# Patient Record
Sex: MALE | Race: WHITE | Employment: FULL TIME | URBAN - METROPOLITAN AREA
[De-identification: names, ages, dates, MRNs, and addresses within clinical notes are randomized per-mention and may not be internally consistent; named-entity substitution may affect disease eponyms.]

---
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
## 2020-01-07 ENCOUNTER — PATIENT OUTREACH (OUTPATIENT)
Dept: GASTROENTEROLOGY | Facility: CLINIC | Age: 20
End: 2020-01-07

## 2020-01-07 ENCOUNTER — TELEPHONE (OUTPATIENT)
Dept: GASTROENTEROLOGY | Facility: CLINIC | Age: 20
End: 2020-01-07

## 2020-01-07 DIAGNOSIS — K50.90 CROHN'S DISEASE (H): ICD-10-CM

## 2020-01-07 DIAGNOSIS — K50.90 CROHN'S DISEASE (H): Primary | ICD-10-CM

## 2020-01-07 LAB
FERRITIN SERPL-MCNC: 167 NG/ML (ref 26–388)
IRON SATN MFR SERPL: 19 % (ref 15–46)
IRON SERPL-MCNC: 62 UG/DL (ref 35–180)
TIBC SERPL-MCNC: 320 UG/DL (ref 240–430)
VIT B12 SERPL-MCNC: 643 PG/ML (ref 193–986)

## 2020-01-07 PROCEDURE — 83540 ASSAY OF IRON: CPT | Performed by: INTERNAL MEDICINE

## 2020-01-07 PROCEDURE — 82607 VITAMIN B-12: CPT | Performed by: INTERNAL MEDICINE

## 2020-01-07 PROCEDURE — 83550 IRON BINDING TEST: CPT | Performed by: INTERNAL MEDICINE

## 2020-01-07 PROCEDURE — 82728 ASSAY OF FERRITIN: CPT | Performed by: INTERNAL MEDICINE

## 2020-01-07 PROCEDURE — 36415 COLL VENOUS BLD VENIPUNCTURE: CPT | Performed by: INTERNAL MEDICINE

## 2020-01-07 NOTE — TELEPHONE ENCOUNTER
Prior Authorization Not Needed per Insurance    Medication: HUMIRA WEEKLY - NEW INSURANCE NO PA NEEDED  Insurance Company: [employer] - Phone [phone] Fax [phone]  Expected CoPay:      Pharmacy Filling the Rx: [facility] - [address] [place] [address]  Pharmacy Notified:    Patient Notified:

## 2020-01-07 NOTE — TELEPHONE ENCOUNTER
PA Initiation    Medication: HUMIRA WEEKLY - [facility] INSURANCE   Insurance Company: [employer] - Phone [phone] Fax [phone]  Pharmacy Filling the Rx: [facility] [place] - [address] [place] [address]  Filling Pharmacy Phone:    Filling Pharmacy Fax:    Start Date: 1/7/2020

## 2020-01-07 NOTE — PROGRESS NOTES
Mother called and left a message that patient was sleeping a lot and did not want to go snow boarding with his friends as he was tired.  Mother states similar when he was low in iron. Orders placed and my chart message to pt. Also will check on humira as patient insurance has changed. Discussed with pharmacy liaison and needs to fill through [facility]   Pt has 2 pens so not urgent.    Mother aware if be pharmacy.

## 2020-02-25 ENCOUNTER — PATIENT OUTREACH (OUTPATIENT)
Dept: GASTROENTEROLOGY | Facility: CLINIC | Age: 20
End: 2020-02-25

## 2020-02-25 DIAGNOSIS — K50.90 CROHN'S DISEASE (H): Primary | ICD-10-CM

## 2020-02-25 RX ORDER — HYOSCYAMINE SULFATE 0.125 MG
0.12 TABLET ORAL EVERY 4 HOURS PRN
Qty: 40 TABLET | Refills: 3 | Status: SHIPPED | OUTPATIENT
Start: 2020-02-25 | End: 2020-04-13

## 2020-02-25 NOTE — PROGRESS NOTES
Mom called asking for a refill of levsin. Pt about once or twice a week has cramping. Refilled the levsin.

## 2020-04-13 ENCOUNTER — VIRTUAL VISIT (OUTPATIENT)
Dept: GASTROENTEROLOGY | Facility: CLINIC | Age: 20
End: 2020-04-13
Payer: COMMERCIAL

## 2020-04-13 DIAGNOSIS — K50.018 CROHN'S DISEASE OF SMALL INTESTINE WITH OTHER COMPLICATION (H): Primary | ICD-10-CM

## 2020-04-13 RX ORDER — HYOSCYAMINE SULFATE 0.125 MG
0.12 TABLET ORAL EVERY 4 HOURS PRN
Qty: 40 TABLET | Refills: 3 | Status: SHIPPED | OUTPATIENT
Start: 2020-04-13 | End: 2020-04-16

## 2020-04-13 RX ORDER — ADALIMUMAB 40MG/0.4ML
40 KIT SUBCUTANEOUS
Qty: 4 EACH | Refills: 5 | Status: SHIPPED | OUTPATIENT
Start: 2020-04-13 | End: 2020-08-13

## 2020-04-13 RX ORDER — MERCAPTOPURINE 50 MG/1
100 TABLET ORAL DAILY
Qty: 60 TABLET | Refills: 4 | Status: SHIPPED | OUTPATIENT
Start: 2020-04-13 | End: 2020-05-29

## 2020-04-13 ASSESSMENT — PAIN SCALES - GENERAL: PAINLEVEL: NO PAIN (0)

## 2020-04-13 NOTE — PROGRESS NOTES
"Ventura Quiroz is a 19 year old male who is being evaluated via a billable video visit.      The patient has been notified of following:     \"This video visit will be conducted via a call between you and your physician/provider. We have found that certain health care needs can be provided without the need for an in-person physical exam.  This service lets us provide the care you need with a video conversation.  If a prescription is necessary we can send it directly to your pharmacy.  If lab work is needed we can place an order for that and you can then stop by our lab to have the test done at a later time.    Video visits are billed at different rates depending on your insurance coverage.  Please reach out to your insurance provider with any questions.    If during the course of the call the physician/provider feels a video visit is not appropriate, you will not be charged for this service.\"    Patient has given verbal consent for Video visit? Yes    How would you like to obtain your AVS? LeilaChaseley    Patient would like the video invitation sent by: Text to cell phone: [phone]      Video Start Time: 2:58 PM      Video-Visit Details    Type of service:  Video Visit    Video End Time (time video stopped): 3:18    Originating Location (pt. Location): Home    Distant Location (provider location):  [facility]     Mode of Communication:  Video Conference via [employer]el[employer]      Tobin Wright MD      "

## 2020-04-13 NOTE — PROGRESS NOTES
IBD CLINIC VISIT    CC/REFERRING MD:  Referred Self  REASON FOR CONSULTATION: Crohn's disease    ASSESSMENT/PLAN:  19 year old male with Crohn's disease of the ileum initially complicated by phlegmon and abscess.    1. Crohn's disease: Ileal Crohn's, previously complicated by phlegmon and abscess.  He had an improvement initially with adalimumab every other week and 6-mercaptopurine.  However he began develop increased inflammatory markers of recurrent symptoms.  Adalimumab trough concentration was 5.  We dose escalated adalimumab to every week in the fall 2019 and he has had a substantial clinical improvement.  He is technically in clinical remission at this time, but does have ongoing pain that I do believe is related to his ileal disease.  Ultimately I believe we need to reevaluate his Crohn's inflammation on weekly adalimumab.    I suspect his pain may be related to his Crohn's ileitis.  Potentially some mild stricturing disease or ongoing inflammation with some luminal narrowing.  Ultimately our goal will be to treat to deep remission.  He will need to be reevaluated with MRE and colonoscopy on weekly adalimumab and 100 mg of mercaptopurine.    We discussed the importance of remaining on the patient's current immunosuppressive therapy.  Discussed that the risks of coronavirus on these medications were small.  Discussed that should medications be interrupted, there could be a flare of the underlying inflammatory bowel disease which could require prednisone or hospitalization, both of which would likely increase the risk of coronavirus beyond that of the current medications.  Also discussed that should we enter a medications there is no guarantee that they would be effective again.  Finally we discussed the importance of hand hygiene, social isolation, and following all routine recommendations from the CDC.    -- Continue combination therapy with adalimumab weekly and mercaptopourine 50mg daily  -- Labs in May:   repeat adalimumab concentration and thiopurine metabolites   -- Colonoscopy / MRE in Spring July 2020 (tentative based on coronavirus)    2.  Acne: Follow with Derm, improved - almost resolved.    IBD HISTORY  Age at diagnosis: 18  Extent of disease: ileal  Disease phenotype: fistulizing  Nieves-anal disease: No  Current CD medications:   - Adalimumab (started 5/8/19) 40mg QOW; increase to EW 9/10/19  - mercaptopurine 100mg daily  Prior IBD surgeries: None  Prior IBD Medications:   - Ciprofloxacin  - Metronidazole    DRUG MONITORING  TPMT enzyme activity:     6-TGN/6-MMPN levels:    Biologic concentration:  8/23/19: Adalimumab: 5, no antibodies, every other week, 50mg mercaptopurine     DISEASE ASSESSMENT  Labs  Recent Labs   Lab Test 12/13/19  1341 09/10/19  0841   CRP 7.9 4.0   SED 9 5     Fecal calprotectin: --  Endoscopy: ileitis with stenosis at IC valve  Enterography: Inflamed ileum, inflammatory mass, likely ileo-ileal distula. > 25cm of ileum involved.   C diff: negative (4/9/19)    sIBDQ:   IBDQ Score Date IBDQ - Total Score  (Higher score better)   9/10/2019 62   5/28/2019 56   4/24/2019 48       IBD Health Care Maintenance:    Vaccinations:  All patients on biologics should avoid live vaccines.    -- Influenza (every year)  -- TdaP (every 10 years)  -- Pneumococcal Pneumonia (once plus booster at 5 years)  -- Yearly assessment for latent Tb (verbal screening and exam, PPD or QuantiFERON-Tb testing)    One time confirmation of immunity or serologies:  -- Hepatitis A (serologies or immunizations)  -- Hepatitis B (serologies or immunizations)  -- Varicella  -- MMR  -- HPV (all aged 18-26)  -- Meningococcal meningitis (all patients at risk for meningitis)  -- Due to the immunosuppression in this patient, I would not advise administration of live vaccines such as varicella/VZV, intranasal influenza, MMR, or yellow fever vaccine (if travelling).      Bone mineral density screening   -- Recommend all patients  supplement with calcium and vitamin D  -- Given prior steroid use recommend DEXA if not already done    Cancer Screening:  Colon cancer screening:  Given ileal only disease, he does not need IBD dysplasia surveillance     Skin cancer screening: Annual visual exam of skin by dermatologist since patient is immunocompromised    Depression Screening:  PHQ-2 Score:     PHQ-2 ( 1999 [employer]) 4/24/2019 6/17/2013   Q1: Little interest or pleasure in doing things 1 0   Q2: Feeling down, depressed or hopeless 0 0   PHQ-2 Score 1 0   Q1: Little interest or pleasure in doing things Several days -   Q2: Feeling down, depressed or hopeless Not at all -   PHQ-2 Score 1 -         Misc:  -- Avoid tobacco use  -- Avoid NSAIDs as there is potentially a 25% chance of causing an IBD flare    Return to clinic in 3 months    Thank you for this consultation.  It was a pleasure to participate in the care of this patient; please contact us with any further questions.  A total of 20 minutes, face to face, was spent with this patient, >50% of which was counseling regarding the above delineated issues.      This note was created with voice recognition software, and while reviewed for accuracy, typos may remain.     Tobin Wright MD   of Medicine  Division of Gastroenterology, Hepatology and Nutrition  [facility]      HPI:   Virtual visit accomplished today for his follow-up.  Overall he is doing very well and has had an over 90% improvement in his Crohn's symptoms with his dose escalation to weekly adalimumab.  He continues to have intermittent abdominal pain.  There seems to be some triggers for this.  For example when he was on vacation with his friend in [place] he was having pain every single day.  However back at home he is having pain only once every 2 to 3 weeks.    Pain is below the belly button, and similar to when he was initially hospitalized, but not as severe.  He will take the antispasmodic  hyoscyamine which resolves his pain within 30 minutes.      Otherwise having 2 stools a day.  No blood.  No other EIM of Crohn's disease.      Was laid off Feb 14.     ROS:    No fevers or chills  No weight loss  No blurry vision, double vision or change in vision  No sore throat  No lymphadenopathy  No headache, paraesthesias, or weakness in a limb  No shortness of breath or wheezing  No chest pain or pressure  No arthralgias or myalgias  No rashes or skin changes  No odynophagia or dysphagia  No BRBPR, hematochezia, melena  No dysuria, frequency or urgency  No hot/cold intolerance or polyria  No anxiety or depression    Extra intestinal manifestations of IBD:  No uveitis/episcleritis  No aphthous ulcers   No arthritis   No erythema nodosum/pyoderma gangrenosum.     PERTINENT PAST MEDICAL HISTORY:  Past Medical History:   Diagnosis Date     Allergic rhinitis, cause unspecified     Zyrtec helps     Crohn's Colitis 4/8/2019     Unspecified otitis media     Otitis Media       PREVIOUS SURGERIES:  Past Surgical History:   Procedure Laterality Date     COLONOSCOPY N/A 4/11/2019    Procedure: COMBINED COLONOSCOPY, SINGLE OR MULTIPLE BIOPSY/POLYPECTOMY BY BIOPSY;  Surgeon: Tobin Wright MD;  Location:  GI     EXCISE LIP OR CHEEK FOLD  10/3/2003    Needle cautery frenulectomy.     PE TUBES  4/25/2006     TONSILLECTOMY & ADENOIDECTOMY  4/25/2006       PREVIOUS ENDOSCOPY:  Results for orders placed or performed during the hospital encounter of 04/08/19   COLONOSCOPY   Result Value Ref Range    COLONOSCOPY       [address] [place] [address] [phone]     Endoscopy Department  _______________________________________________________________________________  Patient Name: Ventura Quiroz             Procedure Date: 4/11/2019 7:20 AM  MRN: [id number]                       Account Number: [id number]  YOB: 2000              Admit Type: Inpatient  Age: 18                                Room:  #1  Gender: Male                          Note Status: Finalized  Attending MD: Tobin Wright ,    Total Sedation Time:   _______________________________________________________________________________     Procedure:           Colonoscopy  Indications:         Chronic diarrhea, Suspected Crohn's disease of the small                        bowel, Abnormal CT of the GI tract  Providers:           Tobin Wright, Evens Wolfe, RN  Referring MD:          Medicines:           See the other procedure note for documentation of the                         administered medications  Complications:       No immediate complications.  _______________________________________________________________________________  Procedure:           Pre-Anesthesia Assessment:                       - Prior to the procedure, a History and Physical was                        performed, and patient medications and allergies were                        reviewed. The patient is competent. The risks and                        benefits of the procedure and the sedation options and                        risks were discussed with the patient. All questions                        were answered and informed consent was obtained. Patient                        identification and proposed procedure were verified by                        the physician and the nurse in the pre-procedure area in                        the procedure room. Mental Status Examination: alert and                        oriented. Airway Examination: normal oropharyngeal                         airway and neck mobility and Mallampati Class II (the                        uvula but not tonsillar pillars visualized). Respiratory                        Examination: clear to auscultation. CV Examination:                        normal. Prophylactic Antibiotics: The patient does not                        require prophylactic antibiotics. Prior  Anticoagulants:                        The patient has taken no previous anticoagulant or                        antiplatelet agents. ASA Grade Assessment: II - A                        patient with mild systemic disease. After reviewing the                        risks and benefits, the patient was deemed in                        satisfactory condition to undergo the procedure. The                        anesthesia plan was to use moderate sedation / analgesia                        (conscious sedation). Immediately prior to                        administration of medications, the patient was                        re-assessed for adequacy  to receive sedatives. The heart                        rate, respiratory rate, oxygen saturations, blood                        pressure, adequacy of pulmonary ventilation, and                        response to care were monitored throughout the                        procedure. The physical status of the patient was                        re-assessed after the procedure.                       After obtaining informed consent, the colonoscope was                        passed under direct vision. Throughout the procedure,                        the patient's blood pressure, pulse, and oxygen                        saturations were monitored continuously. The Colonoscope                        was introduced through the anus and advanced to the                        cecum, identified by appendiceal orifice and ileocecal                        valve. The colonoscopy was performed without difficulty.                        The patient tolerated the procedure well. The quality of                         the bowel preparation was good.                                                                                   Findings:       The perianal and digital rectal examinations were normal.       The colon (entire examined portion) appeared normal. Biopsies were taken        with a  cold forceps for histology. Verification of patient        identification for the specimen was done. Estimated blood loss was        minimal.       Diffuse inflammation, severe and characterized by serpentine ulcerations        was found in the terminal ileum. The TI was stenotic and unable to be        intubated with the adult colonoscope, however the mucosa was visualized.        Biopsies were taken with a cold forceps for histology. Verification of        patient identification for the specimen was done. Estimated blood loss        was minimal.                                                                                   Impression:          - Due to a technical problem, usable images w ere not                        captured                       - The entire examined colon is normal. Biopsied.                       - Ileitis consistent with Crohn's disease with narrowing                        at TI. Biopsied.  Recommendation:      - Await pathology results.                       - Continue antibiotics: Cipro and flagyl                       - Low residue diet.                       - Tentative plan for repeat CT scan in 2-3 weeks to                        assess for improvement of abscess                       - If abscess improving, then likely start anti-TNF                       - Establish care in the IBD program (GI team to                        coordinate).                       - Follow-up pathology results.                                                                                     Electronically signed by: Tobin Wright MD  _____________________  Tobin Wright,   4/11/2019 9:47:56 PM  I was physically present for the entire viewing portion o f the exam.  __________________________  Signature of teaching physician  Nicole/Lauren Wright  Number of Addenda: 0    Note Initiated On: 4/11/2019 7:20 AM  Scope In:  Scope Out:     ]    ALLERGIES:     Allergies   Allergen Reactions      Amoxicillin      Penicillin [Penicillins]        PERTINENT MEDICATIONS:    Current Outpatient Medications:      adalimumab (HUMIRA *CF* PEN) 40 MG/0.4ML pen kit, Inject 0.4 mLs (40 mg) Subcutaneous every 7 days, Disp: 4 each, Rfl: 5     adalimumab (HUMIRA *CF* PEN) 40 MG/0.4ML pen kit, Inject 0.4 mLs (40 mg) Subcutaneous every 7 days, Disp: 4 each, Rfl: 5     ferrous sulfate (FEROSUL) 325 (65 Fe) MG tablet, Take 1 tablet (325 mg) by mouth daily (with lunch), Disp: 90 tablet, Rfl: 3     mercaptopurine (PURINETHOL) 50 MG tablet, Take 2 tablets (100 mg) by mouth daily, Disp: 60 tablet, Rfl: 4     clindamycin (CLEOCIN T) 1 % external lotion, Apply topically 2 times daily (Patient not taking: Reported on 4/13/2020), Disp: 60 mL, Rfl: 11     hyoscyamine (LEVSIN) 0.125 MG tablet, Take 1 tablet (125 mcg) by mouth every 4 hours as needed for cramping (Patient not taking: Reported on 4/13/2020), Disp: 40 tablet, Rfl: 3     tretinoin (RETIN-A) 0.1 % external cream, Apply topically At Bedtime Apply a pea sized amount to the forehead, cheeks, and temples nightly before bed. (Patient not taking: Reported on 4/13/2020), Disp: 45 g, Rfl: 3    SOCIAL HISTORY:  Social History     Socioeconomic History     Marital status: Single     Spouse name: Not on file     Number of children: Not on file     Years of education: Not on file     Highest education level: Not on file   Occupational History     Not on file   Social Needs     Financial resource strain: Not on file     Food insecurity     Worry: Not on file     Inability: Not on file     Transportation needs     Medical: Not on file     Non-medical: Not on file   Tobacco Use     Smoking status: Never Smoker     Smokeless tobacco: Never Used   Substance and Sexual Activity     Alcohol use: Yes     Frequency: Monthly or less     Drinks per session: 1 or 2     Drug use: No     Sexual activity: Never   Lifestyle     Physical activity     Days per week: Not on file     Minutes per session:  Not on file     Stress: Not on file   Relationships     Social connections     Talks on phone: Not on file     Gets together: Not on file     Attends Hinduism service: Not on file     Active member of club or organization: Not on file     Attends meetings of clubs or organizations: Not on file     Relationship status: Not on file     Intimate partner violence     Fear of current or ex partner: Not on file     Emotionally abused: Not on file     Physically abused: Not on file     Forced sexual activity: Not on file   Other Topics Concern      Service Not Asked     Blood Transfusions Not Asked     Caffeine Concern Not Asked     Occupational Exposure Not Asked     Hobby Hazards Not Asked     Sleep Concern Not Asked     Stress Concern Not Asked     Weight Concern Not Asked     Special Diet Not Asked     Back Care Not Asked     Exercise Not Asked     Bike Helmet Not Asked     Seat Belt Not Asked     Self-Exams Not Asked     Parent/sibling w/ CABG, MI or angioplasty before 65F 55M? Not Asked   Social History Narrative     Not on file       FAMILY HISTORY:  Family History   Problem Relation Age of Onset     Melanoma No family hx of      Skin Cancer No family hx of         No family history of IBD or colon or rectal cancer.     Past/family/social history reviewed and no changes    PHYSICAL EXAMINATION:  Constitutional: aaox3, cooperative, pleasant, not dyspneic/diaphoretic, no acute distress  Vitals reviewed: There were no vitals taken for this visit.  Wt:   Wt Readings from Last 2 Encounters:   12/13/19 81.7 kg (180 lb 1.6 oz) (82 %)*   09/10/19 79.7 kg (175 lb 9.6 oz) (79 %)*     * Growth percentiles are based on [facility] (Boys, 2-20 Years) data.      Eyes: Sclera anicteric/injected  Ears/nose/mouth/throat: Normal oropharynx without ulcers or exudate, mucus membranes moist, hearing intact  Respiratory: Unlabored breathing   Skin: No jaundice  Psych: Normal affect      PERTINENT STUDIES:  Most recent CBC:  Recent Labs    Lab Test 12/13/19  1341 09/10/19  0841   WBC 7.3 4.7   HGB 13.6 14.1   HCT 39.9* 44.4    195     Most recent hepatic panel:  Recent Labs   Lab Test 12/13/19  1341 09/10/19  0841   ALT 24 26   AST 12 10     Most recent creatinine:  Recent Labs   Lab Test 04/10/19  0640 04/09/19  0730   CR 0.80 0.71

## 2020-04-13 NOTE — PATIENT INSTRUCTIONS
-- Plan for labs in May including Humira and mercaptopurine concentrations    -- Tentative plan for colonoscopy and MRE in July.

## 2020-04-13 NOTE — NURSING NOTE
Chief Complaint   Patient presents with     RECHECK     F/U for Crohn's Disease       There were no vitals filed for this visit.    There is no height or weight on file to calculate BMI.      Ann-Marie Sher LPN

## 2020-04-15 ENCOUNTER — TELEPHONE (OUTPATIENT)
Dept: GASTROENTEROLOGY | Facility: CLINIC | Age: 20
End: 2020-04-15

## 2020-04-15 NOTE — TELEPHONE ENCOUNTER
[facility] Call Center    Phone Message    May a detailed message be left on voicemail: yes     Reason for Call: Other: Lola called from [facility], she said they need clarification on the directions for the HYOSCYAMINE. Please call to inform okay to leave a message     Action Taken: Other: ump GI    Travel Screening: Not Applicable

## 2020-04-15 NOTE — TELEPHONE ENCOUNTER
Contacted [facility].  Transferred twice and 12 minutes later discussed with pharmacy.   [facility] pharmacist and given order for 3 month supply.

## 2020-04-16 ENCOUNTER — TELEPHONE (OUTPATIENT)
Dept: GASTROENTEROLOGY | Facility: CLINIC | Age: 20
End: 2020-04-16

## 2020-04-16 ENCOUNTER — PATIENT OUTREACH (OUTPATIENT)
Dept: GASTROENTEROLOGY | Facility: CLINIC | Age: 20
End: 2020-04-16

## 2020-04-16 DIAGNOSIS — K50.018 CROHN'S DISEASE OF SMALL INTESTINE WITH OTHER COMPLICATION (H): ICD-10-CM

## 2020-04-16 DIAGNOSIS — K50.90 CROHN'S DISEASE (H): Primary | ICD-10-CM

## 2020-04-16 RX ORDER — HYOSCYAMINE SULFATE 0.125 MG
0.12 TABLET ORAL EVERY 4 HOURS PRN
Qty: 40 TABLET | Refills: 3 | Status: SHIPPED | OUTPATIENT
Start: 2020-04-16 | End: 2020-11-30

## 2020-04-16 RX ORDER — ONDANSETRON 4 MG/1
TABLET, ORALLY DISINTEGRATING ORAL
Qty: 2 TABLET | Refills: 0 | Status: ON HOLD | OUTPATIENT
Start: 2020-04-16 | End: 2020-08-25

## 2020-04-16 NOTE — PROGRESS NOTES
Order placed for lab, mre, zofran. Left a message for mom as patient's phone does not accept voice mail messages      Also - can we get routine labs, along with Humira level and thiopurine metabolites in May        July - Assess for colonoscopy and MRE - OK to schedule if we are back to scheduling routine procedures       He needs zofran prior to MRE     Office visit in August.

## 2020-04-16 NOTE — TELEPHONE ENCOUNTER
[facility] Call Center    Phone Message    May a detailed message be left on voicemail: yes     Reason for Call: Medication Refill Request    Has the patient contacted the pharmacy for the refill? Yes   Name of medication being requested: hyoscyamine (LEVSIN) 0.125 MG tablet   Provider who prescribed the medication: Dr. Wright  Pharmacy: [facility] received refill request from a different pharmacy for patient and they are needing authorization from provider to refill. Please contact: [phone] and reference # [id number]  Date medication is needed: 4/17/2020      Action Taken: Message routed to:  [facility] GI    Travel Screening: Not Applicable

## 2020-04-17 ENCOUNTER — MEDICAL CORRESPONDENCE (OUTPATIENT)
Dept: HEALTH INFORMATION MANAGEMENT | Facility: CLINIC | Age: 20
End: 2020-04-17

## 2020-05-28 ENCOUNTER — PATIENT OUTREACH (OUTPATIENT)
Dept: GASTROENTEROLOGY | Facility: CLINIC | Age: 20
End: 2020-05-28
Payer: COMMERCIAL

## 2020-05-28 DIAGNOSIS — R30.0 DYSURIA: Primary | ICD-10-CM

## 2020-05-28 DIAGNOSIS — K50.90 CROHN'S DISEASE (H): ICD-10-CM

## 2020-05-28 DIAGNOSIS — R30.0 DYSURIA: ICD-10-CM

## 2020-05-28 LAB
ALBUMIN SERPL-MCNC: 2.9 G/DL (ref 3.4–5)
ALBUMIN UR-MCNC: 30 MG/DL
ALP SERPL-CCNC: 65 U/L (ref 65–260)
ALT SERPL W P-5'-P-CCNC: 20 U/L (ref 0–50)
APPEARANCE UR: ABNORMAL
AST SERPL W P-5'-P-CCNC: 12 U/L (ref 0–35)
BASOPHILS # BLD AUTO: 0 10E9/L (ref 0–0.2)
BASOPHILS NFR BLD AUTO: 0.2 %
BILIRUB DIRECT SERPL-MCNC: 0.2 MG/DL (ref 0–0.2)
BILIRUB SERPL-MCNC: 0.6 MG/DL (ref 0.2–1.3)
BILIRUB UR QL STRIP: NEGATIVE
COLOR UR AUTO: ABNORMAL
CRP SERPL-MCNC: 82.3 MG/L (ref 0–8)
DIFFERENTIAL METHOD BLD: ABNORMAL
EOSINOPHIL NFR BLD AUTO: 0.6 %
ERYTHROCYTE [DISTWIDTH] IN BLOOD BY AUTOMATED COUNT: 15 % (ref 10–15)
ERYTHROCYTE [SEDIMENTATION RATE] IN BLOOD BY WESTERGREN METHOD: 48 MM/H (ref 0–15)
GLUCOSE UR STRIP-MCNC: NEGATIVE MG/DL
HCT VFR BLD AUTO: 31 % (ref 40–53)
HGB BLD-MCNC: 10.7 G/DL (ref 13.3–17.7)
HGB UR QL STRIP: NEGATIVE
IMM GRANULOCYTES # BLD: 0 10E9/L (ref 0–0.4)
IMM GRANULOCYTES NFR BLD: 0.4 %
KETONES UR STRIP-MCNC: 5 MG/DL
LEUKOCYTE ESTERASE UR QL STRIP: NEGATIVE
LYMPHOCYTES # BLD AUTO: 0.5 10E9/L (ref 0.8–5.3)
LYMPHOCYTES NFR BLD AUTO: 8.9 %
MCH RBC QN AUTO: 32.6 PG (ref 26.5–33)
MCHC RBC AUTO-ENTMCNC: 34.5 G/DL (ref 31.5–36.5)
MCV RBC AUTO: 95 FL (ref 78–100)
MONOCYTES # BLD AUTO: 0.4 10E9/L (ref 0–1.3)
MONOCYTES NFR BLD AUTO: 7.4 %
MUCOUS THREADS #/AREA URNS LPF: PRESENT /LPF
NEUTROPHILS # BLD AUTO: 4.3 10E9/L (ref 1.6–8.3)
NEUTROPHILS NFR BLD AUTO: 82.5 %
NITRATE UR QL: NEGATIVE
NRBC # BLD AUTO: 0 10*3/UL
NRBC BLD AUTO-RTO: 0 /100
PH UR STRIP: 5 PH (ref 5–7)
PLATELET # BLD AUTO: 269 10E9/L (ref 150–450)
PROT SERPL-MCNC: 7.4 G/DL (ref 6.8–8.8)
RBC # BLD AUTO: 3.28 10E12/L (ref 4.4–5.9)
RBC #/AREA URNS AUTO: 2 /HPF (ref 0–2)
SOURCE: ABNORMAL
SP GR UR STRIP: 1.03 (ref 1–1.03)
UROBILINOGEN UR STRIP-MCNC: 2 MG/DL (ref 0–2)
WBC # BLD AUTO: 5.3 10E9/L (ref 4–11)
WBC #/AREA URNS AUTO: 2 /HPF (ref 0–5)

## 2020-05-28 PROCEDURE — 85652 RBC SED RATE AUTOMATED: CPT | Performed by: INTERNAL MEDICINE

## 2020-05-28 PROCEDURE — 85025 COMPLETE CBC W/AUTO DIFF WBC: CPT | Performed by: INTERNAL MEDICINE

## 2020-05-28 PROCEDURE — 86140 C-REACTIVE PROTEIN: CPT | Performed by: INTERNAL MEDICINE

## 2020-05-28 PROCEDURE — 81001 URINALYSIS AUTO W/SCOPE: CPT | Performed by: INTERNAL MEDICINE

## 2020-05-28 PROCEDURE — 80076 HEPATIC FUNCTION PANEL: CPT | Performed by: INTERNAL MEDICINE

## 2020-05-28 PROCEDURE — 36415 COLL VENOUS BLD VENIPUNCTURE: CPT | Performed by: INTERNAL MEDICINE

## 2020-06-01 ENCOUNTER — PATIENT OUTREACH (OUTPATIENT)
Dept: GASTROENTEROLOGY | Facility: CLINIC | Age: 20
End: 2020-06-01

## 2020-06-01 DIAGNOSIS — K50.018 CROHN'S DISEASE OF SMALL INTESTINE WITH OTHER COMPLICATION (H): Primary | ICD-10-CM

## 2020-06-01 RX ORDER — CIPROFLOXACIN 500 MG/1
500 TABLET, FILM COATED ORAL 2 TIMES DAILY
Qty: 60 TABLET | Refills: 0 | Status: SHIPPED | OUTPATIENT
Start: 2020-06-01 | End: 2020-08-04

## 2020-06-01 RX ORDER — METRONIDAZOLE 500 MG/1
500 TABLET ORAL 2 TIMES DAILY
Qty: 60 TABLET | Refills: 0 | Status: SHIPPED | OUTPATIENT
Start: 2020-06-01 | End: 2020-08-04

## 2020-06-01 RX ORDER — OXYCODONE HYDROCHLORIDE 5 MG/1
5 TABLET ORAL EVERY 8 HOURS PRN
Qty: 28 TABLET | Refills: 0 | Status: SHIPPED | OUTPATIENT
Start: 2020-06-01 | End: 2020-08-13

## 2020-06-01 NOTE — PROGRESS NOTES
Contacted mother that Dr. Wright   Wants patient to move up his mre. Mother given the number to call to see if can get scheduled this week.  Will check on pain med with Dr. Wright.

## 2020-06-05 DIAGNOSIS — K50.90 CROHN'S DISEASE (H): ICD-10-CM

## 2020-06-05 DIAGNOSIS — K50.90 CROHN DISEASE (H): Primary | ICD-10-CM

## 2020-06-05 PROCEDURE — 82397 CHEMILUMINESCENT ASSAY: CPT | Mod: 90 | Performed by: INTERNAL MEDICINE

## 2020-06-05 PROCEDURE — 36415 COLL VENOUS BLD VENIPUNCTURE: CPT | Performed by: INTERNAL MEDICINE

## 2020-06-05 PROCEDURE — 80145 DRUG ASSAY ADALIMUMAB: CPT | Mod: 90 | Performed by: INTERNAL MEDICINE

## 2020-06-05 PROCEDURE — 99000 SPECIMEN HANDLING OFFICE-LAB: CPT | Performed by: INTERNAL MEDICINE

## 2020-06-08 LAB
ADALIMUMAB NEUTRALIZING ANTIBODY: NOT DETECTED
ADALIMUMAB SERPL-MCNC: 14.47 UG/ML
PATHOLOGY STUDY: NORMAL

## 2020-06-09 ENCOUNTER — ANCILLARY PROCEDURE (OUTPATIENT)
Dept: MRI IMAGING | Facility: CLINIC | Age: 20
End: 2020-06-09
Attending: INTERNAL MEDICINE
Payer: COMMERCIAL

## 2020-06-09 DIAGNOSIS — K50.90 CROHN'S DISEASE (H): ICD-10-CM

## 2020-06-09 RX ORDER — GADOBUTROL 604.72 MG/ML
10 INJECTION INTRAVENOUS ONCE
Status: COMPLETED | OUTPATIENT
Start: 2020-06-09 | End: 2020-06-09

## 2020-06-09 RX ADMIN — GADOBUTROL 8 ML: 604.72 INJECTION INTRAVENOUS at 09:15

## 2020-06-12 NOTE — PROGRESS NOTES
He reports feeling better.     Taking some tylenol for pain at night when wakes up.     Since starting antibiotics (6/1/20), only one episode of pain.     MRE reviewed and there is progression from the fall MRE with new colonic inflammation and continued active on chronic enteritis in long segment of ileum. 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     Plan:  -- Continue antibiotics  -- Continue 6-MP 100mg daily  -- Start Infliximab: suspect he will need higher dosing as having disease progression on adalimumab. Will outline this in a letter, however ideally start infliximab (at any dose) in next 1-2 weeks.     Tobin Wright MD    [facility]  Inflammatory Bowel Disease Program  Division of Gastroenterology, Hepatology and Nutrition

## 2020-06-16 ENCOUNTER — PATIENT OUTREACH (OUTPATIENT)
Dept: GASTROENTEROLOGY | Facility: CLINIC | Age: 20
End: 2020-06-16

## 2020-06-16 DIAGNOSIS — K50.014 CROHN'S DISEASE OF SMALL INTESTINE WITH ABSCESS (H): Primary | ICD-10-CM

## 2020-06-16 RX ORDER — DIPHENHYDRAMINE HCL 25 MG
25 CAPSULE ORAL ONCE
Status: CANCELLED
Start: 2020-06-16

## 2020-06-16 RX ORDER — ACETAMINOPHEN 325 MG/1
650 TABLET ORAL ONCE
Status: CANCELLED
Start: 2020-06-16

## 2020-06-16 RX ORDER — METHYLPREDNISOLONE SODIUM SUCCINATE 125 MG/2ML
125 INJECTION, POWDER, LYOPHILIZED, FOR SOLUTION INTRAMUSCULAR; INTRAVENOUS ONCE
Status: CANCELLED | OUTPATIENT
Start: 2020-06-16

## 2020-06-16 NOTE — PROGRESS NOTES
Entered therapy plan and standing lab orders. Patient lives in [place] so first choice is infusions at [facility].  Will send message to prior. Patient  aware need approval from insurance. Also will send literature.   Discussed plan with patient and mom.       Progressive disease on MRE     Lets change from humira to infliximab     I would like to do infliximab 10mg/kg induction and q8     I wrote a letter explaining reason.     If denied, then would do 5mg/kg and if needed rapidly increase.

## 2020-06-24 ENCOUNTER — PATIENT OUTREACH (OUTPATIENT)
Dept: GASTROENTEROLOGY | Facility: CLINIC | Age: 20
End: 2020-06-24

## 2020-06-24 NOTE — PROGRESS NOTES
Contacted mother of patient to discuss infusion   Approved for 5 mgkg  Has appt with lab and infusion at [facility] on June 25 at 10   Working on appeal for higher dose  Patient very nauseous and vomiting  Mother unable to confirm  number of loose stools as patient refuses to discuss.   Will update Dr. Wright.

## 2020-06-25 ENCOUNTER — INFUSION THERAPY VISIT (OUTPATIENT)
Dept: INFUSION THERAPY | Facility: CLINIC | Age: 20
End: 2020-06-25
Payer: COMMERCIAL

## 2020-06-25 VITALS
TEMPERATURE: 98.4 F | HEIGHT: 75 IN | OXYGEN SATURATION: 100 % | SYSTOLIC BLOOD PRESSURE: 110 MMHG | HEART RATE: 81 BPM | BODY MASS INDEX: 19.16 KG/M2 | WEIGHT: 154.1 LBS | RESPIRATION RATE: 16 BRPM | DIASTOLIC BLOOD PRESSURE: 63 MMHG

## 2020-06-25 DIAGNOSIS — D50.9 ANEMIA, IRON DEFICIENCY: Primary | ICD-10-CM

## 2020-06-25 DIAGNOSIS — K50.014 CROHN'S DISEASE OF SMALL INTESTINE WITH ABSCESS (H): ICD-10-CM

## 2020-06-25 DIAGNOSIS — K50.014 CROHN'S DISEASE OF SMALL INTESTINE WITH ABSCESS (H): Primary | ICD-10-CM

## 2020-06-25 DIAGNOSIS — D50.9 ANEMIA, IRON DEFICIENCY: ICD-10-CM

## 2020-06-25 DIAGNOSIS — K52.9 COLITIS: ICD-10-CM

## 2020-06-25 LAB
ALBUMIN SERPL-MCNC: 2.9 G/DL (ref 3.4–5)
ALP SERPL-CCNC: 54 U/L (ref 65–260)
ALT SERPL W P-5'-P-CCNC: 18 U/L (ref 0–50)
AST SERPL W P-5'-P-CCNC: 9 U/L (ref 0–35)
BASOPHILS # BLD AUTO: 0 10E9/L (ref 0–0.2)
BASOPHILS NFR BLD AUTO: 0.2 %
BILIRUB DIRECT SERPL-MCNC: 0.5 MG/DL (ref 0–0.2)
BILIRUB SERPL-MCNC: 1.5 MG/DL (ref 0.2–1.3)
CRP SERPL-MCNC: 91.2 MG/L (ref 0–8)
DIFFERENTIAL METHOD BLD: ABNORMAL
EOSINOPHIL # BLD AUTO: 0 10E9/L (ref 0–0.7)
EOSINOPHIL NFR BLD AUTO: 0.7 %
ERYTHROCYTE [DISTWIDTH] IN BLOOD BY AUTOMATED COUNT: 15.9 % (ref 10–15)
ERYTHROCYTE [SEDIMENTATION RATE] IN BLOOD BY WESTERGREN METHOD: 33 MM/H (ref 0–15)
HCT VFR BLD AUTO: 37.1 % (ref 40–53)
HGB BLD-MCNC: 12.1 G/DL (ref 13.3–17.7)
IMM GRANULOCYTES # BLD: 0 10E9/L (ref 0–0.4)
IMM GRANULOCYTES NFR BLD: 0.4 %
LYMPHOCYTES # BLD AUTO: 0.5 10E9/L (ref 0.8–5.3)
LYMPHOCYTES NFR BLD AUTO: 8.9 %
MCH RBC QN AUTO: 32.4 PG (ref 26.5–33)
MCHC RBC AUTO-ENTMCNC: 32.6 G/DL (ref 31.5–36.5)
MCV RBC AUTO: 100 FL (ref 78–100)
MONOCYTES # BLD AUTO: 0.3 10E9/L (ref 0–1.3)
MONOCYTES NFR BLD AUTO: 5.2 %
NEUTROPHILS # BLD AUTO: 4.6 10E9/L (ref 1.6–8.3)
NEUTROPHILS NFR BLD AUTO: 84.6 %
PLATELET # BLD AUTO: 243 10E9/L (ref 150–450)
PROT SERPL-MCNC: 7.5 G/DL (ref 6.8–8.8)
RBC # BLD AUTO: 3.73 10E12/L (ref 4.4–5.9)
WBC # BLD AUTO: 5.4 10E9/L (ref 4–11)

## 2020-06-25 PROCEDURE — 80076 HEPATIC FUNCTION PANEL: CPT | Performed by: INTERNAL MEDICINE

## 2020-06-25 PROCEDURE — 96375 TX/PRO/DX INJ NEW DRUG ADDON: CPT | Performed by: NURSE PRACTITIONER

## 2020-06-25 PROCEDURE — 96413 CHEMO IV INFUSION 1 HR: CPT | Performed by: NURSE PRACTITIONER

## 2020-06-25 PROCEDURE — 85652 RBC SED RATE AUTOMATED: CPT | Performed by: INTERNAL MEDICINE

## 2020-06-25 PROCEDURE — 36415 COLL VENOUS BLD VENIPUNCTURE: CPT | Performed by: INTERNAL MEDICINE

## 2020-06-25 PROCEDURE — 96415 CHEMO IV INFUSION ADDL HR: CPT | Performed by: NURSE PRACTITIONER

## 2020-06-25 PROCEDURE — 99207 ZZC NO CHARGE LOS: CPT

## 2020-06-25 PROCEDURE — 85025 COMPLETE CBC W/AUTO DIFF WBC: CPT | Performed by: INTERNAL MEDICINE

## 2020-06-25 PROCEDURE — 86140 C-REACTIVE PROTEIN: CPT | Performed by: INTERNAL MEDICINE

## 2020-06-25 RX ORDER — DIPHENHYDRAMINE HCL 25 MG
25 CAPSULE ORAL ONCE
Status: CANCELLED
Start: 2020-07-09

## 2020-06-25 RX ORDER — METHYLPREDNISOLONE SODIUM SUCCINATE 125 MG/2ML
125 INJECTION, POWDER, LYOPHILIZED, FOR SOLUTION INTRAMUSCULAR; INTRAVENOUS ONCE
Status: COMPLETED | OUTPATIENT
Start: 2020-06-25 | End: 2020-06-25

## 2020-06-25 RX ORDER — ACETAMINOPHEN 325 MG/1
650 TABLET ORAL ONCE
Status: CANCELLED
Start: 2020-07-09

## 2020-06-25 RX ORDER — METHYLPREDNISOLONE SODIUM SUCCINATE 125 MG/2ML
125 INJECTION, POWDER, LYOPHILIZED, FOR SOLUTION INTRAMUSCULAR; INTRAVENOUS ONCE
Status: CANCELLED | OUTPATIENT
Start: 2020-07-09

## 2020-06-25 RX ADMIN — Medication 250 ML: at 10:33

## 2020-06-25 RX ADMIN — METHYLPREDNISOLONE SODIUM SUCCINATE 125 MG: 125 INJECTION INTRAMUSCULAR; INTRAVENOUS at 10:34

## 2020-06-25 ASSESSMENT — MIFFLIN-ST. JEOR: SCORE: 1799.62

## 2020-06-25 ASSESSMENT — PAIN SCALES - GENERAL: PAINLEVEL: MODERATE PAIN (4)

## 2020-06-25 NOTE — PROGRESS NOTES
Infusion Nursing Note:  Ventura Quiroz presents today for 1st dose Remicade.    Patient seen by provider today: No   present during visit today: Not Applicable.    Note: N/A.    Intravenous Access:  Peripheral IV placed.    Treatment Conditions:  Lab Results   Component Value Date    HGB 12.1 06/25/2020     Lab Results   Component Value Date    WBC 5.4 06/25/2020      Lab Results   Component Value Date    ANEU 4.6 06/25/2020     Lab Results   Component Value Date     06/25/2020      Lab Results   Component Value Date     04/10/2019                   Lab Results   Component Value Date    POTASSIUM 3.6 04/10/2019           No results found for: MAG         Lab Results   Component Value Date    CR 0.80 04/10/2019                   Lab Results   Component Value Date    SHANNA 8.5 04/10/2019                Lab Results   Component Value Date    BILITOTAL 0.6 05/28/2020           Lab Results   Component Value Date    ALBUMIN 2.9 05/28/2020                    Lab Results   Component Value Date    ALT 20 05/28/2020           Lab Results   Component Value Date    AST 12 05/28/2020       Biological Infusion Checklist:  ~~~ NOTE: If the patient answers yes to any of the questions below, hold the infusion and contact ordering provider or on-call provider.    1. Have you recently had an elevated temperature, fever, chills, productive cough, coughing for 3 weeks or longer or hemoptysis, abnormal vital signs, night sweats,  chest pain or have you noticed a decrease in your appetite, unexplained weight loss or fatigue? No  2. Do you have any open wounds or new incisions? No  3. Do you have any recent or upcoming hospitalizations, surgeries or dental procedures? No  4. Do you currently have or recently have had any signs of illness or infection or are you on any antibiotics? Yes, on antibiotics now per DR. Wright  5. Have you had any new, sudden or worsening abdominal pain? Yes, DR. Wright aware, pain meds  ordered  6. Have you or anyone in your household received a live vaccination in the past 4 weeks? Please note:  No live vaccines while on biologic/chemotherapy until 6 months after the last treatment.  Patient can receive the flu vaccine (shot only) and the pneumovax.  It is optimal for the patient to get these vaccines mid cycle, but they can be given at any time as long as it is not on the day of the infusion. No  7. Have you recently been diagnosed with any new nervous system diseases (ie. Multiple sclerosis, Guillain [place], seizures, neurological changes) or cancer diagnosis? No  8. Are you on any form of radiation or chemotherapy? No  9. Are you pregnant or breast feeding or do you have plans of pregnancy in the future? No  10. Have you been having any signs of worsening depression or suicidal ideations?  (benlysta only) No  11. Have there been any other new onset medical symptoms? No        Post Infusion Assessment:  Patient tolerated infusion without incident.  Blood return noted pre and post infusion.  Access discontinued per protocol.  Biologic Infusion Post Education: Call the triage nurse at your clinic or seek medical attention if you have chills and/or temperature greater than or equal to 100.5, uncontrolled nausea/vomiting, diarrhea, constipation, dizziness, shortness of breath, chest pain, heart palpitations, weakness or any other new or concerning symptoms, questions or concerns.  You cannot have any live virus vaccines prior to or during treatment or up to 6 months post infusion.  If you have an upcoming surgery, medical procedure or dental procedure during treatment, this should be discussed with your ordering physician and your surgeon/dentist.  If you are having any concerning symptom, if you are unsure if you should get your next infusion or wish to speak to a provider before your next infusion, please call your care coordinator or triage nurse at your clinic to notify them so we can adequately  serve you.       Discharge Plan:   Discharge instructions reviewed with: Patient.  Patient and/or family verbalized understanding of discharge instructions and all questions answered.  Patient discharged in stable condition accompanied by: self.  Departure Mode: Ambulatory.    Marianne Knutson RN

## 2020-07-06 ENCOUNTER — PATIENT OUTREACH (OUTPATIENT)
Dept: GASTROENTEROLOGY | Facility: CLINIC | Age: 20
End: 2020-07-06

## 2020-07-06 NOTE — TELEPHONE ENCOUNTER
Changed dose to 5 mg/kg as insurance will not cover 10mg/kg.   Will work on appealing this determination.

## 2020-07-06 NOTE — PROGRESS NOTES
He can get the 5mg/kg dose, but I need the order changed. He dosed at [place] with 10mg/kg and I didn't realize it. So we're going to need to appeal it. The rep I spoke with said we can't do a peer to peer until the dose escalation criteria is met. I'll call today to see if I get a different answer. Sometimes that happens.    Previous Messages     ----- Message -----   From: Holly Johnson RN   Sent: 7/6/2020   9:52 AM CDT   To: Cisco Harkins   Subject: RE: NEED ASAP UPDATE ON INFUSION                 Cisco, the second dose tomorrow is at [facility] is okay right?   What about the dose of 10 mg/kg.   ----- Message -----   From: Cisco Harkins   Sent: 6/30/2020   9:39 AM CDT   To: Holly Johnson RN, Blessing Faria RN   Subject: RE: NEED ASAP UPDATE ON INFUSION                     We actually dosed last week at 10mg/kg anyway. So that will have to be appealed eventually. And by switching to [facility] they required transition to home infusion. And dose escalation has its own policy that is basically a peer to peer. Below is the criteria:       Dose escalation (up to the maximum dose and frequency specified above) may occur upon clinical review on a case by case basis provided that the patient has:     o Shown an initial response to therapy; AND   o Received the three loading doses (0, 2, 6) at the dose AND interval specified above (week 8); AND   o Received a minimum of one maintenance dose at the dose AND interval specified above; AND   o Responded to therapy (by treatment week 16) with subsequent loss of response; AND   o Dose escalation may either increase the dose OR decrease the interval provided it does not   exceed the following limits:   o Dose increase by no more than 2.5 mg/kg (up to 10mg/kg); OR   o Interval decrease by no more than 2 weeks (5mg/kg q 6 weeks)     I'm getting that free drug paperwork drawn up today. How do you want it sent to you?     Thanks much!   ~Cisco   ----- Message -----   From: Alex  LEXY Barrera   Sent: 6/25/2020   9:14 AM CDT   To: Blessing Marie RN   Subject: RE: NEED ASAP UPDATE ON INFUSION                 Cisco, what it the next step   As letter was sent   Can we appeal   Or do peer to peer   ----- Message -----   From: Cisco Harkins   Sent: 6/25/2020   9:10 AM CDT   To: Holly Johnson RN   Subject: RE: NEED ASAP UPDATE ON INFUSION                 No go on the 10mg/kg unfortunately.   ----- Message -----   From: Holly Johnson RN   Sent: 6/25/2020   9:08 AM CDT   To: Cisco Harkins   Subject: RE: NEED ASAP UPDATE ON INFUSION                 Cisco, he is scheduled today at [place]. Any chance a response on the higher dose of 10mg/kg   ----- Message -----   From: Cisco Harkins   Sent: 6/24/2020  12:16 PM CDT   To: Holly Johnson RN   Subject: RE: NEED ASAP UPDATE ON INFUSION                 Well if he goes anywhere other than [place] I'll need to know because I'll have to redo the PA. Just let me know where he ends up so I can change it if need be.   ----- Message -----   From: Holly Johnson RN   Sent: 6/24/2020  10:02 AM CDT   To: Cisco Torin   Subject: RE: NEED ASAP UPDATE ON INFUSION                 Cisco,  so can we go ahead with 5 mg without waiting for the higher dose determination? If so can I have him go to any [place] that I can find an urgent appt/   ----- Message -----   From: Cisco Harkins   Sent: 6/24/2020   9:48 AM CDT   To: Holly Johnson RN, Ethan Phoenix, Megan Bregel   Subject: RE: NEED ASAP UPDATE ON INFUSION                 They won't do the 10mg/kg and I did verify they read the LMN I sent in. But because it's a starting dose plan requirements state he must start at 5mg/kg. I'll have an answer by the end of the day per the rep. I confirmed this with her and stated patient will be hospitalized if we do not have a determination. I'll let you know once I hear something.     ~Cisco   ----- Message -----   From: Holly Johnson RN   Sent: 6/24/2020   9:34 AM CDT   To: Ethan Phoenix, Kris  Drea Harkins, *   Subject: NEED ASAP UPDATE ON INFUSION                     I WILL NEED TO SEND HIM TO THE HOSPITAL IF CAN NOT GET APPROVAL FOR REMICADE   OKAY WITH 5 MG IF 10 NOT APPROVED             We actually dosed last week at 10mg/kg anyway. So that will have to be appealed eventually. And by switching to [facility] they required transition to home infusion. And dose escalation has its own policy that is basically a peer to peer. Below is the criteria:       Dose escalation (up to the maximum dose and frequency specified above) may occur upon clinical review on a case by case basis provided that the patient has:     o Shown an initial response to therapy; AND   o Received the three loading doses (0, 2, 6) at the dose AND interval specified above (week 8); AND   o Received a minimum of one maintenance dose at the dose AND interval specified above; AND   o Responded to therapy (by treatment week 16) with subsequent loss of response; AND   o Dose escalation may either increase the dose OR decrease the interval provided it does not   exceed the following limits:   o Dose increase by no more than 2.5 mg/kg (up to 10mg/kg); OR   o Interval decrease by no more than 2 weeks (5mg/kg q 6 weeks)     I'm getting that free drug paperwork drawn up today. How do you want it sent to you?     Thanks much!   ~Cisco   ----- Message -----   From: Holly Johnson RN   Sent: 6/25/2020   9:14 AM CDT   To: Blessing Marie RN   Subject: RE: NEED ASAP UPDATE ON INFUSION                 Cisco, what it the next step   As letter was sent   Can we appeal   Or do peer to peer   ----- Message -----   From: Cisco Harkins   Sent: 6/25/2020   9:10 AM CDT   To: Holly Johnson RN   Subject: RE: NEED ASAP UPDATE ON INFUSION                 No go on the 10mg/kg unfortunately.   ----- Message -----   From: Holly Johnson RN   Sent: 6/25/2020   9:08 AM CDT   To: Cisco Harkins   Subject: RE: NEED ASAP UPDATE ON INFUSION                 Cisco, he is  scheduled today at [place]. Any chance a response on the higher dose of 10mg/kg   ----- Message -----   From: Cisco Harkins   Sent: 6/24/2020  12:16 PM CDT   To: Holly Johnson RN   Subject: RE: NEED ASAP UPDATE ON INFUSION                 Well if he goes anywhere other than [place] I'll need to know because I'll have to redo the PA. Just let me know where he ends up so I can change it if need be.   ----- Message -----   From: Holly Johnson RN   Sent: 6/24/2020  10:02 AM CDT   To: Cisco Harkins   Subject: RE: NEED ASAP UPDATE ON INFUSION                 Cisco,  so can we go ahead with 5 mg without waiting for the higher dose determination? If so can I have him go to any [place] that I can find an urgent appt/   ----- Message -----   From: Cisco Harkins   Sent: 6/24/2020   9:48 AM CDT   To: Holly Johnson RN, Ethan Phoenix, Megan Bregel   Subject: RE: NEED ASAP UPDATE ON INFUSION                 They won't do the 10mg/kg and I did verify they read the LMN I sent in. But because it's a starting dose plan requirements state he must start at 5mg/kg. I'll have an answer by the end of the day per the rep. I confirmed this with her and stated patient will be hospitalized if we do not have a determination. I'll let you know once I hear something.     ~Cisco   ----- Message -----   From: Holly Johnson RN   Sent: 6/24/2020   9:34 AM CDT   To: Ethan Phoenix, Kris Adam, Megan Bregel, *   Subject: NEED ASAP UPDATE ON INFUSION                     I WILL NEED TO SEND HIM TO THE HOSPITAL IF CAN NOT GET APPROVAL FOR REMICADE   OKAY WITH 5 MG IF 10 NOT APPROVED

## 2020-07-07 ENCOUNTER — PATIENT OUTREACH (OUTPATIENT)
Dept: SURGERY | Facility: CLINIC | Age: 20
End: 2020-07-07

## 2020-07-07 ENCOUNTER — PATIENT OUTREACH (OUTPATIENT)
Dept: GASTROENTEROLOGY | Facility: CLINIC | Age: 20
End: 2020-07-07

## 2020-07-07 ENCOUNTER — INFUSION THERAPY VISIT (OUTPATIENT)
Dept: INFUSION THERAPY | Facility: CLINIC | Age: 20
End: 2020-07-07
Attending: INTERNAL MEDICINE
Payer: COMMERCIAL

## 2020-07-07 VITALS
SYSTOLIC BLOOD PRESSURE: 105 MMHG | HEART RATE: 80 BPM | RESPIRATION RATE: 14 BRPM | WEIGHT: 150.5 LBS | TEMPERATURE: 97.8 F | DIASTOLIC BLOOD PRESSURE: 59 MMHG | BODY MASS INDEX: 18.81 KG/M2 | OXYGEN SATURATION: 98 %

## 2020-07-07 DIAGNOSIS — K50.90 CROHN'S DISEASE (H): Primary | ICD-10-CM

## 2020-07-07 DIAGNOSIS — K50.014 CROHN'S DISEASE OF SMALL INTESTINE WITH ABSCESS (H): Primary | ICD-10-CM

## 2020-07-07 DIAGNOSIS — K52.9 COLITIS: ICD-10-CM

## 2020-07-07 LAB
ALBUMIN SERPL-MCNC: 2.8 G/DL (ref 3.4–5)
ALP SERPL-CCNC: 50 U/L (ref 65–260)
ALT SERPL W P-5'-P-CCNC: 15 U/L (ref 0–50)
AST SERPL W P-5'-P-CCNC: 9 U/L (ref 0–35)
BASOPHILS # BLD AUTO: 0 10E9/L (ref 0–0.2)
BASOPHILS NFR BLD AUTO: 0.3 %
BILIRUB DIRECT SERPL-MCNC: 0.2 MG/DL (ref 0–0.2)
BILIRUB SERPL-MCNC: 0.5 MG/DL (ref 0.2–1.3)
CRP SERPL-MCNC: 41.4 MG/L (ref 0–8)
DIFFERENTIAL METHOD BLD: ABNORMAL
EOSINOPHIL NFR BLD AUTO: 1.5 %
ERYTHROCYTE [DISTWIDTH] IN BLOOD BY AUTOMATED COUNT: 16.2 % (ref 10–15)
ERYTHROCYTE [SEDIMENTATION RATE] IN BLOOD BY WESTERGREN METHOD: 56 MM/H (ref 0–15)
HCT VFR BLD AUTO: 34.3 % (ref 40–53)
HGB BLD-MCNC: 11.4 G/DL (ref 13.3–17.7)
IMM GRANULOCYTES # BLD: 0 10E9/L (ref 0–0.4)
IMM GRANULOCYTES NFR BLD: 0.5 %
LYMPHOCYTES # BLD AUTO: 0.4 10E9/L (ref 0.8–5.3)
LYMPHOCYTES NFR BLD AUTO: 7.3 %
MCH RBC QN AUTO: 32.7 PG (ref 26.5–33)
MCHC RBC AUTO-ENTMCNC: 33.2 G/DL (ref 31.5–36.5)
MCV RBC AUTO: 98 FL (ref 78–100)
MONOCYTES # BLD AUTO: 0.4 10E9/L (ref 0–1.3)
MONOCYTES NFR BLD AUTO: 6 %
NEUTROPHILS # BLD AUTO: 5 10E9/L (ref 1.6–8.3)
NEUTROPHILS NFR BLD AUTO: 84.4 %
NRBC # BLD AUTO: 0 10*3/UL
NRBC BLD AUTO-RTO: 0 /100
PLATELET # BLD AUTO: 331 10E9/L (ref 150–450)
PROT SERPL-MCNC: 7.2 G/DL (ref 6.8–8.8)
RBC # BLD AUTO: 3.49 10E12/L (ref 4.4–5.9)
WBC # BLD AUTO: 5.9 10E9/L (ref 4–11)

## 2020-07-07 PROCEDURE — 25000132 ZZH RX MED GY IP 250 OP 250 PS 637: Performed by: INTERNAL MEDICINE

## 2020-07-07 PROCEDURE — 96413 CHEMO IV INFUSION 1 HR: CPT

## 2020-07-07 PROCEDURE — 96415 CHEMO IV INFUSION ADDL HR: CPT

## 2020-07-07 PROCEDURE — 96375 TX/PRO/DX INJ NEW DRUG ADDON: CPT

## 2020-07-07 PROCEDURE — 25000128 H RX IP 250 OP 636: Performed by: INTERNAL MEDICINE

## 2020-07-07 PROCEDURE — 80076 HEPATIC FUNCTION PANEL: CPT | Performed by: INTERNAL MEDICINE

## 2020-07-07 PROCEDURE — 25800030 ZZH RX IP 258 OP 636: Performed by: INTERNAL MEDICINE

## 2020-07-07 PROCEDURE — 86140 C-REACTIVE PROTEIN: CPT | Performed by: INTERNAL MEDICINE

## 2020-07-07 PROCEDURE — 85652 RBC SED RATE AUTOMATED: CPT | Performed by: INTERNAL MEDICINE

## 2020-07-07 PROCEDURE — 85025 COMPLETE CBC W/AUTO DIFF WBC: CPT | Performed by: INTERNAL MEDICINE

## 2020-07-07 RX ORDER — ACETAMINOPHEN 325 MG/1
650 TABLET ORAL ONCE
Status: CANCELLED
Start: 2020-07-09

## 2020-07-07 RX ORDER — DIPHENHYDRAMINE HCL 25 MG
25 CAPSULE ORAL ONCE
Status: CANCELLED
Start: 2020-07-09

## 2020-07-07 RX ORDER — DIPHENHYDRAMINE HCL 25 MG
25 CAPSULE ORAL ONCE
Status: COMPLETED | OUTPATIENT
Start: 2020-07-07 | End: 2020-07-07

## 2020-07-07 RX ORDER — ACETAMINOPHEN 325 MG/1
650 TABLET ORAL ONCE
Status: COMPLETED | OUTPATIENT
Start: 2020-07-07 | End: 2020-07-07

## 2020-07-07 RX ORDER — METHYLPREDNISOLONE SODIUM SUCCINATE 125 MG/2ML
125 INJECTION, POWDER, LYOPHILIZED, FOR SOLUTION INTRAMUSCULAR; INTRAVENOUS ONCE
Status: CANCELLED | OUTPATIENT
Start: 2020-07-09

## 2020-07-07 RX ORDER — METHYLPREDNISOLONE SODIUM SUCCINATE 125 MG/2ML
125 INJECTION, POWDER, LYOPHILIZED, FOR SOLUTION INTRAMUSCULAR; INTRAVENOUS ONCE
Status: COMPLETED | OUTPATIENT
Start: 2020-07-07 | End: 2020-07-07

## 2020-07-07 RX ADMIN — ACETAMINOPHEN 650 MG: 325 TABLET, FILM COATED ORAL at 11:28

## 2020-07-07 RX ADMIN — SODIUM CHLORIDE 250 ML: 9 INJECTION, SOLUTION INTRAVENOUS at 11:23

## 2020-07-07 RX ADMIN — INFLIXIMAB 400 MG: 100 INJECTION, POWDER, LYOPHILIZED, FOR SOLUTION INTRAVENOUS at 11:57

## 2020-07-07 RX ADMIN — METHYLPREDNISOLONE SODIUM SUCCINATE 125 MG: 125 INJECTION, POWDER, FOR SOLUTION INTRAMUSCULAR; INTRAVENOUS at 11:30

## 2020-07-07 RX ADMIN — DIPHENHYDRAMINE HYDROCHLORIDE 25 MG: 25 CAPSULE ORAL at 11:28

## 2020-07-07 ASSESSMENT — PAIN SCALES - GENERAL: PAINLEVEL: NO PAIN (0)

## 2020-07-07 NOTE — PROGRESS NOTES
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  Has been using heating pad, peppermint oil, cbd oil as other methods of pain control  Still complains of pain with urination   Mother states that patient was at their cabin this past weekend and was miserable  States just not able to enjoy normal cabin activities.   Colon and rectal referral  Message to financial infusion specialist about a peer to peer for 10mg/kg   Route to Dr. Wright

## 2020-07-07 NOTE — PROGRESS NOTES
Infusion Nursing Note:  Ventura Quiroz presents today for Remicade.    Patient seen by provider today: No   present during visit today: Not Applicable.    Note: N/A.    Intravenous Access:  Peripheral IV placed.    Treatment Conditions:  Biological Infusion Checklist:  ~~~ NOTE: If the patient answers yes to any of the questions below, hold the infusion and contact ordering provider or on-call provider.    1. Have you recently had an elevated temperature, fever, chills, productive cough, coughing for 3 weeks or longer or hemoptysis, abnormal vital signs, night sweats,  chest pain or have you noticed a decrease in your appetite, unexplained weight loss or fatigue? No  2. Do you have any open wounds or new incisions? No  3. Do you have any recent or upcoming hospitalizations, surgeries or dental procedures? No  4. Do you currently have or recently have had any signs of illness or infection or are you on any antibiotics? Yes  5. Have you had any new, sudden or worsening abdominal pain? No  6. Have you or anyone in your household received a live vaccination in the past 4 weeks? Please note:  No live vaccines while on biologic/chemotherapy until 6 months after the last treatment.  Patient can receive the flu vaccine (shot only) and the pneumovax.  It is optimal for the patient to get these vaccines mid cycle, but they can be given at any time as long as it is not on the day of the infusion. No  7. Have you recently been diagnosed with any new nervous system diseases (ie. Multiple sclerosis, Guillain [place], seizures, neurological changes) or cancer diagnosis? No  8. Are you on any form of radiation or chemotherapy? No  9. Are you pregnant or breast feeding or do you have plans of pregnancy in the future? No  10. Have you been having any signs of worsening depression or suicidal ideations?  (benlysta only) No  11. Have there been any other new onset medical symptoms? No        Post Infusion Assessment:  Patient  tolerated infusion without incident.  Patient observed for 15 minutes post Remicade per protocol.  Site patent and intact, free from redness, edema or discomfort.  No evidence of extravasations.  Access discontinued per protocol.  Biologic Infusion Post Education: Call the triage nurse at your clinic or seek medical attention if you have chills and/or temperature greater than or equal to 100.5, uncontrolled nausea/vomiting, diarrhea, constipation, dizziness, shortness of breath, chest pain, heart palpitations, weakness or any other new or concerning symptoms, questions or concerns.  You cannot have any live virus vaccines prior to or during treatment or up to 6 months post infusion.  If you have an upcoming surgery, medical procedure or dental procedure during treatment, this should be discussed with your ordering physician and your surgeon/dentist.  If you are having any concerning symptom, if you are unsure if you should get your next infusion or wish to speak to a provider before your next infusion, please call your care coordinator or triage nurse at your clinic to notify them so we can adequately serve you.       Discharge Plan:   Discharge instructions reviewed with: Patient.  Patient discharged in stable condition accompanied by: self.  Departure Mode: Ambulatory.    Sandra Layton RN

## 2020-07-08 NOTE — TELEPHONE ENCOUNTER
RECORDS RECEIVED FROM: Internal    DATE RECEIVED: 7/22/20   NOTES STATUS DETAILS   OFFICE NOTE from referring provider  Internal Referral 7/7/20   DISCHARGE SUMMARY from hospital  Internal ED note 4/8/19   MEDICATION LIST Internal    DIAGNOSTIC PROCEDURES     COLONOSCOPY Internal 4/11/19   IMAGING (DISC & REPORT)      CT  Internal 4/24/19 4/7/19   MRI Internal 6/9/20 11/14/19 8/29/19 4/24/19

## 2020-07-20 ENCOUNTER — MYC MEDICAL ADVICE (OUTPATIENT)
Dept: FAMILY MEDICINE | Facility: CLINIC | Age: 20
End: 2020-07-20
Payer: COMMERCIAL

## 2020-07-20 ENCOUNTER — APPOINTMENT (OUTPATIENT)
Dept: LAB | Facility: CLINIC | Age: 20
End: 2020-07-20
Payer: COMMERCIAL

## 2020-07-20 DIAGNOSIS — R31.9 BLOOD IN URINE: Primary | ICD-10-CM

## 2020-07-20 LAB
ALBUMIN UR-MCNC: 100 MG/DL
APPEARANCE UR: ABNORMAL
BILIRUB UR QL STRIP: NEGATIVE
CAOX CRY #/AREA URNS HPF: ABNORMAL /HPF
COLOR UR AUTO: ABNORMAL
GLUCOSE UR STRIP-MCNC: NEGATIVE MG/DL
HGB UR QL STRIP: ABNORMAL
KETONES UR STRIP-MCNC: 5 MG/DL
LEUKOCYTE ESTERASE UR QL STRIP: ABNORMAL
MUCOUS THREADS #/AREA URNS LPF: PRESENT /LPF
NITRATE UR QL: NEGATIVE
PH UR STRIP: 5 PH (ref 5–7)
RBC #/AREA URNS AUTO: >182 /HPF (ref 0–2)
SOURCE: ABNORMAL
SP GR UR STRIP: 1.03 (ref 1–1.03)
UROBILINOGEN UR STRIP-MCNC: 2 MG/DL (ref 0–2)
WBC #/AREA URNS AUTO: >182 /HPF (ref 0–5)
WBC CLUMPS #/AREA URNS HPF: PRESENT /HPF

## 2020-07-20 PROCEDURE — 87088 URINE BACTERIA CULTURE: CPT | Performed by: FAMILY MEDICINE

## 2020-07-20 PROCEDURE — 87086 URINE CULTURE/COLONY COUNT: CPT | Performed by: FAMILY MEDICINE

## 2020-07-20 PROCEDURE — 81001 URINALYSIS AUTO W/SCOPE: CPT | Performed by: FAMILY MEDICINE

## 2020-07-20 PROCEDURE — 87186 SC STD MICRODIL/AGAR DIL: CPT | Performed by: FAMILY MEDICINE

## 2020-07-21 DIAGNOSIS — N30.01 ACUTE CYSTITIS WITH HEMATURIA: Primary | ICD-10-CM

## 2020-07-21 RX ORDER — NITROFURANTOIN 25; 75 MG/1; MG/1
100 CAPSULE ORAL 2 TIMES DAILY
Qty: 20 CAPSULE | Refills: 0 | Status: SHIPPED | OUTPATIENT
Start: 2020-07-21 | End: 2020-08-17

## 2020-07-21 NOTE — PROGRESS NOTES
"Colon and Rectal Surgery Virtual Visit    Referring provider:  Tobin Wright MD  [address] [place] [id number]       RE: Ventura Quiroz  : 2000  ETHAN: 2020         The patient is being evaluated via a billable video visit and has been notified of following:      \"This video visit will be conducted via a call between you and your physician/provider. We have found that certain health care needs can be provided without the need for an in-person physical exam.  This service lets us provide the care you need with a video conversation.  If a prescription is necessary we can send it directly to your pharmacy.  If lab work is needed we can place an order for that and you can then stop by our lab to have the test done at a later time.     Video visits are billed at different rates depending on your insurance coverage.  Please reach out to your insurance provider with any questions.     If during the course of the call the physician/provider feels a video visit is not appropriate, you will not be charged for this service.\"     Patient has given verbal consent for Video visit? Yes     Video Start Time: 6:30P    Ventura Quiroz is a 19 year old male with visit today for Crohn's disease     HISTORY OF PRESENT ILNESS:     Crohn's disease of the ileum initially complicated by phlegmon and abscess    CT 19:  IMPRESSION:   1. 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  However, the prior foci of gas are no longer present.    2. Stable appearance of the acute on chronic inflammatory bowel  disease involving a long segment of distal ileum (greater than 25 cm  in length).  3. Small peripheral wedge-shaped areas of hypoenhancement in the right  hepatic lobe are of uncertain significance. No corresponding  abnormality was seen on MRI 2019.  4. Small volume free pelvic fluid.    MRE 2020:  1. Persistent ongoing " active or chronic enteritis involving a long  segment terminal ileum with continued mild luminal narrowing. There is  new thickening/enhancement of the sigmoid colon since 11/14/2019 with  adhesions to proximal portion of the affected terminal ileum. In  addition, there are multiple decompressed small bowel loops between  these two structures, also demonstrating adhesions with the adjacent  terminal ileum. Although there is no overt fistulization, a subtle  fistulization between these structures cannot be completely excluded  (noting there are some linear tracts in the vicinity of these bowel  loops, which are not fluid-filled). No abscess identified.  2. Indeterminant susceptibility artifact in the proximal sigmoid.  3. Trace free fluid in the pelvis and surrounding of the terminal  ileum.    He follows with Dr. Tobin Wright of gastroenterology and has been on 6-MP and Cipro/Flagyl and recently started infliximab    Last colonoscopy was 4/11/19 with ileitis    Overall, the patient is doing poorly. He has lost 12 pounds in last 2 months - 150 lb with a baseline of 170lb. His last infusion 2 weeks ago of Remicade. Most recently he had a bladder infection and pain with urination. Please on Nitrofuratioin - started today. He denies pneumaturia or fecaluria. He states that eating hurts. He does a lot of physical labor at work and has minimal energy.       Assessment/Plan: 19 year old male with ileocolic Crohn's disease not amenable to medical therapy. Now with suspicion of an impending fistula to the bladder with new UTI. Possibly also will involvement of the sigmoid colon (possible innocent bystander).  1. Crohn's disease. He is on Remicade. Treated by Dr. Wright.  2. Pain control. We discussed possible Cannabis and avoiding narcotics. This is reasonable.  3. Malnutrition. Would like to get baseline nutrition labs. He will keep a food diary. We will follow his weight. I would like for him to gain about 5 pounds if  possible. We might consider total parenteral nutrition if he cannot do it on his own. This is his biggest risk factor for complications. We discussed this at length.  4. Surgical resection is reasonable to consider soon. But would like to get him more anabolic first with nutrition - see #3. We would perform a exploratory laparotomy with ileocolic resection. Preoperative Assessment Center (PAC), mechanical bowel preparation with antibiotics.       Total time was 30 minutes, over 50% was spent counseling the patient.     Video-Visit Details     Type of service:  Video Visit     Video End Time (time video stopped): 7pm    Originating Location (pt. Location): Home     Distant Location (provider location):  [facility]      Mode of Communication:  Video Conference via Mary Robles MD  Colon and Rectal Surgery Staff  [facility]      -------------------------------------------------------------------------------------------------------------------          Medical history:  Past Medical History:   Diagnosis Date     Allergic rhinitis, cause unspecified     Zyrtec helps     Crohn's Colitis 4/8/2019     Unspecified otitis media     Otitis Media       Surgical history:  Past Surgical History:   Procedure Laterality Date     COLONOSCOPY N/A 4/11/2019    Procedure: COMBINED COLONOSCOPY, SINGLE OR MULTIPLE BIOPSY/POLYPECTOMY BY BIOPSY;  Surgeon: Tobin Wright MD;  Location:  GI     EXCISE LIP OR CHEEK FOLD  10/3/2003    Needle cautery frenulectomy.     PE TUBES  4/25/2006     TONSILLECTOMY & ADENOIDECTOMY  4/25/2006       Problem list:  Patient Active Problem List    Diagnosis Date Noted     Anemia, iron deficiency 08/19/2019     Priority: Medium     Crohn's disease of small intestine with abscess (H) 08/19/2019     Priority: Medium     Crohn's Colitis 04/08/2019     Priority: Medium       Medications:  Current Outpatient Medications    Medication Sig Dispense Refill     adalimumab (HUMIRA *CF* PEN) 40 MG/0.4ML pen kit Inject 0.4 mLs (40 mg) Subcutaneous every 7 days 4 each 5     adalimumab (HUMIRA *CF* PEN) 40 MG/0.4ML pen kit Inject 0.4 mLs (40 mg) Subcutaneous every 7 days 4 each 5     ferrous sulfate (FEROSUL) 325 (65 Fe) MG tablet Take 1 tablet (325 mg) by mouth daily (with lunch) 90 tablet 3     hyoscyamine (LEVSIN) 0.125 MG tablet Take 1 tablet (125 mcg) by mouth every 4 hours as needed for cramping 40 tablet 3     mercaptopurine (PURINETHOL) 50 MG tablet Take 2 tablets (100 mg) by mouth daily 60 tablet 4     ondansetron (ZOFRAN ODT) 4 MG ODT tab Patient should take one tab one hour before his mre appt. May take second one if needed when drinking contrast. 2 tablet 0       Allergies:  Allergies   Allergen Reactions     Amoxicillin      Penicillin [Penicillins]        Family history:  Family History   Problem Relation Age of Onset     Melanoma No family hx of      Skin Cancer No family hx of        Social history:  Social History     Socioeconomic History     Marital status: Single     Spouse name: Not on file     Number of children: Not on file     Years of education: Not on file     Highest education level: Not on file   Occupational History     Not on file   Social Needs     Financial resource strain: Not on file     Food insecurity     Worry: Not on file     Inability: Not on file     Transportation needs     Medical: Not on file     Non-medical: Not on file   Tobacco Use     Smoking status: Never Smoker     Smokeless tobacco: Never Used   Substance and Sexual Activity     Alcohol use: Yes     Frequency: Monthly or less     Drinks per session: 1 or 2     Drug use: No     Sexual activity: Never   Lifestyle     Physical activity     Days per week: Not on file     Minutes per session: Not on file     Stress: Not on file   Relationships     Social connections     Talks on phone: Not on file     Gets together: Not on file     Attends  [employer] service: Not on file     Active member of club or organization: Not on file     Attends meetings of clubs or organizations: Not on file     Relationship status: Not on file     Intimate partner violence     Fear of current or ex partner: Not on file     Emotionally abused: Not on file     Physically abused: Not on file     Forced sexual activity: Not on file   Other Topics Concern      Service Not Asked     Blood Transfusions Not Asked     Caffeine Concern Not Asked     Occupational Exposure Not Asked     Hobby Hazards Not Asked     Sleep Concern Not Asked     Stress Concern Not Asked     Weight Concern Not Asked     Special Diet Not Asked     Back Care Not Asked     Exercise Not Asked     Bike Helmet Not Asked     Seat Belt Not Asked     Self-Exams Not Asked     Parent/sibling w/ CABG, MI or angioplasty before 65F 55M? Not Asked   Social History Narrative     Not on file

## 2020-07-22 ENCOUNTER — PRE VISIT (OUTPATIENT)
Dept: SURGERY | Facility: CLINIC | Age: 20
End: 2020-07-22

## 2020-07-22 ENCOUNTER — VIRTUAL VISIT (OUTPATIENT)
Dept: SURGERY | Facility: CLINIC | Age: 20
End: 2020-07-22
Payer: COMMERCIAL

## 2020-07-22 VITALS — WEIGHT: 150 LBS | BODY MASS INDEX: 19.25 KG/M2 | HEIGHT: 74 IN

## 2020-07-22 DIAGNOSIS — N30.01 ACUTE CYSTITIS WITH HEMATURIA: Primary | ICD-10-CM

## 2020-07-22 DIAGNOSIS — K50.014 CROHN'S DISEASE OF SMALL INTESTINE WITH ABSCESS (H): Primary | ICD-10-CM

## 2020-07-22 LAB
BACTERIA SPEC CULT: ABNORMAL
BACTERIA SPEC CULT: ABNORMAL
SPECIMEN SOURCE: ABNORMAL

## 2020-07-22 RX ORDER — INFLIXIMAB 100 MG/10ML
INJECTION, POWDER, LYOPHILIZED, FOR SOLUTION INTRAVENOUS
Status: ON HOLD | COMMUNITY
End: 2020-08-25

## 2020-07-22 ASSESSMENT — MIFFLIN-ST. JEOR: SCORE: 1765.15

## 2020-07-22 NOTE — NURSING NOTE
"  Chief Complaint   Patient presents with     Consult     Discuss surgery for failed medical treatment of Crohn's       Vitals:    07/22/20 1631   Weight: 150 lb   Height: 6' 2\"       Body mass index is 19.26 kg/m .      Devaughn Tang LPN                        "

## 2020-07-22 NOTE — LETTER
2020       RE: Ventura Quiroz  [address]  [facility] [address]     Dear Colleague,    Thank you for referring your patient, Ventura Quiroz, to the [facility] COLON AND RE[facility]AL SURGERY at [facility]. Please see a copy of my visit note below.    Colon and Rectal Surgery Virtual Visit    Referring provider:  Tobin Wright MD  [address] [place] [id number]       RE: Ventura Quiroz  : 2000  ETHAN: 2020         Video Start Time: 6:30P    Ventura Quiroz is a 19 year old male with visit today for Crohn's disease     HISTORY OF PRESENT ILNESS:     Crohn's disease of the ileum initially complicated by phlegmon and abscess    CT 19:  IMPRESSION:   1. 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  However, the prior foci of gas are no longer present.    2. Stable appearance of the acute on chronic inflammatory bowel  disease involving a long segment of distal ileum (greater than 25 cm  in length).  3. Small peripheral wedge-shaped areas of hypoenhancement in the right  hepatic lobe are of uncertain significance. No corresponding  abnormality was seen on MRI 2019.  4. Small volume free pelvic fluid.    MRE 2020:  1. Persistent ongoing active or chronic enteritis involving a long  segment terminal ileum with continued mild luminal narrowing. There is  new thickening/enhancement of the sigmoid colon since 2019 with  adhesions to proximal portion of the affected terminal ileum. In  addition, there are multiple decompressed small bowel loops between  these two structures, also demonstrating adhesions with the adjacent  terminal ileum. Although there is no overt fistulization, a subtle  fistulization between these structures cannot be completely excluded  (noting there are some linear tracts in the vicinity of these bowel  loops, which are not fluid-filled). No  abscess identified.  2. Indeterminant susceptibility artifact in the proximal sigmoid.  3. Trace free fluid in the pelvis and surrounding of the terminal  ileum.    He follows with Dr. Tobin Wright of gastroenterology and has been on 6-MP and Cipro/Flagyl and recently started infliximab    Last colonoscopy was 4/11/19 with ileitis    Overall, the patient is doing poorly. He has lost 12 pounds in last 2 months - 150 lb with a baseline of 170lb. His last infusion 2 weeks ago of Remicade. Most recently he had a bladder infection and pain with urination. Please on Nitrofuratioin - started today. He denies pneumaturia or fecaluria. He states that eating hurts. He does a lot of physical labor at work and has minimal energy.       Assessment/Plan: 19 year old male with ileocolic Crohn's disease not amenable to medical therapy. Now with suspicion of an impending fistula to the bladder with new UTI. Possibly also will involvement of the sigmoid colon (possible innocent bystander).  1. Crohn's disease. He is on Remicade. Treated by Dr. Wright.  2. Pain control. We discussed possible Cannabis and avoiding narcotics. This is reasonable.  3. Malnutrition. Would like to get baseline nutrition labs. He will keep a food diary. We will follow his weight. I would like for him to gain about 5 pounds if possible. We might consider total parenteral nutrition if he cannot do it on his own. This is his biggest risk factor for complications. We discussed this at length.  4. Surgical resection is reasonable to consider soon. But would like to get him more anabolic first with nutrition - see #3. We would perform a exploratory laparotomy with ileocolic resection. Preoperative Assessment Center (PAC), mechanical bowel preparation with antibiotics.       Total time was 30 minutes, over 50% was spent counseling the patient.     Video-Visit Details     Type of service:  Video Visit     Video End Time (time video stopped): 7pm    Originating  Location (pt. Location): Home     Distant Location (provider location):  [facility] COLON AND RECTAL SURGERY      Mode of Communication:  Video Conference via Mary Robles MD  Colon and Rectal Surgery Staff  [facility]      -------------------------------------------------------------------------------------------------------------------          Medical history:  Past Medical History:   Diagnosis Date     Allergic rhinitis, cause unspecified     Zyrtec helps     Crohn's Colitis 4/8/2019     Unspecified otitis media     Otitis Media       Surgical history:  Past Surgical History:   Procedure Laterality Date     COLONOSCOPY N/A 4/11/2019    Procedure: COMBINED COLONOSCOPY, SINGLE OR MULTIPLE BIOPSY/POLYPECTOMY BY BIOPSY;  Surgeon: Tobin Wright MD;  Location:  GI     EXCISE LIP OR CHEEK FOLD  10/3/2003    Needle cautery frenulectomy.     PE TUBES  4/25/2006     TONSILLECTOMY & ADENOIDECTOMY  4/25/2006       Problem list:  Patient Active Problem List    Diagnosis Date Noted     Anemia, iron deficiency 08/19/2019     Priority: Medium     Crohn's disease of small intestine with abscess (H) 08/19/2019     Priority: Medium     Crohn's Colitis 04/08/2019     Priority: Medium       Medications:  Current Outpatient Medications   Medication Sig Dispense Refill     adalimumab (HUMIRA *CF* PEN) 40 MG/0.4ML pen kit Inject 0.4 mLs (40 mg) Subcutaneous every 7 days 4 each 5     adalimumab (HUMIRA *CF* PEN) 40 MG/0.4ML pen kit Inject 0.4 mLs (40 mg) Subcutaneous every 7 days 4 each 5     ferrous sulfate (FEROSUL) 325 (65 Fe) MG tablet Take 1 tablet (325 mg) by mouth daily (with lunch) 90 tablet 3     hyoscyamine (LEVSIN) 0.125 MG tablet Take 1 tablet (125 mcg) by mouth every 4 hours as needed for cramping 40 tablet 3     mercaptopurine (PURINETHOL) 50 MG tablet Take 2 tablets (100 mg) by mouth daily 60 tablet 4     ondansetron (ZOFRAN ODT) 4 MG ODT tab Patient should take  one tab one hour before his mre appt. May take second one if needed when drinking contrast. 2 tablet 0       Allergies:  Allergies   Allergen Reactions     Amoxicillin      Penicillin [Penicillins]        Family history:  Family History   Problem Relation Age of Onset     Melanoma No family hx of      Skin Cancer No family hx of        Social history:  Social History     Socioeconomic History     Marital status: Single     Spouse name: Not on file     Number of children: Not on file     Years of education: Not on file     Highest education level: Not on file   Occupational History     Not on file   Social Needs     Financial resource strain: Not on file     Food insecurity     Worry: Not on file     Inability: Not on file     Transportation needs     Medical: Not on file     Non-medical: Not on file   Tobacco Use     Smoking status: Never Smoker     Smokeless tobacco: Never Used   Substance and Sexual Activity     Alcohol use: Yes     Frequency: Monthly or less     Drinks per session: 1 or 2     Drug use: No     Sexual activity: Never   Lifestyle     Physical activity     Days per week: Not on file     Minutes per session: Not on file     Stress: Not on file   Relationships     Social connections     Talks on phone: Not on file     Gets together: Not on file     Attends Baptism service: Not on file     Active member of club or organization: Not on file     Attends meetings of clubs or organizations: Not on file     Relationship status: Not on file     Intimate partner violence     Fear of current or ex partner: Not on file     Emotionally abused: Not on file     Physically abused: Not on file     Forced sexual activity: Not on file   Other Topics Concern      Service Not Asked     Blood Transfusions Not Asked     Caffeine Concern Not Asked     Occupational Exposure Not Asked     Hobby Hazards Not Asked     Sleep Concern Not Asked     Stress Concern Not Asked     Weight Concern Not Asked     Special Diet  Not Asked     Back Care Not Asked     Exercise Not Asked     Bike Helmet Not Asked     Seat Belt Not Asked     Self-Exams Not Asked     Parent/sibling w/ CABG, MI or angioplasty before 65F 55M? Not Asked   Social History Narrative     Not on file             Again, thank you for allowing me to participate in the care of your patient.      Sincerely,    Brittni Robles MD

## 2020-07-23 ENCOUNTER — TELEPHONE (OUTPATIENT)
Dept: FAMILY MEDICINE | Facility: CLINIC | Age: 20
End: 2020-07-23

## 2020-07-23 NOTE — TELEPHONE ENCOUNTER
Spoke with patient and informed of results below. Patient understood. States he just started taking the medication last night. He is starting to feel better. Will follow up for UA after compiletion of medication.     Suzanne Dejesus MA

## 2020-07-23 NOTE — TELEPHONE ENCOUNTER
Attempted to reach patient, unable to leave message no VM set up. Please relay results below to patient if he calls back. Will have staff try again later.   Suzanne Dejesus MA

## 2020-07-23 NOTE — TELEPHONE ENCOUNTER
----- Message from Chuy Moss MD sent at 7/22/2020 11:10 PM CDT -----  Can someone call Ventura and make sure he is improving on his antibiotics.  I sent him a my chart note on both the positive culture and then the sensitivities telling him that the Macrobid that we put him on does cover both bacteria that are growing out in his urine.  He will need a repeat urine culture a week after he is done with his antibiotics.  The order was placed.    Electronically signed by:  Chuy Moss M.D.  7/22/2020

## 2020-07-27 ENCOUNTER — MYC MEDICAL ADVICE (OUTPATIENT)
Dept: FAMILY MEDICINE | Facility: CLINIC | Age: 20
End: 2020-07-27

## 2020-07-27 ENCOUNTER — PATIENT OUTREACH (OUTPATIENT)
Dept: GASTROENTEROLOGY | Facility: CLINIC | Age: 20
End: 2020-07-27

## 2020-07-27 DIAGNOSIS — K50.014 CROHN'S DISEASE OF SMALL INTESTINE WITH ABSCESS (H): Primary | ICD-10-CM

## 2020-07-27 DIAGNOSIS — K50.113 CROHN'S DISEASE OF LARGE INTESTINE WITH FISTULA (H): Primary | ICD-10-CM

## 2020-07-27 RX ORDER — OXYCODONE HYDROCHLORIDE 5 MG/1
5 TABLET ORAL EVERY 6 HOURS PRN
Qty: 28 TABLET | Refills: 0 | Status: SHIPPED | OUTPATIENT
Start: 2020-07-27 | End: 2020-08-18

## 2020-07-27 NOTE — TELEPHONE ENCOUNTER
Reason for Call:  Same Day Appointment, Requested Provider:  Chuy Moss MD    PCP: Chuy Moss    Reason for visit:   Appointment Request From: Ventura Quiroz     With Provider: Chuy Moss MD, MD [[facility]]     Preferred Date Range: 7/28/2020 - 7/31/2020     Preferred Times: Tuesday Afternoon, Wednesday Afternoon, Thursday Afternoon, Friday Afternoon     Reason for visit: Request an Appointment     Comments:  Dealing with a bladder infection, still hurts when I pee, need appointment after 4:00  Duration of symptoms:     Have you been treated for this in the past? No    Additional comments:     Can we leave a detailed message on this number? YES    Phone number patient can be reached at: Cell number on file:    Telephone Information:   Mobile [phone]       Best Time: any    Call taken on 7/27/2020 at 6:13 PM by Paula Garcias

## 2020-07-27 NOTE — PROGRESS NOTES
Discussed with Dr. Wright after discussion with mother.  Mother states that patient is in class this week so not as physical work scheduled  Pt does work construction and works outside   Concern due to the high temps and dew point that patient is keeping hydrated.  Mother states that patient only eats at night as states when he is working and eats it becomes very painful and can not work  Discussed the message she sent via my chart about zofran is not used for pain prescribed  for nausea.

## 2020-07-27 NOTE — PROGRESS NOTES
Per Dr. Wright   Short course of pain medication (oxycodone) with the next steps      Virtual visit with Dr. Bossman Whitley to discuss nutrition options  Suggested boost and ensure   Follow up in primary care  Discussion with Dr. Victor Manuel Nelson and Dr. Wright to discuss surgery and timing of surgery   If symptoms continue to increase patient should go to the [facility] at [place]    Dr. Wright will enroll pt in the medical cannabis program     Mother aware of the plan

## 2020-07-28 DIAGNOSIS — K50.90 CROHN'S DISEASE (H): ICD-10-CM

## 2020-07-28 DIAGNOSIS — K50.014 CROHN'S DISEASE OF SMALL INTESTINE WITH ABSCESS (H): Primary | ICD-10-CM

## 2020-07-28 LAB
ALBUMIN SERPL-MCNC: 3.1 G/DL (ref 3.4–5)
ALP SERPL-CCNC: 60 U/L (ref 65–260)
ALT SERPL W P-5'-P-CCNC: 17 U/L (ref 0–50)
AST SERPL W P-5'-P-CCNC: 12 U/L (ref 0–35)
BASOPHILS # BLD AUTO: 0 10E9/L (ref 0–0.2)
BASOPHILS NFR BLD AUTO: 0.4 %
BILIRUB DIRECT SERPL-MCNC: 0.1 MG/DL (ref 0–0.2)
BILIRUB SERPL-MCNC: 0.1 MG/DL (ref 0.2–1.3)
CRP SERPL-MCNC: 29.9 MG/L (ref 0–8)
DIFFERENTIAL METHOD BLD: ABNORMAL
EOSINOPHIL NFR BLD AUTO: 1.7 %
ERYTHROCYTE [DISTWIDTH] IN BLOOD BY AUTOMATED COUNT: 15.3 % (ref 10–15)
ERYTHROCYTE [SEDIMENTATION RATE] IN BLOOD BY WESTERGREN METHOD: 35 MM/H (ref 0–15)
HCT VFR BLD AUTO: 36.8 % (ref 40–53)
HGB BLD-MCNC: 12.3 G/DL (ref 13.3–17.7)
IMM GRANULOCYTES # BLD: 0 10E9/L (ref 0–0.4)
IMM GRANULOCYTES NFR BLD: 0.4 %
LYMPHOCYTES # BLD AUTO: 1 10E9/L (ref 0.8–5.3)
LYMPHOCYTES NFR BLD AUTO: 21.9 %
MCH RBC QN AUTO: 33.1 PG (ref 26.5–33)
MCHC RBC AUTO-ENTMCNC: 33.4 G/DL (ref 31.5–36.5)
MCV RBC AUTO: 99 FL (ref 78–100)
MONOCYTES # BLD AUTO: 0.5 10E9/L (ref 0–1.3)
MONOCYTES NFR BLD AUTO: 9.7 %
NEUTROPHILS # BLD AUTO: 3.1 10E9/L (ref 1.6–8.3)
NEUTROPHILS NFR BLD AUTO: 65.9 %
NRBC # BLD AUTO: 0 10*3/UL
NRBC BLD AUTO-RTO: 0 /100
PLATELET # BLD AUTO: 269 10E9/L (ref 150–450)
PROT SERPL-MCNC: 7.7 G/DL (ref 6.8–8.8)
RBC # BLD AUTO: 3.72 10E12/L (ref 4.4–5.9)
WBC # BLD AUTO: 4.7 10E9/L (ref 4–11)

## 2020-07-28 PROCEDURE — 86140 C-REACTIVE PROTEIN: CPT | Performed by: INTERNAL MEDICINE

## 2020-07-28 PROCEDURE — 85652 RBC SED RATE AUTOMATED: CPT | Performed by: INTERNAL MEDICINE

## 2020-07-28 PROCEDURE — 85025 COMPLETE CBC W/AUTO DIFF WBC: CPT | Performed by: INTERNAL MEDICINE

## 2020-07-28 PROCEDURE — 80076 HEPATIC FUNCTION PANEL: CPT | Performed by: INTERNAL MEDICINE

## 2020-07-28 PROCEDURE — 36415 COLL VENOUS BLD VENIPUNCTURE: CPT | Performed by: INTERNAL MEDICINE

## 2020-07-28 RX ORDER — ONDANSETRON 4 MG/1
4 TABLET, FILM COATED ORAL EVERY 6 HOURS
Qty: 3 TABLET | Refills: 0 | Status: ON HOLD | OUTPATIENT
Start: 2020-07-28 | End: 2020-08-25

## 2020-07-28 RX ORDER — NEOMYCIN SULFATE 500 MG/1
1000 TABLET ORAL EVERY 6 HOURS
Qty: 6 TABLET | Refills: 0 | Status: ON HOLD | OUTPATIENT
Start: 2020-07-28 | End: 2020-08-25

## 2020-07-28 RX ORDER — METRONIDAZOLE 500 MG/1
500 TABLET ORAL EVERY 6 HOURS
Qty: 3 TABLET | Refills: 0 | Status: ON HOLD | OUTPATIENT
Start: 2020-07-28 | End: 2020-08-25

## 2020-07-28 RX ORDER — ONDANSETRON 4 MG/1
4 TABLET, ORALLY DISINTEGRATING ORAL EVERY 8 HOURS PRN
Qty: 30 TABLET | Refills: 1 | Status: SHIPPED | OUTPATIENT
Start: 2020-07-28 | End: 2020-08-18

## 2020-07-28 RX ORDER — POLYETHYLENE GLYCOL 3350 17 G/17G
238 POWDER, FOR SOLUTION ORAL SEE ADMIN INSTRUCTIONS
Qty: 238 G | Refills: 0 | Status: ON HOLD | OUTPATIENT
Start: 2020-07-28 | End: 2020-08-25

## 2020-07-28 NOTE — TELEPHONE ENCOUNTER
Per AMANDEEP Moss will be back tomorrow. Route to him. Will mychart pt making him aware.  Nishi Silva, [facility]

## 2020-07-29 ENCOUNTER — MYC MEDICAL ADVICE (OUTPATIENT)
Dept: FAMILY MEDICINE | Facility: CLINIC | Age: 20
End: 2020-07-29

## 2020-07-29 ENCOUNTER — MYC MEDICAL ADVICE (OUTPATIENT)
Dept: FAMILY MEDICINE | Facility: OTHER | Age: 20
End: 2020-07-29

## 2020-07-29 DIAGNOSIS — N30.01 ACUTE CYSTITIS WITH HEMATURIA: Primary | ICD-10-CM

## 2020-07-29 DIAGNOSIS — N30.01 ACUTE CYSTITIS WITH HEMATURIA: ICD-10-CM

## 2020-07-29 DIAGNOSIS — K50.014 CROHN'S DISEASE OF SMALL INTESTINE WITH ABSCESS (H): ICD-10-CM

## 2020-07-29 LAB
ABO + RH BLD: NORMAL
ABO + RH BLD: NORMAL
ALBUMIN SERPL-MCNC: 3.3 G/DL (ref 3.4–5)
ALBUMIN UR-MCNC: NEGATIVE MG/DL
ALP SERPL-CCNC: 65 U/L (ref 65–260)
ALT SERPL W P-5'-P-CCNC: 20 U/L (ref 0–50)
ANION GAP SERPL CALCULATED.3IONS-SCNC: 5 MMOL/L (ref 3–14)
APPEARANCE UR: ABNORMAL
AST SERPL W P-5'-P-CCNC: 7 U/L (ref 0–35)
BILIRUB SERPL-MCNC: 0.5 MG/DL (ref 0.2–1.3)
BILIRUB UR QL STRIP: NEGATIVE
BLD GP AB SCN SERPL QL: NORMAL
BLOOD BANK CMNT PATIENT-IMP: NORMAL
BUN SERPL-MCNC: 16 MG/DL (ref 7–30)
CALCIUM SERPL-MCNC: 8.7 MG/DL (ref 8.5–10.1)
CHLORIDE SERPL-SCNC: 108 MMOL/L (ref 98–110)
CO2 SERPL-SCNC: 28 MMOL/L (ref 20–32)
COLOR UR AUTO: YELLOW
CREAT SERPL-MCNC: 0.7 MG/DL (ref 0.5–1)
GFR SERPL CREATININE-BSD FRML MDRD: >90 ML/MIN/{1.73_M2}
GLUCOSE SERPL-MCNC: 107 MG/DL (ref 70–99)
GLUCOSE UR STRIP-MCNC: NEGATIVE MG/DL
HGB UR QL STRIP: ABNORMAL
KETONES UR STRIP-MCNC: NEGATIVE MG/DL
LEUKOCYTE ESTERASE UR QL STRIP: ABNORMAL
MUCOUS THREADS #/AREA URNS LPF: PRESENT /LPF
NITRATE UR QL: NEGATIVE
PH UR STRIP: 5 PH (ref 5–7)
POTASSIUM SERPL-SCNC: 3.7 MMOL/L (ref 3.4–5.3)
PROT SERPL-MCNC: 7.9 G/DL (ref 6.8–8.8)
RBC #/AREA URNS AUTO: 99 /HPF (ref 0–2)
SODIUM SERPL-SCNC: 141 MMOL/L (ref 133–144)
SOURCE: ABNORMAL
SP GR UR STRIP: 1.03 (ref 1–1.03)
SPECIMEN EXP DATE BLD: NORMAL
SQUAMOUS #/AREA URNS AUTO: <1 /HPF (ref 0–1)
UROBILINOGEN UR STRIP-MCNC: 2 MG/DL (ref 0–2)
WBC #/AREA URNS AUTO: 152 /HPF (ref 0–5)

## 2020-07-29 PROCEDURE — 36415 COLL VENOUS BLD VENIPUNCTURE: CPT | Performed by: COLON & RECTAL SURGERY

## 2020-07-29 PROCEDURE — 80053 COMPREHEN METABOLIC PANEL: CPT | Performed by: COLON & RECTAL SURGERY

## 2020-07-29 PROCEDURE — 84134 ASSAY OF PREALBUMIN: CPT | Performed by: COLON & RECTAL SURGERY

## 2020-07-29 PROCEDURE — 81001 URINALYSIS AUTO W/SCOPE: CPT | Performed by: FAMILY MEDICINE

## 2020-07-29 PROCEDURE — 87086 URINE CULTURE/COLONY COUNT: CPT | Performed by: FAMILY MEDICINE

## 2020-07-29 PROCEDURE — 86850 RBC ANTIBODY SCREEN: CPT | Performed by: COLON & RECTAL SURGERY

## 2020-07-29 PROCEDURE — 86901 BLOOD TYPING SEROLOGIC RH(D): CPT | Performed by: COLON & RECTAL SURGERY

## 2020-07-29 PROCEDURE — 86900 BLOOD TYPING SEROLOGIC ABO: CPT | Performed by: COLON & RECTAL SURGERY

## 2020-07-29 RX ORDER — CEPHALEXIN 500 MG/1
500 CAPSULE ORAL 3 TIMES DAILY
Qty: 30 CAPSULE | Refills: 0 | Status: SHIPPED | OUTPATIENT
Start: 2020-07-29 | End: 2020-08-17

## 2020-07-29 NOTE — TELEPHONE ENCOUNTER
We will need to retreat him with another antibiotic.  Were to go with a cephalosporin.  Even though the nitrofurantoin was appropriate for both the Klebsiella pneumoniae and E. coli is still symptomatic and his urine is still dirty.  I will add in Keflex 500 mg p.o. 3 times daily x10 days.  I will have him finish off the Macrodantin also.    Electronically signed by:  Chuy Moss M.D.  7/29/2020

## 2020-07-29 NOTE — TELEPHONE ENCOUNTER
Can he drop off another Urine sample?  We need to check another UA/UC.  See if he can do that Wednesday asap. There is an order in Epic. Once I see the results then we can try to address it.     Electronically signed by:  Chuy Moss M.D.  7/28/2020

## 2020-07-29 NOTE — TELEPHONE ENCOUNTER
Patient scheduled for appointment today at 4:15pm, he requested after work.    Shantell Denny XRO/

## 2020-07-30 ENCOUNTER — TELEPHONE (OUTPATIENT)
Dept: SURGERY | Facility: CLINIC | Age: 20
End: 2020-07-30

## 2020-07-30 LAB
BACTERIA SPEC CULT: NO GROWTH
Lab: NORMAL
PREALB SERPL IA-MCNC: 21 MG/DL (ref 15–45)
SPECIMEN SOURCE: NORMAL

## 2020-07-30 NOTE — TELEPHONE ENCOUNTER
He still has a bladder infection, that is why he has pain.  Make sure he completes these new antibiotics and if no better after 72 hours, he will need to see the urologist. If better he will need to repeat the UA/UC in 2 wks.     Electronically signed by:  Chuy Moss M.D.  7/30/2020

## 2020-07-30 NOTE — TELEPHONE ENCOUNTER
Spoke with patient, gave him the date on which we are trying to schedule his surgery, 8/21/2020. Patient states he'll call me back to confirm around 4:30 pm today.    Patient responded via [employer] that he'd like to be scheduled for surgery on 9/11/2020    I called patient to confirm surgery scheduling and to schedule related appointments.    Patient is scheduled for surgery with Dr. Robles    Spoke with: Ventura    Date of Surgery: August 21, 2020    Location: [place]    H&P: Scheduled with PAC on 9/8/2020 at 10:00 am    Labs scheduled on 9/8/2020 at 11:30 am    COVID-19 Test scheduled on 9/8/2020 at 12:10 pm    Post-op appointments:    -Ines Maria NP, on 9/28/2020 at 10:30 am    -Dr. Robles on 10/28/2020 at 6:45 pm (added an appointment note to switch to a video vist if appropriate)    Surgery packet: will send via [employer] and Mail

## 2020-07-31 ENCOUNTER — MYC MEDICAL ADVICE (OUTPATIENT)
Dept: FAMILY MEDICINE | Facility: OTHER | Age: 20
End: 2020-07-31

## 2020-07-31 ENCOUNTER — TELEPHONE (OUTPATIENT)
Dept: FAMILY MEDICINE | Facility: CLINIC | Age: 20
End: 2020-07-31

## 2020-07-31 DIAGNOSIS — R82.81 PYURIA, STERILE: ICD-10-CM

## 2020-07-31 DIAGNOSIS — R31.9 HEMATURIA, UNSPECIFIED TYPE: Primary | ICD-10-CM

## 2020-07-31 DIAGNOSIS — R31.29 MICROSCOPIC HEMATURIA: Primary | ICD-10-CM

## 2020-07-31 NOTE — TELEPHONE ENCOUNTER
----- Message from Chuy Moss MD sent at 7/31/2020 10:21 AM CDT -----  Ventura's urine culture came back negative this time.  We need to set him up to see the urologist to find out why he is having so much hematuria and bacteriuria.  We should do an ultrasound of his kidneys and bladder and to make sure there is no obvious above structural reason for him to have the blood.  We can get that scheduled for him before he has his appointment with urology.  Please contact him and let him know that I want him to continue with the antibiotic.  I placed an order for his ultrasound.  Please get this scheduled for him.    Electronically signed by:  Chuy Moss M.D.  7/31/2020

## 2020-07-31 NOTE — TELEPHONE ENCOUNTER
Attempted to call patient, unable to leave a message, [employer] message sent to patient with information from Dr Moss as well as scheduling numbers.    Shantell Denny XRO/

## 2020-07-31 NOTE — TELEPHONE ENCOUNTER
Patient has scheduled ultrasound appointment.    Urology referral is placed, needs Dx and Signature.    Patient has number for scheduling and will make appointment once referral is signed.    Shantell Denny XRO/

## 2020-08-03 ENCOUNTER — PATIENT OUTREACH (OUTPATIENT)
Dept: GASTROENTEROLOGY | Facility: CLINIC | Age: 20
End: 2020-08-03

## 2020-08-03 ENCOUNTER — HOME INFUSION (PRE-WILLOW HOME INFUSION) (OUTPATIENT)
Dept: PHARMACY | Facility: CLINIC | Age: 20
End: 2020-08-03

## 2020-08-03 ENCOUNTER — TELEPHONE (OUTPATIENT)
Dept: GASTROENTEROLOGY | Facility: CLINIC | Age: 20
End: 2020-08-03

## 2020-08-03 DIAGNOSIS — K52.9 COLITIS: Primary | ICD-10-CM

## 2020-08-03 DIAGNOSIS — Z91.89 AT RISK FOR MALNUTRITION: Primary | ICD-10-CM

## 2020-08-03 DIAGNOSIS — K50.014 CROHN'S DISEASE OF SMALL INTESTINE WITH ABSCESS (H): ICD-10-CM

## 2020-08-03 NOTE — TELEPHONE ENCOUNTER
Spoke with Mr. Quiroz today about the plan.  I also spoke with Dr. Victor Manuel Nelson.  Overall he will need surgery.  Likely in the second half of August.  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    He will need anti-TNF therapy postoperatively.  We will plan on administering his infliximab dose as scheduled on August 10.  We will also institute TPN to optimize nutrition.  I will have him see our IBD nutritionist to talk about it and semielemental diet and also decreasing p.o. intake as bowel rest will likely help his fistula.    We will continue his antibiotics until the time of surgery.  In the ideal setting would plan to restart anti-TNF 2 to 4 weeks postoperative.  We will coordinate closely with Dr. Victor Manuel Nelson's surgical team.    Tobin Wright MD    [facility]  Inflammatory Bowel Disease Program  Division of Gastroenterology, Hepatology and Nutrition

## 2020-08-03 NOTE — TELEPHONE ENCOUNTER
"Patient did not call back to schedule - he was going to call back in the afternoon after 4:30 pm.    Since it is after 4:30 pm, phone call to patient at provided number, \"wireless customer unavailable,\" unable to leave a  msg.  "

## 2020-08-03 NOTE — PROGRESS NOTES
Discussed plan for patient with mother. Pt has taken a layoff to focus on his health.   Pt to have picc line inserted and start tpn as soon as possible for bowel rest and optimize nutrition.   Plan for surgery end of month  Picc line ordered  Discussed tpn start with [facility] from [facility].   Labs to be drawn day of picc line insertion  Message to Dr. Keita about nutrition and bowel rest.

## 2020-08-04 ENCOUNTER — PATIENT OUTREACH (OUTPATIENT)
Dept: GASTROENTEROLOGY | Facility: CLINIC | Age: 20
End: 2020-08-04

## 2020-08-04 ENCOUNTER — TELEPHONE (OUTPATIENT)
Dept: SURGERY | Facility: CLINIC | Age: 20
End: 2020-08-04

## 2020-08-04 ENCOUNTER — HOME INFUSION (PRE-WILLOW HOME INFUSION) (OUTPATIENT)
Dept: PHARMACY | Facility: CLINIC | Age: 20
End: 2020-08-04

## 2020-08-04 DIAGNOSIS — K50.00 CROHN'S DISEASE OF SMALL INTESTINE (H): Primary | ICD-10-CM

## 2020-08-04 DIAGNOSIS — K50.014 CROHN'S DISEASE OF SMALL INTESTINE WITH ABSCESS (H): ICD-10-CM

## 2020-08-04 DIAGNOSIS — Z11.59 ENCOUNTER FOR SCREENING FOR OTHER VIRAL DISEASES: Primary | ICD-10-CM

## 2020-08-04 DIAGNOSIS — K50.018 CROHN'S DISEASE OF SMALL INTESTINE WITH OTHER COMPLICATION (H): ICD-10-CM

## 2020-08-04 RX ORDER — METRONIDAZOLE 500 MG/1
500 TABLET ORAL 2 TIMES DAILY
Qty: 60 TABLET | Refills: 1 | Status: SHIPPED | OUTPATIENT
Start: 2020-08-04 | End: 2020-08-18

## 2020-08-04 RX ORDER — CIPROFLOXACIN 500 MG/1
500 TABLET, FILM COATED ORAL 2 TIMES DAILY
Qty: 60 TABLET | Refills: 1 | Status: SHIPPED | OUTPATIENT
Start: 2020-08-04 | End: 2020-08-18

## 2020-08-04 NOTE — PROGRESS NOTES
Unable to reach pt   Contacted mother  Went over the plan  Pt scheduled with Boss[facility]   My chart message with the plan also                   Agree with TPN.  I / Holly will work on getting it set up.       Holly - can you get patient to see Bossman and start process for TPN     Should remain on antibiotics until surgery.     Plan to give next dose of Remicade.       TPN 2-4 weeks.

## 2020-08-04 NOTE — TELEPHONE ENCOUNTER
Dr. Robles wants patient's surgery moved up to August 21, 2020 over a medical concern.    RN Drea said she'd notify patient of the change via simpleFLOORS.    I called patient to reschedule his related appointments, automated message received said that the Wireless Customer is unavailable, please try your call again later.    Will tentatively reschedule all related appointments, then send them to patient via simpleFLOORS to review, and to let me know if any changes are needed.    Patient is scheduled for surgery with Dr. Travis Nuñez with [facility]    Date of Surgery: 8/21/2020    Location: [place]    COVID-19 Test scheduled at the [facility] on 8/18/2020 at 11:10 am    Labs scheduled on 8/18/2020 at 11:45 am    H&P: Scheduled with PAC on 8/18/2020 at 12:15 pm    Post-op appointments:    -Ines Maria NP, on 9/11/2020 at 10:30 am    -Dr. Robles on 9/30/2020 at 6:30 am    Surgery packet: sent via simpleFLOORS and Mailed     Additional comments:     -surgery rescheduled from 9/11 to 8/21/2020 as per Dr. Robles's instructions

## 2020-08-04 NOTE — TELEPHONE ENCOUNTER
Correction to previous note:    I did not hear back from patient regarding scheduling his surgery, and by the time I did, the original date I was going to offer - 8/21/2020 - was already full.     In the [facility] message that patient responded to me, he chose surgery date 9/11/2020. The following scheduling was completed:    Spoke with patient, gave him the date on which we are trying to schedule his surgery, 8/21/2020. Patient states he'll call me back to confirm around 4:30 pm today.     Patient responded via [employer] that he'd like to be scheduled for surgery on 9/11/2020     I called patient to confirm surgery scheduling and to schedule related appointments.     Patient is scheduled for surgery with Dr. Robles     Spoke with: [facility]     Date of Surgery: September 11, 2020     Location: [place]     H&P: Scheduled with PAC on 9/8/2020 at 10:00 am     Labs scheduled on 9/8/2020 at 11:30 am     COVID-19 Test scheduled on 9/8/2020 at 12:10 pm     Post-op appointments:     -Ines Maria NP, on 9/28/2020 at 10:30 am     -Dr. Robles on 10/28/2020 at 6:45 pm (added an appointment note to switch to a video vist if appropriate)     Surgery packet: will send via [employer] and Mail

## 2020-08-04 NOTE — PROGRESS NOTES
Patient scheduled for picc on August 10  Order for covid 19 testing entered  Contacted mother to discuss picc line placement appt and also covid 19 testing to be completed  Mother given the number to call to schedule  Contacted [facility] with update. Discussed plan with [facility] pharmacist. Labs to be drawn when picc line placement   Lab appt scheduled

## 2020-08-05 ENCOUNTER — TELEPHONE (OUTPATIENT)
Dept: SURGERY | Facility: CLINIC | Age: 20
End: 2020-08-05

## 2020-08-05 ENCOUNTER — PATIENT OUTREACH (OUTPATIENT)
Dept: SURGERY | Facility: CLINIC | Age: 20
End: 2020-08-05

## 2020-08-05 DIAGNOSIS — K50.014 CROHN'S DISEASE OF SMALL INTESTINE WITH ABSCESS (H): Primary | ICD-10-CM

## 2020-08-05 NOTE — PROGRESS NOTES
Spoke with patient's mom and stated pt does not need to do his US of his bladder per Dr. Robles. We may do a cystogram to find the location of the fistula. Will confirm with Dr. Robles.

## 2020-08-05 NOTE — PROGRESS NOTES
This is a recent snapshot of the patient's [facility] medical record.  For current drug dose and complete information and questions, call [phone] or In Basket pool, fv home infusion ([id number])  CSN Number:  [id number]

## 2020-08-05 NOTE — TELEPHONE ENCOUNTER
FUTURE VISIT INFORMATION      SURGERY INFORMATION:    Date: 8/21/20    Location: [facility] OR    Surgeon:  Brittni Robles MD     Anesthesia Type:  General    Procedure: Exploratory laparotomy with ileocolic resection    RECORDS REQUESTED FROM:       [facility]

## 2020-08-06 ENCOUNTER — HOME INFUSION (PRE-WILLOW HOME INFUSION) (OUTPATIENT)
Dept: PHARMACY | Facility: CLINIC | Age: 20
End: 2020-08-06

## 2020-08-06 DIAGNOSIS — K50.00 CROHN'S DISEASE OF SMALL INTESTINE (H): ICD-10-CM

## 2020-08-06 PROCEDURE — U0003 INFECTIOUS AGENT DETECTION BY NUCLEIC ACID (DNA OR RNA); SEVERE ACUTE RESPIRATORY SYNDROME CORONAVIRUS 2 (SARS-COV-2) (CORONAVIRUS DISEASE [COVID-19]), AMPLIFIED PROBE TECHNIQUE, MAKING USE OF HIGH THROUGHPUT TECHNOLOGIES AS DESCRIBED BY CMS-2020-01-R: HCPCS | Performed by: INTERNAL MEDICINE

## 2020-08-07 ENCOUNTER — TEAM CONFERENCE (OUTPATIENT)
Dept: SURGERY | Facility: CLINIC | Age: 20
End: 2020-08-07

## 2020-08-07 ENCOUNTER — MYC MEDICAL ADVICE (OUTPATIENT)
Dept: FAMILY MEDICINE | Facility: CLINIC | Age: 20
End: 2020-08-07

## 2020-08-07 LAB
SARS-COV-2 RNA SPEC QL NAA+PROBE: NOT DETECTED
SPECIMEN SOURCE: NORMAL

## 2020-08-07 NOTE — PROGRESS NOTES
COLON AND RECTAL SURGERY HUDDLE:    Patient was reviewed in preporation for their surgery the following was reviewed and has been completed:    Surgeon: Dr. Brittni Robles    Surgery & Date: 8/21/2020 Exploratory laparotomy with ileocolic resection     Last MD Note: reviewed    Anesthesia Type: General    Other Providers: N/A    PAC: Yes    WOC: N/A    Labs: Yes    Bowel Prep: Yes Antibiotic and Magnesium Citrate    Packet: Yes-- needs to be updated with no prep (addendum 8/13-done)    Imaging: Yes-- MRE 8/19     Post-Op Appointments: Yes    COVID: Yes

## 2020-08-10 ENCOUNTER — INFUSION THERAPY VISIT (OUTPATIENT)
Dept: INFUSION THERAPY | Facility: CLINIC | Age: 20
End: 2020-08-10
Payer: COMMERCIAL

## 2020-08-10 ENCOUNTER — HOSPITAL ENCOUNTER (OUTPATIENT)
Facility: AMBULATORY SURGERY CENTER | Age: 20
End: 2020-08-10
Attending: RADIOLOGY
Payer: COMMERCIAL

## 2020-08-10 ENCOUNTER — HOME INFUSION (PRE-WILLOW HOME INFUSION) (OUTPATIENT)
Dept: PHARMACY | Facility: CLINIC | Age: 20
End: 2020-08-10

## 2020-08-10 ENCOUNTER — ANCILLARY PROCEDURE (OUTPATIENT)
Dept: RADIOLOGY | Facility: AMBULATORY SURGERY CENTER | Age: 20
End: 2020-08-10
Attending: INTERNAL MEDICINE
Payer: COMMERCIAL

## 2020-08-10 ENCOUNTER — PATIENT OUTREACH (OUTPATIENT)
Dept: GASTROENTEROLOGY | Facility: CLINIC | Age: 20
End: 2020-08-10

## 2020-08-10 VITALS
RESPIRATION RATE: 16 BRPM | DIASTOLIC BLOOD PRESSURE: 73 MMHG | TEMPERATURE: 98.1 F | OXYGEN SATURATION: 100 % | HEART RATE: 77 BPM | SYSTOLIC BLOOD PRESSURE: 113 MMHG

## 2020-08-10 VITALS — DIASTOLIC BLOOD PRESSURE: 76 MMHG | SYSTOLIC BLOOD PRESSURE: 121 MMHG | BODY MASS INDEX: 19.44 KG/M2 | WEIGHT: 151.4 LBS

## 2020-08-10 DIAGNOSIS — K50.014 CROHN'S DISEASE OF SMALL INTESTINE WITH ABSCESS (H): ICD-10-CM

## 2020-08-10 DIAGNOSIS — K52.9 COLITIS: ICD-10-CM

## 2020-08-10 DIAGNOSIS — Z91.89 AT RISK FOR MALNUTRITION: ICD-10-CM

## 2020-08-10 DIAGNOSIS — K50.90 CROHN'S DISEASE (H): Primary | ICD-10-CM

## 2020-08-10 DIAGNOSIS — D50.9 IRON DEFICIENCY ANEMIA, UNSPECIFIED IRON DEFICIENCY ANEMIA TYPE: Primary | ICD-10-CM

## 2020-08-10 DIAGNOSIS — K50.90 CROHN'S DISEASE (H): ICD-10-CM

## 2020-08-10 LAB
ALBUMIN SERPL-MCNC: 3.2 G/DL (ref 3.4–5)
ALP SERPL-CCNC: 72 U/L (ref 65–260)
ALT SERPL W P-5'-P-CCNC: 17 U/L (ref 0–50)
ANION GAP SERPL CALCULATED.3IONS-SCNC: 4 MMOL/L (ref 3–14)
AST SERPL W P-5'-P-CCNC: 9 U/L (ref 0–35)
BASOPHILS # BLD AUTO: 0 10E9/L (ref 0–0.2)
BASOPHILS NFR BLD AUTO: 0.4 %
BILIRUB DIRECT SERPL-MCNC: 0.1 MG/DL (ref 0–0.2)
BILIRUB SERPL-MCNC: 0.3 MG/DL (ref 0.2–1.3)
BUN SERPL-MCNC: 14 MG/DL (ref 7–30)
CALCIUM SERPL-MCNC: 8.7 MG/DL (ref 8.5–10.1)
CHLORIDE SERPL-SCNC: 106 MMOL/L (ref 98–110)
CO2 SERPL-SCNC: 30 MMOL/L (ref 20–32)
CREAT SERPL-MCNC: 0.68 MG/DL (ref 0.5–1)
CRP SERPL-MCNC: 41.7 MG/L (ref 0–8)
DIFFERENTIAL METHOD BLD: ABNORMAL
EOSINOPHIL # BLD AUTO: 0.1 10E9/L (ref 0–0.7)
EOSINOPHIL NFR BLD AUTO: 1.3 %
ERYTHROCYTE [DISTWIDTH] IN BLOOD BY AUTOMATED COUNT: 14.5 % (ref 10–15)
ERYTHROCYTE [SEDIMENTATION RATE] IN BLOOD BY WESTERGREN METHOD: 16 MM/H (ref 0–15)
GFR SERPL CREATININE-BSD FRML MDRD: >90 ML/MIN/{1.73_M2}
GLUCOSE SERPL-MCNC: 79 MG/DL (ref 70–99)
HCT VFR BLD AUTO: 40.6 % (ref 40–53)
HGB BLD-MCNC: 13.3 G/DL (ref 13.3–17.7)
IMM GRANULOCYTES # BLD: 0 10E9/L (ref 0–0.4)
IMM GRANULOCYTES NFR BLD: 0.4 %
LYMPHOCYTES # BLD AUTO: 1 10E9/L (ref 0.8–5.3)
LYMPHOCYTES NFR BLD AUTO: 12.5 %
MAGNESIUM SERPL-MCNC: 2.4 MG/DL (ref 1.6–2.3)
MCH RBC QN AUTO: 32.7 PG (ref 26.5–33)
MCHC RBC AUTO-ENTMCNC: 32.8 G/DL (ref 31.5–36.5)
MCV RBC AUTO: 100 FL (ref 78–100)
MONOCYTES # BLD AUTO: 0.9 10E9/L (ref 0–1.3)
MONOCYTES NFR BLD AUTO: 11.5 %
NEUTROPHILS # BLD AUTO: 5.6 10E9/L (ref 1.6–8.3)
NEUTROPHILS NFR BLD AUTO: 73.9 %
NRBC # BLD AUTO: 0 10*3/UL
NRBC BLD AUTO-RTO: 0 /100
PHOSPHATE SERPL-MCNC: 3.1 MG/DL (ref 2.5–4.5)
PLATELET # BLD AUTO: 227 10E9/L (ref 150–450)
POTASSIUM SERPL-SCNC: 4.3 MMOL/L (ref 3.4–5.3)
PROT SERPL-MCNC: 7.8 G/DL (ref 6.8–8.8)
RBC # BLD AUTO: 4.07 10E12/L (ref 4.4–5.9)
SODIUM SERPL-SCNC: 139 MMOL/L (ref 133–144)
TRIGL SERPL-MCNC: 110 MG/DL
WBC # BLD AUTO: 7.6 10E9/L (ref 4–11)

## 2020-08-10 PROCEDURE — 96413 CHEMO IV INFUSION 1 HR: CPT | Performed by: NURSE PRACTITIONER

## 2020-08-10 PROCEDURE — 96415 CHEMO IV INFUSION ADDL HR: CPT | Performed by: NURSE PRACTITIONER

## 2020-08-10 PROCEDURE — 99207 ZZC NO CHARGE NURSE ONLY: CPT

## 2020-08-10 DEVICE — IMPLANTABLE DEVICE: Type: IMPLANTABLE DEVICE | Site: ARM | Status: FUNCTIONAL

## 2020-08-10 RX ORDER — HEPARIN SODIUM,PORCINE 10 UNIT/ML
5 VIAL (ML) INTRAVENOUS DAILY PRN
Status: DISCONTINUED | OUTPATIENT
Start: 2020-08-10 | End: 2020-08-10 | Stop reason: HOSPADM

## 2020-08-10 RX ORDER — HEPARIN SODIUM,PORCINE 10 UNIT/ML
5 VIAL (ML) INTRAVENOUS DAILY PRN
Status: CANCELLED
Start: 2020-08-11

## 2020-08-10 RX ORDER — METHYLPREDNISOLONE SODIUM SUCCINATE 125 MG/2ML
125 INJECTION, POWDER, LYOPHILIZED, FOR SOLUTION INTRAMUSCULAR; INTRAVENOUS ONCE
Status: CANCELLED | OUTPATIENT
Start: 2020-10-01

## 2020-08-10 RX ORDER — HEPARIN SODIUM,PORCINE 10 UNIT/ML
VIAL (ML) INTRAVENOUS PRN
Status: DISCONTINUED | OUTPATIENT
Start: 2020-08-10 | End: 2020-08-10 | Stop reason: HOSPADM

## 2020-08-10 RX ORDER — HEPARIN SODIUM,PORCINE 10 UNIT/ML
5 VIAL (ML) INTRAVENOUS EVERY 24 HOURS
Status: CANCELLED | OUTPATIENT
Start: 2020-08-10

## 2020-08-10 RX ORDER — HEPARIN SODIUM,PORCINE 10 UNIT/ML
5 VIAL (ML) INTRAVENOUS DAILY PRN
Status: CANCELLED
Start: 2020-10-05

## 2020-08-10 RX ORDER — ACETAMINOPHEN 325 MG/1
650 TABLET ORAL ONCE
Status: CANCELLED
Start: 2020-10-01

## 2020-08-10 RX ORDER — DIPHENHYDRAMINE HCL 25 MG
25 CAPSULE ORAL ONCE
Status: CANCELLED
Start: 2020-10-01

## 2020-08-10 RX ORDER — HEPARIN SODIUM,PORCINE 10 UNIT/ML
5-10 VIAL (ML) INTRAVENOUS
Status: DISCONTINUED | OUTPATIENT
Start: 2020-08-10 | End: 2020-08-11 | Stop reason: HOSPADM

## 2020-08-10 RX ADMIN — Medication 5 ML: at 16:38

## 2020-08-10 RX ADMIN — Medication 250 ML: at 14:29

## 2020-08-10 ASSESSMENT — PAIN SCALES - GENERAL: PAINLEVEL: NO PAIN (0)

## 2020-08-10 NOTE — PROGRESS NOTES
Patient had labs drawn this am but esr and crp not drawn. Called [facility] lab and will add on.

## 2020-08-10 NOTE — DISCHARGE INSTRUCTIONS
A collaboration between [facility] and [facility]  Experts in minimally invasive, targeted treatments performed using imaging guidance    Venous Access Device, Central Line Placement    Today you had a procedure today to install a venous access device; either a tunneled central vein catheter or a subcutaneous port catheter.    One of our Radiology PAs performed this procedure for you today:  ? Clay Wilson PA-C  ? MARLY Oviedo PA-C  ? Yaw Vazquez PA-C  ? Karina Wright PA-C   ? [facility] Salas PA-C    After you go home:  - Drink plenty of fluids.  Generally 6-8 (8 ounce) glasses a day is recommended.  - Resume your regular diet unless otherwise ordered by a medical provider.  - Keep any applied tape/gauze dressings clean and dry.  Change tape/gauze dressings if they get wet or soiled.  - You may shower the following day after procedure, however cover and protect from moisture any tape/gauze dressings.  You may let water hit and run over dried skin glue, but do not scrub.  Pat the area dry after showering.  - Port placement incisions are closed with absorbable suture, meaning they do not need to be removed at a later date, and a topical skin adhesive (skin glue).  This glue will wear off in 7-14 days.  Do not remove before this time.  If 14 days have passed and residual glue is present, you may gently remove it.  - Do not apply gels, lotions, or ointments to the glue site for the first 10 days as this may cause the glue to prematurely soften and fail.  - Do not perform strenuous activities or lift greater than 10 pounds for the next three days.  - If there is bleeding or oozing from the procedure site, apply firm pressure to the area for 5-10 minutes.  If the bleeding continues seek medical advice at the numbers below.  - Mild procedure site discomfort can be treated with an ice pack and over-the-counter pain relievers.        For 24 hours after any  sedation used:  - Relax and take it easy.  No strenuous activities.  - Do not drive or operate machines at home or at work.  - No alcohol consumption.  - Do not make any important or legal decisions.    Call our Interventional Radiology (IR) service if:  - If you start bleeding from the procedure site.  If you do start to bleed from the site, lie down and hold some pressure on the site.  Our radiology provider can help you decide if you need to return to the hospital.  - If you have new or worsening pain related to the procedure.  - If you have concerning swelling at the procedure site.  - If you develop persistent nausea or vomiting.  - If you develop hives or a rash or any unexplained itching.  - If you have a fever of greater than 100.5  F and chills in the first 5 days after procedure.  - Any other concerns related to your procedure.      [facility]  Interventional Radiology (IR)  [address] [place] [address]    Contact Number:  [phone]  (IR control desk)  - Monday - Friday 8:00 am - 4:30 pm    After hours for urgent concerns:  [phone]  - After 4:30 pm Monday - Friday, Weekends and Holidays.   - Ask for Interventional Radiology on-call.  Someone is available 24 hours a day.  - [facility] toll free number:  [phone]

## 2020-08-10 NOTE — BRIEF OP NOTE
[facility]    Brief Operative Note    Pre-operative diagnosis: Crohn's disease of small intestine with abscess (H) [K50.014]  Post-operative diagnosis Same as pre-operative diagnosis    Procedure: Procedure(s):  INSERTION, PICC @845  Surgeon: AMEYA Farr    Anesthesia: Local   Estimated blood loss: Minimal  Drains: None  Specimens: * No specimens in log *  Findings:   None.  Complications: None.  Implants:   Implant Name Type Inv. Item Serial No.  Lot No. LRB No. Used Action   CATH VA POWER PICC 5FR DL [id number] Catheter CATH VA POWER PICC 5FR DL [id number]  CR [facility] [id number] Left 1 Used as a Supply

## 2020-08-10 NOTE — PROGRESS NOTES
This is a recent snapshot of the patient's [facility] medical record.  For current drug dose and complete information and questions, call [phone] or In Basket pool, fv home infusion ([id number])  CSN Number:  [id number]

## 2020-08-10 NOTE — PROGRESS NOTES
Infusion Nursing Note:  [facility] Quiroz presents today for Remicade dose #3.    Patient seen by provider today: No   present during visit today: Not Applicable.    Note: Patient had a PICC line placed today.  He is doing well.  His PICC line gets flushed with 5 unit/mL heparin (blue syringe).    Intravenous Access:  PICC.    Treatment Conditions:  Not Applicable.    Post Infusion Assessment:  Patient tolerated infusion without incident.  Blood return noted pre and post infusion.  Site patent and intact, free from redness, edema or discomfort.  No evidence of extravasations.       Discharge Plan:   Patient will return in 8 weeks for next appointment.   Patient discharged in stable condition accompanied by: self.  Departure Mode: Ambulatory.    Valeria Scott RN-BSN, PHN, OCN  [facility]

## 2020-08-11 ENCOUNTER — PATIENT OUTREACH (OUTPATIENT)
Dept: GASTROENTEROLOGY | Facility: CLINIC | Age: 20
End: 2020-08-11

## 2020-08-11 ENCOUNTER — PATIENT OUTREACH (OUTPATIENT)
Dept: SURGERY | Facility: CLINIC | Age: 20
End: 2020-08-11

## 2020-08-11 ENCOUNTER — HOME INFUSION (PRE-WILLOW HOME INFUSION) (OUTPATIENT)
Dept: PHARMACY | Facility: CLINIC | Age: 20
End: 2020-08-11

## 2020-08-11 NOTE — PROGRESS NOTES
This is a recent snapshot of the patient's [facility] medical record.  For current drug dose and complete information and questions, call [phone] or In Basket pool, fv home infusion ([id number])  CSN Number:  [id number]

## 2020-08-11 NOTE — PROGRESS NOTES
Attempted to reach patient. Discussed nutrition with mother. Discussed good calories and that the visit with Dr. Whitley on Monday will be a good start for optimizing pre and post nutrition. Patient continues to have pain after eating. TPN started last evening. Mother had questions about visitors when pt has surgery. Contacted MsAvery Sobeida to call mother.

## 2020-08-12 ENCOUNTER — HOME INFUSION (PRE-WILLOW HOME INFUSION) (OUTPATIENT)
Dept: PHARMACY | Facility: CLINIC | Age: 20
End: 2020-08-12

## 2020-08-12 ENCOUNTER — HOSPITAL ENCOUNTER (OUTPATIENT)
Dept: LAB | Facility: CLINIC | Age: 20
Discharge: HOME | End: 2020-08-12
Attending: INTERNAL MEDICINE | Admitting: INTERNAL MEDICINE
Payer: COMMERCIAL

## 2020-08-12 LAB
ALBUMIN SERPL-MCNC: 3.1 G/DL (ref 3.4–5)
ALP SERPL-CCNC: 72 U/L (ref 65–260)
ALT SERPL W P-5'-P-CCNC: 17 U/L (ref 0–50)
ANION GAP SERPL CALCULATED.3IONS-SCNC: 4 MMOL/L (ref 3–14)
AST SERPL W P-5'-P-CCNC: 8 U/L (ref 0–35)
BASOPHILS # BLD AUTO: 0 10E9/L (ref 0–0.2)
BASOPHILS NFR BLD AUTO: 0.2 %
BILIRUB DIRECT SERPL-MCNC: 0.1 MG/DL (ref 0–0.2)
BILIRUB SERPL-MCNC: 0.3 MG/DL (ref 0.2–1.3)
BUN SERPL-MCNC: 16 MG/DL (ref 7–30)
CALCIUM SERPL-MCNC: 8.8 MG/DL (ref 8.5–10.1)
CHLORIDE SERPL-SCNC: 106 MMOL/L (ref 98–110)
CO2 SERPL-SCNC: 28 MMOL/L (ref 20–32)
CREAT SERPL-MCNC: 0.68 MG/DL (ref 0.5–1)
DIFFERENTIAL METHOD BLD: ABNORMAL
EOSINOPHIL NFR BLD AUTO: 1.2 %
ERYTHROCYTE [DISTWIDTH] IN BLOOD BY AUTOMATED COUNT: 14.1 % (ref 10–15)
GFR SERPL CREATININE-BSD FRML MDRD: >90 ML/MIN/{1.73_M2}
GLUCOSE SERPL-MCNC: 114 MG/DL (ref 70–99)
HCT VFR BLD AUTO: 37.7 % (ref 40–53)
HGB BLD-MCNC: 12.9 G/DL (ref 13.3–17.7)
IMM GRANULOCYTES # BLD: 0 10E9/L (ref 0–0.4)
IMM GRANULOCYTES NFR BLD: 0.4 %
LYMPHOCYTES # BLD AUTO: 0.9 10E9/L (ref 0.8–5.3)
LYMPHOCYTES NFR BLD AUTO: 16.6 %
MAGNESIUM SERPL-MCNC: 2.3 MG/DL (ref 1.6–2.3)
MCH RBC QN AUTO: 32.6 PG (ref 26.5–33)
MCHC RBC AUTO-ENTMCNC: 34.2 G/DL (ref 31.5–36.5)
MCV RBC AUTO: 95 FL (ref 78–100)
MONOCYTES # BLD AUTO: 0.6 10E9/L (ref 0–1.3)
MONOCYTES NFR BLD AUTO: 10.3 %
NEUTROPHILS # BLD AUTO: 4 10E9/L (ref 1.6–8.3)
NEUTROPHILS NFR BLD AUTO: 71.3 %
NRBC # BLD AUTO: 0 10*3/UL
NRBC BLD AUTO-RTO: 0 /100
PHOSPHATE SERPL-MCNC: 4.4 MG/DL (ref 2.5–4.5)
PLATELET # BLD AUTO: 237 10E9/L (ref 150–450)
POTASSIUM SERPL-SCNC: 4.2 MMOL/L (ref 3.4–5.3)
PROT SERPL-MCNC: 7.7 G/DL (ref 6.8–8.8)
RBC # BLD AUTO: 3.96 10E12/L (ref 4.4–5.9)
SODIUM SERPL-SCNC: 138 MMOL/L (ref 133–144)
TRIGL SERPL-MCNC: 162 MG/DL
WBC # BLD AUTO: 5.7 10E9/L (ref 4–11)

## 2020-08-12 PROCEDURE — 82248 BILIRUBIN DIRECT: CPT | Performed by: INTERNAL MEDICINE

## 2020-08-12 PROCEDURE — 80053 COMPREHEN METABOLIC PANEL: CPT | Performed by: INTERNAL MEDICINE

## 2020-08-12 PROCEDURE — 85025 COMPLETE CBC W/AUTO DIFF WBC: CPT | Performed by: INTERNAL MEDICINE

## 2020-08-12 PROCEDURE — 84100 ASSAY OF PHOSPHORUS: CPT | Performed by: INTERNAL MEDICINE

## 2020-08-12 PROCEDURE — 83735 ASSAY OF MAGNESIUM: CPT | Performed by: INTERNAL MEDICINE

## 2020-08-12 PROCEDURE — 84478 ASSAY OF TRIGLYCERIDES: CPT | Performed by: INTERNAL MEDICINE

## 2020-08-12 NOTE — PROGRESS NOTES
This is a recent snapshot of the patient's [facility] medical record.  For current drug dose and complete information and questions, call [phone] or In Basket pool, fv home infusion ([id number])  CSN Number:  [id number]

## 2020-08-12 NOTE — PROGRESS NOTES
This is a recent snapshot of the patient's [facility] medical record.  For current drug dose and complete information and questions, call [phone] or In Basket pool, fv home infusion ([id number])  CSN Number:  [id number]

## 2020-08-13 ENCOUNTER — HOME INFUSION (PRE-WILLOW HOME INFUSION) (OUTPATIENT)
Dept: PHARMACY | Facility: CLINIC | Age: 20
End: 2020-08-13

## 2020-08-13 DIAGNOSIS — K50.018 CROHN'S DISEASE OF SMALL INTESTINE WITH OTHER COMPLICATION (H): ICD-10-CM

## 2020-08-13 RX ORDER — OXYCODONE HYDROCHLORIDE 5 MG/1
5 TABLET ORAL EVERY 8 HOURS PRN
Qty: 20 TABLET | Refills: 0 | Status: ON HOLD | OUTPATIENT
Start: 2020-08-13 | End: 2020-08-25

## 2020-08-13 NOTE — PROGRESS NOTES
This is a recent snapshot of the patient's [facility] medical record.  For current drug dose and complete information and questions, call [phone] or In Basket pool, fv home infusion ([id number])  CSN Number:  [id number]

## 2020-08-14 NOTE — PROGRESS NOTES
This is a recent snapshot of the patient's [facility] medical record.  For current drug dose and complete information and questions, call [phone] or In Basket pool, fv home infusion ([id number])  CSN Number:  [id number]

## 2020-08-17 ENCOUNTER — VIRTUAL VISIT (OUTPATIENT)
Dept: GASTROENTEROLOGY | Facility: CLINIC | Age: 20
End: 2020-08-17
Payer: COMMERCIAL

## 2020-08-17 ENCOUNTER — CLINICAL UPDATE (OUTPATIENT)
Dept: PHARMACY | Facility: CLINIC | Age: 20
End: 2020-08-17

## 2020-08-17 ENCOUNTER — HOME INFUSION (PRE-WILLOW HOME INFUSION) (OUTPATIENT)
Dept: PHARMACY | Facility: CLINIC | Age: 20
End: 2020-08-17

## 2020-08-17 DIAGNOSIS — K50.90 CROHN'S DISEASE (H): Primary | ICD-10-CM

## 2020-08-17 DIAGNOSIS — K50.014 CROHN'S DISEASE OF SMALL INTESTINE WITH ABSCESS (H): Primary | ICD-10-CM

## 2020-08-17 DIAGNOSIS — Z71.3 NUTRITIONAL COUNSELING: ICD-10-CM

## 2020-08-17 DIAGNOSIS — Z91.89 AT RISK FOR MALNUTRITION: ICD-10-CM

## 2020-08-17 LAB
ALBUMIN SERPL-MCNC: 2.8 G/DL (ref 3.4–5)
ALP SERPL-CCNC: 77 U/L (ref 40–150)
ALT SERPL W P-5'-P-CCNC: 15 U/L (ref 0–70)
ANION GAP SERPL CALCULATED.3IONS-SCNC: 6 MMOL/L (ref 3–14)
AST SERPL W P-5'-P-CCNC: 12 U/L (ref 0–45)
BASOPHILS # BLD AUTO: 0 10E9/L (ref 0–0.2)
BASOPHILS NFR BLD AUTO: 0.3 %
BILIRUB DIRECT SERPL-MCNC: <0.1 MG/DL (ref 0–0.2)
BILIRUB SERPL-MCNC: 0.3 MG/DL (ref 0.2–1.3)
BUN SERPL-MCNC: 15 MG/DL (ref 7–30)
CALCIUM SERPL-MCNC: 8.3 MG/DL (ref 8.5–10.1)
CHLORIDE SERPL-SCNC: 107 MMOL/L (ref 94–109)
CO2 SERPL-SCNC: 25 MMOL/L (ref 20–32)
CREAT SERPL-MCNC: 0.57 MG/DL (ref 0.66–1.25)
DIFFERENTIAL METHOD BLD: ABNORMAL
EOSINOPHIL # BLD AUTO: 0.1 10E9/L (ref 0–0.7)
EOSINOPHIL NFR BLD AUTO: 1 %
ERYTHROCYTE [DISTWIDTH] IN BLOOD BY AUTOMATED COUNT: 14.2 % (ref 10–15)
GFR SERPL CREATININE-BSD FRML MDRD: >90 ML/MIN/{1.73_M2}
GLUCOSE SERPL-MCNC: 101 MG/DL (ref 70–99)
HCT VFR BLD AUTO: 36.8 % (ref 40–53)
HGB BLD-MCNC: 12.1 G/DL (ref 13.3–17.7)
IMM GRANULOCYTES # BLD: 0.1 10E9/L (ref 0–0.4)
IMM GRANULOCYTES NFR BLD: 0.7 %
LYMPHOCYTES # BLD AUTO: 1.5 10E9/L (ref 0.8–5.3)
LYMPHOCYTES NFR BLD AUTO: 21.1 %
MAGNESIUM SERPL-MCNC: 2 MG/DL (ref 1.6–2.3)
MCH RBC QN AUTO: 32.5 PG (ref 26.5–33)
MCHC RBC AUTO-ENTMCNC: 32.9 G/DL (ref 31.5–36.5)
MCV RBC AUTO: 99 FL (ref 78–100)
MONOCYTES # BLD AUTO: 0.4 10E9/L (ref 0–1.3)
MONOCYTES NFR BLD AUTO: 5.4 %
NEUTROPHILS # BLD AUTO: 5.1 10E9/L (ref 1.6–8.3)
NEUTROPHILS NFR BLD AUTO: 71.5 %
NRBC # BLD AUTO: 0 10*3/UL
NRBC BLD AUTO-RTO: 0 /100
PHOSPHATE SERPL-MCNC: 3.6 MG/DL (ref 2.5–4.5)
PLATELET # BLD AUTO: 214 10E9/L (ref 150–450)
POTASSIUM SERPL-SCNC: 4.1 MMOL/L (ref 3.4–5.3)
PROT SERPL-MCNC: 7.1 G/DL (ref 6.8–8.8)
RBC # BLD AUTO: 3.72 10E12/L (ref 4.4–5.9)
SODIUM SERPL-SCNC: 138 MMOL/L (ref 133–144)
TRIGL SERPL-MCNC: 62 MG/DL
WBC # BLD AUTO: 7.2 10E9/L (ref 4–11)

## 2020-08-17 PROCEDURE — 80053 COMPREHEN METABOLIC PANEL: CPT | Performed by: INTERNAL MEDICINE

## 2020-08-17 PROCEDURE — 84100 ASSAY OF PHOSPHORUS: CPT | Performed by: INTERNAL MEDICINE

## 2020-08-17 PROCEDURE — 99207 ZZC NO CHARGE LOS: CPT | Performed by: PHARMACIST

## 2020-08-17 PROCEDURE — 83735 ASSAY OF MAGNESIUM: CPT | Performed by: INTERNAL MEDICINE

## 2020-08-17 PROCEDURE — 85025 COMPLETE CBC W/AUTO DIFF WBC: CPT | Performed by: INTERNAL MEDICINE

## 2020-08-17 PROCEDURE — 82248 BILIRUBIN DIRECT: CPT | Performed by: INTERNAL MEDICINE

## 2020-08-17 PROCEDURE — 84478 ASSAY OF TRIGLYCERIDES: CPT | Performed by: INTERNAL MEDICINE

## 2020-08-17 NOTE — PATIENT INSTRUCTIONS
Aric Whitehead,    It was great meeting you today. Below is a summary of what we discussed:    1. Small, frequent snacks/meals.      2. Focus on softer texture foods and chew thoroughly to help minimize abdominal pain associated with eating at this time    Best regards,  Bossman Wihtley, PhD, RD

## 2020-08-17 NOTE — PROGRESS NOTES
"[facility] Outpatient Medical Nutrition Therapy      Ventura Quiroz is a 20 year old male who is being evaluated via a billable video visit.      The patient has been notified of following:     \"This video visit will be conducted via a call between you and your physician/provider. We have found that certain health care needs can be provided without the need for an in-person physical exam.  This service lets us provide the care you need with a video conversation.  If a prescription is necessary we can send it directly to your pharmacy.  If lab work is needed we can place an order for that and you can then stop by our lab to have the test done at a later time.    Video visits are billed at different rates depending on your insurance coverage.  Please reach out to your insurance provider with any questions.    If during the course of the call the physician/provider feels a video visit is not appropriate, you will not be charged for this service.\"    Patient has given verbal consent for Video visit? Yes  How would you like to obtain your AVS? MyChart  If you are dropped from the video visit, the video invite should be resent to: Send to e-mail at: [email or web address]  Will anyone else be joining your video visit? No        Video-Visit Details    Type of service:  Video Visit    Video Start Time: 11:01 AM  Video End Time: 11:18 AM    Originating Location (pt. Location): Home   During this virtual visit the patient is located in [place], patient verifies this as the location during the entirety of this visit.     Distant Location (provider location):  [facility]STRO[facility]IN[facility]     Platform used for Video Visit: Mary Whitley, PhD, RD    Additional provider notes:  Referring Physician: Kyle  Reason for RD Visit: IBD     Nutrition Plan: Continue to utilize Premier Protein shakes (2/day for an additional 320 kcal and 60 g protein/day). Recommended small frequent snacks and focusing on soft " "textures/chewing thoroughly if consuming solid foods at this time to help minimize abdominal pain associated with eating.    Recommendations for MD/Provider to order: None at this time    Malnutrition Diagnosis: Severe malnutrition. At this time, however, PN and use of Premier Protein shakes should be providing >90% of estimated caloric needs and >2 g/kg protein  In Context of:  Chronic illness or disease     Nutrition Assessment:  Patient is here for intial visit with Registered Dietitian (CHYNA).  Patient is a 20 year old male with a complex past medical history pertinent for Crohn's ileitis not amenable to medical therapy. Plans for ileocolic resection 8/21/20. Was seen by Dr. Robles 7/22/2020 to discuss surgery at which time concerns were raised about nutrition status. At that time he had reportedly lost 12 pounds in the previous 2 months - presented at 150 lb with a baseline of 170lb. Since that time TPN was initiated conservatively starting 8/11 due to concern for refeeding syndomre. Orders were placed this weekend to increase caloric content of PN. He remains at ~75% of estimated needs. Presents today to discuss possible use of a semi-elemental diet to help optimize for surgery.     He reports a relatively stable weight around 150 pounds in the past 2-3 weeks. He reports consuming 2 Premier protein shakes/day, which he has been doing for the past year. He has also tried a small amount of solids, but finds that if he eats too much at one time it results in severe abdominal pain. He states that he feels much better about nutrition status now that he is receiving PN.    Past Surgical History: No IBD surgical history    Symptoms:  Reports persistent abdominal pain. Gets worse with eating, especially if he eats a lot. Reports a weight of 150.2 pounds recently (within the past couple of days). Feels better getting PN.    Diet Recall:  (Typical Day)  Still eats \"every now and then.\" Had waffles this weekend. " Typically drinks a lot of gatorade. Consistently drinking 2 protein shakes/day (Premier protein). Has been using these for the past year. When he does try to eat solids it tends to be meat or breakfast food.    Anthropometrics:   Height: Data Unavailable  Recent Weight: 150.2 pounds (home scale)  BMI: There is no height or weight on file to calculate BMI.    Weight History:  Wt Readings from Last 10 Encounters:   08/10/20 68.7 kg (151 lb 6.4 oz) (43 %, Z= -0.17)*   07/22/20 68 kg (150 lb) (41 %, Z= -0.23)*   07/07/20 68.3 kg (150 lb 8 oz) (42 %, Z= -0.20)*   06/25/20 69.9 kg (154 lb 1.6 oz) (48 %, Z= -0.05)*   12/13/19 81.7 kg (180 lb 1.6 oz) (82 %, Z= 0.92)*   09/10/19 79.7 kg (175 lb 9.6 oz) (79 %, Z= 0.82)*   08/07/19 77.6 kg (171 lb) (75 %, Z= 0.68)*   05/28/19 74.9 kg (165 lb 3.2 oz) (70 %, Z= 0.51)*   05/08/19 75.1 kg (165 lb 9.6 oz) (70 %, Z= 0.54)*   04/25/19 74.4 kg (164 lb) (69 %, Z= 0.49)*     * Growth percentiles are based on [facility] (Boys, 2-20 Years) data.     Usual Weight: 170 pounds  Weight change in past 6 months: Experienced weight loss down to 150 pounds, but this seems to have since stabilized    Labs: 8/12/20: Albumin = 3.1; Triglycerides = 162; Glucose = 114   Pertinent Medications/vitamin and mineral supplements:   Current Outpatient Medications   Medication     ciprofloxacin (CIPRO) 500 MG tablet     ferrous sulfate (FEROSUL) 325 (65 Fe) MG tablet     hyoscyamine (LEVSIN) 0.125 MG tablet     inFLIXimab (REMICADE) 100 MG injection     mercaptopurine (PURINETHOL) 50 MG tablet     metroNIDAZOLE (FLAGYL) 500 MG tablet     metroNIDAZOLE (FLAGYL) 500 MG tablet     neomycin (MYCIFRADIN) 500 MG tablet     ondansetron (ZOFRAN ODT) 4 MG ODT tab     ondansetron (ZOFRAN) 4 MG tablet     ondansetron (ZOFRAN-ODT) 4 MG ODT tab     oxyCODONE (ROXICODONE) 5 MG tablet     polyethylene glycol (MIRALAX) 17 g packet     No current facility-administered medications for this visit.      Food Allergies: NA  Food  Intolerances: NA  Physical Activity: NA  Estimated Nutrition Needs based on most recent body weight of 68.2 k calories (25 kcals/kg), 100 g protein (1.5 g/kg), ~1 ml/kcal or total fluids per MD.     MALNUTRITION:  % Weight Loss:  > 10% in 6 months (severe malnutrition)  % Intake:  Decreased intake does not meet criteria for malnutrition as now likely meeting estimated needs between PN and oral intake  Subcutaneous Fat Loss:  NA  Muscle Loss:  Reports visible loss of muscle mass associated with weight loss  Fluid Retention:  None noted    Nutrition Diagnosis:    Food and nutrition related knowledge deficit related to IBD as evidenced by need for diet education.    Nutrition Prescription: Regular diet    Nutrition Intervention:    Nutrition Education/Counseling:  Discussed diet and symptom history. Discussed current nutrient needs and what is being provided with PN. Discussed oral intake. Reports historically drinking 2 Premier protein shakes/day for the past year. He also trials solid food, but notes that overeating can make symptoms worse. Discussed emphasizing small frequent snacks/meals to help minimize GI symptoms associated with eating. We also discussed focusing on softer texture items and chewing thoroughly to help minimize abdominal pain associated with eating. Discussed recent weight stability and expectations for weight regain following surgery.    Educational Materials Provided: No education materials provided at this time  Patient verbalized understanding of education provided. See all recommendations under Goals.    Goals:  1. Small, frequent snacks/meals.     2. Focus on softer texture foods and chew thoroughly to help minimize abdominal pain associated with eating at this time    Nutrition Monitoring and Evaluation: Will monitor adherence to nutrition recommendations at future RD visits.     Further Medical Nutrition Therapy: Yes  Next Appointment (if applicable): 6 weeks  Patient was encouraged  to call/contact RD with any further questions.

## 2020-08-17 NOTE — PROGRESS NOTES
"Clinical Pharmacy Consult:                                                    Ventura Quiroz is a 20 year old male called for a clinical pharmacist consult.  He was referred to me from Dr. Wright.     Ventura is not seen for CMR today due to request from consult by provider/time.    Reason for Consult: pre-/post-operative IBD med coordination    Discussion:    Surgery: Exploratory laparotomy with ileocolic resection   Surgeon: Dr. Robles  IBD Provider: Dr. Wright  Date of Surgery: 8/21/20    Current IBD Meds:    - Remicade 10 mg/kg Q8 weeks (recently completed induction 6/25, 7/7, and 8/10)   - mercaptopurine 100 mg daily   - ciprofloxacin 500 mg BID   - metronidazole 500 mg BID   - hyoscyamine 125 mcg every 4 hours PRN     IBD Status: MRE on 6/9/20 indicated acute on chronic inflammation of the ileum with suggestion of enteroenteric fistula and new colonic inflammation  Risk of anti-drug antibody development: higher due to anti-TNF therapy    IBD Med Plan:   - continue Remicade on 8/10 [Anastacia to clarify next dose] continue with every 8 week dosing, next dose expected 10/5   - continue mercaptopurine [Anastacia to clarify need for hold] continue until surgery, then hold   - continue ciprofloxacin until surgery   - continue metronidazole until surgery   - continue hyoscyamine PRN until surgery    Relevant notes: \"Spoke with Mr. Quiroz today about the plan.  I also spoke with Dr. Victor Manuel Nelson.  Overall he will need surgery.  Likely in the second half of August.  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     He will need anti-TNF therapy postoperatively.  We will plan on administering his infliximab dose as scheduled on August 10.  We will also institute TPN to optimize nutrition.  I will have him see our IBD nutritionist to talk about it and semielemental diet and also decreasing p.o. intake as bowel rest will likely help his fistula.      We will continue his antibiotics until the " "time of surgery.  In the ideal setting would plan to restart anti-TNF 2 to 4 weeks postoperative.  We will coordinate closely with Dr. Victor Manuel Nelson's surgical team.\"    I confirmed that Ventura did receive infliximab on 8/10 as planned. He was under the assumption he should be getting infliximab every month. He has continued on mercaptopurine as well as the antibiotics prescribed by Dr. Wright. Reviewed tentative plan to \"re-start\" infliximab 2-4 weeks post-operatively, though he proceeded with most recent dose and this is not being held. He has a scheduled post-op visit with Ines Campbell PA-C on 9/11, which will be 3 weeks post-op. Per my review of his chart, he is on every 8 weeks infliximab now that he completed the induction dose. 2-4 weeks post-operative would be roughly 4-6 weeks after his last infusion. I will clarify with Dr. Wright if this is correct, as we may need insurance authorization for this. Based on conventional dosing, his next dose of infliximab is due around October 5th. I will also confirm plan for mercaptopurine as this was not specifically called out elsewhere.  I confirmed that he has completed both the cephalexin for his recent UTI as well as the nitrofurantoin. Removed from medication list.    Plan:  1. Anastacia to clarify timeline for infliximab \"re-start\" and mercaptopurine    Tobin Wright MD [place], Anastacia [place]one, [facility]            Yes, OK to continue 6-MP until time of surgery, but then probably need to hold after that.     Actually with his surgery, we will probably keep him at 8 weeks, and coordinate an 8 week level...          "

## 2020-08-17 NOTE — LETTER
8/17/2020         RE: Ventura Quiroz  [address]  [place] [place] [id number]        Dear Colleague,    Thank you for referring your patient, Ventura Quiroz, to the [facility]. Please see a copy of my visit note below.    [facility] Outpatient Medical Nutrition Therapy      Ventura Quiroz is a 20 year old male who is being evaluated via a billable video visit.        Video-Visit Details    Type of service:  Video Visit    Video Start Time: 11:01 AM  Video End Time: 11:18 AM    Originating Location (pt. Location): Home   During this virtual visit the patient is located in [place], patient verifies this as the location during the entirety of this visit.     Distant Location (provider location):  [facility]     Platform used for Video Visit: Mary Whitley, PhD, RD    Additional provider notes:  Referring Physician: Kyle  Reason for RD Visit: IBD     Nutrition Plan: Continue to utilize Premier Protein shakes (2/day for an additional 320 kcal and 60 g protein/day). Recommended small frequent snacks and focusing on soft textures/chewing thoroughly if consuming solid foods at this time to help minimize abdominal pain associated with eating.    Recommendations for MD/Provider to order: None at this time    Malnutrition Diagnosis: Severe malnutrition. At this time, however, PN and use of Premier Protein shakes should be providing >90% of estimated caloric needs and >2 g/kg protein  In Context of:  Chronic illness or disease     Nutrition Assessment:  Patient is here for intial visit with Registered Dietitian (RD).  Patient is a 20 year old male with a complex past medical history pertinent for Crohn's ileitis not amenable to medical therapy. Plans for ileocolic resection 8/21/20. Was seen by Dr. Robles 7/22/2020 to discuss surgery at which time concerns were raised about nutrition status. At that time he had reportedly lost 12 pounds in the previous 2  "months - presented at 150 lb with a baseline of 170lb. Since that time TPN was initiated conservatively starting 8/11 due to concern for refeeding syndomre. Orders were placed this weekend to increase caloric content of PN. He remains at ~75% of estimated needs. Presents today to discuss possible use of a semi-elemental diet to help optimize for surgery.     He reports a relatively stable weight around 150 pounds in the past 2-3 weeks. He reports consuming 2 Premier protein shakes/day, which he has been doing for the past year. He has also tried a small amount of solids, but finds that if he eats too much at one time it results in severe abdominal pain. He states that he feels much better about nutrition status now that he is receiving PN.    Past Surgical History: No IBD surgical history    Symptoms:  Reports persistent abdominal pain. Gets worse with eating, especially if he eats a lot. Reports a weight of 150.2 pounds recently (within the past couple of days). Feels better getting PN.    Diet Recall:  (Typical Day)  Still eats \"every now and then.\" Had waffles this weekend. Typically drinks a lot of gatorade. Consistently drinking 2 protein shakes/day (Premier protein). Has been using these for the past year. When he does try to eat solids it tends to be meat or breakfast food.    Anthropometrics:   Height: Data Unavailable  Recent Weight: 150.2 pounds (home scale)  BMI: There is no height or weight on file to calculate BMI.    Weight History:  Wt Readings from Last 10 Encounters:   08/10/20 68.7 kg (151 lb 6.4 oz) (43 %, Z= -0.17)*   07/22/20 68 kg (150 lb) (41 %, Z= -0.23)*   07/07/20 68.3 kg (150 lb 8 oz) (42 %, Z= -0.20)*   06/25/20 69.9 kg (154 lb 1.6 oz) (48 %, Z= -0.05)*   12/13/19 81.7 kg (180 lb 1.6 oz) (82 %, Z= 0.92)*   09/10/19 79.7 kg (175 lb 9.6 oz) (79 %, Z= 0.82)*   08/07/19 77.6 kg (171 lb) (75 %, Z= 0.68)*   05/28/19 74.9 kg (165 lb 3.2 oz) (70 %, Z= 0.51)*   05/08/19 75.1 kg (165 lb 9.6 oz) (70 " %, Z= 0.54)*   19 74.4 kg (164 lb) (69 %, Z= 0.49)*     * Growth percentiles are based on CDC (Boys, 2-20 Years) data.     Usual Weight: 170 pounds  Weight change in past 6 months: Experienced weight loss down to 150 pounds, but this seems to have since stabilized    Labs: 20: Albumin = 3.1; Triglycerides = 162; Glucose = 114   Pertinent Medications/vitamin and mineral supplements:   Current Outpatient Medications   Medication     ciprofloxacin (CIPRO) 500 MG tablet     ferrous sulfate (FEROSUL) 325 (65 Fe) MG tablet     hyoscyamine (LEVSIN) 0.125 MG tablet     inFLIXimab (REMICADE) 100 MG injection     mercaptopurine (PURINETHOL) 50 MG tablet     metroNIDAZOLE (FLAGYL) 500 MG tablet     metroNIDAZOLE (FLAGYL) 500 MG tablet     neomycin (MYCIFRADIN) 500 MG tablet     ondansetron (ZOFRAN ODT) 4 MG ODT tab     ondansetron (ZOFRAN) 4 MG tablet     ondansetron (ZOFRAN-ODT) 4 MG ODT tab     oxyCODONE (ROXICODONE) 5 MG tablet     polyethylene glycol (MIRALAX) 17 g packet     No current facility-administered medications for this visit.      Food Allergies: NA  Food Intolerances: NA  Physical Activity: NA  Estimated Nutrition Needs based on most recent body weight of 68.2 k calories (25 kcals/kg), 100 g protein (1.5 g/kg), ~1 ml/kcal or total fluids per MD.     MALNUTRITION:  % Weight Loss:  > 10% in 6 months (severe malnutrition)  % Intake:  Decreased intake does not meet criteria for malnutrition as now likely meeting estimated needs between PN and oral intake  Subcutaneous Fat Loss:  NA  Muscle Loss:  Reports visible loss of muscle mass associated with weight loss  Fluid Retention:  None noted    Nutrition Diagnosis:    Food and nutrition related knowledge deficit related to IBD as evidenced by need for diet education.    Nutrition Prescription: Regular diet    Nutrition Intervention:    Nutrition Education/Counseling:  Discussed diet and symptom history. Discussed current nutrient needs and what is  being provided with PN. Discussed oral intake. Reports historically drinking 2 Premier protein shakes/day for the past year. He also trials solid food, but notes that overeating can make symptoms worse. Discussed emphasizing small frequent snacks/meals to help minimize GI symptoms associated with eating. We also discussed focusing on softer texture items and chewing thoroughly to help minimize abdominal pain associated with eating. Discussed recent weight stability and expectations for weight regain following surgery.    Educational Materials Provided: No education materials provided at this time  Patient verbalized understanding of education provided. See all recommendations under Goals.    Goals:  1. Small, frequent snacks/meals.     2. Focus on softer texture foods and chew thoroughly to help minimize abdominal pain associated with eating at this time    Nutrition Monitoring and Evaluation: Will monitor adherence to nutrition recommendations at future RD visits.     Further Medical Nutrition Therapy: Yes  Next Appointment (if applicable): 6 weeks  Patient was encouraged to call/contact RD with any further questions.            Again, thank you for allowing me to participate in the care of your patient.        Sincerely,        Bossman Whitley RD

## 2020-08-18 ENCOUNTER — HOME INFUSION (PRE-WILLOW HOME INFUSION) (OUTPATIENT)
Dept: PHARMACY | Facility: CLINIC | Age: 20
End: 2020-08-18

## 2020-08-18 ENCOUNTER — ANESTHESIA EVENT (OUTPATIENT)
Dept: SURGERY | Facility: CLINIC | Age: 20
End: 2020-08-18
Payer: COMMERCIAL

## 2020-08-18 ENCOUNTER — PRE VISIT (OUTPATIENT)
Dept: SURGERY | Facility: CLINIC | Age: 20
End: 2020-08-18

## 2020-08-18 ENCOUNTER — OFFICE VISIT (OUTPATIENT)
Dept: SURGERY | Facility: CLINIC | Age: 20
End: 2020-08-18
Payer: COMMERCIAL

## 2020-08-18 VITALS
HEART RATE: 70 BPM | SYSTOLIC BLOOD PRESSURE: 95 MMHG | TEMPERATURE: 98.1 F | HEIGHT: 74 IN | WEIGHT: 161 LBS | BODY MASS INDEX: 20.66 KG/M2 | DIASTOLIC BLOOD PRESSURE: 57 MMHG

## 2020-08-18 DIAGNOSIS — K50.014 CROHN'S DISEASE OF SMALL INTESTINE WITH ABSCESS (H): ICD-10-CM

## 2020-08-18 DIAGNOSIS — Z11.59 ENCOUNTER FOR SCREENING FOR OTHER VIRAL DISEASES: ICD-10-CM

## 2020-08-18 DIAGNOSIS — Z01.818 PREOP EXAMINATION: Primary | ICD-10-CM

## 2020-08-18 LAB
BASOPHILS # BLD AUTO: 0 10E9/L (ref 0–0.2)
BASOPHILS NFR BLD AUTO: 0.3 %
DIFFERENTIAL METHOD BLD: ABNORMAL
EOSINOPHIL # BLD AUTO: 0.1 10E9/L (ref 0–0.7)
EOSINOPHIL NFR BLD AUTO: 1.4 %
ERYTHROCYTE [DISTWIDTH] IN BLOOD BY AUTOMATED COUNT: 14.1 % (ref 10–15)
HCT VFR BLD AUTO: 36.8 % (ref 40–53)
HGB BLD-MCNC: 11.8 G/DL (ref 13.3–17.7)
IMM GRANULOCYTES # BLD: 0.1 10E9/L (ref 0–0.4)
IMM GRANULOCYTES NFR BLD: 1 %
LYMPHOCYTES # BLD AUTO: 0.8 10E9/L (ref 0.8–5.3)
LYMPHOCYTES NFR BLD AUTO: 11.6 %
MCH RBC QN AUTO: 32.7 PG (ref 26.5–33)
MCHC RBC AUTO-ENTMCNC: 32.1 G/DL (ref 31.5–36.5)
MCV RBC AUTO: 102 FL (ref 78–100)
MONOCYTES # BLD AUTO: 0.9 10E9/L (ref 0–1.3)
MONOCYTES NFR BLD AUTO: 12.7 %
NEUTROPHILS # BLD AUTO: 5.1 10E9/L (ref 1.6–8.3)
NEUTROPHILS NFR BLD AUTO: 73 %
NRBC # BLD AUTO: 0 10*3/UL
NRBC BLD AUTO-RTO: 0 /100
PLATELET # BLD AUTO: 179 10E9/L (ref 150–450)
RBC # BLD AUTO: 3.61 10E12/L (ref 4.4–5.9)
WBC # BLD AUTO: 7 10E9/L (ref 4–11)

## 2020-08-18 ASSESSMENT — PAIN SCALES - GENERAL: PAINLEVEL: MODERATE PAIN (5)

## 2020-08-18 ASSESSMENT — MIFFLIN-ST. JEOR: SCORE: 1810.04

## 2020-08-18 ASSESSMENT — LIFESTYLE VARIABLES: TOBACCO_USE: 0

## 2020-08-18 NOTE — ANESTHESIA PREPROCEDURE EVALUATION
Anesthesia Pre-Procedure Evaluation    Patient: Ventura Quiroz   MRN:     [id number] Gender:   male   Age:    20 year old :      2000        Ventura Quiroz is a 19 year old male with PMH significant for Crohn's disease.  He was diagnosed at age 18 years old. He had a 2 year history of on and off abdominal pain and diarrhea. He presented to ED in 2019 with increased pain and emesis.  CT at that time showed evidence of Crohn's ileitis and abscess formation which resolved with antibiotics.  Disease phenotype - fistulizing. He began to have increase in inflammatory markers and some abdominal pain.  Repeat MRE showed disease progression with new colonic inflammation and continued acute on chronic enteritis in long segment of ileum.  He has started Remicaide with complete induction as of 8/10/20.  He has lost significant weight recently and has had PICC line placed and started TPN on 8/10/20.  He has post-prandial pain.      Preoperative Diagnosis: * No surgery found *        LABS:  CBC:   Lab Results   Component Value Date    WBC 7.0 2020    WBC 7.2 2020    HGB 11.8 (L) 2020    HGB 12.1 (L) 2020    HCT 36.8 (L) 2020    HCT 36.8 (L) 2020     2020     2020     BMP:   Lab Results   Component Value Date     2020     2020    POTASSIUM 4.1 2020    POTASSIUM 4.2 2020    CHLORIDE 107 2020    CHLORIDE 106 2020    CO2 25 2020    CO2 28 2020    BUN 15 2020    BUN 16 2020    CR 0.57 (L) 2020    CR 0.68 2020     (H) 2020     (H) 2020     COAGS: No results found for: PTT, INR, FIBR  POC: No results found for: BGM, HCG, HCGS  OTHER:   Lab Results   Component Value Date    A1C 5.4 2019    SHANNA 8.3 (L) 2020    PHOS 3.6 2020    MAG 2.0 2020    ALBUMIN 2.8 (L) 2020    PROTTOTAL 7.1 2020    ALT 15 2020    AST 12 2020     "ALKPHOS 77 08/17/2020    BILITOTAL 0.3 08/17/2020    LIPASE 58 04/07/2019    CRP 41.7 (H) 08/10/2020    SED 16 (H) 08/10/2020        Preop Vitals    BP Readings from Last 3 Encounters:   08/10/20 113/73   08/10/20 121/76   07/07/20 105/59    Pulse Readings from Last 3 Encounters:   08/10/20 77   07/07/20 80   06/25/20 81      Resp Readings from Last 3 Encounters:   08/10/20 16   07/07/20 14   06/25/20 16    SpO2 Readings from Last 3 Encounters:   08/10/20 100%   07/07/20 98%   06/25/20 100%      Temp Readings from Last 1 Encounters:   08/10/20 98.1  F (36.7  C) (Temporal)    Ht Readings from Last 1 Encounters:   07/22/20 1.88 m (6' 2\") (94 %, Z= 1.57)*     * Growth percentiles are based on CDC (Boys, 2-20 Years) data.      Wt Readings from Last 1 Encounters:   08/10/20 68.7 kg (151 lb 6.4 oz) (43 %, Z= -0.17)*     * Growth percentiles are based on CDC (Boys, 2-20 Years) data.    Estimated body mass index is 19.44 kg/m  as calculated from the following:    Height as of 7/22/20: 1.88 m (6' 2\").    Weight as of 8/10/20: 68.7 kg (151 lb 6.4 oz).     LDA:  Peripheral IV 11/14/19 Right Upper forearm (Active)   Number of days: 278       PICC Double Lumen 08/10/20 Left Basilic (Active)   Site Assessment WDL 08/10/20 1638   Dressing Intervention Dressing reinforced 08/10/20 1638   Lumen A - Color RED 08/10/20 1638   Lumen A - Status blood return noted;heparin locked 08/10/20 1638   Number of days: 8        Past Medical History:   Diagnosis Date     Allergic rhinitis, cause unspecified     Zyrtec helps     Crohn's Colitis 4/8/2019     Unspecified otitis media     Otitis Media      Past Surgical History:   Procedure Laterality Date     COLONOSCOPY N/A 4/11/2019    Procedure: COMBINED COLONOSCOPY, SINGLE OR MULTIPLE BIOPSY/POLYPECTOMY BY BIOPSY;  Surgeon: Tobin Wright MD;  Location:  GI     EXCISE LIP OR CHEEK FOLD  10/3/2003    Needle cautery frenulectomy.     INSERT PICC LINE Left 8/10/2020    Procedure: INSERTION, " PICC @845;  Surgeon: Karan Farr MD;  Location: [facility] OR     IR PICC PLACEMENT > 5 YRS OF AGE  8/10/2020     PE TUBES  4/25/2006     TONSILLECTOMY & ADENOIDECTOMY  4/25/2006      Allergies   Allergen Reactions     Amoxicillin      Penicillin [Penicillins]         Anesthesia Evaluation     . Pt has had prior anesthetic. Type: MAC    No history of anesthetic complications          ROS/MED HX    ENT/Pulmonary:  - neg pulmonary ROS    (-) tobacco use   Neurologic:  - neg neurologic ROS     Cardiovascular:  - neg cardiovascular ROS   (+) ----. : . . . :. . No previous cardiac testing       METS/Exercise Tolerance:  >4 METS   Hematologic:     (+) Anemia, -     (-) history of blood clots and History of Transfusion   Musculoskeletal:  - neg musculoskeletal ROS       GI/Hepatic:     (+) Inflammatory bowel disease, bowel prep,       Renal/Genitourinary:  - ROS Renal section negative       Endo:  - neg endo ROS       Psychiatric:  - neg psychiatric ROS       Infectious Disease:  - neg infectious disease ROS       Malignancy:      - no malignancy   Other:                         PHYSICAL EXAM:   Mental Status/Neuro: A/A/O; Age Appropriate   Airway: Facies: Feasible  Mallampati: I  Mouth/Opening: Full  TM distance: > 6 cm  Neck ROM: Full   Respiratory: Auscultation: CTAB     Resp. Rate: Normal     Resp. Effort: Normal      CV: Rhythm: Regular  Rate: Age appropriate  Heart: Normal Sounds  Edema: None   Comments:      Dental: Normal Dentition                Assessment:   ASA SCORE: 2    H&P: History and physical reviewed and following examination; no interval change.   Smoking Status:  Non-Smoker/Unknown   NPO Status: NPO Appropriate     Plan:   Anes. Type:  General   Pre-Medication: None   Induction:  IV (Standard)   Airway: ETT; Oral   Access/Monitoring: PIV   Maintenance: Balanced     Postop Plan:   Postop Pain: Opioids  Postop Sedation/Airway: Not planned     PONV Management:   Adult Risk Factors:, Non-Smoker, Postop  Opioids   Prevention: Ondansetron, Dexamethasone     CONSENT: Direct conversation   Plan and risks discussed with: Patient   Blood Products: Consented (ALL Blood Products)                PAC Discussion and Assessment    ASA Classification: 2  Case is suitable for: [place]  Anesthetic techniques and relevant risks discussed: GA with regional block for post-op pain control  Invasive monitoring and risk discussed: No  Types:   Possibility and Risk of blood transfusion discussed: Yes  NPO instructions given:   Additional anesthetic preparation and risks discussed:   Needs early admission to pre-op area:   Other:     PAC Resident/NP Anesthesia Assessment:  Ventura Quiroz is a 20 scheduled for Exploratory laparotomy with ileocolic resection on 8/21/2020 by Dr. Robles in treatment of Crohn's disease of small intestine with abscess.  PAC referral for risk assessment and optimization for anesthesia with comorbid conditions of anemia:    Pre-operative considerations:  1.  Cardiac:  Functional status- METS >4.  Intermediate risk surgery with 0.9% (RCRI #) risk of major adverse cardiac event.   2.  Pulm:  Airway feasible.  RAHUL risk: low  3.  GI:  Risk of PONV score = 3.  If > 2, anti-emetic intervention recommended.   -Crohn's diagnosed in 2018.  Remicaide 10 mg/kg Q8 weeks (recently completed induction 6/25, 7/7, and 8/10).  Continue hyosyamine, mercaptopurine.  4.  Anemia.  Hgb 8/17 of 12.1.  On Iron replacement.  5.  Other: albumin of 2.8 on 8/17.  Followed by RD.  Consuming protein shakes and receiving TPN.  LUE PICC.  6.  Pt is requesting antiemetic prophylaxis.  He has h/o emesis during/after procedure/MRI/CT scan    VTE risk: 0.5%    Patient is optimized and is acceptable candidate for the proposed procedure.  No further diagnostic evaluation is needed.         **For further details of assessment, testing, and physical exam please see H and P completed on same date.          Surekha Canada PA-C, [facility]      Reviewed  and Signed by PAC Mid-Level Provider/Resident  Mid-Level Provider/Resident: Surekah Canada  Date: 8/18/2020  Time:     Attending Anesthesiologist Anesthesia Assessment:        Anesthesiologist:   Date:   Time:   Pass/Fail:   Disposition:     PAC Pharmacist Assessment:        Pharmacist:   Date:   Time:    Surekha Canada PA-C     ASA 2 for Chron's with malnutrition.  THOMAS Stover MD  Clinical   Anesthesia / Critical Care  *[phone]

## 2020-08-18 NOTE — H&P
Pre-Operative H & P     CC:  Preoperative exam to assess for increased cardiopulmonary risk while undergoing surgery and anesthesia.    Date of Encounter: 8/18/2020  Primary Care Physician:  Chuy Moss  Associated diagnosis: Crohn's disease of small intestine with abscess    HPI  Ventura Quiroz is a 20 year old male who presents for pre-operative H & P in preparation for Exploratory laparotomy with ileocolic resection with Dr. Robles on 8/21/2020 at [facility]. General anesthesia with block.    Ventura Quiroz is a 19 year old male with PMH significant for Crohn's disease.  He was diagnosed at age 18 years old. He had a 2 year history of on and off abdominal pain and diarrhea. He presented to ED in 4/2019 with increased pain and emesis.  CT at that time showed evidence of Crohn's ileitis and abscess formation which resolved with antibiotics.  Disease phenotype - fistulizing. He began to have increase in inflammatory markers and some abdominal pain.  Repeat MRE showed disease progression with new colonic inflammation and continued acute on chronic enteritis in long segment of ileum.  He has started Remicaide with complete induction as of 8/10/20.  He has lost significant weight recently and has had PICC line placed and started TPN on 8/10/20.  He has post-prandial pain.  He is now scheduled for the above procedure.    History is obtained from the patient and chart review.    Past Medical History  Past Medical History:   Diagnosis Date     Allergic rhinitis, cause unspecified     Zyrtec helps     Crohn's Colitis 4/8/2019     Unspecified otitis media     Otitis Media       Past Surgical History  Past Surgical History:   Procedure Laterality Date     COLONOSCOPY N/A 4/11/2019    Procedure: COMBINED COLONOSCOPY, SINGLE OR MULTIPLE BIOPSY/POLYPECTOMY BY BIOPSY;  Surgeon: Tobin Wright MD;  Location:  GI     EXCISE LIP OR CHEEK FOLD  10/3/2003    Needle  cautery frenulectomy.     INSERT PICC LINE Left 8/10/2020    Procedure: INSERTION, PICC @845;  Surgeon: Karan Farr MD;  [facility]: [facility]     IR PICC PLACEMENT > 5 YRS OF AGE  8/10/2020     PE TUBES  4/25/2006     TONSILLECTOMY & ADENOIDECTOMY  4/25/2006       Hx of Blood transfusions/reactions: denies     Hx of abnormal bleeding or anti-platelet use: denies    Menstrual history: No LMP for male patient.:     Steroid use in the last year: no    Personal or FH with difficulty with Anesthesia:  no    Prior to Admission Medications  Current Outpatient Medications   Medication Sig Dispense Refill     hyoscyamine (LEVSIN) 0.125 MG tablet Take 1 tablet (125 mcg) by mouth every 4 hours as needed for cramping 40 tablet 3     inFLIXimab (REMICADE) 100 MG injection every 28 days        mercaptopurine (PURINETHOL) 50 MG tablet Take 2 tablets (100 mg) by mouth daily (Patient taking differently: Take 100 mg by mouth every evening ) 60 tablet 4     ondansetron (ZOFRAN ODT) 4 MG ODT tab Patient should take one tab one hour before his mre appt. May take second one if needed when drinking contrast. (Patient taking differently: Take 4 mg by mouth as needed Patient should take one tab one hour before his mre appt. May take second one if needed when drinking contrast.) 2 tablet 0     oxyCODONE (ROXICODONE) 5 MG tablet Take 1 tablet (5 mg) by mouth every 8 hours as needed for pain 20 tablet 0     metroNIDAZOLE (FLAGYL) 500 MG tablet Take 1 tablet (500 mg) by mouth every 6 hours At 8:00 am, 2:00 pm, 8:00 pm the day prior to your surgery with neomycin and zofran. 3 tablet 0     neomycin (MYCIFRADIN) 500 MG tablet Take 2 tablets (1,000 mg) by mouth every 6 hours At 8:00 am, 2:00 pm, 8:00 pm the day prior to your surgery with flagyl and zofran. 6 tablet 0     ondansetron (ZOFRAN) 4 MG tablet Take 1 tablet (4 mg) by mouth every 6 hours At 8:00 am, 2:00 pm, 8:00 pm the day prior to your surgery with neomycin and flagyl. 3 tablet 0      "polyethylene glycol (MIRALAX) 17 g packet Take 238 g by mouth See Admin Instructions Starting at 4 pm night prior to surgery. Refer to \"Getting Ready for Surgery\" instructions. 238 g 0       Allergies  Allergies   Allergen Reactions     Amoxicillin      Penicillin [Penicillins]        Social History  Social History     Socioeconomic History     Marital status: Single     Spouse name: Not on file     Number of children: Not on file     Years of education: Not on file     Highest education level: Not on file   Occupational History     Not on file   Social Needs     Financial resource strain: Not on file     Food insecurity     Worry: Not on file     Inability: Not on file     Transportation needs     Medical: Not on file     Non-medical: Not on file   Tobacco Use     Smoking status: Never Smoker     Smokeless tobacco: Never Used   Substance and Sexual Activity     Alcohol use: Yes     Frequency: Monthly or less     Drinks per session: 1 or 2     Drug use: No     Sexual activity: Never   Lifestyle     Physical activity     Days per week: Not on file     Minutes per session: Not on file     Stress: Not on file   Relationships     Social connections     Talks on phone: Not on file     Gets together: Not on file     Attends Taoism service: Not on file     Active member of club or organization: Not on file     Attends meetings of clubs or organizations: Not on file     Relationship status: Not on file     Intimate partner violence     Fear of current or ex partner: Not on file     Emotionally abused: Not on file     Physically abused: Not on file     Forced sexual activity: Not on file   Other Topics Concern      Service Not Asked     Blood Transfusions Not Asked     Caffeine Concern Not Asked     Occupational Exposure Not Asked     Hobby Hazards Not Asked     Sleep Concern Not Asked     Stress Concern Not Asked     Weight Concern Not Asked     Special Diet Not Asked     Back Care Not Asked     Exercise Not " "Asked     Bike Helmet Not Asked     Seat Belt Not Asked     Self-Exams Not Asked     Parent/sibling w/ CABG, MI or angioplasty before 65F 55M? Not Asked   Social History Narrative     Not on file       Family History  Family History   Problem Relation Age of Onset     No Known Problems Mother      No Known Problems Father      Melanoma No family hx of      Skin Cancer No family hx of            Anesthesia Evaluation     . Pt has had prior anesthetic. Type: MAC    No history of anesthetic complications          ROS/MED HX  The complete review of systems is negative other than noted in the HPI or here.   ENT/Pulmonary:  - neg pulmonary ROS    (-) tobacco use   Neurologic:  - neg neurologic ROS     Cardiovascular:  - neg cardiovascular ROS   (+) ----. : . . . :. . No previous cardiac testing       METS/Exercise Tolerance:  >4 METS   Hematologic:     (+) Anemia, -     (-) history of blood clots and History of Transfusion   Musculoskeletal:  - neg musculoskeletal ROS       GI/Hepatic:     (+) Inflammatory bowel disease, bowel prep,       Renal/Genitourinary:  - ROS Renal section negative       Endo:  - neg endo ROS       Psychiatric:  - neg psychiatric ROS       Infectious Disease:  - neg infectious disease ROS       Malignancy:      - no malignancy   Other:             PHYSICAL EXAM:   Mental Status/Neuro: A/A/O; Age Appropriate   Airway: Facies: Feasible  Mallampati: II  Mouth/Opening: Full  TM distance: > 6 cm  Neck ROM: Full   Respiratory: Auscultation: CTAB     Resp. Rate: Normal     Resp. Effort: Normal      CV: Rhythm: Regular  Rate: Age appropriate  Heart: Normal Sounds  Edema: None   Comments:      Dental: Normal Dentition            Temp: 98.1  F (36.7  C) Temp src: Oral BP: 95/57 Pulse: 70               161 lbs 0 oz  6' 2\"[PT REPORTED[   Body mass index is 20.67 kg/m .       Physical Exam  Constitutional: Awake, alert, cooperative, no apparent distress, and appears stated age.  Eyes: Pupils equal, round and " reactive to light, extra ocular muscles intact, sclera clear, conjunctiva normal.  HENT: Normocephalic, oral pharynx with moist mucus membranes, good dentition. No goiter appreciated.   Respiratory: Clear to auscultation bilaterally, no crackles or wheezing.  Cardiovascular: Regular rate and rhythm, normal S1 and S2, and no murmur noted.  Carotids +2, no bruits. No edema. Palpable pulses to radial  DP and PT arteries.   GI: Normal bowel sounds, soft abdomen  Lymph/Hematologic: No cervical lymphadenopathy and no supraclavicular lymphadenopathy.  Genitourinary:  deferred  Skin: Warm and dry.  No rashes at anticipated surgical site.   Musculoskeletal: Full ROM of neck. There is no redness, warmth, or swelling of the joints. Gross motor strength is normal.    Neurologic: Awake, alert, oriented to name, place and time. Cranial nerves II-XII are grossly intact. Gait is normal.   Neuropsychiatric: Calm, cooperative. Normal affect.     Labs: (personally reviewed)  Component      Latest Ref Rng & Units 8/18/2020   WBC      4.0 - 11.0 10e9/L 7.0   RBC Count      4.4 - 5.9 10e12/L 3.61 (L)   Hemoglobin      13.3 - 17.7 g/dL 11.8 (L)   Hematocrit      40.0 - 53.0 % 36.8 (L)   MCV      78 - 100 fl 102 (H)   MCH      26.5 - 33.0 pg 32.7   MCHC      31.5 - 36.5 g/dL 32.1   RDW      10.0 - 15.0 % 14.1   Platelet Count      150 - 450 10e9/L 179   Diff Method       Automated Method   % Neutrophils      % 73.0   % Lymphocytes      % 11.6   % Monocytes      % 12.7   % Eosinophils      % 1.4   % Basophils      % 0.3   % Immature Granulocytes      % 1.0   Nucleated RBCs      0 /100 0   Absolute Neutrophil      1.6 - 8.3 10e9/L 5.1   Absolute Lymphocytes      0.8 - 5.3 10e9/L 0.8   Absolute Monocytes      0.0 - 1.3 10e9/L 0.9   Absolute Eosinophils      0.0 - 0.7 10e9/L 0.1   Absolute Basophils      0.0 - 0.2 10e9/L 0.0   Abs Immature Granulocytes      0 - 0.4 10e9/L 0.1   Absolute Nucleated RBC       0.0     Component      Latest Ref Rng  & Units 8/17/2020   Sodium      133 - 144 mmol/L 138   Potassium      3.4 - 5.3 mmol/L 4.1   Chloride      94 - 109 mmol/L 107   Carbon Dioxide      20 - 32 mmol/L 25   Anion Gap      3 - 14 mmol/L 6   Glucose      70 - 99 mg/dL 101 (H)   Urea Nitrogen      7 - 30 mg/dL 15   Creatinine      0.66 - 1.25 mg/dL 0.57 (L)   GFR Estimate      >60 mL/min/1.73:m2 >90   GFR Estimate If Black      >60 mL/min/1.73:m2 >90   Calcium      8.5 - 10.1 mg/dL 8.3 (L)   Bilirubin Total      0.2 - 1.3 mg/dL 0.3   Albumin      3.4 - 5.0 g/dL 2.8 (L)   Protein Total      6.8 - 8.8 g/dL 7.1   Alkaline Phosphatase      40 - 150 U/L 77   ALT      0 - 70 U/L 15   AST      0 - 45 U/L 12     EXAMINATION: MR ENTEROGRAPHY WO AND W CONTRAST, 6/9/2020  IMPRESSION:  1. Persistent ongoing active or chronic enteritis involving a long  segment terminal ileum with continued mild luminal narrowing. There is  new thickening/enhancement of the sigmoid colon since 11/14/2019 with  adhesions to proximal portion of the affected terminal ileum. In  addition, there are multiple decompressed small bowel loops between  these two structures, also demonstrating adhesions with the adjacent  terminal ileum. Although there is no overt fistulization, a subtle  fistulization between these structures cannot be completely excluded  (noting there are some linear tracts in the vicinity of these bowel  loops, which are not fluid-filled). No abscess identified.  2. Indeterminant susceptibility artifact in the proximal sigmoid.  3. Trace free fluid in the pelvis and surrounding of the terminal  Ileum      Outside records reviewed from: care everywhere    ASSESSMENT and PLAN  Ventura Quiroz is a 20 scheduled for Exploratory laparotomy with ileocolic resection on 8/21/2020 by Dr. Robles in treatment of Crohn's disease of small intestine with abscess.  PAC referral for risk assessment and optimization for anesthesia with comorbid conditions of anemia:    Pre-operative  considerations:  1.  Cardiac:  Functional status- METS >4.  Intermediate risk surgery with 0.9% (RCRI #) risk of major adverse cardiac event.   2.  Pulm:  Airway feasible. Mouth opening 3 finger breadths. RAHUL risk: low  3.  GI:  Risk of PONV score = 3.  If > 2, anti-emetic intervention recommended.   -Crohn's diagnosed in 2018.  Remicaide 10 mg/kg Q8 weeks (recently completed induction 6/25, 7/7, and 8/10).  Continue hyosyamine, mercaptopurine.  4.  Anemia.  Hgb of  8/17 of 12.1.  On Iron replacement.  5.  Other: albumin of 2.8 on 8/17.  Followed by RD.  Consuming protein shakes and receiving TPN.  LUE PICC.  6.  Pt is requesting antiemetic prophylaxis.  He has h/o emesis during/after procedure/MRI/CT scan    VTE risk: 0.5%    Patient is optimized and is acceptable candidate for the proposed procedure.  No further diagnostic evaluation is needed.       Surekha Canada PA-C  Preoperative Assessment Center  [facility]  Cl[facility]  Phone: [phone]  Fax: [phone]

## 2020-08-18 NOTE — PATIENT INSTRUCTIONS
It was nice speaking with you today.    1) As we discussed, continue your antibiotics (metronidazole and ciprofloxacin) until surgery.    2) I will clarify your next Remicade dose and be in touch after surgery. Usual dosing after the induction phase is to get Remicade every 8 weeks.    3) I will clarify the plan for mercaptopurine.      Anastacia Zaragoza PharmD, BCACP  MTM Pharmacist   [facility]  Phone: [phone]

## 2020-08-18 NOTE — PATIENT INSTRUCTIONS
Preparing for Your Surgery      Name:  Ventura Quiroz   MRN:  [id number]   :  2000   Today's Date:  2020       Arriving for surgery:  Surgery date:  20  Arrival time:  12 Noon    Restrictions due to COVID 19:  Patients are allowed one visitor in the pre-op period  All visitors must wear a mask  No visitors under 18  No ill visitors   parking is not available     Please come to:  [facility] [address]  [address] [place]  [address]     -    Please proceed to the Surgery Lounge on the 3rd floor. [phone]?     - ?If you are in need of directions, wheelchair or escort please stop at the Information Desk in the lobby.  Inform the information person that you are here for surgery; a wheelchair and escort will be provided to the Surgery Lounge .?    Enhanced Recovery After Surgery     This is a team effort, including you, to get you back on your feet, eating and drinking normally and out of the hospital as quickly as possible.  The goals are: 1) NO INFECTIONS and   2) RETURN TO NORMAL DIET    How can we achieve these goals?  1) STAY ACTIVE: Walk every day before your surgery; try to increase the amount every day.  Walk after surgery as much as you can-the nurses will help you.  Walking speeds healing and gets you home quicker, you heal better at home and have less risk of infection.     2) INCENTIVE SPIROMETER: Beginning one week prior to surgery, practice your incentive spirometer 4 times per day with 5 breaths each time. Bring incentive spirometer to hospital the day of surgery.   Using the incentive spirometer can strengthen your muscles between your ribs and help you have a strong cough after surgery.  A more effective cough can help prevent problems with your lungs.    3) STAY HYDRATED: Drink clear liquids up until 2 hours before your surgery. We would like you to purchase a drink such as Gatorade or Ensure Clear (not the milkshake type).  Drink this  before bedtime and on the way into the hospital, drink between 8-10 ounces or until you feel hydrated.  Keeping well hydrated leads to your veins being plump, you wake up faster, and you are less likely to be nauseated. Start drinking water as soon as you can after surgery and advance to clear liquids and food as tolerated.  IV fluids contain salt, drinking fluids will minimize the amount of IV fluids you need and decrease the amount of salt you get.    The most common reason for the patient to be readmitted is dehydration. Staying hydrated after you go home from the hospital is very important.  Ensure or Ensure Clear are good options to keep you hydrated.     4) PAIN MANAGEMENT: If we minimize the amount of opioids and narcotics, and use regional blocks (which numb the area where your surgery is) along with oral pain medications; you will have less side effects of nausea and constipation. Narcotics can slow down your bowels and cause you to stay in the hospital longer.     Our goal is to keep you comfortable; eating and drinking normally and back home safely.      What can I eat or drink?  -  Follow [facility] instructions for Bowel Prep and Clear Liquid diet.  -  You may have clear liquids until 2 hours before surgery. (Until 12 noon- Stop on arrival to hospital)  Examples of clear liquids:  Water  Clear broth  Juices (apple, white grape, white cranberry  and cider) without pulp  Noncarbonated, powder based beverages  (lemonade and Sudhakar-Aid)  Sodas (Sprite, 7-Up, ginger ale and seltzer)  Coffee or tea (without milk or cream)  Gatorade- No red or purple    -  No Alcohol for at least 24 hours before surgery     Which medicines can I take?    Hold Aspirin for 7 days before surgery.   Hold Multivitamins for 7 days before surgery.  Hold Supplements for 7 days before surgery.  Hold Ibuprofen (Advil, Motrin) for 1 day before surgery--unless otherwise directed by surgeon.  Hold Naproxen (Aleve) for 4 days before  surgery.    -  PLEASE TAKE these medications the day of surgery:  Hyoscyamine (Levsin) if needed  Oxycodone if needed    How do I prepare myself?  - Please take 2 showers before surgery using Scrubcare or Hibiclens soap.    Use this soap only from the neck to your toes.     Leave the soap on your skin for one minute--then rinse thoroughly.      You may use your own shampoo and conditioner; no other hair products.   - Please remove all jewelry and body piercings.  - No lotions, deodorants or fragrance.  - Bring your ID and insurance card.    - All patients are required to have a Covid-19 test within 4 days of surgery/procedure.      -Patients will be contacted by the [facility] scheduling team within 1 week of surgery to make an appointment.      - Patients may call the Scheduling team at [phone] if they have not been scheduled within 4 days of  surgery.    Questions or Concerns:    - For any questions regarding the day of surgery or your hospital stay, please contact the Pre Admission Nursing Office at [phone].       - If you have health changes between today and your surgery please call your surgeon.       For questions after surgery please call your surgeons office.     AFTER YOUR SURGERY  Breathing exercises   Breathing exercises help you recover faster. Take deep breaths and let the air out slowly. This will:     Help you wake up after surgery.    Help prevent complications like pneumonia.  Preventing complications will help you go home sooner.   Nausea and vomiting   You may feel sick to your stomach after surgery; if so, let your nurse know.    Pain control:  After surgery, you may have pain. Our goal is to help you manage your pain. Pain medicine will help you feel comfortable enough to do activities that will help you heal.  These activities may include breathing exercises, walking and physical therapy.   To help your health care team treat your pain we will ask: 1) If you have pain  2)  where it is located 3) describe your pain in your words  Methods of pain control include medications given by mouth, vein or by nerve block for some surgeries.  Sequential Compression Device (SCD):  You may need to wear SCD S (also called pneumo boots)on your legs or feet. These are wraps connected to a machine that pumps in air and releases it. The repeated pumping helps prevent blood clots from forming.   Using an Incentive Spirometer    An incentive spirometer is a device that helps you do deep breathing exercises. These exercises expand your lungs, aid in circulation, and help prevent pneumonia. Deep breathing exercises also help you breathe better and improve the function of your lungs by:    Keeping your lungs clear    Strengthening your breathing muscles    Helping prevent respiratory complications or problems  The incentive spirometer gives you a way to take an active part in your care. A nurse or therapist will teach you breathing exercises. To do these exercises, you will breathe in through your mouth and not your nose. The incentive spirometer only works correctly if you breathe in through your mouth.    Steps to clear lungs  Step 1. Exhale normally. Then, inhale normally.    Relax and breathe out.  Step 2. Place your lips tightly around the mouthpiece.    Make sure the device is upright and not tilted.  Step 3. Inhale as much air as you can through the mouthpiece (don't breath through your nose).    Inhale slowly and deeply.    Hold your breath long enough to keep the balls or disk raised for at least 3 to 5 seconds, or as instructed by your healthcare provider.  Step 4. Repeat the exercise regularly.

## 2020-08-19 ENCOUNTER — HOSPITAL ENCOUNTER (OUTPATIENT)
Dept: MRI IMAGING | Facility: CLINIC | Age: 20
Discharge: HOME | End: 2020-08-19
Attending: COLON & RECTAL SURGERY | Admitting: COLON & RECTAL SURGERY
Payer: COMMERCIAL

## 2020-08-19 DIAGNOSIS — K50.014 CROHN'S DISEASE OF SMALL INTESTINE WITH ABSCESS (H): ICD-10-CM

## 2020-08-19 LAB
SARS-COV-2 RNA SPEC QL NAA+PROBE: NOT DETECTED
SPECIMEN SOURCE: NORMAL

## 2020-08-19 PROCEDURE — 74183 MRI ABD W/O CNTR FLWD CNTR: CPT

## 2020-08-19 PROCEDURE — 25800030 ZZH RX IP 258 OP 636: Performed by: COLON & RECTAL SURGERY

## 2020-08-19 PROCEDURE — A9585 GADOBUTROL INJECTION: HCPCS | Performed by: COLON & RECTAL SURGERY

## 2020-08-19 PROCEDURE — 25500064 ZZH RX 255 OP 636: Performed by: COLON & RECTAL SURGERY

## 2020-08-19 RX ORDER — GADOBUTROL 604.72 MG/ML
7.5 INJECTION INTRAVENOUS ONCE
Status: COMPLETED | OUTPATIENT
Start: 2020-08-19 | End: 2020-08-19

## 2020-08-19 RX ADMIN — GADOBUTROL 7.2 ML: 604.72 INJECTION INTRAVENOUS at 12:32

## 2020-08-19 RX ADMIN — SODIUM CHLORIDE 40 ML: 9 INJECTION, SOLUTION INTRAVENOUS at 12:33

## 2020-08-19 NOTE — PROGRESS NOTES
This is a recent snapshot of the patient's [facility] medical record.  For current drug dose and complete information and questions, call [phone] or In Basket pool, [facility] home infusion ([id number])  CSN Number:  [id number]

## 2020-08-20 ENCOUNTER — TELEPHONE (OUTPATIENT)
Dept: SURGERY | Facility: CLINIC | Age: 20
End: 2020-08-20

## 2020-08-20 LAB
ABO + RH BLD: NORMAL
ABO + RH BLD: NORMAL
BLD GP AB SCN SERPL QL: NORMAL
BLOOD BANK CMNT PATIENT-IMP: NORMAL
SPECIMEN EXP DATE BLD: NORMAL

## 2020-08-20 NOTE — TELEPHONE ENCOUNTER
Spoke with patient, updated him that new surgery arrival time tomorrow 8/21/2020 is 11:35 am for a new surgery start time 1:35 pm. Patient expressed understanding.

## 2020-08-21 ENCOUNTER — ANESTHESIA (OUTPATIENT)
Dept: SURGERY | Facility: CLINIC | Age: 20
End: 2020-08-21
Payer: COMMERCIAL

## 2020-08-21 ENCOUNTER — ANCILLARY PROCEDURE (OUTPATIENT)
Dept: ULTRASOUND IMAGING | Facility: CLINIC | Age: 20
End: 2020-08-21
Payer: COMMERCIAL

## 2020-08-21 ENCOUNTER — DOCUMENTATION ONLY (OUTPATIENT)
Dept: PHARMACY | Facility: CLINIC | Age: 20
End: 2020-08-21

## 2020-08-21 ENCOUNTER — HOSPITAL ENCOUNTER (INPATIENT)
Facility: CLINIC | Age: 20
LOS: 5 days | Discharge: HOME | End: 2020-08-26
Attending: COLON & RECTAL SURGERY | Admitting: COLON & RECTAL SURGERY
Payer: COMMERCIAL

## 2020-08-21 DIAGNOSIS — K50.014 CROHN'S DISEASE OF SMALL INTESTINE WITH ABSCESS (H): Primary | ICD-10-CM

## 2020-08-21 PROBLEM — K50.90 CROHN DISEASE (H): Status: ACTIVE | Noted: 2020-08-21

## 2020-08-21 LAB
CREAT SERPL-MCNC: 0.62 MG/DL (ref 0.66–1.25)
GFR SERPL CREATININE-BSD FRML MDRD: >90 ML/MIN/{1.73_M2}
GLUCOSE BLDC GLUCOMTR-MCNC: 87 MG/DL (ref 70–99)

## 2020-08-21 PROCEDURE — 25000128 H RX IP 250 OP 636: Performed by: COLON & RECTAL SURGERY

## 2020-08-21 PROCEDURE — 25000125 ZZHC RX 250: Performed by: NURSE ANESTHETIST, CERTIFIED REGISTERED

## 2020-08-21 PROCEDURE — 82565 ASSAY OF CREATININE: CPT | Performed by: SURGERY

## 2020-08-21 PROCEDURE — 71000014 ZZH RECOVERY PHASE 1 LEVEL 2 FIRST HR: Performed by: COLON & RECTAL SURGERY

## 2020-08-21 PROCEDURE — 25000132 ZZH RX MED GY IP 250 OP 250 PS 637: Performed by: SURGERY

## 2020-08-21 PROCEDURE — P9041 ALBUMIN (HUMAN),5%, 50ML: HCPCS | Performed by: NURSE ANESTHETIST, CERTIFIED REGISTERED

## 2020-08-21 PROCEDURE — 0DBN0ZZ EXCISION OF SIGMOID COLON, OPEN APPROACH: ICD-10-PCS | Performed by: COLON & RECTAL SURGERY

## 2020-08-21 PROCEDURE — 88307 TISSUE EXAM BY PATHOLOGIST: CPT | Performed by: COLON & RECTAL SURGERY

## 2020-08-21 PROCEDURE — 25000128 H RX IP 250 OP 636: Performed by: NURSE ANESTHETIST, CERTIFIED REGISTERED

## 2020-08-21 PROCEDURE — 25000132 ZZH RX MED GY IP 250 OP 250 PS 637: Performed by: COLON & RECTAL SURGERY

## 2020-08-21 PROCEDURE — 37000009 ZZH ANESTHESIA TECHNICAL FEE, EACH ADDTL 15 MIN: Performed by: COLON & RECTAL SURGERY

## 2020-08-21 PROCEDURE — 0DBH0ZZ EXCISION OF CECUM, OPEN APPROACH: ICD-10-PCS | Performed by: COLON & RECTAL SURGERY

## 2020-08-21 PROCEDURE — 00000146 ZZHCL STATISTIC GLUCOSE BY METER IP

## 2020-08-21 PROCEDURE — 25000128 H RX IP 250 OP 636: Performed by: ANESTHESIOLOGY

## 2020-08-21 PROCEDURE — 25800030 ZZH RX IP 258 OP 636: Performed by: NURSE ANESTHETIST, CERTIFIED REGISTERED

## 2020-08-21 PROCEDURE — 07BC0ZX EXCISION OF PELVIS LYMPHATIC, OPEN APPROACH, DIAGNOSTIC: ICD-10-PCS | Performed by: COLON & RECTAL SURGERY

## 2020-08-21 PROCEDURE — 0DJD8ZZ INSPECTION OF LOWER INTESTINAL TRACT, VIA NATURAL OR ARTIFICIAL OPENING ENDOSCOPIC: ICD-10-PCS | Performed by: COLON & RECTAL SURGERY

## 2020-08-21 PROCEDURE — 37000008 ZZH ANESTHESIA TECHNICAL FEE, 1ST 30 MIN: Performed by: COLON & RECTAL SURGERY

## 2020-08-21 PROCEDURE — C9290 INJ, BUPIVACAINE LIPOSOME: HCPCS | Performed by: STUDENT IN AN ORGANIZED HEALTH CARE EDUCATION/TRAINING PROGRAM

## 2020-08-21 PROCEDURE — 12000001 ZZH R&B MED SURG/OB UMMC

## 2020-08-21 PROCEDURE — 0DNW0ZZ RELEASE PERITONEUM, OPEN APPROACH: ICD-10-PCS | Performed by: COLON & RECTAL SURGERY

## 2020-08-21 PROCEDURE — 25000128 H RX IP 250 OP 636: Performed by: STUDENT IN AN ORGANIZED HEALTH CARE EDUCATION/TRAINING PROGRAM

## 2020-08-21 PROCEDURE — 25000128 H RX IP 250 OP 636: Performed by: SURGERY

## 2020-08-21 PROCEDURE — 25000132 ZZH RX MED GY IP 250 OP 250 PS 637: Performed by: STUDENT IN AN ORGANIZED HEALTH CARE EDUCATION/TRAINING PROGRAM

## 2020-08-21 PROCEDURE — 27210794 ZZH OR GENERAL SUPPLY STERILE: Performed by: COLON & RECTAL SURGERY

## 2020-08-21 PROCEDURE — 0DBP0ZZ EXCISION OF RECTUM, OPEN APPROACH: ICD-10-PCS | Performed by: COLON & RECTAL SURGERY

## 2020-08-21 PROCEDURE — 36000057 ZZH SURGERY LEVEL 3 1ST 30 MIN - UMMC: Performed by: COLON & RECTAL SURGERY

## 2020-08-21 PROCEDURE — 40000171 ZZH STATISTIC PRE-PROCEDURE ASSESSMENT III: Performed by: COLON & RECTAL SURGERY

## 2020-08-21 PROCEDURE — 36592 COLLECT BLOOD FROM PICC: CPT | Performed by: SURGERY

## 2020-08-21 PROCEDURE — 36000059 ZZH SURGERY LEVEL 3 EA 15 ADDTL MIN UMMC: Performed by: COLON & RECTAL SURGERY

## 2020-08-21 PROCEDURE — 25800030 ZZH RX IP 258 OP 636: Performed by: SURGERY

## 2020-08-21 PROCEDURE — 71000015 ZZH RECOVERY PHASE 1 LEVEL 2 EA ADDTL HR: Performed by: COLON & RECTAL SURGERY

## 2020-08-21 PROCEDURE — 25000566 ZZH SEVOFLURANE, EA 15 MIN: Performed by: COLON & RECTAL SURGERY

## 2020-08-21 RX ORDER — LIDOCAINE 40 MG/G
CREAM TOPICAL
Status: DISCONTINUED | OUTPATIENT
Start: 2020-08-21 | End: 2020-08-22

## 2020-08-21 RX ORDER — NALOXONE HYDROCHLORIDE 0.4 MG/ML
.1-.4 INJECTION, SOLUTION INTRAMUSCULAR; INTRAVENOUS; SUBCUTANEOUS
Status: DISCONTINUED | OUTPATIENT
Start: 2020-08-21 | End: 2020-08-21 | Stop reason: HOSPADM

## 2020-08-21 RX ORDER — NALOXONE HYDROCHLORIDE 0.4 MG/ML
.1-.4 INJECTION, SOLUTION INTRAMUSCULAR; INTRAVENOUS; SUBCUTANEOUS
Status: DISCONTINUED | OUTPATIENT
Start: 2020-08-21 | End: 2020-08-25

## 2020-08-21 RX ORDER — FENTANYL CITRATE 50 UG/ML
25-50 INJECTION, SOLUTION INTRAMUSCULAR; INTRAVENOUS
Status: DISCONTINUED | OUTPATIENT
Start: 2020-08-21 | End: 2020-08-21 | Stop reason: HOSPADM

## 2020-08-21 RX ORDER — BUPIVACAINE HYDROCHLORIDE 2.5 MG/ML
INJECTION, SOLUTION EPIDURAL; INFILTRATION; INTRACAUDAL PRN
Status: DISCONTINUED | OUTPATIENT
Start: 2020-08-21 | End: 2020-08-21

## 2020-08-21 RX ORDER — ONDANSETRON 2 MG/ML
INJECTION INTRAMUSCULAR; INTRAVENOUS PRN
Status: DISCONTINUED | OUTPATIENT
Start: 2020-08-21 | End: 2020-08-21

## 2020-08-21 RX ORDER — DEXAMETHASONE SODIUM PHOSPHATE 4 MG/ML
INJECTION, SOLUTION INTRA-ARTICULAR; INTRALESIONAL; INTRAMUSCULAR; INTRAVENOUS; SOFT TISSUE PRN
Status: DISCONTINUED | OUTPATIENT
Start: 2020-08-21 | End: 2020-08-21

## 2020-08-21 RX ORDER — MEPERIDINE HYDROCHLORIDE 25 MG/ML
12.5 INJECTION INTRAMUSCULAR; INTRAVENOUS; SUBCUTANEOUS
Status: DISCONTINUED | OUTPATIENT
Start: 2020-08-21 | End: 2020-08-21 | Stop reason: HOSPADM

## 2020-08-21 RX ORDER — SODIUM CHLORIDE, SODIUM LACTATE, POTASSIUM CHLORIDE, CALCIUM CHLORIDE 600; 310; 30; 20 MG/100ML; MG/100ML; MG/100ML; MG/100ML
INJECTION, SOLUTION INTRAVENOUS CONTINUOUS
Status: DISCONTINUED | OUTPATIENT
Start: 2020-08-21 | End: 2020-08-21 | Stop reason: HOSPADM

## 2020-08-21 RX ORDER — HYDROMORPHONE HYDROCHLORIDE 1 MG/ML
.3-.5 INJECTION, SOLUTION INTRAMUSCULAR; INTRAVENOUS; SUBCUTANEOUS EVERY 10 MIN PRN
Status: DISCONTINUED | OUTPATIENT
Start: 2020-08-21 | End: 2020-08-21 | Stop reason: HOSPADM

## 2020-08-21 RX ORDER — ONDANSETRON 2 MG/ML
4 INJECTION INTRAMUSCULAR; INTRAVENOUS EVERY 6 HOURS PRN
Status: DISCONTINUED | OUTPATIENT
Start: 2020-08-21 | End: 2020-08-25

## 2020-08-21 RX ORDER — ALBUMIN, HUMAN INJ 5% 5 %
SOLUTION INTRAVENOUS CONTINUOUS PRN
Status: DISCONTINUED | OUTPATIENT
Start: 2020-08-21 | End: 2020-08-21

## 2020-08-21 RX ORDER — ACETAMINOPHEN 325 MG/1
975 TABLET ORAL EVERY 8 HOURS
Status: DISCONTINUED | OUTPATIENT
Start: 2020-08-21 | End: 2020-08-22

## 2020-08-21 RX ORDER — SODIUM CHLORIDE, SODIUM LACTATE, POTASSIUM CHLORIDE, CALCIUM CHLORIDE 600; 310; 30; 20 MG/100ML; MG/100ML; MG/100ML; MG/100ML
INJECTION, SOLUTION INTRAVENOUS CONTINUOUS PRN
Status: DISCONTINUED | OUTPATIENT
Start: 2020-08-21 | End: 2020-08-21

## 2020-08-21 RX ORDER — ONDANSETRON 4 MG/1
4 TABLET, ORALLY DISINTEGRATING ORAL EVERY 30 MIN PRN
Status: DISCONTINUED | OUTPATIENT
Start: 2020-08-21 | End: 2020-08-21 | Stop reason: HOSPADM

## 2020-08-21 RX ORDER — ONDANSETRON 2 MG/ML
4 INJECTION INTRAMUSCULAR; INTRAVENOUS EVERY 30 MIN PRN
Status: DISCONTINUED | OUTPATIENT
Start: 2020-08-21 | End: 2020-08-21 | Stop reason: HOSPADM

## 2020-08-21 RX ORDER — CIPROFLOXACIN 2 MG/ML
400 INJECTION, SOLUTION INTRAVENOUS
Status: COMPLETED | OUTPATIENT
Start: 2020-08-21 | End: 2020-08-21

## 2020-08-21 RX ORDER — OXYCODONE HYDROCHLORIDE 5 MG/1
5 TABLET ORAL
Status: COMPLETED | OUTPATIENT
Start: 2020-08-21 | End: 2020-08-22

## 2020-08-21 RX ORDER — SODIUM CHLORIDE, SODIUM LACTATE, POTASSIUM CHLORIDE, CALCIUM CHLORIDE 600; 310; 30; 20 MG/100ML; MG/100ML; MG/100ML; MG/100ML
INJECTION, SOLUTION INTRAVENOUS CONTINUOUS
Status: DISCONTINUED | OUTPATIENT
Start: 2020-08-21 | End: 2020-08-25

## 2020-08-21 RX ORDER — FLUMAZENIL 0.1 MG/ML
0.2 INJECTION, SOLUTION INTRAVENOUS
Status: DISCONTINUED | OUTPATIENT
Start: 2020-08-21 | End: 2020-08-21 | Stop reason: HOSPADM

## 2020-08-21 RX ORDER — OXYCODONE HYDROCHLORIDE 10 MG/1
10 TABLET ORAL
Status: COMPLETED | OUTPATIENT
Start: 2020-08-21 | End: 2020-08-21

## 2020-08-21 RX ORDER — LIDOCAINE HYDROCHLORIDE 20 MG/ML
INJECTION, SOLUTION INFILTRATION; PERINEURAL PRN
Status: DISCONTINUED | OUTPATIENT
Start: 2020-08-21 | End: 2020-08-21

## 2020-08-21 RX ORDER — PROPOFOL 10 MG/ML
INJECTION, EMULSION INTRAVENOUS PRN
Status: DISCONTINUED | OUTPATIENT
Start: 2020-08-21 | End: 2020-08-21

## 2020-08-21 RX ORDER — CIPROFLOXACIN 2 MG/ML
400 INJECTION, SOLUTION INTRAVENOUS SEE ADMIN INSTRUCTIONS
Status: DISCONTINUED | OUTPATIENT
Start: 2020-08-21 | End: 2020-08-21 | Stop reason: HOSPADM

## 2020-08-21 RX ORDER — NALOXONE HYDROCHLORIDE 0.4 MG/ML
.1-.4 INJECTION, SOLUTION INTRAMUSCULAR; INTRAVENOUS; SUBCUTANEOUS
Status: DISCONTINUED | OUTPATIENT
Start: 2020-08-21 | End: 2020-08-26 | Stop reason: HOSPADM

## 2020-08-21 RX ORDER — ACETAMINOPHEN 325 MG/1
975 TABLET ORAL ONCE
Status: COMPLETED | OUTPATIENT
Start: 2020-08-21 | End: 2020-08-21

## 2020-08-21 RX ADMIN — PROCHLORPERAZINE EDISYLATE 5 MG: 5 INJECTION INTRAMUSCULAR; INTRAVENOUS at 19:12

## 2020-08-21 RX ADMIN — ALBUMIN HUMAN: 0.05 INJECTION, SOLUTION INTRAVENOUS at 16:13

## 2020-08-21 RX ADMIN — FENTANYL CITRATE 50 MCG: 50 INJECTION, SOLUTION INTRAMUSCULAR; INTRAVENOUS at 14:35

## 2020-08-21 RX ADMIN — SUGAMMADEX 200 MG: 100 INJECTION, SOLUTION INTRAVENOUS at 18:03

## 2020-08-21 RX ADMIN — OXYCODONE HYDROCHLORIDE 10 MG: 10 TABLET ORAL at 20:51

## 2020-08-21 RX ADMIN — METRONIDAZOLE 500 MG: 500 INJECTION, SOLUTION INTRAVENOUS at 13:53

## 2020-08-21 RX ADMIN — BUPIVACAINE HYDROCHLORIDE 40 ML: 2.5 INJECTION, SOLUTION EPIDURAL; INFILTRATION; INTRACAUDAL; PERINEURAL at 13:00

## 2020-08-21 RX ADMIN — MIDAZOLAM 2 MG: 1 INJECTION INTRAMUSCULAR; INTRAVENOUS at 13:50

## 2020-08-21 RX ADMIN — FENTANYL CITRATE 50 MCG: 50 INJECTION, SOLUTION INTRAMUSCULAR; INTRAVENOUS at 15:03

## 2020-08-21 RX ADMIN — HYDROMORPHONE HYDROCHLORIDE 0.5 MG: 1 INJECTION, SOLUTION INTRAMUSCULAR; INTRAVENOUS; SUBCUTANEOUS at 17:45

## 2020-08-21 RX ADMIN — FENTANYL CITRATE 50 MCG: 50 INJECTION, SOLUTION INTRAMUSCULAR; INTRAVENOUS at 18:27

## 2020-08-21 RX ADMIN — HYDROMORPHONE HYDROCHLORIDE 0.5 MG: 1 INJECTION, SOLUTION INTRAMUSCULAR; INTRAVENOUS; SUBCUTANEOUS at 18:58

## 2020-08-21 RX ADMIN — FENTANYL CITRATE 50 MCG: 50 INJECTION, SOLUTION INTRAMUSCULAR; INTRAVENOUS at 17:39

## 2020-08-21 RX ADMIN — ONDANSETRON 4 MG: 2 INJECTION INTRAMUSCULAR; INTRAVENOUS at 18:28

## 2020-08-21 RX ADMIN — FENTANYL CITRATE 50 MCG: 50 INJECTION, SOLUTION INTRAMUSCULAR; INTRAVENOUS at 12:39

## 2020-08-21 RX ADMIN — FENTANYL CITRATE 50 MCG: 50 INJECTION, SOLUTION INTRAMUSCULAR; INTRAVENOUS at 15:25

## 2020-08-21 RX ADMIN — FENTANYL CITRATE 50 MCG: 50 INJECTION, SOLUTION INTRAMUSCULAR; INTRAVENOUS at 18:02

## 2020-08-21 RX ADMIN — SODIUM CHLORIDE, POTASSIUM CHLORIDE, SODIUM LACTATE AND CALCIUM CHLORIDE: 600; 310; 30; 20 INJECTION, SOLUTION INTRAVENOUS at 13:50

## 2020-08-21 RX ADMIN — HYDROMORPHONE HYDROCHLORIDE 0.5 MG: 1 INJECTION, SOLUTION INTRAMUSCULAR; INTRAVENOUS; SUBCUTANEOUS at 18:48

## 2020-08-21 RX ADMIN — PROPOFOL 100 MG: 10 INJECTION, EMULSION INTRAVENOUS at 14:03

## 2020-08-21 RX ADMIN — ACETAMINOPHEN 975 MG: 325 TABLET, FILM COATED ORAL at 13:04

## 2020-08-21 RX ADMIN — SODIUM CHLORIDE, POTASSIUM CHLORIDE, SODIUM LACTATE AND CALCIUM CHLORIDE: 600; 310; 30; 20 INJECTION, SOLUTION INTRAVENOUS at 15:18

## 2020-08-21 RX ADMIN — FENTANYL CITRATE 50 MCG: 50 INJECTION, SOLUTION INTRAMUSCULAR; INTRAVENOUS at 16:01

## 2020-08-21 RX ADMIN — MIDAZOLAM 1 MG: 1 INJECTION INTRAMUSCULAR; INTRAVENOUS at 12:55

## 2020-08-21 RX ADMIN — ALBUMIN HUMAN: 0.05 INJECTION, SOLUTION INTRAVENOUS at 15:55

## 2020-08-21 RX ADMIN — FENTANYL CITRATE 100 MCG: 50 INJECTION, SOLUTION INTRAMUSCULAR; INTRAVENOUS at 16:46

## 2020-08-21 RX ADMIN — ROCURONIUM BROMIDE 10 MG: 10 INJECTION INTRAVENOUS at 15:03

## 2020-08-21 RX ADMIN — ACETAMINOPHEN 975 MG: 325 TABLET, FILM COATED ORAL at 20:06

## 2020-08-21 RX ADMIN — ONDANSETRON 4 MG: 2 INJECTION INTRAMUSCULAR; INTRAVENOUS at 17:27

## 2020-08-21 RX ADMIN — SODIUM CHLORIDE, POTASSIUM CHLORIDE, SODIUM LACTATE AND CALCIUM CHLORIDE: 600; 310; 30; 20 INJECTION, SOLUTION INTRAVENOUS at 20:01

## 2020-08-21 RX ADMIN — CIPROFLOXACIN 400 MG: 2 INJECTION INTRAVENOUS at 13:53

## 2020-08-21 RX ADMIN — DEXAMETHASONE SODIUM PHOSPHATE 10 MG: 4 INJECTION, SOLUTION INTRA-ARTICULAR; INTRALESIONAL; INTRAMUSCULAR; INTRAVENOUS; SOFT TISSUE at 16:18

## 2020-08-21 RX ADMIN — SODIUM CHLORIDE, POTASSIUM CHLORIDE, SODIUM LACTATE AND CALCIUM CHLORIDE: 600; 310; 30; 20 INJECTION, SOLUTION INTRAVENOUS at 17:12

## 2020-08-21 RX ADMIN — PROCHLORPERAZINE EDISYLATE 5 MG: 5 INJECTION INTRAMUSCULAR; INTRAVENOUS at 18:49

## 2020-08-21 RX ADMIN — BUPIVACAINE 20 ML: 13.3 INJECTION, SUSPENSION, LIPOSOMAL INFILTRATION at 13:00

## 2020-08-21 RX ADMIN — FENTANYL CITRATE 50 MCG: 50 INJECTION, SOLUTION INTRAMUSCULAR; INTRAVENOUS at 12:41

## 2020-08-21 RX ADMIN — MIDAZOLAM 1 MG: 1 INJECTION INTRAMUSCULAR; INTRAVENOUS at 12:39

## 2020-08-21 RX ADMIN — DEXMEDETOMIDINE HYDROCHLORIDE 8 MCG: 100 INJECTION, SOLUTION INTRAVENOUS at 18:15

## 2020-08-21 RX ADMIN — ROCURONIUM BROMIDE 10 MG: 10 INJECTION INTRAVENOUS at 15:47

## 2020-08-21 RX ADMIN — Medication: at 18:53

## 2020-08-21 RX ADMIN — FENTANYL CITRATE 100 MCG: 50 INJECTION, SOLUTION INTRAMUSCULAR; INTRAVENOUS at 14:03

## 2020-08-21 RX ADMIN — ROCURONIUM BROMIDE 100 MG: 10 INJECTION INTRAVENOUS at 14:03

## 2020-08-21 RX ADMIN — ROCURONIUM BROMIDE 20 MG: 10 INJECTION INTRAVENOUS at 16:21

## 2020-08-21 RX ADMIN — FENTANYL CITRATE 50 MCG: 50 INJECTION, SOLUTION INTRAMUSCULAR; INTRAVENOUS at 18:34

## 2020-08-21 RX ADMIN — LIDOCAINE HYDROCHLORIDE 100 MG: 20 INJECTION, SOLUTION INFILTRATION; PERINEURAL at 14:03

## 2020-08-21 ASSESSMENT — ACTIVITIES OF DAILY LIVING (ADL): ADLS_ACUITY_SCORE: 10

## 2020-08-21 ASSESSMENT — MIFFLIN-ST. JEOR: SCORE: 1804.75

## 2020-08-21 NOTE — PROGRESS NOTES
Patient initiated TPN in the home on 8/10/20.    TPN formula as of 8/21/20  Dosing weight 68.2 kg   gm (goal 102 gm)  Dex 150 gm/d (goal 235 gm),   Base volume= 1400 mL  Lipid 20 % 200 ml (goal 250 mL);      Micronutrients:  na  70meq\d, k  50 meq\d, ca 15 meq\d, magnesium 10 meq\d, po4 25  mm\d,  Cl to  oac =   50% Cl,   Infuvite 10 ml/day,  MTE-5 Conc.  1 ml/day       20 hour cycled infusion with 1 hour taper up and down.     Cami Waite, PharmD [facility]   Ph: [phone]  Fax: [phone]

## 2020-08-21 NOTE — ANESTHESIA PROCEDURE NOTES
Peripheral Nerve Block Procedure Note  Staff -   Anesthesiologist:  Rodolfo Castrejon MD  Resident/Fellow: Edwin Miller MD    Performed By: resident        Location: Pre-op  Procedure Start/Stop TImes:     patient identified, IV checked, site marked, risks and benefits discussed, informed consent, monitors and equipment checked, pre-op evaluation, at physician/surgeon's request and post-op pain management      Correct Patient: Yes      Correct Position: Yes      Correct Site: Yes      Correct Procedure: Yes      Correct Laterality:  Yes    Site Marked:  Yes  Procedure details:     Procedure:  TAP    Diagnosis:  Post operative pain    Laterality:  Bilateral    Position:  Supine    Sterile Prep: chloraprep, mask and sterile gloves      Local skin infiltration:  None    Needle:  Short bevel    Needle gauge:  21    Needle length (mm):  110    Ultrasound: Yes      Ultrasound used to identify targeted nerve, plexus, or vascular structure and placed a needle adjacent to it      Permanent Image entered into patiient's record      Abnormal pain on injection: No      Blood Aspirated: No      Paresthesias:  No    Bleeding at site: No      Bolus via:  Needle    Infusion Method:  Single Shot    Complications:  None  Assessment/Narrative:     Injection made incrementally with aspirations every (mL):  5

## 2020-08-21 NOTE — OR NURSING
Bilateral TAP block completed with 100 mcg fentanyl, and 1 mg midazolam. Patient tolerate procedure well, with no complications, and is vitally stable. Will continue to monitor until transfer to OR.

## 2020-08-21 NOTE — ANESTHESIA CARE TRANSFER NOTE
Patient: Ventura Quiroz    Procedure(s):  Exploratory laparotomy with ileocolic resection and low anterior resection    Diagnosis: Crohn's disease of small intestine with abscess (H) [K50.014]  Diagnosis Additional Information: No value filed.    Anesthesia Type:   General     Note:  Airway :Face Mask  Patient transferred to:PACU  Comments: VSS. Ventilating well.Handoff Report: Identifed the Patient, Identified the Reponsible Provider, Reviewed the pertinent medical history, Discussed the surgical course, Reviewed Intra-OP anesthesia mangement and issues during anesthesia, Set expectations for post-procedure period and Allowed opportunity for questions and acknowledgement of understanding      Vitals: (Last set prior to Anesthesia Care Transfer)    CRNA VITALS  8/21/2020 1741 - 8/21/2020 1833      8/21/2020             NIBP:  144/86    Ht Rate:  77                Electronically Signed By: ALVINO Lomax CRNA  August 21, 2020  6:33 PM

## 2020-08-21 NOTE — BRIEF OP NOTE
[facility], [place]    Brief Operative Note    Pre-operative diagnosis: Crohn's disease of small intestine with abscess (H) [K50.014]  Post-operative diagnosis Same as pre-operative diagnosis inflammation of both the TI and the sigmoid colon.    Procedure: Procedure(s):  Exploratory laparotomy with ileocolic resection and low anterior resection  Surgeon: Surgeon(s) and Role:     * Brittni Robles MD - Primary     * Kendrick Jara MD - Fellow - Assisting  Anesthesia: Combined General with Block   Estimated blood loss: 200 ml  Drains: Prieto-Marcial  Specimens:   ID Type Source Tests Collected by Time Destination   A : TERMINAL ILEUM WITH CECUM AND APPENDIX Tissue Large Intestine, Cecum SURGICAL PATHOLOGY EXAM Brittni Robles MD 8/21/2020  4:14 PM    B : SIGMOID COLON AND PROXIMAL RECTUM Tissue Colon SURGICAL PATHOLOGY EXAM Brittni Robles MD 8/21/2020  5:01 PM      Findings:   Crohn's disease involving the TI and the sigmoid colon. Both the ileum and the sigmoid colon were plastered to the abdominal  wall at the level of the bladder..  Complications: None.  Implants: * No implants in log *    Kendrick Jara MD  CRS Fellow

## 2020-08-21 NOTE — OP NOTE
SURGEON: Brittni Robles MD     ASSISTANT: Kendrick Jara MD Colorectal Surgery Fellow. Lidia Cruz MD Surgery Intern. 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 Sigmoid colon and rectosigmoid involvement.    PROCEDURE: Exploratory laparotomy, ileocolic resection with takedown of enterovesical fistula, low anterior resection, flexible sigmoidoscopy.    INDICATIONS: Ventura Quiroz is a 20 year old male with severe ileocolic disease with a fistula to the bladder. He was getting malnourished and we have been optimizing with home total parenteral nutrition. He is nutritionally improved with good weight gain. The risks and benefits of surgery were thoroughly discussed with the patient and he agreed to proceed.     DESCRIPTION OF PROCEDURE: The patient was brought to the operating room, placed supine on the operating room table. General endotracheal anesthesia was  induced. We prepped and draped the abdomen in the usual sterile fashion.  We began the procedure with a timeout and made a midline incision. There was significant adhesions in the pelvis and the terminal ileum was folded over the bladder and on itself. The sigmoid colon and rectum was also involved and the bladder was indurated. With care, we were able to dissect this out. This was a tedious dissection. The terminal ileum and colon mesentery was very thick and indurated. The mesentery was divided very slowly with U-stick ligatures. At this point, we performed our ileocolic anastomosis, dividing the mesentery and then making two enterotomies. The anastomosis was formed using a single firing of the KIMBERLEY blue load 100, which was hemostatic on inspection  followed by a single firing of the TA-90 blue load. The anastomosis was  reinforced with a 3-0 Vicryl crotch stitch and sutures along the transverse staple line. After this, the anastomosis appeared highly satisfactory and was under no tension and healthy in appearance. We performed a flexible sigmoidoscopy and the rectosigmoid was partially obstructed and the scope could not pass. The sigmoid colon and proximal rectum were then carefully dissected out and the descending colon was mobilized lateral to medial. A Contour stapler was used to divide the rectum. The mesentery was divided and the proximal end of resection determined. A 3-0 Prolene pursestring was placed and the anvil inserted. The EEA stapler was placed and the anvil mated. There were 2 intact anastomotic donuts. A flexible sigmoidoscopy was performed and the anastomosis was healthy with no tension and airtight. The abdomen was irrigated out. A 19 [place] lan LESLEY was placed and secured with a drain stitch. There was very good hemostasis. The fascia was closed with #1 looped PDS suture. The skin was irrigated out and the subcutaneous tissue was reapproximated with 3-0 Vicryl and the skin reapproximated with 4-0 Monocryl with skin glue sealant applied. At the end the case, all instruments and sponge counts were correct x2. The patient was emerged from anesthesia and taken to postoperative anesthesia care unit in good condition.     SPECIMEN: terminal ileum, appendix, and cecum; sigmoid colon and proximal rectum..     ESTIMATED BLOOD LOSS: See anesthesia record.     DRAINS: 19 [place] LESLEY.     URINE OUTPUT: See anesthesia record.     VIDA KIM MD   Colon and Rectal Surgery Staff  [facility]

## 2020-08-22 LAB
ANION GAP SERPL CALCULATED.3IONS-SCNC: 6 MMOL/L (ref 3–14)
BUN SERPL-MCNC: 12 MG/DL (ref 7–30)
CALCIUM SERPL-MCNC: 8.2 MG/DL (ref 8.5–10.1)
CHLORIDE SERPL-SCNC: 103 MMOL/L (ref 94–109)
CO2 SERPL-SCNC: 26 MMOL/L (ref 20–32)
CREAT SERPL-MCNC: 0.53 MG/DL (ref 0.66–1.25)
ERYTHROCYTE [DISTWIDTH] IN BLOOD BY AUTOMATED COUNT: 13.7 % (ref 10–15)
GFR SERPL CREATININE-BSD FRML MDRD: >90 ML/MIN/{1.73_M2}
GLUCOSE SERPL-MCNC: 141 MG/DL (ref 70–99)
HCT VFR BLD AUTO: 32.6 % (ref 40–53)
HGB BLD-MCNC: 10.9 G/DL (ref 13.3–17.7)
MAGNESIUM SERPL-MCNC: 1.7 MG/DL (ref 1.6–2.3)
MCH RBC QN AUTO: 32.2 PG (ref 26.5–33)
MCHC RBC AUTO-ENTMCNC: 33.4 G/DL (ref 31.5–36.5)
MCV RBC AUTO: 96 FL (ref 78–100)
PHOSPHATE SERPL-MCNC: 4.3 MG/DL (ref 2.5–4.5)
PLATELET # BLD AUTO: 248 10E9/L (ref 150–450)
POTASSIUM SERPL-SCNC: 4.4 MMOL/L (ref 3.4–5.3)
RBC # BLD AUTO: 3.39 10E12/L (ref 4.4–5.9)
SODIUM SERPL-SCNC: 135 MMOL/L (ref 133–144)
WBC # BLD AUTO: 14.7 10E9/L (ref 4–11)

## 2020-08-22 PROCEDURE — 40000802 ZZH SITE CHECK

## 2020-08-22 PROCEDURE — 25000128 H RX IP 250 OP 636: Performed by: SURGERY

## 2020-08-22 PROCEDURE — 25000128 H RX IP 250 OP 636

## 2020-08-22 PROCEDURE — 25000132 ZZH RX MED GY IP 250 OP 250 PS 637: Performed by: SURGERY

## 2020-08-22 PROCEDURE — 40000558 ZZH STATISTIC CVC DRESSING CHANGE

## 2020-08-22 PROCEDURE — 25000132 ZZH RX MED GY IP 250 OP 250 PS 637: Performed by: STUDENT IN AN ORGANIZED HEALTH CARE EDUCATION/TRAINING PROGRAM

## 2020-08-22 PROCEDURE — 84100 ASSAY OF PHOSPHORUS: CPT | Performed by: SURGERY

## 2020-08-22 PROCEDURE — 25000132 ZZH RX MED GY IP 250 OP 250 PS 637

## 2020-08-22 PROCEDURE — 25800030 ZZH RX IP 258 OP 636: Performed by: SURGERY

## 2020-08-22 PROCEDURE — 12000001 ZZH R&B MED SURG/OB UMMC

## 2020-08-22 PROCEDURE — 80048 BASIC METABOLIC PNL TOTAL CA: CPT | Performed by: SURGERY

## 2020-08-22 PROCEDURE — 85027 COMPLETE CBC AUTOMATED: CPT | Performed by: SURGERY

## 2020-08-22 PROCEDURE — 83735 ASSAY OF MAGNESIUM: CPT | Performed by: SURGERY

## 2020-08-22 PROCEDURE — 36415 COLL VENOUS BLD VENIPUNCTURE: CPT | Performed by: SURGERY

## 2020-08-22 RX ORDER — PROCHLORPERAZINE MALEATE 5 MG
5 TABLET ORAL EVERY 6 HOURS PRN
Status: DISCONTINUED | OUTPATIENT
Start: 2020-08-22 | End: 2020-08-25

## 2020-08-22 RX ORDER — DIPHENHYDRAMINE HCL 50 MG
50 CAPSULE ORAL EVERY 6 HOURS PRN
Status: DISCONTINUED | OUTPATIENT
Start: 2020-08-22 | End: 2020-08-26 | Stop reason: HOSPADM

## 2020-08-22 RX ORDER — LIDOCAINE 40 MG/G
CREAM TOPICAL
Status: DISCONTINUED | OUTPATIENT
Start: 2020-08-22 | End: 2020-08-26 | Stop reason: HOSPADM

## 2020-08-22 RX ORDER — HEPARIN SODIUM,PORCINE 10 UNIT/ML
5-10 VIAL (ML) INTRAVENOUS
Status: DISCONTINUED | OUTPATIENT
Start: 2020-08-22 | End: 2020-08-26 | Stop reason: HOSPADM

## 2020-08-22 RX ORDER — HEPARIN SODIUM,PORCINE 10 UNIT/ML
5-10 VIAL (ML) INTRAVENOUS EVERY 24 HOURS
Status: DISCONTINUED | OUTPATIENT
Start: 2020-08-22 | End: 2020-08-26 | Stop reason: HOSPADM

## 2020-08-22 RX ORDER — LIDOCAINE 40 MG/G
CREAM TOPICAL
Status: DISCONTINUED | OUTPATIENT
Start: 2020-08-22 | End: 2020-08-22

## 2020-08-22 RX ORDER — CYCLOBENZAPRINE HCL 5 MG
5 TABLET ORAL EVERY 8 HOURS PRN
Status: DISCONTINUED | OUTPATIENT
Start: 2020-08-22 | End: 2020-08-26 | Stop reason: HOSPADM

## 2020-08-22 RX ORDER — SIMETHICONE 80 MG
80 TABLET,CHEWABLE ORAL 4 TIMES DAILY PRN
Status: DISCONTINUED | OUTPATIENT
Start: 2020-08-22 | End: 2020-08-26 | Stop reason: HOSPADM

## 2020-08-22 RX ORDER — GABAPENTIN 100 MG/1
100 CAPSULE ORAL 3 TIMES DAILY
Status: DISCONTINUED | OUTPATIENT
Start: 2020-08-22 | End: 2020-08-26 | Stop reason: HOSPADM

## 2020-08-22 RX ORDER — LIDOCAINE 4 G/G
1 PATCH TOPICAL
Status: DISCONTINUED | OUTPATIENT
Start: 2020-08-22 | End: 2020-08-26 | Stop reason: HOSPADM

## 2020-08-22 RX ORDER — MAGNESIUM SULFATE HEPTAHYDRATE 40 MG/ML
4 INJECTION, SOLUTION INTRAVENOUS ONCE
Status: COMPLETED | OUTPATIENT
Start: 2020-08-22 | End: 2020-08-22

## 2020-08-22 RX ORDER — HYDROMORPHONE HYDROCHLORIDE 2 MG/1
2-4 TABLET ORAL EVERY 4 HOURS PRN
Status: DISCONTINUED | OUTPATIENT
Start: 2020-08-22 | End: 2020-08-26 | Stop reason: HOSPADM

## 2020-08-22 RX ORDER — ACETAMINOPHEN 325 MG/1
975 TABLET ORAL
Status: DISCONTINUED | OUTPATIENT
Start: 2020-08-22 | End: 2020-08-26 | Stop reason: HOSPADM

## 2020-08-22 RX ADMIN — HYDROMORPHONE HYDROCHLORIDE 2 MG: 2 TABLET ORAL at 20:26

## 2020-08-22 RX ADMIN — Medication: at 14:39

## 2020-08-22 RX ADMIN — ACETAMINOPHEN 975 MG: 325 TABLET, FILM COATED ORAL at 10:01

## 2020-08-22 RX ADMIN — PROCHLORPERAZINE MALEATE 5 MG: 5 TABLET ORAL at 10:31

## 2020-08-22 RX ADMIN — ONDANSETRON 4 MG: 2 INJECTION INTRAMUSCULAR; INTRAVENOUS at 20:36

## 2020-08-22 RX ADMIN — GABAPENTIN 100 MG: 100 CAPSULE ORAL at 08:19

## 2020-08-22 RX ADMIN — MAGNESIUM SULFATE IN WATER 4 G: 40 INJECTION, SOLUTION INTRAVENOUS at 10:32

## 2020-08-22 RX ADMIN — ACETAMINOPHEN 975 MG: 325 TABLET, FILM COATED ORAL at 21:43

## 2020-08-22 RX ADMIN — Medication 5 ML: at 11:30

## 2020-08-22 RX ADMIN — ENOXAPARIN SODIUM 40 MG: 40 INJECTION SUBCUTANEOUS at 12:05

## 2020-08-22 RX ADMIN — GABAPENTIN 100 MG: 100 CAPSULE ORAL at 20:22

## 2020-08-22 RX ADMIN — SIMETHICONE 80 MG: 80 TABLET, CHEWABLE ORAL at 10:50

## 2020-08-22 RX ADMIN — DIPHENHYDRAMINE HYDROCHLORIDE 50 MG: 50 CAPSULE ORAL at 14:35

## 2020-08-22 RX ADMIN — ONDANSETRON 4 MG: 2 INJECTION INTRAMUSCULAR; INTRAVENOUS at 08:26

## 2020-08-22 RX ADMIN — HYDROMORPHONE HYDROCHLORIDE 2 MG: 2 TABLET ORAL at 11:25

## 2020-08-22 RX ADMIN — CYCLOBENZAPRINE HYDROCHLORIDE 5 MG: 5 TABLET, FILM COATED ORAL at 10:50

## 2020-08-22 RX ADMIN — ONDANSETRON 4 MG: 2 INJECTION INTRAMUSCULAR; INTRAVENOUS at 02:20

## 2020-08-22 RX ADMIN — SIMETHICONE 80 MG: 80 TABLET, CHEWABLE ORAL at 15:57

## 2020-08-22 RX ADMIN — SODIUM CHLORIDE, POTASSIUM CHLORIDE, SODIUM LACTATE AND CALCIUM CHLORIDE: 600; 310; 30; 20 INJECTION, SOLUTION INTRAVENOUS at 17:18

## 2020-08-22 RX ADMIN — ACETAMINOPHEN 975 MG: 325 TABLET, FILM COATED ORAL at 04:06

## 2020-08-22 RX ADMIN — CYCLOBENZAPRINE HYDROCHLORIDE 5 MG: 5 TABLET, FILM COATED ORAL at 18:21

## 2020-08-22 RX ADMIN — GABAPENTIN 100 MG: 100 CAPSULE ORAL at 14:05

## 2020-08-22 RX ADMIN — LIDOCAINE 1 PATCH: 560 PATCH PERCUTANEOUS; TOPICAL; TRANSDERMAL at 08:21

## 2020-08-22 RX ADMIN — ONDANSETRON 4 MG: 2 INJECTION INTRAMUSCULAR; INTRAVENOUS at 14:33

## 2020-08-22 RX ADMIN — SODIUM CHLORIDE, POTASSIUM CHLORIDE, SODIUM LACTATE AND CALCIUM CHLORIDE: 600; 310; 30; 20 INJECTION, SOLUTION INTRAVENOUS at 18:22

## 2020-08-22 RX ADMIN — PROCHLORPERAZINE MALEATE 5 MG: 5 TABLET ORAL at 04:45

## 2020-08-22 RX ADMIN — ACETAMINOPHEN 975 MG: 325 TABLET, FILM COATED ORAL at 15:57

## 2020-08-22 RX ADMIN — BENZOCAINE AND MENTHOL 1 LOZENGE: 15; 3.6 LOZENGE ORAL at 21:56

## 2020-08-22 RX ADMIN — SODIUM CHLORIDE, POTASSIUM CHLORIDE, SODIUM LACTATE AND CALCIUM CHLORIDE: 600; 310; 30; 20 INJECTION, SOLUTION INTRAVENOUS at 04:45

## 2020-08-22 RX ADMIN — OXYCODONE HYDROCHLORIDE 5 MG: 5 TABLET ORAL at 00:13

## 2020-08-22 RX ADMIN — HYDROMORPHONE HYDROCHLORIDE 2 MG: 2 TABLET ORAL at 08:19

## 2020-08-22 ASSESSMENT — PAIN DESCRIPTION - DESCRIPTORS
DESCRIPTORS: ACHING;CONSTANT;SORE;DISCOMFORT
DESCRIPTORS: ACHING;CONSTANT;SORE;STABBING
DESCRIPTORS: ACHING;CONSTANT
DESCRIPTORS: ACHING;CONSTANT;SORE;DISCOMFORT
DESCRIPTORS: ACHING;CONSTANT;SORE;DISCOMFORT

## 2020-08-22 ASSESSMENT — ACTIVITIES OF DAILY LIVING (ADL)
ADLS_ACUITY_SCORE: 12

## 2020-08-22 NOTE — PROGRESS NOTES
Reason for Assessment:  Nutrition education regarding low fiber diet  8/21: Exploratory laparotomy with ileocolic resection and low anterior resection   Diet History: Spoke w/ pt over the phone. Patient reports no history of low fiber diet education. Ventura reports that PTA he was hungry but unable to eat due to clear out. Pt reports last eating on 8/20.   Pt last had a telehealth visit w/ outpatient RD on 8/17/20. At that time pt was on home TPN meeting ~75% nutrition needs and consuming 2 Premier protein shakes/day w/ small amounts of other food.  Nutrition Diagnosis:  Food- and nutrition-related knowledge deficit r/t no previous knowledge of low fiber diet as evidenced by patient verbalization.   Interventions:  Provided instruction on low fiber diet. Discussed each food group and foods to eat and avoid.   Goals:   Patient will verbalize understanding of at least 2 foods from each food group that are appropriate to consume on low fiber diet  Follow-up:    Patient to ask any further nutrition-related questions before discharge.  In addition, pt may request outpatient RD appointment.  Shivani English RD, LD  7C RD pager [phone]  Weekend RD pager [phone]

## 2020-08-22 NOTE — PROGRESS NOTES
Admitted/transferred from:   2 RN  skin assessment completed by Sean Peters RN and Donn Ansari RN   Skin assessment finding: midline incision with dermabond open to air clean/dry and intact. No other skin issues noted   Interventions/actions: No intervention needed at this time.     Will continue to monitor.

## 2020-08-22 NOTE — PROGRESS NOTES
"Post Op Check    08/21/2020    Ventura Quiroz is a 20 year old male with h/o Crohn's disease of small intestine with enterovesical fistula and SBO now POD#0 s/p ex-lap with ileocolic resection with fistula takedown and low anterior resection.    Pt reports pain is diffuse and intolerable. Denies SOB, chest pain, or dizziness. No BM, attempted but unable. Voiding with hussein in place.    /49 (BP Location: Right arm)   Pulse 59   Temp 95.6  F (35.3  C) (Oral)   Resp 22   Ht 1.88 m (6' 2\")   Wt 72.5 kg (159 lb 13.3 oz)   SpO2 99%   BMI 20.52 kg/m      Gen: A&O x3, appears uncomfortable  Chest: breathing non-labored  Abdomen: soft, appropriately tender, nondistended  Incision: clean, dry, intact  Extremities: warm and well perfused    A/P: Increased PCA dosing from 0.2 to 0.3. Will give an additional oxycodone as well.  Continue plan of care per primary team. Please call with any questions.    Mady Bustillos MD  Surgery Resident PGY-1    "

## 2020-08-22 NOTE — ANESTHESIA POSTPROCEDURE EVALUATION
Anesthesia POST Procedure Evaluation    Patient: Ventura Quiroz   MRN:     [id number] Gender:   male   Age:    20 year old :      2000        Preoperative Diagnosis: Crohn's disease of small intestine with abscess (H) [K50.014]   Procedure(s):  Exploratory laparotomy with ileocolic resection and low anterior resection   Postop Comments: No value filed.     Anesthesia Type: General          Postop Pain Control: Uneventful            Sign Out: Well controlled pain   PONV: No   Neuro/Psych: Uneventful            Sign Out: Acceptable/Baseline neuro status   Airway/Respiratory: Uneventful            Sign Out: Acceptable/Baseline resp. status   CV/Hemodynamics: Uneventful            Sign Out: Acceptable CV status   Other NRE: NONE   DID A NON-ROUTINE EVENT OCCUR? No         Last Anesthesia Record Vitals:  CRNA VITALS  2020 1741 - 2020 1841      2020             NIBP:  144/86    Ht Rate:  77          Last PACU Vitals:  Vitals Value Taken Time   /49 2020  8:29 PM   Temp 35.3  C (95.6  F) 2020  7:55 PM   Pulse 59 2020  8:29 PM   Resp 22 2020  8:29 PM   SpO2 99 % 2020  8:29 PM   Temp src     NIBP 144/86 2020  6:16 PM   Pulse     SpO2 100 % 2020  6:15 PM   Resp     Temp     Ht Rate 77 2020  6:16 PM   Temp 2           Electronically Signed By: Ori Pelletier MD, 2020, 8:39 PM

## 2020-08-22 NOTE — PROGRESS NOTES
"[facility] General Surgery Post-Op / Progress Note         Assessment and Plan:    Assessment:    Ventura Quiroz is a 20 year old male with h/o Crohn's disease of small intestine with enterovesical fistula and SBO now 1 Day Post-Op s/p ex-lap with ileocolic resection and low anterior resection with primary anastomosis. Progressing well after surgery      Plan:   - Continue clears due to nausea  - Add oral pain meds  - Gavin in for 5 days   - replete lytes prn  - Encourage ambulation and IS           Interval History:   Subjectively c/o pain. Some nausea but no vomiting. No gas. Has not ambulated yet. Pain not well controlled.             Medications:     All medications related to the patient's surgery have been reviewed  Current Facility-Administered Medications   Medication     acetaminophen (TYLENOL) tablet 975 mg     bupivacaine liposome (EXPAREL) LONG ACTING injection was administered into the infiltration site to produce postsurgical analgesia. Duration of action is up to 72 hours, and other \"rome\" medications should not be given for 96 hours with the exception of the lidocaine 5% patch (LIDODERM) and the lidocaine 10mg in potassium infusions. This entry is for INFORMATION ONLY.     cyclobenzaprine (FLEXERIL) tablet 5 mg     enoxaparin ANTICOAGULANT (LOVENOX) injection 40 mg     gabapentin (NEURONTIN) capsule 100 mg     HYDROmorphone (DILAUDID) PCA 0.2 mg/mL OPIOID NAIVE CrCl > 50     HYDROmorphone (DILAUDID) tablet 2-4 mg     lactated ringers BOLUS 500 mL     lactated ringers infusion     Lidocaine (LIDOCARE) 4 % Patch 1 patch     lidocaine (LMX4) cream     lidocaine 1 % 0.1-1 mL     lidocaine patch in PLACE     magnesium sulfate 4 g in 100 mL sterile water (premade)     naloxone (NARCAN) injection 0.1-0.4 mg     naloxone (NARCAN) injection 0.1-0.4 mg     ondansetron (ZOFRAN) injection 4 mg     prochlorperazine (COMPAZINE) tablet 5 mg     sodium chloride (PF) 0.9% PF flush 3 mL     sodium chloride (PF) " 0.9% PF flush 3 mL             Physical Exam:     All vitals stable  Patient Vitals for the past 8 hrs:   BP Temp Temp src Pulse Resp SpO2   08/22/20 0805 (!) 146/82 96  F (35.6  C) Oral 51 14 96 %   08/22/20 0343 139/65 96.3  F (35.7  C) Oral 55 16 97 %     GA: NAD  CV: RRR  Lung: nonlabored breathing  Abd: soft, appropriately tender, non distended  Ext: fROM  Neuro: grossly normal          Data:   All laboratory data related to this surgery reviewed  Lab Results   Component Value Date    WBC 14.7 (H) 08/22/2020    HGB 10.9 (L) 08/22/2020    HCT 32.6 (L) 08/22/2020     08/22/2020     08/22/2020    POTASSIUM 4.4 08/22/2020    CHLORIDE 103 08/22/2020    CO2 26 08/22/2020    BUN 12 08/22/2020    CR 0.53 (L) 08/22/2020     (H) 08/22/2020    SED 16 (H) 08/10/2020    AST 12 08/17/2020    ALT 15 08/17/2020    ALKPHOS 77 08/17/2020    BILITOTAL 0.3 08/17/2020       Kendrick Jara MD  CRS Fellow    Pt discussed with Dr Ochoa.

## 2020-08-22 NOTE — PLAN OF CARE
POD# 1 S/p Xlap ileocolic resection with fistula breakdown and LAR    Alert and oriented, pain still the main issue for the pt since this morning. Fellow who saw pt this am, ordered medication to help with managing the pain. On top of his PCA 0.3 every 10 minutes, Dr. Kendrick Jara ordered for scheduled Gabapentin and prn flexerin and dilaudid, plus po tylenol from every 8 hours to every 6 hours with minimal help. Clarified with Pharmacist this morning before giving medication to pt as he already have PCA and provider ordered po dilaudid. Per Riki, Pharm, technically not allowed unless pt is transitioning to oral pain medication but Pharmacist will verified it but to makes sure to give only for breakthrough pain. Per pt, his pain is moving around his abdomen, seems like a gas pain. Prn simethicone given as well. Pt able to rest in between cares. Gavin in place with good urine volume. Gavin bag leaking on the main cannister, bag changed. C/o of itchiness all over the body. Text page surgery on call and will order for benadryl-given with relief. Complaining of numbness in left hand, initially this RN thought due to tight placement from pulse ox, replaced to right with minimal relief. Inform the Surgery on call resident Dr. uRben Houser as well as the Attending physician Dr. Kirsty Ochoa during her rounds in the afternoon. Pt also have episode of bradycardia (below 50), 48-49 for 1-2 seconds, regular, pt asymptomatic. Informed Dr. Ruben Houser as parameter to notify provider is below 50. Per provider to informed him when HR/pulse is below 45. Auscultated 1 full minute and 50, regular. On continuous pulse monitoring. Otherwise the rest of the VSS, afebrile. Ambulating x3 this shift and sat x3. On clear liquid diet, intermittent nausea when getting up from bed. Encouraged use of IS. Gavin in place with adequate urine volume. LESLEY with serosanginous drainage, bowel sound faint. Mid surgical incision CDI/ELZA. PLAN:  Continue with the post op care plan. Pain management, drain and nausea. Awaiting for the return of bowel function.

## 2020-08-22 NOTE — PLAN OF CARE
POD#1 s/p ex-lap with ileocolic resection with fistula takedown and low anterior resection. A&OX4. VSS on room air  with CAPNO in place. Patient arrived from PACU around 2000. Arrived with uncontrolled pain, stated his pain feels like sore and something was stabbing him in the belly. MD notified (see previous note). Midline incision dermabond clean/dry and intact open to air. Incisional pain somewhat control with PCA dilaudid, tylenol and one time dose of oxycodone ordered twice, and repositioning with minimal relief. On clear liquid diet, zofran and compazine given for nausea with some relief. Taking sips of clears. Patient ambulated from bed to the bathroom in attempt to have a bowel movement twice but unable. Gavin in place with adequate output. Denies passing gas. LESLEY with bloody red output, dressing clean/dry and intact. Writer educated patient on the importance of using insensitive spirometer. Double lumen PICC in place. MIVF infusing through PIV with PCA dilaudid. Sleeping on and off, stated he was unable to get some rest. Calls appropriately. Continue with plan of care and maintain pain control.

## 2020-08-22 NOTE — PROVIDER NOTIFICATION
Notified colo/rectal crossover x2 around 2000 and 2230 regarding patient's uncontrollable pain.   Orders: Increased PCA dosing from 0.2 to 0.3, and one time dose of oxycodone ordered twice.  Comments: Will continue to monitor and notify provider with any other changes

## 2020-08-23 PROCEDURE — 25800030 ZZH RX IP 258 OP 636: Performed by: SURGERY

## 2020-08-23 PROCEDURE — 25000132 ZZH RX MED GY IP 250 OP 250 PS 637

## 2020-08-23 PROCEDURE — 25000132 ZZH RX MED GY IP 250 OP 250 PS 637: Performed by: SURGERY

## 2020-08-23 PROCEDURE — 25000128 H RX IP 250 OP 636: Performed by: SURGERY

## 2020-08-23 PROCEDURE — 25000128 H RX IP 250 OP 636

## 2020-08-23 PROCEDURE — 12000001 ZZH R&B MED SURG/OB UMMC

## 2020-08-23 RX ADMIN — ACETAMINOPHEN 975 MG: 325 TABLET, FILM COATED ORAL at 15:59

## 2020-08-23 RX ADMIN — DIPHENHYDRAMINE HYDROCHLORIDE 50 MG: 50 CAPSULE ORAL at 07:48

## 2020-08-23 RX ADMIN — ACETAMINOPHEN 975 MG: 325 TABLET, FILM COATED ORAL at 11:15

## 2020-08-23 RX ADMIN — ACETAMINOPHEN 975 MG: 325 TABLET, FILM COATED ORAL at 03:55

## 2020-08-23 RX ADMIN — ENOXAPARIN SODIUM 40 MG: 40 INJECTION SUBCUTANEOUS at 12:23

## 2020-08-23 RX ADMIN — GABAPENTIN 100 MG: 100 CAPSULE ORAL at 13:54

## 2020-08-23 RX ADMIN — ACETAMINOPHEN 975 MG: 325 TABLET, FILM COATED ORAL at 21:55

## 2020-08-23 RX ADMIN — SODIUM CHLORIDE, POTASSIUM CHLORIDE, SODIUM LACTATE AND CALCIUM CHLORIDE: 600; 310; 30; 20 INJECTION, SOLUTION INTRAVENOUS at 02:22

## 2020-08-23 RX ADMIN — LIDOCAINE 1 PATCH: 560 PATCH PERCUTANEOUS; TOPICAL; TRANSDERMAL at 07:40

## 2020-08-23 RX ADMIN — Medication 10 ML: at 11:16

## 2020-08-23 RX ADMIN — GABAPENTIN 100 MG: 100 CAPSULE ORAL at 07:41

## 2020-08-23 RX ADMIN — GABAPENTIN 100 MG: 100 CAPSULE ORAL at 19:38

## 2020-08-23 RX ADMIN — CYCLOBENZAPRINE HYDROCHLORIDE 5 MG: 5 TABLET, FILM COATED ORAL at 08:27

## 2020-08-23 RX ADMIN — HYDROMORPHONE HYDROCHLORIDE 2 MG: 2 TABLET ORAL at 08:27

## 2020-08-23 ASSESSMENT — PAIN DESCRIPTION - DESCRIPTORS
DESCRIPTORS: ACHING;DISCOMFORT
DESCRIPTORS: ACHING;CONSTANT
DESCRIPTORS: ACHING;DISCOMFORT
DESCRIPTORS: ACHING;DISCOMFORT
DESCRIPTORS: ACHING;CONSTANT
DESCRIPTORS: ACHING;CONSTANT;DISCOMFORT
DESCRIPTORS: ACHING;DISCOMFORT

## 2020-08-23 ASSESSMENT — MIFFLIN-ST. JEOR: SCORE: 1759.69

## 2020-08-23 ASSESSMENT — ACTIVITIES OF DAILY LIVING (ADL)
ADLS_ACUITY_SCORE: 12

## 2020-08-23 NOTE — PLAN OF CARE
Care from 0700-1100H  POD# 2 XLAP ileocolic resection with fistula breakdown and LAR    Alert and oriented. Per pt, pain managed with PCA 0.3 every 10 minutes, scheduled po tylenol, gabapentin and prn flexeril. PO benadryl given for itchiness with relief. Ambulated and improving, encouraged and using IS. Mom at bedside supportive with cares. Bowel sounds audible, but not passing gas and no BM yet. OVSS, afebrile. LESLEY with serosanguinous drainage, hussein to stay for 5 days, with adequate urine volume. PLAN: Increase activity, encourage use more of IS, pain management. Awaiting for the return of bowel function.

## 2020-08-23 NOTE — PROGRESS NOTES
"[facility] General Surgery Post-Op / Progress Note         Assessment and Plan:    Assessment:    Ventura Quiroz is a 20 year old male with h/o Crohn's disease of small intestine with enterovesical fistula and SBO now 2 Day Post-Op s/p ex-lap with ileocolic resection and low anterior resection with primary anastomosis. Progressing well after surgery      Plan:   - Advance to FLD  - Wean down IVFs  - Gavin in for 5 days   - Labs tomorrow  - Encourage ambulation and IS           Interval History:   Subjectively better, no nausea or vomiting. Lack of appetite. No gas or stool. Ambulated x3 yesterday.          Medications:     All medications related to the patient's surgery have been reviewed  Current Facility-Administered Medications   Medication     acetaminophen (TYLENOL) tablet 975 mg     benzocaine-menthol (CEPACOL) 15-3.6 MG lozenge 1 lozenge     bupivacaine liposome (EXPAREL) LONG ACTING injection was administered into the infiltration site to produce postsurgical analgesia. Duration of action is up to 72 hours, and other \"rome\" medications should not be given for 96 hours with the exception of the lidocaine 5% patch (LIDODERM) and the lidocaine 10mg in potassium infusions. This entry is for INFORMATION ONLY.     cyclobenzaprine (FLEXERIL) tablet 5 mg     diphenhydrAMINE (BENADRYL) capsule 50 mg     enoxaparin ANTICOAGULANT (LOVENOX) injection 40 mg     gabapentin (NEURONTIN) capsule 100 mg     heparin lock flush 10 UNIT/ML injection 5-10 mL     heparin lock flush 10 UNIT/ML injection 5-10 mL     HYDROmorphone (DILAUDID) PCA 0.2 mg/mL OPIOID NAIVE CrCl > 50     HYDROmorphone (DILAUDID) tablet 2-4 mg     lactated ringers BOLUS 500 mL     lactated ringers infusion     Lidocaine (LIDOCARE) 4 % Patch 1 patch     lidocaine (LMX4) cream     lidocaine 1 % 0.1-1 mL     lidocaine patch in PLACE     naloxone (NARCAN) injection 0.1-0.4 mg     naloxone (NARCAN) injection 0.1-0.4 mg     ondansetron (ZOFRAN) injection " 4 mg     prochlorperazine (COMPAZINE) tablet 5 mg     simethicone (MYLICON) chewable tablet 80 mg     sodium chloride (PF) 0.9% PF flush 10 mL     sodium chloride (PF) 0.9% PF flush 10-20 mL     sodium chloride (PF) 0.9% PF flush 10-20 mL     sodium chloride (PF) 0.9% PF flush 3 mL     sodium chloride (PF) 0.9% PF flush 3 mL             Physical Exam:     All vitals stable  Patient Vitals for the past 8 hrs:   BP Temp Temp src Pulse Resp SpO2   08/23/20 0749 129/69 96.4  F (35.8  C) Oral 57 16 98 %   08/23/20 0358 134/83 96.4  F (35.8  C) Oral 65 16 99 %     GA: NAD  CV: RRR  Lung: nonlabored breathing  Abd: soft, appropriately tender, non distended  Ext: fROM  Neuro: grossly normal          Data:   All laboratory data related to this surgery reviewed  Lab Results   Component Value Date    WBC 14.7 (H) 08/22/2020    HGB 10.9 (L) 08/22/2020    HCT 32.6 (L) 08/22/2020     08/22/2020     08/22/2020    POTASSIUM 4.4 08/22/2020    CHLORIDE 103 08/22/2020    CO2 26 08/22/2020    BUN 12 08/22/2020    CR 0.53 (L) 08/22/2020     (H) 08/22/2020    SED 16 (H) 08/10/2020    AST 12 08/17/2020    ALT 15 08/17/2020    ALKPHOS 77 08/17/2020    BILITOTAL 0.3 08/17/2020       Kendrick Jara MD  CRS Fellow    Pt discussed with Dr Ochoa.

## 2020-08-23 NOTE — PLAN OF CARE
POD#2 s/p ex-lap with ileocolic resection with fistula takedown and low anterior resection. A&OX4. VSS on room air with continuous pulse ox.Pt was not  Bradycardic this shift, heart rate was 56 and above. Midline incision dermabond clean/dry and intact open to air. Incisional pain somewhat control with PCA dilaudid, tylenol, oral dilaudid, gabapentin with some relief. On clear liquid diet, zofran given for nausea with some relief. Taking sips of clears. Denies passing flatus. No bowel movement this shift. Gavin in place with adequate output. LESLEY with serosanguinous output, dressing clean/dry and intact. Encourage activity and use of insensitive spirometer. Double lumen PICC in place heparin locked. MIVF infusing through PIV with PCA dilaudid. Resting comfortably in between care. Calls appropriately. Continue with plan of care and maintain pain control.

## 2020-08-24 LAB
ANION GAP SERPL CALCULATED.3IONS-SCNC: 6 MMOL/L (ref 3–14)
BUN SERPL-MCNC: 13 MG/DL (ref 7–30)
CALCIUM SERPL-MCNC: 8.2 MG/DL (ref 8.5–10.1)
CHLORIDE SERPL-SCNC: 101 MMOL/L (ref 94–109)
CO2 SERPL-SCNC: 29 MMOL/L (ref 20–32)
CREAT SERPL-MCNC: 0.65 MG/DL (ref 0.66–1.25)
ERYTHROCYTE [DISTWIDTH] IN BLOOD BY AUTOMATED COUNT: 14.1 % (ref 10–15)
GFR SERPL CREATININE-BSD FRML MDRD: >90 ML/MIN/{1.73_M2}
GLUCOSE SERPL-MCNC: 74 MG/DL (ref 70–99)
HCT VFR BLD AUTO: 30.8 % (ref 40–53)
HGB BLD-MCNC: 10 G/DL (ref 13.3–17.7)
MAGNESIUM SERPL-MCNC: 1.7 MG/DL (ref 1.6–2.3)
MCH RBC QN AUTO: 32.1 PG (ref 26.5–33)
MCHC RBC AUTO-ENTMCNC: 32.5 G/DL (ref 31.5–36.5)
MCV RBC AUTO: 99 FL (ref 78–100)
PHOSPHATE SERPL-MCNC: 4 MG/DL (ref 2.5–4.5)
PLATELET # BLD AUTO: 182 10E9/L (ref 150–450)
POTASSIUM SERPL-SCNC: 3.5 MMOL/L (ref 3.4–5.3)
RBC # BLD AUTO: 3.12 10E12/L (ref 4.4–5.9)
SODIUM SERPL-SCNC: 136 MMOL/L (ref 133–144)
WBC # BLD AUTO: 6.8 10E9/L (ref 4–11)

## 2020-08-24 PROCEDURE — 25000132 ZZH RX MED GY IP 250 OP 250 PS 637: Performed by: SURGERY

## 2020-08-24 PROCEDURE — 84100 ASSAY OF PHOSPHORUS: CPT | Performed by: SURGERY

## 2020-08-24 PROCEDURE — 25000132 ZZH RX MED GY IP 250 OP 250 PS 637

## 2020-08-24 PROCEDURE — 85027 COMPLETE CBC AUTOMATED: CPT | Performed by: SURGERY

## 2020-08-24 PROCEDURE — 25000128 H RX IP 250 OP 636: Performed by: SURGERY

## 2020-08-24 PROCEDURE — 25000128 H RX IP 250 OP 636

## 2020-08-24 PROCEDURE — 80048 BASIC METABOLIC PNL TOTAL CA: CPT | Performed by: SURGERY

## 2020-08-24 PROCEDURE — 12000001 ZZH R&B MED SURG/OB UMMC

## 2020-08-24 PROCEDURE — 36592 COLLECT BLOOD FROM PICC: CPT | Performed by: SURGERY

## 2020-08-24 PROCEDURE — 83735 ASSAY OF MAGNESIUM: CPT | Performed by: SURGERY

## 2020-08-24 RX ORDER — MAGNESIUM SULFATE HEPTAHYDRATE 40 MG/ML
2 INJECTION, SOLUTION INTRAVENOUS ONCE
Status: COMPLETED | OUTPATIENT
Start: 2020-08-24 | End: 2020-08-24

## 2020-08-24 RX ORDER — POTASSIUM CHLORIDE 750 MG/1
40 TABLET, EXTENDED RELEASE ORAL ONCE
Status: COMPLETED | OUTPATIENT
Start: 2020-08-24 | End: 2020-08-24

## 2020-08-24 RX ADMIN — MAGNESIUM SULFATE 2 G: 2 INJECTION INTRAVENOUS at 15:10

## 2020-08-24 RX ADMIN — ACETAMINOPHEN 975 MG: 325 TABLET, FILM COATED ORAL at 12:46

## 2020-08-24 RX ADMIN — GABAPENTIN 100 MG: 100 CAPSULE ORAL at 15:05

## 2020-08-24 RX ADMIN — ACETAMINOPHEN 975 MG: 325 TABLET, FILM COATED ORAL at 21:54

## 2020-08-24 RX ADMIN — ACETAMINOPHEN 975 MG: 325 TABLET, FILM COATED ORAL at 04:24

## 2020-08-24 RX ADMIN — Medication 10 ML: at 12:53

## 2020-08-24 RX ADMIN — GABAPENTIN 100 MG: 100 CAPSULE ORAL at 20:30

## 2020-08-24 RX ADMIN — ONDANSETRON 4 MG: 2 INJECTION INTRAMUSCULAR; INTRAVENOUS at 09:07

## 2020-08-24 RX ADMIN — ACETAMINOPHEN 975 MG: 325 TABLET, FILM COATED ORAL at 15:51

## 2020-08-24 RX ADMIN — Medication: at 03:29

## 2020-08-24 RX ADMIN — ENOXAPARIN SODIUM 40 MG: 40 INJECTION SUBCUTANEOUS at 12:59

## 2020-08-24 RX ADMIN — ONDANSETRON 4 MG: 2 INJECTION INTRAMUSCULAR; INTRAVENOUS at 15:05

## 2020-08-24 RX ADMIN — POTASSIUM CHLORIDE 40 MEQ: 750 TABLET, EXTENDED RELEASE ORAL at 12:50

## 2020-08-24 RX ADMIN — GABAPENTIN 100 MG: 100 CAPSULE ORAL at 08:19

## 2020-08-24 ASSESSMENT — ACTIVITIES OF DAILY LIVING (ADL)
ADLS_ACUITY_SCORE: 12
SWALLOWING: 0-->SWALLOWS FOODS/LIQUIDS WITHOUT DIFFICULTY
RETIRED_EATING: 0-->INDEPENDENT
AMBULATION: 0-->INDEPENDENT
COGNITION: 0 - NO COGNITION ISSUES REPORTED
ADLS_ACUITY_SCORE: 12
DRESS: 0-->INDEPENDENT
RETIRED_COMMUNICATION: 0-->UNDERSTANDS/COMMUNICATES WITHOUT DIFFICULTY
ADLS_ACUITY_SCORE: 12
ADLS_ACUITY_SCORE: 12
BATHING: 0-->INDEPENDENT
ADLS_ACUITY_SCORE: 12
TRANSFERRING: 0-->INDEPENDENT
ADLS_ACUITY_SCORE: 12
TOILETING: 0-->INDEPENDENT
FALL_HISTORY_WITHIN_LAST_SIX_MONTHS: NO

## 2020-08-24 ASSESSMENT — PAIN DESCRIPTION - DESCRIPTORS
DESCRIPTORS: ACHING
DESCRIPTORS: ACHING;DISCOMFORT
DESCRIPTORS: ACHING;DISCOMFORT

## 2020-08-24 NOTE — PLAN OF CARE
Assumed care of patient from 0250-3570. AVSS on RA. Patient A&Ox4. Pain managed with PCA dilaudid and scheduled tylenol. Full liquid diet, denies nausea. Encouraged patient to try more food prior to discharge. PICC heparin locked. PIV infusing PCA and IVMF. Abdominal incisions CDA and ELZA. Patient up with SBA, binder on when ambulating. R LESLEY stripped per orders with serosanguinous output. Gavin with adequate urine output. Denies post op flatus/BM. Monitor return of bowel function and continue with POC.

## 2020-08-24 NOTE — PROGRESS NOTES
Colorectal Surgery Progress Note  POD#3      Subjective:  Pt was seen at bedside, doing well in NAD, pain moderately controlled, Denies Nausea/Vomiting, Denies flatus/bm. Ambulated 3x yesterday    Vitals:  Vitals:    08/23/20 1012 08/23/20 1600 08/24/20 0036 08/24/20 0806   BP:  127/75 121/67 120/68   BP Location:  Right arm Right arm Right arm   Pulse:  66 62 59   Resp:  16 14 16   Temp:  97.6  F (36.4  C) 97.2  F (36.2  C) 96.8  F (36  C)   TempSrc:  Oral Oral Oral   SpO2:  99% 99% 98%   Weight: 68 kg (149 lb 14.4 oz)      Height:         I/O:  I/O last 3 completed shifts:  In: 1200 [I.V.:1200]  Out: 1710 [Urine:1550; Drains:160]    Physical Exam:  Gen: AAOx3, NAD  Pulm: Non-labored breathing  Abd: Soft, mildly distended, appropriately tender, no guarding   Incision C/D/I with staples in place   LESLEY drain draining serosanguinous fluid  Ext:  Warm and well-perfused    BMP  Recent Labs   Lab 08/24/20  0732 08/22/20  0648 08/21/20  2133 08/17/20  1300    135  --  138   POTASSIUM 3.5 4.4  --  4.1   CHLORIDE 101 103  --  107   CO2 29 26  --  25   BUN 13 12  --  15   CR 0.65* 0.53* 0.62* 0.57*   GLC 74 141*  --  101*   MAG 1.7 1.7  --  2.0   PHOS 4.0 4.3  --  3.6     CBC  Recent Labs   Lab 08/24/20  0732 08/22/20  0648 08/18/20  1126 08/17/20  1300   WBC 6.8 14.7* 7.0 7.2   HGB 10.0* 10.9* 11.8* 12.1*   HCT 30.8* 32.6* 36.8* 36.8*    248 179 214         ASSESSMENT: This is a 20 year old male history of Crohn's Disease of small intestine with enterovesical fistula and SBO now POD 3 s/p Ex-lap with ileocolic resection and low anterior resection with primary anastomosis. Doing well no complaints at this time.       Plan:   - Advance to Low Fiber Diet   - Increase PCA interval from 10 minutes to 20 minutes  - Wean Down IVFs  - Encourage Ambulation and IS      Silverio Sultana MD PGY1  [place]-rectal Surgery      Patient was seen and discussed with Dr. Robles

## 2020-08-24 NOTE — PLAN OF CARE
POD #3. VSS on RA. SBA. Low fiber diet. Some nausea with breakfast but relieved with Zofran. Tolerated popsicle. Pain controlled with scheduled tylenol and PCA dilaudid. PCA dose interval changed this AM from 0.3mg every 10 minutes to 0.3mg every 20 minutes. Abdominal incisions CDI and ELZA. Abdominal binder in place for comfort. LESLEY with moderate amount of serosanguinous output. Dressing CDI. PICC heparin locked. PIV infusing IVMF, PCA and currently magnesium sulfate replacement. Potassium replacement given PO. Rechecks in the AM. Gavin with adequate urine output. Denies BM or gas. Ambulated multiple times today. Pt resting between cares. Continue POC.

## 2020-08-24 NOTE — PLAN OF CARE
"/75 (BP Location: Right arm)   Pulse 66   Temp 97.6  F (36.4  C) (Oral)   Resp 16   Ht 1.88 m (6' 2\")   Wt 68 kg (149 lb 14.4 oz)   SpO2 99%   BMI 19.25 kg/m    Assumed care from 2049-8248. VSS on RA. A&Ox4. Pain controlled with PCA dilaudid, lidocaine patches (now a patch free period), scheduled tylenol & gabapentin, and PRN flexeril & PO dilaudid. Denies nausea, on full liquid diet, pt states he doesn't want to eat any food here. Writer educated pt about importance of tolerating diet before discharge, pt expressed understanding as pt was on TPN prior to admission and hopes to not have to continue once he discharges. Right PICC with two lumens, both heparin locked. Left PIV infusing PCA and IVMF. Abdominal incisions CDI and ELZA, abdominal binder in place when out of bed. Right LESLEY with good amount of serosanguinous output, stripped per orders, dressing CDI. Gavin positional with AUO. Denies gas or BM post op, pt states that he rarely passing gas even prior to surgery, bowel sounds hypoactive in all quadrants. Pt up with SBA, walking halls multiple times throughout the day. Pt's mother at bedside for support through visiting hours, very involved in cares. Continue POC.     "

## 2020-08-25 ENCOUNTER — PATIENT OUTREACH (OUTPATIENT)
Dept: CARE COORDINATION | Facility: CLINIC | Age: 20
End: 2020-08-25

## 2020-08-25 PROCEDURE — 25000128 H RX IP 250 OP 636: Performed by: SURGERY

## 2020-08-25 PROCEDURE — 25000128 H RX IP 250 OP 636

## 2020-08-25 PROCEDURE — 12000001 ZZH R&B MED SURG/OB UMMC

## 2020-08-25 PROCEDURE — 25000132 ZZH RX MED GY IP 250 OP 250 PS 637: Performed by: SURGERY

## 2020-08-25 PROCEDURE — 25000128 H RX IP 250 OP 636: Performed by: PHYSICIAN ASSISTANT

## 2020-08-25 RX ORDER — PROCHLORPERAZINE 25 MG
25 SUPPOSITORY, RECTAL RECTAL EVERY 12 HOURS PRN
Status: DISCONTINUED | OUTPATIENT
Start: 2020-08-25 | End: 2020-08-25

## 2020-08-25 RX ORDER — ONDANSETRON 4 MG/1
4 TABLET, ORALLY DISINTEGRATING ORAL EVERY 6 HOURS PRN
Status: DISCONTINUED | OUTPATIENT
Start: 2020-08-25 | End: 2020-08-26 | Stop reason: HOSPADM

## 2020-08-25 RX ORDER — PROCHLORPERAZINE MALEATE 5 MG
10 TABLET ORAL EVERY 6 HOURS PRN
Status: DISCONTINUED | OUTPATIENT
Start: 2020-08-25 | End: 2020-08-26 | Stop reason: HOSPADM

## 2020-08-25 RX ORDER — HYDROMORPHONE HCL/0.9% NACL/PF 0.2MG/0.2
0.2 SYRINGE (ML) INTRAVENOUS
Status: DISCONTINUED | OUTPATIENT
Start: 2020-08-25 | End: 2020-08-25

## 2020-08-25 RX ORDER — HYDROMORPHONE HCL/0.9% NACL/PF 0.2MG/0.2
0.2 SYRINGE (ML) INTRAVENOUS EVERY 4 HOURS PRN
Status: DISCONTINUED | OUTPATIENT
Start: 2020-08-25 | End: 2020-08-26 | Stop reason: HOSPADM

## 2020-08-25 RX ORDER — ONDANSETRON 2 MG/ML
4 INJECTION INTRAMUSCULAR; INTRAVENOUS EVERY 6 HOURS PRN
Status: DISCONTINUED | OUTPATIENT
Start: 2020-08-25 | End: 2020-08-26 | Stop reason: HOSPADM

## 2020-08-25 RX ORDER — ACETAMINOPHEN 325 MG/1
975 TABLET ORAL EVERY 6 HOURS
Qty: 1 BOTTLE | Refills: 0 | Status: SHIPPED | OUTPATIENT
Start: 2020-08-26 | End: 2020-08-26

## 2020-08-25 RX ADMIN — Medication 5 ML: at 12:47

## 2020-08-25 RX ADMIN — GABAPENTIN 100 MG: 100 CAPSULE ORAL at 14:51

## 2020-08-25 RX ADMIN — HYDROMORPHONE HYDROCHLORIDE 4 MG: 2 TABLET ORAL at 16:14

## 2020-08-25 RX ADMIN — Medication 0.2 MG: at 17:42

## 2020-08-25 RX ADMIN — ONDANSETRON 4 MG: 4 TABLET, ORALLY DISINTEGRATING ORAL at 10:12

## 2020-08-25 RX ADMIN — ACETAMINOPHEN 975 MG: 325 TABLET, FILM COATED ORAL at 12:42

## 2020-08-25 RX ADMIN — ENOXAPARIN SODIUM 40 MG: 40 INJECTION SUBCUTANEOUS at 12:43

## 2020-08-25 RX ADMIN — LIDOCAINE 1 PATCH: 560 PATCH PERCUTANEOUS; TOPICAL; TRANSDERMAL at 20:21

## 2020-08-25 RX ADMIN — ACETAMINOPHEN 975 MG: 325 TABLET, FILM COATED ORAL at 19:57

## 2020-08-25 RX ADMIN — HYDROMORPHONE HYDROCHLORIDE 4 MG: 2 TABLET ORAL at 20:25

## 2020-08-25 RX ADMIN — GABAPENTIN 100 MG: 100 CAPSULE ORAL at 08:58

## 2020-08-25 RX ADMIN — HYDROMORPHONE HYDROCHLORIDE 4 MG: 2 TABLET ORAL at 10:12

## 2020-08-25 RX ADMIN — ACETAMINOPHEN 975 MG: 325 TABLET, FILM COATED ORAL at 04:14

## 2020-08-25 RX ADMIN — GABAPENTIN 100 MG: 100 CAPSULE ORAL at 19:57

## 2020-08-25 RX ADMIN — Medication 10 ML: at 20:20

## 2020-08-25 ASSESSMENT — PAIN DESCRIPTION - DESCRIPTORS
DESCRIPTORS: SORE
DESCRIPTORS: DISCOMFORT
DESCRIPTORS: ACHING;DISCOMFORT
DESCRIPTORS: SORE

## 2020-08-25 ASSESSMENT — ACTIVITIES OF DAILY LIVING (ADL)
ADLS_ACUITY_SCORE: 12

## 2020-08-25 NOTE — PLAN OF CARE
"/64 (BP Location: Right arm)   Pulse 66   Temp 95.8  F (35.4  C) (Oral)   Resp 16   Ht 1.88 m (6' 2\")   Wt 68 kg (149 lb 14.4 oz)   SpO2 98%   BMI 19.25 kg/m    VSS on RA, A&Ox4. Pain controlled with PCA dilaudid and scheduled tylenol. Denies nausea, tolerating low fiber diet. PICC with two lumens, both heparin locked. PIV infusing IVMF and PCA. Abdominal incisions CDI and ELZA, abdominal binder in place. Right LESLEY with good amount of serosanguinous output, stripped per orders, dressing CDI. Gavin positional with AUO. Denies gas, bowel sounds hypoactive and faint. Gavin positional with AUO. Pt up ad heidy, resting comfortably in between cares. Continue POC.   "

## 2020-08-25 NOTE — PROGRESS NOTES
"Surgery Progress Note    S: Denies passing gas. Still using PCA without oral narcotics. Tolerating low fiber diet. Ambulating.     O:   /56 (BP Location: Right arm)   Pulse 62   Temp 96.2  F (35.7  C) (Oral)   Resp 16   Ht 1.88 m (6' 2\")   Wt 68 kg (149 lb 14.4 oz)   SpO2 100%   BMI 19.25 kg/m      Sleeping in bed, NAD  WWP  NLB on RA  Abd soft, ND, NT  LESLEY with serosanguinous drainage     I/O last 3 completed shifts:  In: 1540 [P.O.:1140; I.V.:400]  Out: 2705 [Urine:2475; Drains:230]     A/P   Ventura Quiroz is a 20 year old male history of Crohn's Disease of small intestine with enterovesical fistula and SBO now POD 4 s/p Ex-lap with ileocolic resection and low anterior resection with primary anastomosis.      Plan:   - continue low fiber diet  - discontinue PCA, encourage PO pain medications  - PRN IV dilaudid 0.2mg q1h for breakthrough pain   - lovenox ppx   - LR @ 50cc    -Discussed with fellow.     Heather Paul MD  PGY-3 General Surgery       "

## 2020-08-25 NOTE — PLAN OF CARE
"/67 (BP Location: Left arm)   Pulse 54   Temp 97.3  F (36.3  C) (Oral)   Resp 16   Ht 1.88 m (6' 2\")   Wt 68 kg (149 lb 14.4 oz)   SpO2 100%   BMI 19.25 kg/m    VSS on RA, A&Ox4. Pain controlled with PCA diluadid, scheduled tylenol and gabapentin. Denies nausea, tolerating low fiber diet. PICC with two lumens, both heparin locked. PIV infusing IVMF and PCA. Abdominal incisions CDI and ELZA, abdominal binder in place when out of bed. Right LESLEY with good amount of serosanguinous output, stripped per orders, dressing CDI. Gavin positional with AUO. Denies gas, bowel sounds hypoactive and faint. Gavin positional with AUO. Pt up ad heidy with encouragement, walked halls twice and up in chair once. Continue POC.   "

## 2020-08-25 NOTE — PLAN OF CARE
"/63 (BP Location: Right arm)   Pulse 69   Temp 98.6  F (37  C) (Oral)   Resp 16   Ht 1.88 m (6' 2\")   Wt 68 kg (149 lb 14.4 oz)   SpO2 98%   BMI 19.25 kg/m      Shift: 0097-1735  Reason for Admission: POD 4 s/p Ex-lap with ileocolic resection and low anterior resection with primary anastomosis.   VS: VSS on RA  Neuros: A/Ox4, able to make needs known.   GI/: No BMs this shift, passing gas. Gavin in place  Diet: Tolerating low fiber diet  Drains/Lines: L. PICC 2L- hep locked, caps still need to changed. PIV SL. R. LESLEY  Activity: Pt up ad heidy, encourage pt to walk  Pain/Nausea: C/o of incisional and LESLEY site pain- PRN PO and IV Dilaudid given. Denies nausea  Skin: Abdominal incision- ELZA. LESLEY Dressing site- CDI  Plan of care: Possible Gavin and PICC removal tomorrow. Will continue to monitor and follow POC.   "

## 2020-08-25 NOTE — PROGRESS NOTES
Clinical Product Navigator RN reviewed chart; patient on payer product coverage; identified on recent admission report.  Review results: Obtaining further information to determine needs and next steps.  Will monitor for patient's inpatient discharge/next level of care and assist in any care navigation needs.    Kay Alicea RN/Clinical Product Navigator

## 2020-08-25 NOTE — PLAN OF CARE
POD #4. VSS on RA. Up ad heidy. Low fiber diet. PO zofran given before meals to help with nausea. PCA pump discontinued this AM. Pain managed with PO dilaudid and tylenol and heat packs. Abdominal incisions CDI and ELZA. Abdominal binder in place for comfort. LESLEY with moderate amount of serosanguinous output. Dressing changed. PICC heparin locked. PIV-SL. Gavin with adequate urine output. BM and gas today. Ambulated multiple times today. Took a shower. Pt resting between cares. Continue POC.

## 2020-08-26 ENCOUNTER — PATIENT OUTREACH (OUTPATIENT)
Dept: CARE COORDINATION | Facility: CLINIC | Age: 20
End: 2020-08-26

## 2020-08-26 ENCOUNTER — HOME INFUSION (PRE-WILLOW HOME INFUSION) (OUTPATIENT)
Dept: PHARMACY | Facility: CLINIC | Age: 20
End: 2020-08-26

## 2020-08-26 VITALS
RESPIRATION RATE: 16 BRPM | TEMPERATURE: 96.2 F | HEIGHT: 74 IN | BODY MASS INDEX: 19.24 KG/M2 | WEIGHT: 149.9 LBS | HEART RATE: 58 BPM | SYSTOLIC BLOOD PRESSURE: 111 MMHG | DIASTOLIC BLOOD PRESSURE: 59 MMHG | OXYGEN SATURATION: 98 %

## 2020-08-26 LAB — COPATH REPORT: NORMAL

## 2020-08-26 PROCEDURE — 25000132 ZZH RX MED GY IP 250 OP 250 PS 637: Performed by: SURGERY

## 2020-08-26 PROCEDURE — 25000128 H RX IP 250 OP 636

## 2020-08-26 RX ORDER — HYDROMORPHONE HYDROCHLORIDE 2 MG/1
2-4 TABLET ORAL EVERY 6 HOURS PRN
Qty: 20 TABLET | Refills: 0 | Status: SHIPPED | OUTPATIENT
Start: 2020-08-26 | End: 2020-11-30

## 2020-08-26 RX ORDER — ACETAMINOPHEN 325 MG/1
975 TABLET ORAL EVERY 6 HOURS
Qty: 1 BOTTLE | Refills: 0 | Status: SHIPPED | OUTPATIENT
Start: 2020-08-26 | End: 2020-11-30

## 2020-08-26 RX ADMIN — CYCLOBENZAPRINE HYDROCHLORIDE 5 MG: 5 TABLET, FILM COATED ORAL at 11:05

## 2020-08-26 RX ADMIN — GABAPENTIN 100 MG: 100 CAPSULE ORAL at 07:36

## 2020-08-26 RX ADMIN — Medication 10 ML: at 10:30

## 2020-08-26 RX ADMIN — HYDROMORPHONE HYDROCHLORIDE 4 MG: 2 TABLET ORAL at 04:03

## 2020-08-26 RX ADMIN — ACETAMINOPHEN 975 MG: 325 TABLET, FILM COATED ORAL at 06:17

## 2020-08-26 RX ADMIN — HYDROMORPHONE HYDROCHLORIDE 4 MG: 2 TABLET ORAL at 09:04

## 2020-08-26 ASSESSMENT — ACTIVITIES OF DAILY LIVING (ADL)
ADLS_ACUITY_SCORE: 12

## 2020-08-26 ASSESSMENT — PAIN DESCRIPTION - DESCRIPTORS
DESCRIPTORS: DISCOMFORT

## 2020-08-26 NOTE — PLAN OF CARE
VSS. Up ad heidy. Low fiber diet, tolerating well. Denies nausea. Pain controlled with dilaudid, tylenol and flexeril. Abdominal incision CDI and ELZA. LESLEY taken out by CHELA Blackman. Gavin removed at 0940 am. Pt able to void by 11am. PICC line removed and compression dressing in place. Pt educated on importance of keeping the dressing in place for 24 hours. All belongings sent home with pt. Pt will be going home iwth support from mom and family.  Pt and mom walked with staff down to discharge pharmacy to  medications.

## 2020-08-26 NOTE — DISCHARGE SUMMARY
[facility]  Discharge Summary  Colon and Rectal Surgery     Ventura Quiroz MRN# [id number]   YOB: 2000 Age: 20 year old     Date of Admission:  8/21/2020  Date of Discharge::  8/26/2020  Admitting Physician:  Brittni Robles MD  Discharge Physician:  Brittni Robles MD  Primary Care Physician:        Chuy Moss          Admission Diagnoses:   Crohn's disease of small intestine with abscess (H) [K50.014]  Fistulizing ileocolic crohns disease  Small bowel obstruction  Enterovesical fistula          Discharge Diagnosis:   Crohn's disease of small intestine with abscess (H) [K50.014]  Fistulizing ileocolic crohns disease  Small bowel obstruction  Enterovesical fistula         Procedures:   8/21/2020:  PROCEDURE: Exploratory laparotomy, ileocolic resection with takedown of enterovesical fistula, low anterior resection, flexible sigmoidoscopy.         Consultations:   [facility] ADULT IP CONSULT         Imaging Studies:     Results for orders placed or performed during the hospital encounter of 08/21/20   POC US Guidance Needle Placement    Impression    Four quadrant TAP Block          Medications Prior to Admission:     Medications Prior to Admission   Medication Sig Dispense Refill Last Dose     mercaptopurine (PURINETHOL) 50 MG tablet Take 2 tablets (100 mg) by mouth daily (Patient taking differently: Take 100 mg by mouth every evening ) 60 tablet 4 8/20/2020 at 1800     hyoscyamine (LEVSIN) 0.125 MG tablet Take 1 tablet (125 mcg) by mouth every 4 hours as needed for cramping 40 tablet 3 More than a month at Unknown time     [DISCONTINUED] inFLIXimab (REMICADE) 100 MG injection every 28 days    8/18/2020     [DISCONTINUED] metroNIDAZOLE (FLAGYL) 500 MG tablet Take 1 tablet (500 mg) by mouth every 6 hours At 8:00 am, 2:00 pm, 8:00 pm the day prior to your surgery with neomycin and zofran. 3 tablet 0 8/20/2020 at 2000     [DISCONTINUED] neomycin  "(MYCIFRADIN) 500 MG tablet Take 2 tablets (1,000 mg) by mouth every 6 hours At 8:00 am, 2:00 pm, 8:00 pm the day prior to your surgery with flagyl and zofran. 6 tablet 0 8/20/2020 at 2000     [DISCONTINUED] ondansetron (ZOFRAN ODT) 4 MG ODT tab Patient should take one tab one hour before his mre appt. May take second one if needed when drinking contrast. (Patient taking differently: Take 4 mg by mouth as needed Patient should take one tab one hour before his mre appt. May take second one if needed when drinking contrast.) 2 tablet 0 Past Week at Unknown time     [DISCONTINUED] ondansetron (ZOFRAN) 4 MG tablet Take 1 tablet (4 mg) by mouth every 6 hours At 8:00 am, 2:00 pm, 8:00 pm the day prior to your surgery with neomycin and flagyl. 3 tablet 0 8/20/2020 at 2000     [DISCONTINUED] oxyCODONE (ROXICODONE) 5 MG tablet Take 1 tablet (5 mg) by mouth every 8 hours as needed for pain 20 tablet 0 8/20/2020 at Unknown time     [DISCONTINUED] polyethylene glycol (MIRALAX) 17 g packet Take 238 g by mouth See Admin Instructions Starting at 4 pm night prior to surgery. Refer to \"Getting Ready for Surgery\" instructions. 238 g 0 8/20/2020 at 1600          Discharge Medications:     Current Discharge Medication List      START taking these medications    Details   acetaminophen (TYLENOL) 325 MG tablet Take 3 tablets (975 mg) by mouth every 6 hours  Qty: 1 Bottle, Refills: 0    Associated Diagnoses: Crohn's disease of small intestine with abscess (H)      enoxaparin ANTICOAGULANT (LOVENOX) 40 MG/0.4ML syringe Inject 0.4 mLs (40 mg) Subcutaneous every 24 hours for 25 days  Qty: 10 mL, Refills: 0    Associated Diagnoses: Crohn's disease of small intestine with abscess (H)      HYDROmorphone (DILAUDID) 2 MG tablet Take 1-2 tablets (2-4 mg) by mouth every 6 hours as needed for moderate to severe pain  Qty: 20 tablet, Refills: 0    Associated Diagnoses: Crohn's disease of small intestine with abscess (H)         CONTINUE these " medications which have NOT CHANGED    Details   mercaptopurine (PURINETHOL) 50 MG tablet Take 2 tablets (100 mg) by mouth daily  Qty: 60 tablet, Refills: 4    Associated Diagnoses: Crohn's disease of small intestine with other complication (H)      hyoscyamine (LEVSIN) 0.125 MG tablet Take 1 tablet (125 mcg) by mouth every 4 hours as needed for cramping  Qty: 40 tablet, Refills: 3    Associated Diagnoses: Crohn's disease of small intestine with other complication (H)         STOP taking these medications       inFLIXimab (REMICADE) 100 MG injection Comments:   Reason for Stopping:         metroNIDAZOLE (FLAGYL) 500 MG tablet Comments:   Reason for Stopping:         neomycin (MYCIFRADIN) 500 MG tablet Comments:   Reason for Stopping:         ondansetron (ZOFRAN ODT) 4 MG ODT tab Comments:   Reason for Stopping:         ondansetron (ZOFRAN) 4 MG tablet Comments:   Reason for Stopping:         oxyCODONE (ROXICODONE) 5 MG tablet Comments:   Reason for Stopping:         polyethylene glycol (MIRALAX) 17 g packet Comments:   Reason for Stopping:                     Brief History of Illness:   20 year old M, PMH of fistulizing ileocolic crohns disease followed by Dr. Wright and on Remicade and mercaptopurine who has had partial small bowel obstruction, enterovesical fistula, was on prior to admission TPN for jesus-operative optimization.  Now s/p exploratory laparotomy, ileocolic resection with take down of enterovesical fistula, low anterior resection, flexible sigmoidoscopy on 8/21/2020.           Hospital Course:   Post-operatively was gently fluid resuscitated.  Pain was controlled with tylenol and IV dilaudid.  Pt had eventual return of bowel function and was able to tolerate small amounts of a low fiber diet.  Pt was able to transition to oral dilaudid and tylenol.  Pt was taught how to self-inject lovenox which he is to do for a total of 30 days.  Due to enterovesical fistula take down, hussein was left in place for 5  "days.  Gavin was removed on POD#5 and a few hours later LESLEY drain was removed.  Pt tolerated this.  Since pt was able to tolerate oral food, his PICC line was also removed prior to discharge.      Pt is to resume his home mercaptopurine and should hold his remicade for 2-3 weeks post-op.  Pt and mom were counseled on good oral intake to optimize healing.     Patient is to follow up in the Colon and Rectal Surgery Clinic in 2-3 week with Ines Maria NP and then with Dr. Robles in 4-6 weeks after.          Day of Discharge Physical Exam:   Blood pressure 111/59, pulse 58, temperature 96.2  F (35.7  C), temperature source Oral, resp. rate 16, height 1.88 m (6' 2\"), weight 68 kg (149 lb 14.4 oz), SpO2 98 %.    Gen: AAOx3, NAD  Pulm: Non-labored breathing  Abd: Soft, appropriately tender, no guarding/rebound   Incision C/D/I   Ext:  Warm and well-perfused         Final Pathology Result:   FINAL PATHOLOGY RESULT PENDING AT THE TIME OF DISCHARGE           Discharge Instructions and Follow-Up:     Discharge Procedure Orders   Reason for your hospital stay   Order Comments: S/p Exploratory Laparotomy, Ileocolic resection, Low Anterior resection     Wound care and dressings   Order Comments: WOUND CARE  -Inspect your wounds daily for signs of infection (increased redness, drainage, pain)  -Keep your wound clean and dry  -You may shower, but do not soak in tub or pool     Activity   Order Comments: ACTIVITY  -No lifting, pushing, pulling greater than 10 lbs and no strenuous exercise for 6 weeks   -Do not insert anything into your anus/rectum for at least 6 weeks or until discussed with Colon and Rectal Surgery  -No driving while on narcotic analgesics (i.e. Percocet, oxycodone, Vicodin)  -No driving until you are able to fully twist to both sides or slam on brakes quickly and without any pain  -We encourage walking at least 4-5 times per day     Order Specific Question Answer Comments   Is discharge order? " Yes      Adult [facility] Follow-up and recommended labs and tests   Order Comments: NOTIFY  Please contact Amita Ruelas RN, Drea Vidales RN or Devaughn Tang LPN at [phone] for problems after discharge such as:  -Temperature > 101F, chills, rigors, dizziness  -Redness around or purulent drainage from wound  -Inability to tolerate diet, nausea or vomiting  -You stop passing gas, develop significant bloating, abdominal pain  -Have blood in stools/vomit  -Have severe diarrhea/constipation  -Any other questions or concerns.  - At nights (after 4:30pm), on weekends, or if urgent, call [phone] and ask the  to speak with the on-call Colorectal Surgery resident or fellow      Medication Instructions  Some of your medications may have changed. Please take only prescribed and resumed medications     FOLLOW-UP  1.  You will need to follow-up with Ines Maria NP in the Colon and Rectal Surgery clinic in 2-3 week(s) and then with CRS Staff: Dr. Robles in 4-5 weeks after.  Please contact our clinic scheduler (phone # [phone]) if you have not heard from our clinic in 3 business days afer discharge to schedule a follow-up appointment.     2.  Follow up with your primary care provider in 1-2 weeks after discharge from the hospital to review this hospitalization.     3.  Follow up with your Gastroenterologist in 2-3 weeks.  Recommend that you hold Remicade for about 2-3 weeks after surgery to allow for healing.         Appointments on [place] and/or [facility] (with [facility] or [facility] provider or service). Call [phone] if you haven't heard regarding these appointments within 7 days of discharge.     Diet   Order Comments: DIET  -Low Residue Diet for at least 4-6 weeks unless cleared by Colorectal surgery.  No raw vegetables, fruit skins, fibrous foods that require a lot of chewing, nuts, seeds, corn, popcorn.   -We recommend eating slowly, chewing thoroughly, eating small frequent  meals throughout the day  -Stay well hydrated.     Order Specific Question Answer Comments   Is discharge order? Yes             Home Health Care:   Not needed           Discharge Disposition:   Discharged to home      Condition at discharge: Stable      Jocelyne Harman PA-C  Colon and Rectal Surgery     Pt was seen and discussed with Dr. Jara on 8/26/2020      Staff Addendum:  Agree with the discharge summary as documented. I was personally involved with the discharge planning and hospital decision-making for this patient.  Brittni Robles MD  Colon and Rectal Surgery Staff  [facility]

## 2020-08-26 NOTE — PLAN OF CARE
VSS. Oral Dilaudid and Tylenol given for incisional pain. Tolerating LFD with no nausea. Gavin with adequate urine output. Passing gas, no BM overnight. [employer] inc ELZA. LESLEY with sersoang output, stripped. PICC hep locked, caps changed. Up ind in room. Continue to monitor and with POC.

## 2020-08-28 ENCOUNTER — PATIENT OUTREACH (OUTPATIENT)
Dept: SURGERY | Facility: CLINIC | Age: 20
End: 2020-08-28

## 2020-08-28 NOTE — PROGRESS NOTES
Tried calling x2. Was unable to leave a message due to a busy beeping noise. Sent a CallApp message.   
[phone]

## 2020-08-28 NOTE — PROGRESS NOTES
This is a recent snapshot of the patient's [facility] medical record.  For current drug dose and complete information and questions, call [phone] or In Basket pool, [facility] home infusion ([id number])  CSN Number:  [id number]

## 2020-09-09 NOTE — PROGRESS NOTES
Chart Reviewed; patient has discharged to home and is established with specialty care coordination team, outpatient infusions and provider follow up.     No referrals or outreach initiated at this time.     Kay Alicea RN  Clinical Product Navigator

## 2020-09-11 ENCOUNTER — VIRTUAL VISIT (OUTPATIENT)
Dept: SURGERY | Facility: CLINIC | Age: 20
End: 2020-09-11
Payer: COMMERCIAL

## 2020-09-11 ENCOUNTER — MYC MEDICAL ADVICE (OUTPATIENT)
Dept: FAMILY MEDICINE | Facility: CLINIC | Age: 20
End: 2020-09-11

## 2020-09-11 VITALS — BODY MASS INDEX: 19.12 KG/M2 | HEIGHT: 74 IN | WEIGHT: 149 LBS

## 2020-09-11 DIAGNOSIS — K50.014 CROHN'S DISEASE OF SMALL INTESTINE WITH ABSCESS (H): ICD-10-CM

## 2020-09-11 DIAGNOSIS — Z09 FOLLOW-UP EXAMINATION FOLLOWING SURGERY: Primary | ICD-10-CM

## 2020-09-11 ASSESSMENT — PAIN SCALES - GENERAL: PAINLEVEL: NO PAIN (0)

## 2020-09-11 ASSESSMENT — MIFFLIN-ST. JEOR: SCORE: 1755.61

## 2020-09-11 NOTE — PROGRESS NOTES
"Colon and Rectal Surgery Consult Video Note    Date: 2020     Referring provider:  Brittni Robles MD  [address] [facility]  [place] [address]     RE: Ventura Quiroz  : 2000  ETHAN: 2020    Ventura Quiroz is a 20 year old male who is being evaluated via a billable video visit.      The patient has been notified of following:     \"This video visit will be conducted via a call between you and your physician/provider. We have found that certain health care needs can be provided without the need for an in-person physical exam.  This service lets us provide the care you need with a video conversation.  If a prescription is necessary we can send it directly to your pharmacy.  If lab work is needed we can place an order for that and you can then stop by our lab to have the test done at a later time.    Video visits are billed at different rates depending on your insurance coverage.  Please reach out to your insurance provider with any questions.    If during the course of the call the physician/provider feels a video visit is not appropriate, you will not be charged for this service.\"    Patient has given verbal consent for Video visit? Yes    Video Start Time: 1002     Ventura Quiroz is a very pleasant 20 year old male with PMH of fistulizing ileocolic crohns disease followed by Dr. Wright and on Remicade and mercaptopurine who has had partial small bowel obstruction, enterovesical fistula, was on prior to admission TPN for jesus-operative optimization.  Now s/p exploratory laparotomy, ileocolic resection with take down of enterovesical fistula, low anterior resection, flexible sigmoidoscopy on 2020 with Dr. Robles.     FINAL DIAGNOSIS:   A. TERMINAL ILEUM WITH CECUM AND APPENDIX, RESECTION:   - Ileum with patchy mild to moderately active ileitis, focal ulceration,   pseudopyloric metaplasia, submucosal   fibrosis, transmural chronic inflammation and patchy acute serositis "   consistent with Crohn's disease   - Cecum with no significant inflammation   - Appendix with acute and chronic intramural inflammation with giant cell   reaction, hemorrhage and acute   serositis   - Negative for dysplasia   - Three benign reactive lymph nodes     B. SIGMOID COLON AND PROXIMAL RECTUM, RESECTION:   - Colon with marked subserosal acute inflammation, focal abscess formation    with foreign body giant cell   reaction, patchy transmural chronic inflammation and fibrous adhesions   - Negative for dysplasia   - Nine benign reactive lymph nodes     Interval History: Tylenol only for pain.  He is eating and drinking without difficulty and thinks his weight has been going up.  He has about 3-4 fairly loose bowel movements a day.  Voiding normally.  No fevers or chills.  He continues on his mercaptopurine and will be restarting Remicade in the next few weeks.  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    Assessment/Plan: 20 year old male s/p exploratory laparotomy, ileocolic resection with take down of enterovesical fistula, low anterior resection, flexible sigmoidoscopy on 8/21/2020 with Dr. Robles. He has been doing well since returning home.  Only needing Tylenol for pain.  Continue on a low residue diet for another 2 weeks and then he can slowly add fiber back into his diet.  He is having loose stools but only 3-4 times a day.  This may improve with restarting Remicade also.  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  Offered another follow-up visit with Dr. Robles but he does not feel this is necessary.  Encouraged him to contact us with any questions or concerns. Patient's questions were answered to his stated satisfaction and he is in agreement with this plan.    Video-Visit Details    Type of service:  Video Visit    Video End Time (time video stopped):  1008    Originating Location (pt. Location): Home    Distant Location (provider location):  [facility] COLON AND RECTAL SURGERY     Mode of Communication:  Video Conference via [employer]     Medical history:  Past Medical History:   Diagnosis Date     Allergic rhinitis, cause unspecified     Zyrtec helps     Crohn's Colitis 4/8/2019     Unspecified otitis media     Otitis Media       Surgical history:  Past Surgical History:   Procedure Laterality Date     COLONOSCOPY N/A 4/11/2019    Procedure: COMBINED COLONOSCOPY, SINGLE OR MULTIPLE BIOPSY/POLYPECTOMY BY BIOPSY;  Surgeon: Tobin Wright MD;  Location: [facility]     EXCISE LIP OR CHEEK FOLD  10/3/2003    Needle cautery frenulectomy.     INSERT PICC LINE Left 8/10/2020    Procedure: INSERTION, PICC @845;  Surgeon: Karan Farr MD;  [facility]: [facility]     IR PICC PLACEMENT > 5 YRS OF AGE  8/10/2020     PE TUBES  4/25/2006     RESECTION ILEOCOLIC N/A 8/21/2020    Procedure: Exploratory laparotomy with ileocolic resection with takedown of enterovesical fistula, and low anterior resection;  Surgeon: Brittni Robles MD;  Location: [facility]     SIGMOIDOSCOPY FLEXIBLE N/A 8/21/2020    Procedure: Sigmoidoscopy flexible;  Surgeon: Brittni Robles MD;  Location: [facility]     TONSILLECTOMY & ADENOIDECTOMY  4/25/2006       Problem list:  Patient Active Problem List    Diagnosis Date Noted     Crohn disease (H) 08/21/2020     Priority: Medium     Anemia, iron deficiency 08/19/2019     Priority: Medium     Crohn's disease of small intestine with abscess (H) 08/19/2019     Priority: Medium     Crohn's Colitis 04/08/2019     Priority: Medium       Medications:  Current Outpatient Medications   Medication Sig Dispense Refill     enoxaparin ANTICOAGULANT (LOVENOX) 40 MG/0.4ML syringe Inject 0.4 mLs (40 mg) Subcutaneous every 24 hours 10 mL 0     hyoscyamine (LEVSIN) 0.125 MG tablet Take 1 tablet (125 mcg) by mouth every 4 hours as needed for cramping 40 tablet 3  "    mercaptopurine (PURINETHOL) 50 MG tablet Take 2 tablets (100 mg) by mouth daily (Patient taking differently: Take 100 mg by mouth every evening ) 60 tablet 4     acetaminophen (TYLENOL) 325 MG tablet Take 3 tablets (975 mg) by mouth every 6 hours (Patient not taking: Reported on 9/11/2020) 1 Bottle 0     HYDROmorphone (DILAUDID) 2 MG tablet Take 1-2 tablets (2-4 mg) by mouth every 6 hours as needed for moderate to severe pain (Patient not taking: Reported on 9/11/2020) 20 tablet 0       Allergies:  Allergies   Allergen Reactions     Gadavist [Gadobutrol] Nausea     Patient could not perform timed sequences due to nausea and discomfort.     Glucagon Nausea and Vomiting     Pre medicate with lorazapam if able.       Amoxicillin      Penicillin [Penicillins]        Family history:  Family History   Problem Relation Age of Onset     No Known Problems Mother      No Known Problems Father      Melanoma No family hx of      Skin Cancer No family hx of        Social history:  Social History     Tobacco Use     Smoking status: Never Smoker     Smokeless tobacco: Never Used   Substance Use Topics     Alcohol use: Yes     Frequency: Monthly or less     Drinks per session: 1 or 2    Marital status: single.  Occupation: construction    Nursing Notes:   Liz Caldera EMT  9/11/2020  9:58 AM  Sign at exiting of workspace  Chief Complaint   Patient presents with     Surgical Followup       Vitals:    09/11/20 0957   Weight: 149 lb   Height: 6' 2\"       Body mass index is 19.13 kg/m .      GEOVANNA Sagastume APRN, NP-C  Colon and Rectal Surgery   [facility]    This note was created using speech recognition software and may contain unintended word substitutions.    "

## 2020-09-11 NOTE — NURSING NOTE
"Chief Complaint   Patient presents with     Surgical Followup       Vitals:    09/11/20 0957   Weight: 149 lb   Height: 6' 2\"       Body mass index is 19.13 kg/m .      Liz Clark, EMT                      "

## 2020-09-11 NOTE — LETTER
"2020       RE: Ventura Quiroz  [address]  [facility] [address]     Dear Colleague,    Thank you for referring your patient, Ventura Quiroz, to the [facility] COLON AND RECTAL SURGERY at [facility]. Please see a copy of my visit note below.    Colon and Rectal Surgery Consult Video Note    Date: 2020     Referring provider:  Brittni Robles MD  [address] [facility]  [place] [address]     RE: Ventura Quiroz  : 2000  ETHAN: 2020    Ventura Quiroz is a 20 year old male who is being evaluated via a billable video visit.      The patient has been notified of following:     \"This video visit will be conducted via a call between you and your physician/provider. We have found that certain health care needs can be provided without the need for an in-person physical exam.  This service lets us provide the care you need with a video conversation.  If a prescription is necessary we can send it directly to your pharmacy.  If lab work is needed we can place an order for that and you can then stop by our lab to have the test done at a later time.    Video visits are billed at different rates depending on your insurance coverage.  Please reach out to your insurance provider with any questions.    If during the course of the call the physician/provider feels a video visit is not appropriate, you will not be charged for this service.\"    Patient has given verbal consent for Video visit? Yes    Video Start Time: 1002     Ventura Quiroz is a very pleasant 20 year old male with PMH of fistulizing ileocolic crohns disease followed by Dr. Wright and on Remicade and mercaptopurine who has had partial small bowel obstruction, enterovesical fistula, was on prior to admission TPN for jesus-operative optimization.  Now s/p exploratory laparotomy, ileocolic resection with take down of enterovesical fistula, low anterior resection, flexible sigmoidoscopy on 2020 with " Dr. Robles.     FINAL DIAGNOSIS:   A. TERMINAL ILEUM WITH CECUM AND APPENDIX, RESECTION:   - Ileum with patchy mild to moderately active ileitis, focal ulceration,   pseudopyloric metaplasia, submucosal   fibrosis, transmural chronic inflammation and patchy acute serositis   consistent with Crohn's disease   - Cecum with no significant inflammation   - Appendix with acute and chronic intramural inflammation with giant cell   reaction, hemorrhage and acute   serositis   - Negative for dysplasia   - Three benign reactive lymph nodes     B. SIGMOID COLON AND PROXIMAL RECTUM, RESECTION:   - Colon with marked subserosal acute inflammation, focal abscess formation    with foreign body giant cell   reaction, patchy transmural chronic inflammation and fibrous adhesions   - Negative for dysplasia   - Nine benign reactive lymph nodes     Interval History: Tylenol only for pain.  He is eating and drinking without difficulty and thinks his weight has been going up.  He has about 3-4 fairly loose bowel movements a day.  Voiding normally.  No fevers or chills.  He continues on his mercaptopurine and will be restarting Remicade in the next few weeks.  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    Assessment/Plan: 20 year old male s/p exploratory laparotomy, ileocolic resection with take down of enterovesical fistula, low anterior resection, flexible sigmoidoscopy on 8/21/2020 with Dr. Robles. He has been doing well since returning home.  Only needing Tylenol for pain.  Continue on a low residue diet for another 2 weeks and then he can slowly add fiber back into his diet.  He is having loose stools but only 3-4 times a day.  This may improve with restarting Remicade also.  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  Offered another follow-up visit with Dr. Robles but he does not feel this  is necessary.  Encouraged him to contact us with any questions or concerns. Patient's questions were answered to his stated satisfaction and he is in agreement with this plan.    Video-Visit Details    Type of service:  Video Visit    Video End Time (time video stopped): 1008    Originating Location (pt. Location): Home    Distant Location (provider location):  [facility] COLON AND RECTAL SURGERY     Mode of Communication:  Video Conference via [employer]     Medical history:  Past Medical History:   Diagnosis Date     Allergic rhinitis, cause unspecified     Zyrtec helps     Crohn's Colitis 4/8/2019     Unspecified otitis media     Otitis Media       Surgical history:  Past Surgical History:   Procedure Laterality Date     COLONOSCOPY N/A 4/11/2019    Procedure: COMBINED COLONOSCOPY, SINGLE OR MULTIPLE BIOPSY/POLYPECTOMY BY BIOPSY;  Surgeon: Tobin Wright MD;  [facility]     EXCISE LIP OR CHEEK FOLD  10/3/2003    Needle cautery frenulectomy.     INSERT PICC LINE Left 8/10/2020    Procedure: INSERTION, PICC @845;  Surgeon: Karan Farr MD;  [facility]     IR PICC PLACEMENT > 5 YRS OF AGE  8/10/2020     PE TUBES  4/25/2006     RESECTION ILEOCOLIC N/A 8/21/2020    Procedure: Exploratory laparotomy with ileocolic resection with takedown of enterovesical fistula, and low anterior resection;  Surgeon: Brittni Robles MD;  [facility]     SIGMOIDOSCOPY FLEXIBLE N/A 8/21/2020    Procedure: Sigmoidoscopy flexible;  Surgeon: Brittni Robles MD;  [facility]     TONSILLECTOMY & ADENOIDECTOMY  4/25/2006       Problem list:  Patient Active Problem List    Diagnosis Date Noted     Crohn disease (H) 08/21/2020     Priority: Medium     Anemia, iron deficiency 08/19/2019     Priority: Medium     Crohn's disease of small intestine with abscess (H) 08/19/2019     Priority: Medium     Crohn's Colitis 04/08/2019     Priority: Medium       Medications:  Current Outpatient Medications  "  Medication Sig Dispense Refill     enoxaparin ANTICOAGULANT (LOVENOX) 40 MG/0.4ML syringe Inject 0.4 mLs (40 mg) Subcutaneous every 24 hours 10 mL 0     hyoscyamine (LEVSIN) 0.125 MG tablet Take 1 tablet (125 mcg) by mouth every 4 hours as needed for cramping 40 tablet 3     mercaptopurine (PURINETHOL) 50 MG tablet Take 2 tablets (100 mg) by mouth daily (Patient taking differently: Take 100 mg by mouth every evening ) 60 tablet 4     acetaminophen (TYLENOL) 325 MG tablet Take 3 tablets (975 mg) by mouth every 6 hours (Patient not taking: Reported on 9/11/2020) 1 Bottle 0     HYDROmorphone (DILAUDID) 2 MG tablet Take 1-2 tablets (2-4 mg) by mouth every 6 hours as needed for moderate to severe pain (Patient not taking: Reported on 9/11/2020) 20 tablet 0       Allergies:  Allergies   Allergen Reactions     Gadavist [Gadobutrol] Nausea     Patient could not perform timed sequences due to nausea and discomfort.     Glucagon Nausea and Vomiting     Pre medicate with lorazapam if able.       Amoxicillin      Penicillin [Penicillins]        Family history:  Family History   Problem Relation Age of Onset     No Known Problems Mother      No Known Problems Father      Melanoma No family hx of      Skin Cancer No family hx of        Social history:  Social History     Tobacco Use     Smoking status: Never Smoker     Smokeless tobacco: Never Used   Substance Use Topics     Alcohol use: Yes     Frequency: Monthly or less     Drinks per session: 1 or 2    Marital status: single.  Occupation: construction    Nursing Notes:   Liz Caldera EMT  9/11/2020  9:58 AM  Sign at exiting of workspace  Chief Complaint   Patient presents with     Surgical Followup       Vitals:    09/11/20 0957   Weight: 149 lb   Height: 6' 2\"       Body mass index is 19.13 kg/m .      GEOVANNA Sagastume APRN, NP-C  Colon and Rectal Surgery   [facility]    This note was " created using speech recognition software and may contain unintended word substitutions.

## 2020-09-12 NOTE — TELEPHONE ENCOUNTER
I have attempted to contact this patient by phone with the following results: no answer, no vm.  Nishi Silva, [facility]

## 2020-09-12 NOTE — TELEPHONE ENCOUNTER
We need to know when the surgeon thinks he will be able to return to work after his surgery and whether that will be full time or part time.  They need to give us that information so we can more completely fill out that paper work.      Electronically signed by:  Chuy Moss M.D.  9/12/2020

## 2020-09-14 NOTE — TELEPHONE ENCOUNTER
We can not us the photo of the paper work we will need to have him upload the paper not a photo.

## 2020-09-14 NOTE — TELEPHONE ENCOUNTER
I have the forms signed. Ventura can pick it up, since he has to sign it too.     Electronically signed by:  Chuy Moss M.D.  9/14/2020

## 2020-09-14 NOTE — TELEPHONE ENCOUNTER
Patient responded via [employer], I am waiting until he sends me a few more details then will be able to fill out the papers.  We can print off what he sent via the [employer] and just place them in my box.    Electronically signed by:  Chuy Moss M.D.  9/14/2020

## 2020-09-15 NOTE — TELEPHONE ENCOUNTER
Patient is not able to  the paper work.  Left patient a message what he would like us to do with this either email or we can mail it.     Lucía GILL

## 2020-09-28 ENCOUNTER — TELEPHONE (OUTPATIENT)
Dept: GASTROENTEROLOGY | Facility: CLINIC | Age: 20
End: 2020-09-28

## 2020-09-28 ENCOUNTER — VIRTUAL VISIT (OUTPATIENT)
Dept: GASTROENTEROLOGY | Facility: CLINIC | Age: 20
End: 2020-09-28
Payer: COMMERCIAL

## 2020-09-28 VITALS — BODY MASS INDEX: 20.08 KG/M2 | WEIGHT: 156.4 LBS

## 2020-09-28 DIAGNOSIS — K50.90 CROHN'S DISEASE (H): Primary | ICD-10-CM

## 2020-09-28 DIAGNOSIS — Z71.3 NUTRITIONAL COUNSELING: ICD-10-CM

## 2020-09-28 NOTE — PATIENT INSTRUCTIONS
Aric Whitehead,    It was great meeting with you again today. Below is a summary of what we discussed:    1. Begin to increase intake of fiber. Sources of soluble fiber may help with improve loose stools. Some of the ideas we discussed were smoothies with fruit and elisa seeds or ground flaxseed; or nut/seed butters.     2. You may need to limit intake of concentrated sweets/sugar-sweetened beverages in the future to help with loose stools. We specifically discussed Mountain dew or gatorade as sugar-sweetened beverages that could be limited.    Best regards,  Bossman Whitley, PhD, RD

## 2020-09-28 NOTE — PROGRESS NOTES
"[facility] Outpatient Medical Nutrition Therapy      Ventura Quiroz is a 20 year old male who is being evaluated via a billable video visit.      The patient has been notified of following:     \"This video visit will be conducted via a call between you and your physician/provider. We have found that certain health care needs can be provided without the need for an in-person physical exam.  This service lets us provide the care you need with a video conversation.  If a prescription is necessary we can send it directly to your pharmacy.  If lab work is needed we can place an order for that and you can then stop by our lab to have the test done at a later time.    Video visits are billed at different rates depending on your insurance coverage.  Please reach out to your insurance provider with any questions.    If during the course of the call the physician/provider feels a video visit is not appropriate, you will not be charged for this service.\"    Patient has given verbal consent for Video visit? Yes  How would you like to obtain your AVS? MyChart  If you are dropped from the video visit, the video invite should be resent to: Send to e-mail at: [email or web address]  Will anyone else be joining your video visit? No        Video-Visit Details    Type of service:  Video Visit    Video Start Time: 10:57 AM  Video End Time: 11:21 AM    Originating Location (pt. Location): Home   During this virtual visit the patient is located in [place], patient verifies this as the location during the entirety of this visit.     Distant Location (provider location):  [facility] GASTROENTEROLOGY AND IBD CLINIC     Platform used for Video Visit: Mary Whitley, PhD, RD    Additional provider notes:  Referring Physician: Kyle  Reason for RD Visit: IBD     Nutrition Plan: Begin to incorporate soft-texture, soluble fiber rich foods into your diet    Recommendations for MD/Provider to order: None at this time    Malnutrition Diagnosis: " Patient does not meet criteria for malnutrition at this time.    Nutrition Assessment:  Patient is here for follow-up visit with Registered Dietitian (CHYNA).  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 Robles. He was recently seen by Dr. Robles 9/11/2020 at which time he was instructed to continue following a low residue diet for another 2 weeks and then begin slowly adding fiber back into his diet. He was also maintained on PN prior to surgery due to malnutrition, but this was stopped following surgery. Today Ventura reports minimal GI symptoms except for diarrhea and loose stools (4-5 watery/loose BMs/day). He feels like he has a good appetite and is tolerating meals well. He notes that his appetite fluctuates, but he feels like he is beginning to put weight back on. Today's patient reported weight was 156.4 pounds, which suggests an ~7# weight gain in the past 2 weeks. He is content with low fiber diet, but we discussed incorporating sources of soluble fiber into diet to possibly help with stool consistency.    Past Surgical History: No IBD surgical history    Symptoms:  Struggling with diarrhea. Usually between 4-5 watery/loose BMs/day.    Diet Recall:  (Typical Day)  On an intensive vitamin supplement routine. Starts day with these and gatorade followed by breakfast if hungry (4-5x/wk), but notes that he usually isn't hungry. Using the instant buttermilk milk (2 waffles with syrup). Will drink gatorade or mountain dew. Then steak, grilled chicken, or chicken nuggets AND ricearoni or garlic bread. Will drink milk with lunch. If still hungry will eat ice cream or medium blizzard from DQ. Drinking 3-4 20-ounce gatorades/day and maybe 1 mountain dew/day.  Continues to drink 1 premier protein/day.    Anthropometrics:   Height: Data Unavailable  Weight: 156.4 pounds (home scale)  BMI: Body mass index is 20.08 kg/m .    Weight History:  Wt Readings from Last 10 Encounters:   20 70.9 kg (156 lb 6.4 oz)   20 67.6 kg (149 lb)   20 68 kg (149 lb 14.4 oz)   20 73 kg (161 lb)   08/10/20 68.7 kg (151 lb 6.4 oz) (43 %, Z= -0.17)*   20 68 kg (150 lb) (41 %, Z= -0.23)*   20 68.3 kg (150 lb 8 oz) (42 %, Z= -0.20)*   20 69.9 kg (154 lb 1.6 oz) (48 %, Z= -0.05)*   19 81.7 kg (180 lb 1.6 oz) (82 %, Z= 0.92)*   09/10/19 79.7 kg (175 lb 9.6 oz) (79 %, Z= 0.82)*     * Growth percentiles are based on CDC (Boys, 2-20 Years) data.     Usual Weight: 170 pounds  Weight change in past 6 months: Experienced weight loss down to 150 pounds prior to surgery, but this has since started to increase    Labs: Reviewed  Pertinent Medications/vitamin and mineral supplements:   Current Outpatient Medications   Medication     acetaminophen (TYLENOL) 325 MG tablet     enoxaparin ANTICOAGULANT (LOVENOX) 40 MG/0.4ML syringe     HYDROmorphone (DILAUDID) 2 MG tablet     hyoscyamine (LEVSIN) 0.125 MG tablet     mercaptopurine (PURINETHOL) 50 MG tablet     No current facility-administered medications for this visit.      Food Allergies: NA  Food Intolerances: NA  Physical Activity: NA  Estimated Nutrition Needs based on most recent body weight of 70.9 k calories (30 kcals/kg), 100 g protein (1.5 g/kg), ~1 ml/kcal or total fluids per MD.     MALNUTRITION:  % Weight Loss: No recent  % Intake: % of usual  Subcutaneous Fat Loss:  NA  Muscle Loss:  Reports visible increase in body mass  Fluid Retention:  None noted    Nutrition Diagnosis:    Food and nutrition related knowledge deficit related to IBD as evidenced by need for diet education.    Nutrition Prescription: Regular diet    Nutrition Intervention:    Nutrition Education/Counseling:  Discussed diet  and symptom history. Discussed expectations for weight regain following surgery. Discussed current intake and working to begin incorporating soluble fiber into his diet. Discussed soluble fibr containing foods and identified smoothies with fruit and ground flaxseed or elisa seeds as a palatable option for Ventura. We also discussed nut/seed butters as another good source of soluble fiber was well as calories and protein. Ventura asked about eating sunflower seeds. Discussed recommendations for softer textures at this time and that if he chooses to eat sunflower seeds that he should ensure they are chewed well and a paste-like consistency before swallowing them.    Educational Materials Provided: No education materials provided at this time  Patient verbalized understanding of education provided. See all recommendations under Goals.    Goals:  1. Begin to increase intake of fiber. Sources of soluble fiber may help with improve loose stools. Some of the ideas we discussed were smoothies with fruit and elisa seeds or ground flaxseed; or nut/seed butters.     2. You may need to limit intake of concentrated sweets/sugar-sweetened beverages in the future to help with loose stools. We specifically discussed Mountain dew or gatorade as sugar-sweetened beverages that could be limited.    Nutrition Monitoring and Evaluation: Will monitor adherence to nutrition recommendations at future RD visits.     Further Medical Nutrition Therapy: Yes  Next Appointment (if applicable): 6 weeks  Patient was encouraged to call/contact RD with any further questions.

## 2020-09-28 NOTE — LETTER
"9/28/2020     RE: Ventura Quiroz  [address]  [place] [address]    Dear Colleague,    Thank you for referring your patient, Ventura Quiroz, to the [facility] GASTROENTEROLOGY AND IBD CLINIC. Please see a copy of my visit note below.    [facility] Outpatient Medical Nutrition Therapy      Ventura Quiroz is a 20 year old male who is being evaluated via a billable video visit.      The patient has been notified of following:     \"This video visit will be conducted via a call between you and your physician/provider. We have found that certain health care needs can be provided without the need for an in-person physical exam.  This service lets us provide the care you need with a video conversation.  If a prescription is necessary we can send it directly to your pharmacy.  If lab work is needed we can place an order for that and you can then stop by our lab to have the test done at a later time.    Video visits are billed at different rates depending on your insurance coverage.  Please reach out to your insurance provider with any questions.    If during the course of the call the physician/provider feels a video visit is not appropriate, you will not be charged for this service.\"    Patient has given verbal consent for Video visit? Yes  How would you like to obtain your AVS? MyChart  If you are dropped from the video visit, the video invite should be resent to: Send to e-mail at: [email or web address]  Will anyone else be joining your video visit? No        Video-Visit Details    Type of service:  Video Visit    Video Start Time: 10:57 AM  Video End Time: 11:21 AM    Originating Location (pt. Location): Home   During this virtual visit the patient is located in [place], patient verifies this as the location during the entirety of this visit.     Distant Location (provider location):  [facility] GASTROENTEROLOGY AND IBD CLINIC     Platform used for Video Visit: Mary Whitley, PhD, RD    Additional provider " notes:  Referring Physician: Kyle  Reason for RD Visit: IBD     Nutrition Plan: Begin to incorporate soft-texture, soluble fiber rich foods into your diet    Recommendations for MD/Provider to order: None at this time    Malnutrition Diagnosis: Patient does not meet criteria for malnutrition at this time.    Nutrition Assessment:  Patient is here for follow-up visit with Registered Dietitian (RD).  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 Robles. He was recently seen by Dr. Robles 9/11/2020 at which time he was instructed to continue following a low residue diet for another 2 weeks and then begin slowly adding fiber back into his diet. He was also maintained on PN prior to surgery due to malnutrition, but this was stopped following surgery. Today Ventura reports minimal GI symptoms except for diarrhea and loose stools (4-5 watery/loose BMs/day). He feels like he has a good appetite and is tolerating meals well. He notes that his appetite fluctuates, but he feels like he is beginning to put weight back on. Today's patient reported weight was 156.4 pounds, which suggests an ~7# weight gain in the past 2 weeks. He is content with low fiber diet, but we discussed incorporating sources of soluble fiber into diet to possibly help with stool consistency.    Past Surgical History: No IBD surgical history    Symptoms:  Struggling with diarrhea. Usually between 4-5 watery/loose BMs/day.    Diet Recall:  (Typical Day)  On an intensive vitamin supplement routine. Starts day with these and gatorade followed by breakfast if hungry (4-5x/wk), but notes that he usually isn't hungry. Using the instant buttermilk milk (2 waffles with syrup). Will drink gatorade or mountain  dew. Then steak, grilled chicken, or chicken nuggets AND ricearoni or garlic bread. Will drink milk with lunch. If still hungry will eat ice cream or medium blizzard from DQ. Drinking 3-4 20-ounce gatorades/day and maybe 1 mountain dew/day. Continues to drink 1 premier protein/day.    Anthropometrics:   Height: Data Unavailable  Weight: 156.4 pounds (home scale)  BMI: Body mass index is 20.08 kg/m .    Weight History:  Wt Readings from Last 10 Encounters:   20 70.9 kg (156 lb 6.4 oz)   20 67.6 kg (149 lb)   20 68 kg (149 lb 14.4 oz)   20 73 kg (161 lb)   08/10/20 68.7 kg (151 lb 6.4 oz) (43 %, Z= -0.17)*   20 68 kg (150 lb) (41 %, Z= -0.23)*   20 68.3 kg (150 lb 8 oz) (42 %, Z= -0.20)*   20 69.9 kg (154 lb 1.6 oz) (48 %, Z= -0.05)*   19 81.7 kg (180 lb 1.6 oz) (82 %, Z= 0.92)*   09/10/19 79.7 kg (175 lb 9.6 oz) (79 %, Z= 0.82)*     * Growth percentiles are based on CDC (Boys, 2-20 Years) data.     Usual Weight: 170 pounds  Weight change in past 6 months: Experienced weight loss down to 150 pounds prior to surgery, but this has since started to increase    Labs: Reviewed  Pertinent Medications/vitamin and mineral supplements:   Current Outpatient Medications   Medication     acetaminophen (TYLENOL) 325 MG tablet     enoxaparin ANTICOAGULANT (LOVENOX) 40 MG/0.4ML syringe     HYDROmorphone (DILAUDID) 2 MG tablet     hyoscyamine (LEVSIN) 0.125 MG tablet     mercaptopurine (PURINETHOL) 50 MG tablet     No current facility-administered medications for this visit.      Food Allergies: NA  Food Intolerances: NA  Physical Activity: NA  Estimated Nutrition Needs based on most recent body weight of 70.9 k calories (30 kcals/kg), 100 g protein (1.5 g/kg), ~1 ml/kcal or total fluids per MD.     MALNUTRITION:  % Weight Loss: No recent  % Intake: % of usual  Subcutaneous Fat Loss:  NA  Muscle Loss:  Reports visible increase in body mass  Fluid Retention:  None  noted    Nutrition Diagnosis:    Food and nutrition related knowledge deficit related to IBD as evidenced by need for diet education.    Nutrition Prescription: Regular diet    Nutrition Intervention:    Nutrition Education/Counseling:  Discussed diet and symptom history. Discussed expectations for weight regain following surgery. Discussed current intake and working to begin incorporating soluble fiber into his diet. Discussed soluble fibr containing foods and identified smoothies with fruit and ground flaxseed or elisa seeds as a palatable option for Ventura. We also discussed nut/seed butters as another good source of soluble fiber was well as calories and protein. Ventura asked about eating sunflower seeds. Discussed recommendations for softer textures at this time and that if he chooses to eat sunflower seeds that he should ensure they are chewed well and a paste-like consistency before swallowing them.    Educational Materials Provided: No education materials provided at this time  Patient verbalized understanding of education provided. See all recommendations under Goals.    Goals:  1. Begin to increase intake of fiber. Sources of soluble fiber may help with improve loose stools. Some of the ideas we discussed were smoothies with fruit and elisa seeds or ground flaxseed; or nut/seed butters.     2. You may need to limit intake of concentrated sweets/sugar-sweetened beverages in the future to help with loose stools. We specifically discussed Mountain dew or gatorade as sugar-sweetened beverages that could be limited.    Nutrition Monitoring and Evaluation: Will monitor adherence to nutrition recommendations at future RD visits.     Further Medical Nutrition Therapy: Yes  Next Appointment (if applicable): 6 weeks  Patient was encouraged to call/contact RD with any further questions.    Again, thank you for allowing me to participate in the care of your patient.      Sincerely,  Bossman Whitley RD

## 2020-10-01 ENCOUNTER — TELEPHONE (OUTPATIENT)
Dept: GASTROENTEROLOGY | Facility: CLINIC | Age: 20
End: 2020-10-01

## 2020-10-09 ENCOUNTER — INFUSION THERAPY VISIT (OUTPATIENT)
Dept: INFUSION THERAPY | Facility: CLINIC | Age: 20
End: 2020-10-09
Payer: COMMERCIAL

## 2020-10-09 VITALS
OXYGEN SATURATION: 99 % | WEIGHT: 160.8 LBS | RESPIRATION RATE: 18 BRPM | SYSTOLIC BLOOD PRESSURE: 125 MMHG | HEART RATE: 71 BPM | BODY MASS INDEX: 20.65 KG/M2 | DIASTOLIC BLOOD PRESSURE: 73 MMHG | TEMPERATURE: 98.3 F

## 2020-10-09 DIAGNOSIS — K50.014 CROHN'S DISEASE OF SMALL INTESTINE WITH ABSCESS (H): ICD-10-CM

## 2020-10-09 DIAGNOSIS — D50.9 IRON DEFICIENCY ANEMIA, UNSPECIFIED IRON DEFICIENCY ANEMIA TYPE: Primary | ICD-10-CM

## 2020-10-09 DIAGNOSIS — K52.9 COLITIS: ICD-10-CM

## 2020-10-09 LAB
ALBUMIN SERPL-MCNC: 3.4 G/DL (ref 3.4–5)
ALP SERPL-CCNC: 66 U/L (ref 40–150)
ALT SERPL W P-5'-P-CCNC: 25 U/L (ref 0–70)
AST SERPL W P-5'-P-CCNC: 5 U/L (ref 0–45)
BASOPHILS # BLD AUTO: 0 10E9/L (ref 0–0.2)
BASOPHILS NFR BLD AUTO: 0.2 %
BILIRUB DIRECT SERPL-MCNC: 0.2 MG/DL (ref 0–0.2)
BILIRUB SERPL-MCNC: 0.6 MG/DL (ref 0.2–1.3)
CRP SERPL-MCNC: 8.5 MG/L (ref 0–8)
DIFFERENTIAL METHOD BLD: ABNORMAL
EOSINOPHIL # BLD AUTO: 0.1 10E9/L (ref 0–0.7)
EOSINOPHIL NFR BLD AUTO: 1.4 %
ERYTHROCYTE [DISTWIDTH] IN BLOOD BY AUTOMATED COUNT: 12.8 % (ref 10–15)
ERYTHROCYTE [SEDIMENTATION RATE] IN BLOOD BY WESTERGREN METHOD: 9 MM/H (ref 0–15)
HCT VFR BLD AUTO: 36.3 % (ref 40–53)
HGB BLD-MCNC: 12.3 G/DL (ref 13.3–17.7)
IMM GRANULOCYTES # BLD: 0 10E9/L (ref 0–0.4)
IMM GRANULOCYTES NFR BLD: 0.2 %
LYMPHOCYTES # BLD AUTO: 0.6 10E9/L (ref 0.8–5.3)
LYMPHOCYTES NFR BLD AUTO: 12.2 %
MCH RBC QN AUTO: 32.2 PG (ref 26.5–33)
MCHC RBC AUTO-ENTMCNC: 33.9 G/DL (ref 31.5–36.5)
MCV RBC AUTO: 95 FL (ref 78–100)
MONOCYTES # BLD AUTO: 0.6 10E9/L (ref 0–1.3)
MONOCYTES NFR BLD AUTO: 12.6 %
NEUTROPHILS # BLD AUTO: 3.5 10E9/L (ref 1.6–8.3)
NEUTROPHILS NFR BLD AUTO: 73.4 %
PLATELET # BLD AUTO: 140 10E9/L (ref 150–450)
PROT SERPL-MCNC: 6.5 G/DL (ref 6.8–8.8)
RBC # BLD AUTO: 3.82 10E12/L (ref 4.4–5.9)
WBC # BLD AUTO: 4.8 10E9/L (ref 4–11)

## 2020-10-09 PROCEDURE — 96413 CHEMO IV INFUSION 1 HR: CPT | Performed by: INTERNAL MEDICINE

## 2020-10-09 PROCEDURE — 96375 TX/PRO/DX INJ NEW DRUG ADDON: CPT | Performed by: INTERNAL MEDICINE

## 2020-10-09 PROCEDURE — 86140 C-REACTIVE PROTEIN: CPT | Performed by: INTERNAL MEDICINE

## 2020-10-09 PROCEDURE — 85652 RBC SED RATE AUTOMATED: CPT | Performed by: INTERNAL MEDICINE

## 2020-10-09 PROCEDURE — 99207 PR NO CHARGE LOS: CPT

## 2020-10-09 PROCEDURE — 85025 COMPLETE CBC W/AUTO DIFF WBC: CPT | Performed by: INTERNAL MEDICINE

## 2020-10-09 PROCEDURE — 80076 HEPATIC FUNCTION PANEL: CPT | Performed by: INTERNAL MEDICINE

## 2020-10-09 RX ORDER — ACETAMINOPHEN 325 MG/1
650 TABLET ORAL ONCE
Status: CANCELLED
Start: 2020-11-30 | End: 2020-11-30

## 2020-10-09 RX ORDER — METHYLPREDNISOLONE SODIUM SUCCINATE 125 MG/2ML
125 INJECTION, POWDER, LYOPHILIZED, FOR SOLUTION INTRAMUSCULAR; INTRAVENOUS ONCE
Status: COMPLETED | OUTPATIENT
Start: 2020-10-09 | End: 2020-10-09

## 2020-10-09 RX ORDER — DIPHENHYDRAMINE HCL 25 MG
25 CAPSULE ORAL ONCE
Status: CANCELLED
Start: 2020-11-30 | End: 2020-11-30

## 2020-10-09 RX ORDER — METHYLPREDNISOLONE SODIUM SUCCINATE 125 MG/2ML
125 INJECTION, POWDER, LYOPHILIZED, FOR SOLUTION INTRAMUSCULAR; INTRAVENOUS ONCE
Status: CANCELLED | OUTPATIENT
Start: 2020-11-30 | End: 2020-11-30

## 2020-10-09 RX ORDER — HEPARIN SODIUM,PORCINE 10 UNIT/ML
5 VIAL (ML) INTRAVENOUS DAILY PRN
Status: CANCELLED
Start: 2020-11-30

## 2020-10-09 RX ADMIN — METHYLPREDNISOLONE SODIUM SUCCINATE 125 MG: 125 INJECTION INTRAMUSCULAR; INTRAVENOUS at 11:12

## 2020-10-09 RX ADMIN — Medication 250 ML: at 11:08

## 2020-10-09 ASSESSMENT — PAIN SCALES - GENERAL: PAINLEVEL: NO PAIN (0)

## 2020-10-09 NOTE — PROGRESS NOTES
"Infusion Nursing Note:  Ventura Quiroz presents today for Rapid Remicade - pt has had 3 successful 2 hour Remicade infusions.  Patient seen by provider today: No   present during visit today: Not Applicable.    Note: Pt states he had his top wisdom teeth pulled yesterday, denies any fevers, did not have any complications and wasn't given any oral antibiotics.  Dr Tobin Wright paged and notified of pt status - Per MD \"Ok to give Remicade today as the procedure was minimal and pt isn't having any fevers or using antibiotics\".   Pt verbalized understanding, will treat as ordered.    Intravenous Access:  Labs drawn without difficulty.  Peripheral IV placed.    Treatment Conditions:  Biological Infusion Checklist:  ~~~ NOTE: If the patient answers yes to any of the questions below, hold the infusion and contact ordering provider or on-call provider.    1. Have you recently had an elevated temperature, fever, chills, productive cough, coughing for 3 weeks or longer or hemoptysis, abnormal vital signs, night sweats,  chest pain or have you noticed a decrease in your appetite, unexplained weight loss or fatigue? No  2. Do you have any open wounds or new incisions? Yes, [place] teeth pulled yesterday - two top teeth.  3. Do you have any recent or upcoming hospitalizations, surgeries or dental procedures? No  4. Do you currently have or recently have had any signs of illness or infection or are you on any antibiotics? No  5. Have you had any new, sudden or worsening abdominal pain? No  6. Have you or anyone in your household received a live vaccination in the past 4 weeks? Please note:  No live vaccines while on biologic/chemotherapy until 6 months after the last treatment.  Patient can receive the flu vaccine (shot only) and the pneumovax.  It is optimal for the patient to get these vaccines mid cycle, but they can be given at any time as long as it is not on the day of the infusion. No  7. Have you recently been " diagnosed with any new nervous system diseases (ie. Multiple sclerosis, Guillain [place], seizures, neurological changes) or cancer diagnosis? No  8. Are you on any form of radiation or chemotherapy? No  9. Are you pregnant or breast feeding or do you have plans of pregnancy in the future? No  10. Have you been having any signs of worsening depression or suicidal ideations?  (benlysta only) No  11. Have there been any other new onset medical symptoms? No    Post Infusion Assessment:  Patient tolerated infusion without incident.  Blood return noted pre and post infusion.  Site patent and intact, free from redness, edema or discomfort.  No evidence of extravasations.  Access discontinued per protocol.  Biologic Infusion Post Education: Call the triage nurse at your clinic or seek medical attention if you have chills and/or temperature greater than or equal to 100.5, uncontrolled nausea/vomiting, diarrhea, constipation, dizziness, shortness of breath, chest pain, heart palpitations, weakness or any other new or concerning symptoms, questions or concerns.  You cannot have any live virus vaccines prior to or during treatment or up to 6 months post infusion.  If you have an upcoming surgery, medical procedure or dental procedure during treatment, this should be discussed with your ordering physician and your surgeon/dentist.  If you are having any concerning symptom, if you are unsure if you should get your next infusion or wish to speak to a provider before your next infusion, please call your care coordinator or triage nurse at your clinic to notify them so we can adequately serve you.       Discharge Plan:   Return 12/04/2020 for Rapid Remicade  Discharge instructions reviewed with: Patient.  Patient and/or family verbalized understanding of discharge instructions and all questions answered.  Patient discharged in stable condition accompanied by: self.  Departure Mode: Ambulatory.    Bre Martinez,  RN

## 2020-11-30 ENCOUNTER — VIRTUAL VISIT (OUTPATIENT)
Dept: GASTROENTEROLOGY | Facility: CLINIC | Age: 20
End: 2020-11-30
Payer: COMMERCIAL

## 2020-11-30 VITALS — WEIGHT: 166 LBS | HEIGHT: 74 IN | BODY MASS INDEX: 21.3 KG/M2

## 2020-11-30 DIAGNOSIS — K50.012 CROHN'S DISEASE OF SMALL INTESTINE WITH INTESTINAL OBSTRUCTION (H): Primary | ICD-10-CM

## 2020-11-30 PROCEDURE — 99214 OFFICE O/P EST MOD 30 MIN: CPT | Mod: 95 | Performed by: INTERNAL MEDICINE

## 2020-11-30 ASSESSMENT — MIFFLIN-ST. JEOR: SCORE: 1832.72

## 2020-11-30 ASSESSMENT — PAIN SCALES - GENERAL: PAINLEVEL: NO PAIN (0)

## 2020-11-30 NOTE — LETTER
11/30/2020       RE: Ventura Quiroz  [address]  [facility] [address]      Dear Colleague,    Thank you for referring your patient, Ventura Quiroz, to the [facility] [place]. Please see a copy of my visit note below.    IBD CLINIC VISIT    CC/REFERRING MD:  Referred Self  REASON FOR CONSULTATION: Crohn's disease    ASSESSMENT/PLAN:  20 year old male with Crohn's disease of the ileum initially complicated by phlegmon and abscess now status post ICR.     1. Crohn's disease: Substantially improved after ileocecal resection.  He essentially is in clinical remission at this time.  Discussed the importance of endoscopic restaging.  We will plan to continue infliximab.  Initial plan was to continue 6-MP as well, but he did not do this.  I think that is fine and he may do well on infliximab monotherapy in the postoperative setting.    -- Colonoscopy  -- Continue Infliximab for maintenance of remission.     2.  Acne: Follow with Derm, improved - almost resolved.    IBD HISTORY  Age at diagnosis: 18  Extent of disease: ileal  Disease phenotype: fistulizing  Nieves-anal disease: No  Current CD medications:   - Infliximab  Prior IBD surgeries: Ileocecal resection - [facility] 8/21/20.   Prior IBD Medications:   - Ciprofloxacin  - Metronidazole  - Ciprofloxacin - disease progression despite weekly dosing  - 6-MP - stopped at surgery.     DRUG MONITORING  TPMT enzyme activity:     6-TGN/6-MMPN levels:    Biologic concentration:  8/23/19: Adalimumab: 5, no antibodies, every other week, 50mg mercaptopurine     DISEASE ASSESSMENT  Labs  Recent Labs   Lab Test 10/09/20  1110 08/10/20  0928   CRP 8.5* 41.7*   SED 9 16*     Fecal calprotectin: --  Endoscopy: ileitis with stenosis at IC valve  Enterography: Inflamed ileum, inflammatory mass, likely ileo-ileal distula. > 25cm of ileum involved.   C diff: negative (4/9/19)    sIBDQ:   IBDQ Score Date IBDQ - Total Score  (Higher score better)    9/10/2019 62   5/28/2019 56   4/24/2019 48       IBD Health Care Maintenance:    Vaccinations:  All patients on biologics should avoid live vaccines.    -- Influenza (every year)  -- TdaP (every 10 years)  -- Pneumococcal Pneumonia (once plus booster at 5 years)  -- Yearly assessment for latent Tb (verbal screening and exam, PPD or QuantiFERON-Tb testing)    One time confirmation of immunity or serologies:  -- Hepatitis A (serologies or immunizations)  -- Hepatitis B (serologies or immunizations)  -- Varicella  -- MMR  -- HPV (all aged 18-26)  -- Meningococcal meningitis (all patients at risk for meningitis)  -- Due to the immunosuppression in this patient, I would not advise administration of live vaccines such as varicella/VZV, intranasal influenza, MMR, or yellow fever vaccine (if travelling).      Bone mineral density screening   -- Recommend all patients supplement with calcium and vitamin D  -- Given prior steroid use recommend DEXA if not already done    Cancer Screening:  Colon cancer screening:  Given ileal only disease, he does not need IBD dysplasia surveillance     Skin cancer screening: Annual visual exam of skin by dermatologist since patient is immunocompromised    Depression Screening:  PHQ-2 Score:     PHQ-2 ( 1999 Pfizer) 8/18/2020 4/24/2019   Q1: Little interest or pleasure in doing things 0 1   Q2: Feeling down, depressed or hopeless 0 0   PHQ-2 Score 0 1   Q1: Little interest or pleasure in doing things - Several days   Q2: Feeling down, depressed or hopeless - Not at all   PHQ-2 Score - 1         Misc:  -- Avoid tobacco use  -- Avoid NSAIDs as there is potentially a 25% chance of causing an IBD flare    Return to clinic in 3 months    Thank you for this consultation.  It was a pleasure to participate in the care of this patient; please contact us with any further questions.  A total of 20 minutes, face to face, was spent with this patient, >50% of which was counseling regarding the above  delineated issues.      This note was created with voice recognition software, and while reviewed for accuracy, typos may remain.     Tobin Wright MD   of Medicine  Division of Gastroenterology, Hepatology and Nutrition  [facility]      HPI:   When I saw him last he was having only some mild symptoms on adalimumab and 6-MP, which had actually resolved with increasing his adalimumab to weekly.  Plan had been to evaluate for stricture with MR enterography and colonoscopy.  This was somewhat delayed due to coronavirus, ultimately MR enterography done in June 2020 demonstrated to strictures with possible subtle fistulization between bowel loops and a long segment of chronic active enteritis.  Clinically he worsened and became febrile and developed severe abdominal pain again.  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  He was started on infliximab but given his rapid progression in August the decision was made to pursue ileocecal resection after infliximab induction.  The initial plan was to continue infliximab and thiopurine in the postoperative setting.    He had surgery at the end of August 2020 and did well.  He went back to work 8 weeks after surgery.  He is currently having 5-6 stools a day without any blood.  This is slightly improved from medially after surgery.  He is not really having abdominal pain only if he works out too much.  His weight is back up and he is going to the gym and has plenty of energy.  He feels quite well at this time.  He is getting his infliximab, however he is not on his thiopurine as he was not sure he was supposed to.    CRP remains elevated in October.     ROS:    No fevers or chills  No weight loss  No blurry vision, double vision or change in vision  No sore throat  No lymphadenopathy  No headache, paraesthesias, or weakness in a limb  No shortness of breath or wheezing  No  chest pain or pressure  No arthralgias or myalgias  No rashes or skin changes  No odynophagia or dysphagia  No BRBPR, hematochezia, melena  No dysuria, frequency or urgency  No hot/cold intolerance or polyria  No anxiety or depression    Extra intestinal manifestations of IBD:  No uveitis/episcleritis  No aphthous ulcers   No arthritis   No erythema nodosum/pyoderma gangrenosum.     PREVIOUS ENDOSCOPY:  4/11/2019: Crohn's ileitis with stenosis.     PERTINENT PAST MEDICAL HISTORY:  Past Medical History:   Diagnosis Date     Allergic rhinitis, cause unspecified     Zyrtec helps     Crohn's Colitis 4/8/2019     Unspecified otitis media     Otitis Media       PREVIOUS SURGERIES:  Past Surgical History:   Procedure Laterality Date     COLONOSCOPY N/A 4/11/2019    Procedure: COMBINED COLONOSCOPY, SINGLE OR MULTIPLE BIOPSY/POLYPECTOMY BY BIOPSY;  Surgeon: Tobin Wright MD;  Location: [facility]     EXCISE LIP OR CHEEK FOLD  10/3/2003    Needle cautery frenulectomy.     INSERT PICC LINE Left 8/10/2020    Procedure: INSERTION, PICC @845;  Surgeon: Karan Farr MD;  [facility]     IR PICC PLACEMENT > 5 YRS OF AGE  8/10/2020     PE TUBES  4/25/2006     RESECTION ILEOCOLIC N/A 8/21/2020    Procedure: Exploratory laparotomy with ileocolic resection with takedown of enterovesical fistula, and low anterior resection;  Surgeon: Brittni Robles MD;  Location: [facility]     SIGMOIDOSCOPY FLEXIBLE N/A 8/21/2020    Procedure: Sigmoidoscopy flexible;  Surgeon: Brittni Robles MD;  Location: [facility]     TONSILLECTOMY & ADENOIDECTOMY  4/25/2006     ALLERGIES:     Allergies   Allergen Reactions     Gadavist [Gadobutrol] Nausea     Patient could not perform timed sequences due to nausea and discomfort.     Glucagon Nausea and Vomiting     Pre medicate with lorazapam if able.       Amoxicillin      Penicillin [Penicillins]        PERTINENT MEDICATIONS:    Current Outpatient Medications:      acetaminophen  (TYLENOL) 325 MG tablet, Take 3 tablets (975 mg) by mouth every 6 hours (Patient not taking: Reported on 9/11/2020), Disp: 1 Bottle, Rfl: 0     enoxaparin ANTICOAGULANT (LOVENOX) 40 MG/0.4ML syringe, Inject 0.4 mLs (40 mg) Subcutaneous every 24 hours, Disp: 10 mL, Rfl: 0     HYDROmorphone (DILAUDID) 2 MG tablet, Take 1-2 tablets (2-4 mg) by mouth every 6 hours as needed for moderate to severe pain (Patient not taking: Reported on 9/11/2020), Disp: 20 tablet, Rfl: 0     hyoscyamine (LEVSIN) 0.125 MG tablet, Take 1 tablet (125 mcg) by mouth every 4 hours as needed for cramping, Disp: 40 tablet, Rfl: 3     mercaptopurine (PURINETHOL) 50 MG tablet, Take 2 tablets (100 mg) by mouth daily (Patient taking differently: Take 100 mg by mouth every evening ), Disp: 60 tablet, Rfl: 4    SOCIAL HISTORY:  Social History     Socioeconomic History     Marital status: Single     Spouse name: Not on file     Number of children: Not on file     Years of education: Not on file     Highest education level: Not on file   Occupational History     Not on file   Social Needs     Financial resource strain: Not on file     Food insecurity     Worry: Not on file     Inability: Not on file     Transportation needs     Medical: Not on file     Non-medical: Not on file   Tobacco Use     Smoking status: Never Smoker     Smokeless tobacco: Never Used   Substance and Sexual Activity     Alcohol use: Yes     Frequency: Monthly or less     Drinks per session: 1 or 2     Drug use: No     Sexual activity: Never   Lifestyle     Physical activity     Days per week: Not on file     Minutes per session: Not on file     Stress: Not on file   Relationships     Social connections     Talks on phone: Not on file     Gets together: Not on file     Attends [employer] service: Not on file     Active member of club or organization: Not on file     Attends meetings of clubs or organizations: Not on file     Relationship status: Not on file     Intimate partner  "violence     Fear of current or ex partner: Not on file     Emotionally abused: Not on file     Physically abused: Not on file     Forced sexual activity: Not on file   Other Topics Concern      Service Not Asked     Blood Transfusions Not Asked     Caffeine Concern Not Asked     Occupational Exposure Not Asked     Hobby Hazards Not Asked     Sleep Concern Not Asked     Stress Concern Not Asked     Weight Concern Not Asked     Special Diet Not Asked     Back Care Not Asked     Exercise Not Asked     Bike Helmet Not Asked     Seat Belt Not Asked     Self-Exams Not Asked     Parent/sibling w/ CABG, MI or angioplasty before 65F 55M? Not Asked   Social History Narrative     Not on file       FAMILY HISTORY:  Family History   Problem Relation Age of Onset     No Known Problems Mother      No Known Problems Father      Melanoma No family hx of      Skin Cancer No family hx of         No family history of IBD or colon or rectal cancer.     Past/family/social history reviewed and no changes    PHYSICAL EXAMINATION:  Constitutional: aaox3, cooperative, pleasant, not dyspneic/diaphoretic, no acute distress  Vitals reviewed: There were no vitals taken for this visit.  Wt:   Wt Readings from Last 2 Encounters:   10/09/20 72.9 kg (160 lb 12.8 oz)   09/28/20 70.9 kg (156 lb 6.4 oz)      Constitutional - general appearance is well and in no acute distress. Body habitus normal  Eyes - No redness or discharge  Respiratory - No cough, unlabored breathing  Musculoskeletal - range of motion intact: Neck and arms  Skin - No discoloration or lesions  Neurological - No tremors, headaches  Psychiatric - No anxiety, alert & oriented     Ventura Quiroz is a 20 year old male who is being evaluated via a billable video visit.      The patient has been notified of following:     \"This video visit will be conducted via a call between you and your physician/provider. We have found that certain health care needs can be provided without the " "need for an in-person physical exam.  This service lets us provide the care you need with a video conversation.  If a prescription is necessary we can send it directly to your pharmacy.  If lab work is needed we can place an order for that and you can then stop by our lab to have the test done at a later time.    Video visits are billed at different rates depending on your insurance coverage.  Please reach out to your insurance provider with any questions.    If during the course of the call the physician/provider feels a video visit is not appropriate, you will not be charged for this service.\"    Patient has given verbal consent for Video visit? Yes. Call with doximity.  How would you like to obtain your AVS? MyChart  If you are dropped from the video visit, the video invite should be resent to: Text to cell phone: [phone]  Will anyone else be joining your video visit? No    Video-Visit Details    Type of service:  Video Visit    Video Start Time: 4:10 PM  Video End Time: 4:25 PM    Originating Location (pt. Location): Home    Distant Location (provider location):  [facility] [place]     Platform used for Video Visit: Mary Wright MD            "

## 2020-11-30 NOTE — PROGRESS NOTES
IBD CLINIC VISIT    CC/REFERRING MD:  Referred Self  REASON FOR CONSULTATION: Crohn's disease    ASSESSMENT/PLAN:  20 year old male with Crohn's disease of the ileum initially complicated by phlegmon and abscess now status post ICR.     1. Crohn's disease: Substantially improved after ileocecal resection.  He essentially is in clinical remission at this time.  Discussed the importance of endoscopic restaging.  We will plan to continue infliximab.  Initial plan was to continue 6-MP as well, but he did not do this.  I think that is fine and he may do well on infliximab monotherapy in the postoperative setting.    -- Colonoscopy  -- Continue Infliximab for maintenance of remission.     2.  Acne: Follow with Derm, improved - almost resolved.    IBD HISTORY  Age at diagnosis: 18  Extent of disease: ileal  Disease phenotype: fistulizing  Nieves-anal disease: No  Current CD medications:   - Infliximab  Prior IBD surgeries: Ileocecal resection - [facility] 8/21/20.   Prior IBD Medications:   - Ciprofloxacin  - Metronidazole  - Ciprofloxacin - disease progression despite weekly dosing  - 6-MP - stopped at surgery.     DRUG MONITORING  TPMT enzyme activity:     6-TGN/6-MMPN levels:    Biologic concentration:  8/23/19: Adalimumab: 5, no antibodies, every other week, 50mg mercaptopurine     DISEASE ASSESSMENT  Labs  Recent Labs   Lab Test 10/09/20  1110 08/10/20  0928   CRP 8.5* 41.7*   SED 9 16*     Fecal calprotectin: --  Endoscopy: ileitis with stenosis at IC valve  Enterography: Inflamed ileum, inflammatory mass, likely ileo-ileal distula. > 25cm of ileum involved.   C diff: negative (4/9/19)    sIBDQ:   IBDQ Score Date IBDQ - Total Score  (Higher score better)   9/10/2019 62   5/28/2019 56   4/24/2019 48       IBD Health Care Maintenance:    Vaccinations:  All patients on biologics should avoid live vaccines.    -- Influenza (every year)  -- TdaP (every 10 years)  -- Pneumococcal Pneumonia (once plus booster at 5  years)  -- Yearly assessment for latent Tb (verbal screening and exam, PPD or QuantiFERON-Tb testing)    One time confirmation of immunity or serologies:  -- Hepatitis A (serologies or immunizations)  -- Hepatitis B (serologies or immunizations)  -- Varicella  -- MMR  -- HPV (all aged 18-26)  -- Meningococcal meningitis (all patients at risk for meningitis)  -- Due to the immunosuppression in this patient, I would not advise administration of live vaccines such as varicella/VZV, intranasal influenza, MMR, or yellow fever vaccine (if travelling).      Bone mineral density screening   -- Recommend all patients supplement with calcium and vitamin D  -- Given prior steroid use recommend DEXA if not already done    Cancer Screening:  Colon cancer screening:  Given ileal only disease, he does not need IBD dysplasia surveillance     Skin cancer screening: Annual visual exam of skin by dermatologist since patient is immunocompromised    Depression Screening:  PHQ-2 Score:     PHQ-2 ( 1999 Pfizer) 8/18/2020 4/24/2019   Q1: Little interest or pleasure in doing things 0 1   Q2: Feeling down, depressed or hopeless 0 0   PHQ-2 Score 0 1   Q1: Little interest or pleasure in doing things - Several days   Q2: Feeling down, depressed or hopeless - Not at all   PHQ-2 Score - 1         Misc:  -- Avoid tobacco use  -- Avoid NSAIDs as there is potentially a 25% chance of causing an IBD flare    Return to clinic in 3 months    Thank you for this consultation.  It was a pleasure to participate in the care of this patient; please contact us with any further questions.  A total of 20 minutes, face to face, was spent with this patient, >50% of which was counseling regarding the above delineated issues.      This note was created with voice recognition software, and while reviewed for accuracy, typos may remain.     Tobin Wright MD   of Medicine  Division of Gastroenterology, Hepatology and Nutrition  [facility]  [place]      HPI:   When I saw him last he was having only some mild symptoms on adalimumab and 6-MP, which had actually resolved with increasing his adalimumab to weekly.  Plan had been to evaluate for stricture with MR enterography and colonoscopy.  This was somewhat delayed due to coronavirus, ultimately MR enterography done in June 2020 demonstrated to strictures with possible subtle fistulization between bowel loops and a long segment of chronic active enteritis.  Clinically he worsened and became febrile and developed severe abdominal pain again.  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  He was started on infliximab but given his rapid progression in August the decision was made to pursue ileocecal resection after infliximab induction.  The initial plan was to continue infliximab and thiopurine in the postoperative setting.    He had surgery at the end of August 2020 and did well.  He went back to work 8 weeks after surgery.  He is currently having 5-6 stools a day without any blood.  This is slightly improved from medially after surgery.  He is not really having abdominal pain only if he works out too much.  His weight is back up and he is going to the gym and has plenty of energy.  He feels quite well at this time.  He is getting his infliximab, however he is not on his thiopurine as he was not sure he was supposed to.    CRP remains elevated in October.     ROS:    No fevers or chills  No weight loss  No blurry vision, double vision or change in vision  No sore throat  No lymphadenopathy  No headache, paraesthesias, or weakness in a limb  No shortness of breath or wheezing  No chest pain or pressure  No arthralgias or myalgias  No rashes or skin changes  No odynophagia or dysphagia  No BRBPR, hematochezia, melena  No dysuria, frequency or urgency  No hot/cold intolerance or polyria  No anxiety or depression    Extra intestinal  manifestations of IBD:  No uveitis/episcleritis  No aphthous ulcers   No arthritis   No erythema nodosum/pyoderma gangrenosum.     PREVIOUS ENDOSCOPY:  4/11/2019: Crohn's ileitis with stenosis.     PERTINENT PAST MEDICAL HISTORY:  Past Medical History:   Diagnosis Date     Allergic rhinitis, cause unspecified     Zyrtec helps     Crohn's Colitis 4/8/2019     Unspecified otitis media     Otitis Media       PREVIOUS SURGERIES:  Past Surgical History:   Procedure Laterality Date     COLONOSCOPY N/A 4/11/2019    Procedure: COMBINED COLONOSCOPY, SINGLE OR MULTIPLE BIOPSY/POLYPECTOMY BY BIOPSY;  Surgeon: Tobin Wright MD;  Location: [facility]     EXCISE LIP OR CHEEK FOLD  10/3/2003    Needle cautery frenulectomy.     INSERT PICC LINE Left 8/10/2020    Procedure: INSERTION, PICC @845;  Surgeon: Karan Farr MD;  [facility]     IR PICC PLACEMENT > 5 YRS OF AGE  8/10/2020     PE TUBES  4/25/2006     RESECTION ILEOCOLIC N/A 8/21/2020    Procedure: Exploratory laparotomy with ileocolic resection with takedown of enterovesical fistula, and low anterior resection;  Surgeon: Brittni Robles MD;  Location: [facility]     SIGMOIDOSCOPY FLEXIBLE N/A 8/21/2020    Procedure: Sigmoidoscopy flexible;  Surgeon: Brittni Robles MD;  Location: [facility]     TONSILLECTOMY & ADENOIDECTOMY  4/25/2006     ALLERGIES:     Allergies   Allergen Reactions     Gadavist [Gadobutrol] Nausea     Patient could not perform timed sequences due to nausea and discomfort.     Glucagon Nausea and Vomiting     Pre medicate with lorazapam if able.       Amoxicillin      Penicillin [Penicillins]        PERTINENT MEDICATIONS:    Current Outpatient Medications:      acetaminophen (TYLENOL) 325 MG tablet, Take 3 tablets (975 mg) by mouth every 6 hours (Patient not taking: Reported on 9/11/2020), Disp: 1 Bottle, Rfl: 0     enoxaparin ANTICOAGULANT (LOVENOX) 40 MG/0.4ML syringe, Inject 0.4 mLs (40 mg) Subcutaneous every 24 hours, Disp: 10  mL, Rfl: 0     HYDROmorphone (DILAUDID) 2 MG tablet, Take 1-2 tablets (2-4 mg) by mouth every 6 hours as needed for moderate to severe pain (Patient not taking: Reported on 9/11/2020), Disp: 20 tablet, Rfl: 0     hyoscyamine (LEVSIN) 0.125 MG tablet, Take 1 tablet (125 mcg) by mouth every 4 hours as needed for cramping, Disp: 40 tablet, Rfl: 3     mercaptopurine (PURINETHOL) 50 MG tablet, Take 2 tablets (100 mg) by mouth daily (Patient taking differently: Take 100 mg by mouth every evening ), Disp: 60 tablet, Rfl: 4    SOCIAL HISTORY:  Social History     Socioeconomic History     Marital status: Single     Spouse name: Not on file     Number of children: Not on file     Years of education: Not on file     Highest education level: Not on file   Occupational History     Not on file   Social Needs     Financial resource strain: Not on file     Food insecurity     Worry: Not on file     Inability: Not on file     Transportation needs     Medical: Not on file     Non-medical: Not on file   Tobacco Use     Smoking status: Never Smoker     Smokeless tobacco: Never Used   Substance and Sexual Activity     Alcohol use: Yes     Frequency: Monthly or less     Drinks per session: 1 or 2     Drug use: No     Sexual activity: Never   Lifestyle     Physical activity     Days per week: Not on file     Minutes per session: Not on file     Stress: Not on file   Relationships     Social connections     Talks on phone: Not on file     Gets together: Not on file     Attends Religion service: Not on file     Active member of club or organization: Not on file     Attends meetings of clubs or organizations: Not on file     Relationship status: Not on file     Intimate partner violence     Fear of current or ex partner: Not on file     Emotionally abused: Not on file     Physically abused: Not on file     Forced sexual activity: Not on file   Other Topics Concern      Service Not Asked     Blood Transfusions Not Asked     Caffeine  Concern Not Asked     Occupational Exposure Not Asked     Hobby Hazards Not Asked     Sleep Concern Not Asked     Stress Concern Not Asked     Weight Concern Not Asked     Special Diet Not Asked     Back Care Not Asked     Exercise Not Asked     Bike Helmet Not Asked     Seat Belt Not Asked     Self-Exams Not Asked     Parent/sibling w/ CABG, MI or angioplasty before 65F 55M? Not Asked   Social History Narrative     Not on file       FAMILY HISTORY:  Family History   Problem Relation Age of Onset     No Known Problems Mother      No Known Problems Father      Melanoma No family hx of      Skin Cancer No family hx of         No family history of IBD or colon or rectal cancer.     Past/family/social history reviewed and no changes    PHYSICAL EXAMINATION:  Constitutional: aaox3, cooperative, pleasant, not dyspneic/diaphoretic, no acute distress  Vitals reviewed: There were no vitals taken for this visit.  Wt:   Wt Readings from Last 2 Encounters:   10/09/20 72.9 kg (160 lb 12.8 oz)   09/28/20 70.9 kg (156 lb 6.4 oz)      Constitutional - general appearance is well and in no acute distress. Body habitus normal  Eyes - No redness or discharge  Respiratory - No cough, unlabored breathing  Musculoskeletal - range of motion intact: Neck and arms  Skin - No discoloration or lesions  Neurological - No tremors, headaches  Psychiatric - No anxiety, alert & oriented

## 2020-11-30 NOTE — NURSING NOTE
"Chief Complaint   Patient presents with     Follow Up     follow up       Vitals:    11/30/20 1609   Weight: 75.3 kg (166 lb)   Height: 1.88 m (6' 2\")       Body mass index is 21.31 kg/m .    Amber Mejia CMA    "

## 2020-11-30 NOTE — PROGRESS NOTES
"Ventura Quiroz is a 20 year old male who is being evaluated via a billable video visit.      The patient has been notified of following:     \"This video visit will be conducted via a call between you and your physician/provider. We have found that certain health care needs can be provided without the need for an in-person physical exam.  This service lets us provide the care you need with a video conversation.  If a prescription is necessary we can send it directly to your pharmacy.  If lab work is needed we can place an order for that and you can then stop by our lab to have the test done at a later time.    Video visits are billed at different rates depending on your insurance coverage.  Please reach out to your insurance provider with any questions.    If during the course of the call the physician/provider feels a video visit is not appropriate, you will not be charged for this service.\"    Patient has given verbal consent for Video visit? Yes. Call with doximity.  How would you like to obtain your AVS? MyChart  If you are dropped from the video visit, the video invite should be resent to: Text to cell phone: [phone]  Will anyone else be joining your video visit? No        Video-Visit Details    Type of service:  Video Visit    Video Start Time: 4:10 PM  Video End Time: 4:25 PM    Originating Location (pt. Location): Home    Distant Location (provider location):  [facility] [place]     Platform used for Video Visit: Mary Wright MD        "

## 2020-12-04 ENCOUNTER — INFUSION THERAPY VISIT (OUTPATIENT)
Dept: INFUSION THERAPY | Facility: CLINIC | Age: 20
End: 2020-12-04
Payer: COMMERCIAL

## 2020-12-04 VITALS
BODY MASS INDEX: 23.39 KG/M2 | TEMPERATURE: 97.4 F | DIASTOLIC BLOOD PRESSURE: 66 MMHG | WEIGHT: 182.2 LBS | HEART RATE: 81 BPM | RESPIRATION RATE: 16 BRPM | SYSTOLIC BLOOD PRESSURE: 121 MMHG | OXYGEN SATURATION: 99 %

## 2020-12-04 DIAGNOSIS — K52.9 COLITIS: ICD-10-CM

## 2020-12-04 DIAGNOSIS — K50.90 CROHN'S DISEASE (H): Primary | ICD-10-CM

## 2020-12-04 DIAGNOSIS — D50.9 IRON DEFICIENCY ANEMIA, UNSPECIFIED IRON DEFICIENCY ANEMIA TYPE: ICD-10-CM

## 2020-12-04 DIAGNOSIS — K50.014 CROHN'S DISEASE OF SMALL INTESTINE WITH ABSCESS (H): ICD-10-CM

## 2020-12-04 LAB
ALBUMIN SERPL-MCNC: 3.7 G/DL (ref 3.4–5)
ALP SERPL-CCNC: 89 U/L (ref 40–150)
ALT SERPL W P-5'-P-CCNC: 45 U/L (ref 0–70)
AST SERPL W P-5'-P-CCNC: 35 U/L (ref 0–45)
BASOPHILS # BLD AUTO: 0 10E9/L (ref 0–0.2)
BASOPHILS NFR BLD AUTO: 0.3 %
BILIRUB DIRECT SERPL-MCNC: 0.1 MG/DL (ref 0–0.2)
BILIRUB SERPL-MCNC: 0.5 MG/DL (ref 0.2–1.3)
CRP SERPL-MCNC: <2.9 MG/L (ref 0–8)
DIFFERENTIAL METHOD BLD: ABNORMAL
EOSINOPHIL # BLD AUTO: 0.1 10E9/L (ref 0–0.7)
EOSINOPHIL NFR BLD AUTO: 0.8 %
ERYTHROCYTE [DISTWIDTH] IN BLOOD BY AUTOMATED COUNT: 12.5 % (ref 10–15)
ERYTHROCYTE [SEDIMENTATION RATE] IN BLOOD BY WESTERGREN METHOD: 7 MM/H (ref 0–15)
HCT VFR BLD AUTO: 38.3 % (ref 40–53)
HGB BLD-MCNC: 12.7 G/DL (ref 13.3–17.7)
IMM GRANULOCYTES # BLD: 0 10E9/L (ref 0–0.4)
IMM GRANULOCYTES NFR BLD: 0.2 %
LYMPHOCYTES # BLD AUTO: 0.9 10E9/L (ref 0.8–5.3)
LYMPHOCYTES NFR BLD AUTO: 14.1 %
MCH RBC QN AUTO: 30.5 PG (ref 26.5–33)
MCHC RBC AUTO-ENTMCNC: 33.2 G/DL (ref 31.5–36.5)
MCV RBC AUTO: 92 FL (ref 78–100)
MONOCYTES # BLD AUTO: 0.6 10E9/L (ref 0–1.3)
MONOCYTES NFR BLD AUTO: 9.2 %
NEUTROPHILS # BLD AUTO: 4.8 10E9/L (ref 1.6–8.3)
NEUTROPHILS NFR BLD AUTO: 75.4 %
PLATELET # BLD AUTO: 198 10E9/L (ref 150–450)
PROT SERPL-MCNC: 7.6 G/DL (ref 6.8–8.8)
RBC # BLD AUTO: 4.17 10E12/L (ref 4.4–5.9)
WBC # BLD AUTO: 6.3 10E9/L (ref 4–11)

## 2020-12-04 PROCEDURE — 96413 CHEMO IV INFUSION 1 HR: CPT | Performed by: INTERNAL MEDICINE

## 2020-12-04 PROCEDURE — 85652 RBC SED RATE AUTOMATED: CPT | Performed by: INTERNAL MEDICINE

## 2020-12-04 PROCEDURE — 80076 HEPATIC FUNCTION PANEL: CPT | Performed by: INTERNAL MEDICINE

## 2020-12-04 PROCEDURE — 85025 COMPLETE CBC W/AUTO DIFF WBC: CPT | Performed by: INTERNAL MEDICINE

## 2020-12-04 PROCEDURE — 99207 PR NO CHARGE LOS: CPT

## 2020-12-04 PROCEDURE — 96375 TX/PRO/DX INJ NEW DRUG ADDON: CPT | Performed by: INTERNAL MEDICINE

## 2020-12-04 PROCEDURE — 86140 C-REACTIVE PROTEIN: CPT | Performed by: INTERNAL MEDICINE

## 2020-12-04 RX ORDER — METHYLPREDNISOLONE SODIUM SUCCINATE 125 MG/2ML
125 INJECTION, POWDER, LYOPHILIZED, FOR SOLUTION INTRAMUSCULAR; INTRAVENOUS ONCE
Status: CANCELLED | OUTPATIENT
Start: 2021-01-29 | End: 2021-01-29

## 2020-12-04 RX ORDER — DIPHENHYDRAMINE HCL 25 MG
25 CAPSULE ORAL ONCE
Status: CANCELLED
Start: 2021-01-29 | End: 2021-01-29

## 2020-12-04 RX ORDER — HEPARIN SODIUM,PORCINE 10 UNIT/ML
5 VIAL (ML) INTRAVENOUS DAILY PRN
Status: CANCELLED
Start: 2021-01-29

## 2020-12-04 RX ORDER — METHYLPREDNISOLONE SODIUM SUCCINATE 125 MG/2ML
125 INJECTION, POWDER, LYOPHILIZED, FOR SOLUTION INTRAMUSCULAR; INTRAVENOUS ONCE
Status: COMPLETED | OUTPATIENT
Start: 2020-12-04 | End: 2020-12-04

## 2020-12-04 RX ORDER — ACETAMINOPHEN 325 MG/1
650 TABLET ORAL ONCE
Status: CANCELLED
Start: 2021-01-29 | End: 2021-01-29

## 2020-12-04 RX ADMIN — METHYLPREDNISOLONE SODIUM SUCCINATE 125 MG: 125 INJECTION INTRAMUSCULAR; INTRAVENOUS at 13:59

## 2020-12-04 RX ADMIN — Medication 250 ML: at 13:58

## 2020-12-04 ASSESSMENT — PAIN SCALES - GENERAL: PAINLEVEL: NO PAIN (0)

## 2020-12-04 NOTE — PROGRESS NOTES
Infusion Nursing Note:  Ventura Quiroz presents today for rapid remicade.    Patient seen by provider today: No   present during visit today: Not Applicable.    Intravenous Access:  Peripheral IV placed.    Treatment Conditions:  Biological Infusion Checklist:  ~~~ NOTE: If the patient answers yes to any of the questions below, hold the infusion and contact ordering provider or on-call provider.    1. Have you recently had an elevated temperature, fever, chills, productive cough, coughing for 3 weeks or longer or hemoptysis, abnormal vital signs, night sweats,  chest pain or have you noticed a decrease in your appetite, unexplained weight loss or fatigue? No  2. Do you have any open wounds or new incisions? No  3. Do you have any recent or upcoming hospitalizations, surgeries or dental procedures? No  4. Do you currently have or recently have had any signs of illness or infection or are you on any antibiotics? No  5. Have you had any new, sudden or worsening abdominal pain? No  6. Have you or anyone in your household received a live vaccination in the past 4 weeks? Please note:  No live vaccines while on biologic/chemotherapy until 6 months after the last treatment.  Patient can receive the flu vaccine (shot only) and the pneumovax.  It is optimal for the patient to get these vaccines mid cycle, but they can be given at any time as long as it is not on the day of the infusion. No  7. Have you recently been diagnosed with any new nervous system diseases (ie. Multiple sclerosis, Guillain [place], seizures, neurological changes) or cancer diagnosis? No  8. Are you on any form of radiation or chemotherapy? No  9. Are you pregnant or breast feeding or do you have plans of pregnancy in the future? N/A  10. Have you been having any signs of worsening depression or suicidal ideations?  (benlysta only) N/A  11. Have there been any other new onset medical symptoms? No        Post Infusion Assessment:  Patient  tolerated infusion without incident.  Blood return noted pre and post infusion.  Site patent and intact, free from redness, edema or discomfort.  No evidence of extravasations.  Access discontinued per protocol.       Discharge Plan:   Patient discharged in stable condition accompanied by: self.  Departure Mode: Ambulatory.  Verbally reviewed next infusion on 2/1/21.    Marianne Dimas RN

## 2020-12-11 ENCOUNTER — TELEPHONE (OUTPATIENT)
Dept: GASTROENTEROLOGY | Facility: OUTPATIENT CENTER | Age: 20
End: 2020-12-11

## 2020-12-11 NOTE — TELEPHONE ENCOUNTER
Patient is scheduled for Colonoscopy with Dr. Wright    Spoke with: Pt    Date of Procedure: 2/8/21    Location: [facility]    Sedation Type DEEP MAC    Pre-op for Unit J MAC NA    (if yes advise patient they will need a pre-op prior to procedure)      Is patient on blood thinners? -NO (If yes- inform patient to follow up with PCP or provider for follow up instructions)     Informed patient they will need an adult  Y    Informed Patient of COVID Test Requirement scheduled    Preferred Pharmacy for Pre Prescription no    Confirmed Nurse will call to complete assessment no    Additional comments: none

## 2020-12-13 DIAGNOSIS — Z11.59 ENCOUNTER FOR SCREENING FOR OTHER VIRAL DISEASES: Primary | ICD-10-CM

## 2021-01-09 ENCOUNTER — HEALTH MAINTENANCE LETTER (OUTPATIENT)
Age: 21
End: 2021-01-09

## 2021-02-02 ENCOUNTER — TELEPHONE (OUTPATIENT)
Dept: GASTROENTEROLOGY | Facility: OUTPATIENT CENTER | Age: 21
End: 2021-02-02

## 2021-02-05 DIAGNOSIS — Z11.59 ENCOUNTER FOR SCREENING FOR OTHER VIRAL DISEASES: ICD-10-CM

## 2021-02-05 LAB
SARS-COV-2 RNA RESP QL NAA+PROBE: NORMAL
SPECIMEN SOURCE: NORMAL

## 2021-02-05 PROCEDURE — U0003 INFECTIOUS AGENT DETECTION BY NUCLEIC ACID (DNA OR RNA); SEVERE ACUTE RESPIRATORY SYNDROME CORONAVIRUS 2 (SARS-COV-2) (CORONAVIRUS DISEASE [COVID-19]), AMPLIFIED PROBE TECHNIQUE, MAKING USE OF HIGH THROUGHPUT TECHNOLOGIES AS DESCRIBED BY CMS-2020-01-R: HCPCS | Performed by: INTERNAL MEDICINE

## 2021-02-05 PROCEDURE — U0005 INFEC AGEN DETEC AMPLI PROBE: HCPCS | Performed by: INTERNAL MEDICINE

## 2021-02-06 LAB
LABORATORY COMMENT REPORT: NORMAL
SARS-COV-2 RNA RESP QL NAA+PROBE: NEGATIVE
SPECIMEN SOURCE: NORMAL

## 2021-02-08 ENCOUNTER — DOCUMENTATION ONLY (OUTPATIENT)
Dept: GASTROENTEROLOGY | Facility: OUTPATIENT CENTER | Age: 21
End: 2021-02-08
Payer: COMMERCIAL

## 2021-02-08 ENCOUNTER — TRANSFERRED RECORDS (OUTPATIENT)
Dept: HEALTH INFORMATION MANAGEMENT | Facility: CLINIC | Age: 21
End: 2021-02-08

## 2021-02-08 ENCOUNTER — PATIENT OUTREACH (OUTPATIENT)
Dept: GASTROENTEROLOGY | Facility: CLINIC | Age: 21
End: 2021-02-08

## 2021-02-08 DIAGNOSIS — K50.012 CROHN'S DISEASE OF SMALL INTESTINE WITH INTESTINAL OBSTRUCTION (H): ICD-10-CM

## 2021-02-08 DIAGNOSIS — K50.012 CROHN'S DISEASE OF SMALL INTESTINE WITH INTESTINAL OBSTRUCTION (H): Primary | ICD-10-CM

## 2021-02-08 NOTE — PROGRESS NOTES
Edited therapy plan to add infliximab level to be drawn day of infusion prior to start of infusion.   Label sst with at least two patient identifiers and date of collection(acceptable identifiers name, ,medical record # , requisition #)  Collect 5-7 ml peripheral blood in SST  Place SST upright;let stand undisturbed minimum 30 minutes)  Centrifuge at FULL SPEED (5415-4252) for minimum 15 minutes. Observe separation of the serum from the clot  Package the labeled SST tube in biohazard bag.  Place with completed requisition and frozen cold pack inside kit.  SHIP IMMEDIATELY OR REFRIGERATE OVERNIGHT  Blood to [facility]  [employer]  form completed and scanned into the record.

## 2021-02-09 ENCOUNTER — TELEPHONE (OUTPATIENT)
Dept: GASTROENTEROLOGY | Facility: CLINIC | Age: 21
End: 2021-02-09

## 2021-02-09 LAB — COPATH REPORT: NORMAL

## 2021-02-09 NOTE — TELEPHONE ENCOUNTER
VM not set up yet, [employer] message sent.  Schedule next available video return visit with Dr. Wright for a follow-up appointment.

## 2021-02-17 ENCOUNTER — TELEPHONE (OUTPATIENT)
Dept: GASTROENTEROLOGY | Facility: CLINIC | Age: 21
End: 2021-02-17

## 2021-02-17 NOTE — TELEPHONE ENCOUNTER
VM not set up.  Sent [employer] message and letter to schedule next available video return visit with Dr. Wright for a routine follow-up appointment.

## 2021-02-18 ENCOUNTER — INFUSION THERAPY VISIT (OUTPATIENT)
Dept: INFUSION THERAPY | Facility: CLINIC | Age: 21
End: 2021-02-18
Payer: COMMERCIAL

## 2021-02-18 ENCOUNTER — MEDICAL CORRESPONDENCE (OUTPATIENT)
Dept: HEALTH INFORMATION MANAGEMENT | Facility: CLINIC | Age: 21
End: 2021-02-18

## 2021-02-18 ENCOUNTER — OFFICE VISIT (OUTPATIENT)
Dept: ONCOLOGY | Facility: CLINIC | Age: 21
End: 2021-02-18
Payer: COMMERCIAL

## 2021-02-18 VITALS
DIASTOLIC BLOOD PRESSURE: 77 MMHG | WEIGHT: 187 LBS | HEART RATE: 68 BPM | TEMPERATURE: 98.5 F | BODY MASS INDEX: 24.01 KG/M2 | SYSTOLIC BLOOD PRESSURE: 121 MMHG | OXYGEN SATURATION: 100 % | RESPIRATION RATE: 18 BRPM

## 2021-02-18 DIAGNOSIS — K50.012 CROHN'S DISEASE OF SMALL INTESTINE WITH INTESTINAL OBSTRUCTION (H): ICD-10-CM

## 2021-02-18 DIAGNOSIS — K50.014 CROHN'S DISEASE OF SMALL INTESTINE WITH ABSCESS (H): ICD-10-CM

## 2021-02-18 DIAGNOSIS — K52.9 COLITIS: ICD-10-CM

## 2021-02-18 DIAGNOSIS — D50.9 IRON DEFICIENCY ANEMIA, UNSPECIFIED IRON DEFICIENCY ANEMIA TYPE: Primary | ICD-10-CM

## 2021-02-18 LAB
ALBUMIN SERPL-MCNC: 4.1 G/DL (ref 3.4–5)
ALP SERPL-CCNC: 100 U/L (ref 40–150)
ALT SERPL W P-5'-P-CCNC: 42 U/L (ref 0–70)
AST SERPL W P-5'-P-CCNC: 29 U/L (ref 0–45)
BASOPHILS # BLD AUTO: 0 10E9/L (ref 0–0.2)
BASOPHILS NFR BLD AUTO: 0.3 %
BILIRUB DIRECT SERPL-MCNC: 0.1 MG/DL (ref 0–0.2)
BILIRUB SERPL-MCNC: 0.4 MG/DL (ref 0.2–1.3)
CRP SERPL-MCNC: <2.9 MG/L (ref 0–8)
DIFFERENTIAL METHOD BLD: ABNORMAL
EOSINOPHIL # BLD AUTO: 0.1 10E9/L (ref 0–0.7)
EOSINOPHIL NFR BLD AUTO: 0.6 %
ERYTHROCYTE [DISTWIDTH] IN BLOOD BY AUTOMATED COUNT: 13.1 % (ref 10–15)
ERYTHROCYTE [SEDIMENTATION RATE] IN BLOOD BY WESTERGREN METHOD: 7 MM/H (ref 0–15)
HCT VFR BLD AUTO: 39.6 % (ref 40–53)
HGB BLD-MCNC: 13.5 G/DL (ref 13.3–17.7)
IMM GRANULOCYTES # BLD: 0 10E9/L (ref 0–0.4)
IMM GRANULOCYTES NFR BLD: 0.5 %
LYMPHOCYTES # BLD AUTO: 1.3 10E9/L (ref 0.8–5.3)
LYMPHOCYTES NFR BLD AUTO: 14.2 %
MCH RBC QN AUTO: 29.6 PG (ref 26.5–33)
MCHC RBC AUTO-ENTMCNC: 34.1 G/DL (ref 31.5–36.5)
MCV RBC AUTO: 87 FL (ref 78–100)
MONOCYTES # BLD AUTO: 0.7 10E9/L (ref 0–1.3)
MONOCYTES NFR BLD AUTO: 7.6 %
NEUTROPHILS # BLD AUTO: 6.7 10E9/L (ref 1.6–8.3)
NEUTROPHILS NFR BLD AUTO: 76.8 %
PLATELET # BLD AUTO: 238 10E9/L (ref 150–450)
PROT SERPL-MCNC: 7.9 G/DL (ref 6.8–8.8)
RBC # BLD AUTO: 4.56 10E12/L (ref 4.4–5.9)
WBC # BLD AUTO: 8.8 10E9/L (ref 4–11)

## 2021-02-18 PROCEDURE — 85025 COMPLETE CBC W/AUTO DIFF WBC: CPT | Performed by: INTERNAL MEDICINE

## 2021-02-18 PROCEDURE — 99207 PR NO CHARGE LOS: CPT

## 2021-02-18 PROCEDURE — 86140 C-REACTIVE PROTEIN: CPT | Performed by: INTERNAL MEDICINE

## 2021-02-18 PROCEDURE — 96413 CHEMO IV INFUSION 1 HR: CPT | Performed by: INTERNAL MEDICINE

## 2021-02-18 PROCEDURE — 99N1145 PR STATISTIC INFLIXIMAB: Performed by: INTERNAL MEDICINE

## 2021-02-18 PROCEDURE — 85652 RBC SED RATE AUTOMATED: CPT | Performed by: INTERNAL MEDICINE

## 2021-02-18 PROCEDURE — 80076 HEPATIC FUNCTION PANEL: CPT | Performed by: INTERNAL MEDICINE

## 2021-02-18 RX ORDER — METHYLPREDNISOLONE SODIUM SUCCINATE 125 MG/2ML
125 INJECTION, POWDER, LYOPHILIZED, FOR SOLUTION INTRAMUSCULAR; INTRAVENOUS ONCE
Status: CANCELLED | OUTPATIENT
Start: 2021-03-26 | End: 2021-03-26

## 2021-02-18 RX ORDER — DIPHENHYDRAMINE HCL 25 MG
25 CAPSULE ORAL ONCE
Status: CANCELLED
Start: 2021-03-26 | End: 2021-03-26

## 2021-02-18 RX ORDER — HEPARIN SODIUM,PORCINE 10 UNIT/ML
5 VIAL (ML) INTRAVENOUS DAILY PRN
Status: CANCELLED
Start: 2021-03-26

## 2021-02-18 RX ORDER — ACETAMINOPHEN 325 MG/1
650 TABLET ORAL ONCE
Status: CANCELLED
Start: 2021-03-26 | End: 2021-03-26

## 2021-02-18 RX ADMIN — Medication 250 ML: at 15:01

## 2021-02-18 ASSESSMENT — PAIN SCALES - GENERAL: PAINLEVEL: NO PAIN (0)

## 2021-02-18 NOTE — PROGRESS NOTES
Infusion Nursing Note:  Venturarock Biswasury presents today for: Remicade.    Patient seen by provider today: No   present during visit today: Not Applicable.    Note: patient symptomatic as he missed infusion 2 wks ago.  Intermittent episodes of diarrhea and cramping      Intravenous Access:  Peripheral IV placed.    Treatment Conditions:  Biological Infusion Checklist:  ~~~ NOTE: If the patient answers yes to any of the questions below, hold the infusion and contact ordering provider or on-call provider.    1. Have you recently had an elevated temperature, fever, chills, productive cough, coughing for 3 weeks or longer or hemoptysis, abnormal vital signs, night sweats,  chest pain or have you noticed a decrease in your appetite, unexplained weight loss or fatigue? No  2. Do you have any open wounds or new incisions? No  3. Do you have any recent or upcoming hospitalizations, surgeries or dental procedures? No  4. Do you currently have or recently have had any signs of illness or infection or are you on any antibiotics? No  5. Have you had any new, sudden or worsening abdominal pain? No  6. Have you or anyone in your household received a live vaccination in the past 4 weeks? Please note:  No live vaccines while on biologic/chemotherapy until 6 months after the last treatment.  Patient can receive the flu vaccine (shot only) and the pneumovax.  It is optimal for the patient to get these vaccines mid cycle, but they can be given at any time as long as it is not on the day of the infusion. No  7. Have you recently been diagnosed with any new nervous system diseases (ie. Multiple sclerosis, Guillain [place], seizures, neurological changes) or cancer diagnosis? No  8. Are you on any form of radiation or chemotherapy? No  9. Are you pregnant or breast feeding or do you have plans of pregnancy in the future? No  10. Have you been having any signs of worsening depression or suicidal ideations?  (benlysta only)  No  11. Have there been any other new onset medical symptoms? No    Post Infusion Assessment:  Patient tolerated infusion without incident.       Discharge Plan:   .    RONA RIZVI RN

## 2021-02-25 LAB — LAB SCANNED RESULT: NORMAL

## 2021-02-26 ENCOUNTER — OFFICE VISIT (OUTPATIENT)
Dept: PHARMACY | Facility: CLINIC | Age: 21
End: 2021-02-26

## 2021-02-26 DIAGNOSIS — K50.014 CROHN'S DISEASE OF SMALL INTESTINE WITH ABSCESS (H): Primary | ICD-10-CM

## 2021-02-26 PROCEDURE — 99207 PR NO CHARGE LOS: CPT | Performed by: PHARMACIST

## 2021-02-26 RX ORDER — MERCAPTOPURINE 50 MG/1
50 TABLET ORAL DAILY
Qty: 30 TABLET | Refills: 2 | Status: SHIPPED | OUTPATIENT
Start: 2021-02-26 | End: 2022-09-29

## 2021-02-26 NOTE — PROGRESS NOTES
Clinical Pharmacy Consult:                                                    Ventura Quiroz is a 20 year old male called for a clinical pharmacist consult.  He was referred to me from Dr. Wright.     Ventura is not seen for CMR today due to time/request for consult from provider.    Reason for Consult: Infliximab level low    Discussion:     We reviewed most recent infliximab lab drawn prior to his last infusion on 2/18. We discussed that the drug level is less meaningful, as we typically assess this based on a week eight level, and this level was drawn closer to week 11. Therefore, we would expect it to be low. We also discussed the low-level presence of antibodies which may sometimes revert. The addition of a thiopurine is meant to also help with anti-drug antibody production. He notes feeling an increase in symptoms after week eight. We discussed risk of antibody development when infliximab levels are subtherapeutic and the importance of getting infusions on time. Ventura acknowledges that he forgot about his infusion and it had to be re-scheduled, which is why his most recent infusion was delayed.     Reviewed plan as recommended by Dr. Wright to start 50 mg mercaptopurine daily and re-check a level in eight weeks. Reviewed rationale for this. Ventura has been on mercaptopurine before, at a higher dose (100 mg daily) and was previously tolerating this. Therefore, I expect him to tolerate this dose well, but we reviewed common side effects and I encouraged him to reach out if he has difficulty with tolerance.     Ventura is agreeable to plan and has no further questions at this time. He is appreciative of call and update.    Plan:  1. Ventura to start 6-MP 50 mg daily as directed by Dr. Wright   -- order sent per discussion below to [facility] per Ventura's preference    2. Ventura to keep infusion as planned at 8 weeks as scheduled   -- plan for level with next infusion, order entered per discussion below      From: Nik  Anastacia Boone [facility]   Sent: 2/26/2021   9:20 AM CST   To: Holly Johnson RN, Tobin Wright MD     Okay. I think that's a good plan to start with adding back 6-MP and re-checking in 8 weeks. Either Holly or I will emphasize that it is not a good idea to delay doses unless indicated.     Anastacia   ----- Message -----   From: Tobin Wright MD   Sent: 2/26/2021   9:11 AM CST   To: Holly Johnson RN, Anastacia Zaragoza [facility]     OK... thank you, that was very helpful.   I thought for some reason he was off the infliximab longer...     What if we put him on 6-MP 50mg, and re-checked level at 8 weeks?     Agree, antibody is on the lowish level side.     Infliximab Timing   Dose #1 6/25   Dose #2 7/7 (12 days)   Dose #3 8/10 (4 weeks 6 days)   Dose #4 10/9 (8 weeks 4 days)   Dose #5 12/4 (8 weeks)   Dose #6 2/18 (10 weeks 5 days)

## 2021-02-26 NOTE — PATIENT INSTRUCTIONS
1. Start mercaptopurine 50 mg by mouth daily as directed by Dr. Wright. I will coordinate an order to [facility] in [place] as you indicated.    2. Please keep your next infusion as scheduled. We will plan to re-check a drug level and screen for antibodies prior to your next infusion.    Please reach out if you have questions or concerns.    Anastacia GrullonD, BCACP  MTM Pharmacist   [facility]  Phone: [phone]

## 2021-02-26 NOTE — Clinical Note
Hello -     Wanted to let you know that I got him re-started on 6-MP and placed the orders. Just confirming a plan for labs. Okay with modified plan to have thiopurine labs at weeks 2 and 4, then monthly x 3 since he has tolerated a higher dose previously? Certainly open to your direction as well. Thanks!

## 2021-03-04 ENCOUNTER — PATIENT OUTREACH (OUTPATIENT)
Dept: GASTROENTEROLOGY | Facility: CLINIC | Age: 21
End: 2021-03-04

## 2021-03-04 DIAGNOSIS — K50.012 CROHN'S DISEASE OF SMALL INTESTINE WITH INTESTINAL OBSTRUCTION (H): Primary | ICD-10-CM

## 2021-03-16 DIAGNOSIS — K50.012 CROHN'S DISEASE OF SMALL INTESTINE WITH INTESTINAL OBSTRUCTION (H): ICD-10-CM

## 2021-03-16 LAB
ALBUMIN SERPL-MCNC: 4.1 G/DL (ref 3.4–5)
ALP SERPL-CCNC: 94 U/L (ref 40–150)
ALT SERPL W P-5'-P-CCNC: 31 U/L (ref 0–70)
AST SERPL W P-5'-P-CCNC: 12 U/L (ref 0–45)
BASOPHILS # BLD AUTO: 0 10E9/L (ref 0–0.2)
BASOPHILS NFR BLD AUTO: 0.3 %
BILIRUB DIRECT SERPL-MCNC: 0.1 MG/DL (ref 0–0.2)
BILIRUB SERPL-MCNC: 0.4 MG/DL (ref 0.2–1.3)
CRP SERPL-MCNC: <2.9 MG/L (ref 0–8)
DIFFERENTIAL METHOD BLD: ABNORMAL
EOSINOPHIL # BLD AUTO: 0 10E9/L (ref 0–0.7)
EOSINOPHIL NFR BLD AUTO: 0.7 %
ERYTHROCYTE [DISTWIDTH] IN BLOOD BY AUTOMATED COUNT: 13.5 % (ref 10–15)
ERYTHROCYTE [SEDIMENTATION RATE] IN BLOOD BY WESTERGREN METHOD: 4 MM/H (ref 0–15)
HCT VFR BLD AUTO: 38.7 % (ref 40–53)
HGB BLD-MCNC: 12.7 G/DL (ref 13.3–17.7)
IMM GRANULOCYTES # BLD: 0 10E9/L (ref 0–0.4)
IMM GRANULOCYTES NFR BLD: 0.3 %
LYMPHOCYTES # BLD AUTO: 1.4 10E9/L (ref 0.8–5.3)
LYMPHOCYTES NFR BLD AUTO: 22.7 %
MCH RBC QN AUTO: 29.3 PG (ref 26.5–33)
MCHC RBC AUTO-ENTMCNC: 32.8 G/DL (ref 31.5–36.5)
MCV RBC AUTO: 89 FL (ref 78–100)
MONOCYTES # BLD AUTO: 0.5 10E9/L (ref 0–1.3)
MONOCYTES NFR BLD AUTO: 8.4 %
NEUTROPHILS # BLD AUTO: 4 10E9/L (ref 1.6–8.3)
NEUTROPHILS NFR BLD AUTO: 67.6 %
NRBC # BLD AUTO: 0 10*3/UL
NRBC BLD AUTO-RTO: 0 /100
PLATELET # BLD AUTO: 191 10E9/L (ref 150–450)
PROT SERPL-MCNC: 7.5 G/DL (ref 6.8–8.8)
RBC # BLD AUTO: 4.34 10E12/L (ref 4.4–5.9)
WBC # BLD AUTO: 5.9 10E9/L (ref 4–11)

## 2021-03-16 PROCEDURE — 85652 RBC SED RATE AUTOMATED: CPT | Performed by: INTERNAL MEDICINE

## 2021-03-16 PROCEDURE — 85025 COMPLETE CBC W/AUTO DIFF WBC: CPT | Performed by: INTERNAL MEDICINE

## 2021-03-16 PROCEDURE — 86140 C-REACTIVE PROTEIN: CPT | Performed by: INTERNAL MEDICINE

## 2021-03-16 PROCEDURE — 36415 COLL VENOUS BLD VENIPUNCTURE: CPT | Performed by: INTERNAL MEDICINE

## 2021-03-16 PROCEDURE — 80076 HEPATIC FUNCTION PANEL: CPT | Performed by: INTERNAL MEDICINE

## 2021-04-06 DIAGNOSIS — K50.012 CROHN'S DISEASE OF SMALL INTESTINE WITH INTESTINAL OBSTRUCTION (H): ICD-10-CM

## 2021-04-06 LAB
ALBUMIN SERPL-MCNC: 3.9 G/DL (ref 3.4–5)
ALP SERPL-CCNC: 93 U/L (ref 40–150)
ALT SERPL W P-5'-P-CCNC: 32 U/L (ref 0–70)
AST SERPL W P-5'-P-CCNC: 21 U/L (ref 0–45)
BASOPHILS # BLD AUTO: 0 10E9/L (ref 0–0.2)
BASOPHILS NFR BLD AUTO: 0.3 %
BILIRUB DIRECT SERPL-MCNC: 0.2 MG/DL (ref 0–0.2)
BILIRUB SERPL-MCNC: 0.6 MG/DL (ref 0.2–1.3)
CRP SERPL-MCNC: <2.9 MG/L (ref 0–8)
DIFFERENTIAL METHOD BLD: ABNORMAL
EOSINOPHIL # BLD AUTO: 0 10E9/L (ref 0–0.7)
EOSINOPHIL NFR BLD AUTO: 0.6 %
ERYTHROCYTE [DISTWIDTH] IN BLOOD BY AUTOMATED COUNT: 14.4 % (ref 10–15)
ERYTHROCYTE [SEDIMENTATION RATE] IN BLOOD BY WESTERGREN METHOD: 5 MM/H (ref 0–15)
HCT VFR BLD AUTO: 37 % (ref 40–53)
HGB BLD-MCNC: 12.7 G/DL (ref 13.3–17.7)
IMM GRANULOCYTES # BLD: 0 10E9/L (ref 0–0.4)
IMM GRANULOCYTES NFR BLD: 0.5 %
LYMPHOCYTES # BLD AUTO: 1.2 10E9/L (ref 0.8–5.3)
LYMPHOCYTES NFR BLD AUTO: 18.3 %
MCH RBC QN AUTO: 30.1 PG (ref 26.5–33)
MCHC RBC AUTO-ENTMCNC: 34.3 G/DL (ref 31.5–36.5)
MCV RBC AUTO: 88 FL (ref 78–100)
MONOCYTES # BLD AUTO: 0.5 10E9/L (ref 0–1.3)
MONOCYTES NFR BLD AUTO: 8.3 %
NEUTROPHILS # BLD AUTO: 4.6 10E9/L (ref 1.6–8.3)
NEUTROPHILS NFR BLD AUTO: 72 %
NRBC # BLD AUTO: 0 10*3/UL
NRBC BLD AUTO-RTO: 0 /100
PLATELET # BLD AUTO: 194 10E9/L (ref 150–450)
PROT SERPL-MCNC: 7.6 G/DL (ref 6.8–8.8)
RBC # BLD AUTO: 4.22 10E12/L (ref 4.4–5.9)
WBC # BLD AUTO: 6.4 10E9/L (ref 4–11)

## 2021-04-06 PROCEDURE — 86140 C-REACTIVE PROTEIN: CPT | Performed by: INTERNAL MEDICINE

## 2021-04-06 PROCEDURE — 85025 COMPLETE CBC W/AUTO DIFF WBC: CPT | Performed by: INTERNAL MEDICINE

## 2021-04-06 PROCEDURE — 36415 COLL VENOUS BLD VENIPUNCTURE: CPT | Performed by: INTERNAL MEDICINE

## 2021-04-06 PROCEDURE — 85652 RBC SED RATE AUTOMATED: CPT | Performed by: INTERNAL MEDICINE

## 2021-04-06 PROCEDURE — 80076 HEPATIC FUNCTION PANEL: CPT | Performed by: INTERNAL MEDICINE

## 2021-04-19 ENCOUNTER — INFUSION THERAPY VISIT (OUTPATIENT)
Dept: INFUSION THERAPY | Facility: CLINIC | Age: 21
End: 2021-04-19
Payer: COMMERCIAL

## 2021-04-19 VITALS
DIASTOLIC BLOOD PRESSURE: 59 MMHG | SYSTOLIC BLOOD PRESSURE: 115 MMHG | TEMPERATURE: 99 F | OXYGEN SATURATION: 97 % | BODY MASS INDEX: 24.47 KG/M2 | HEART RATE: 69 BPM | WEIGHT: 190.6 LBS

## 2021-04-19 DIAGNOSIS — K52.9 COLITIS: ICD-10-CM

## 2021-04-19 DIAGNOSIS — K50.014 CROHN'S DISEASE OF SMALL INTESTINE WITH ABSCESS (H): Primary | ICD-10-CM

## 2021-04-19 DIAGNOSIS — D50.9 IRON DEFICIENCY ANEMIA, UNSPECIFIED IRON DEFICIENCY ANEMIA TYPE: Primary | ICD-10-CM

## 2021-04-19 DIAGNOSIS — D50.9 IRON DEFICIENCY ANEMIA, UNSPECIFIED IRON DEFICIENCY ANEMIA TYPE: ICD-10-CM

## 2021-04-19 DIAGNOSIS — K50.014 CROHN'S DISEASE OF SMALL INTESTINE WITH ABSCESS (H): ICD-10-CM

## 2021-04-19 LAB
ALBUMIN SERPL-MCNC: 3.9 G/DL (ref 3.4–5)
ALP SERPL-CCNC: 84 U/L (ref 40–150)
ALT SERPL W P-5'-P-CCNC: 27 U/L (ref 0–70)
AST SERPL W P-5'-P-CCNC: 14 U/L (ref 0–45)
BASOPHILS # BLD AUTO: 0 10E9/L (ref 0–0.2)
BASOPHILS NFR BLD AUTO: 0.3 %
BILIRUB DIRECT SERPL-MCNC: 0.1 MG/DL (ref 0–0.2)
BILIRUB SERPL-MCNC: 0.7 MG/DL (ref 0.2–1.3)
CRP SERPL-MCNC: <2.9 MG/L (ref 0–8)
DIFFERENTIAL METHOD BLD: ABNORMAL
EOSINOPHIL # BLD AUTO: 0 10E9/L (ref 0–0.7)
EOSINOPHIL NFR BLD AUTO: 0.6 %
ERYTHROCYTE [DISTWIDTH] IN BLOOD BY AUTOMATED COUNT: 14.4 % (ref 10–15)
ERYTHROCYTE [SEDIMENTATION RATE] IN BLOOD BY WESTERGREN METHOD: 5 MM/H (ref 0–15)
HCT VFR BLD AUTO: 38.4 % (ref 40–53)
HGB BLD-MCNC: 12.7 G/DL (ref 13.3–17.7)
IMM GRANULOCYTES # BLD: 0 10E9/L (ref 0–0.4)
IMM GRANULOCYTES NFR BLD: 0.2 %
LYMPHOCYTES # BLD AUTO: 1 10E9/L (ref 0.8–5.3)
LYMPHOCYTES NFR BLD AUTO: 15.4 %
MCH RBC QN AUTO: 29.9 PG (ref 26.5–33)
MCHC RBC AUTO-ENTMCNC: 33.1 G/DL (ref 31.5–36.5)
MCV RBC AUTO: 90 FL (ref 78–100)
MONOCYTES # BLD AUTO: 0.6 10E9/L (ref 0–1.3)
MONOCYTES NFR BLD AUTO: 9.4 %
NEUTROPHILS # BLD AUTO: 4.6 10E9/L (ref 1.6–8.3)
NEUTROPHILS NFR BLD AUTO: 74.1 %
PLATELET # BLD AUTO: 180 10E9/L (ref 150–450)
PROT SERPL-MCNC: 7.1 G/DL (ref 6.8–8.8)
RBC # BLD AUTO: 4.25 10E12/L (ref 4.4–5.9)
WBC # BLD AUTO: 6.2 10E9/L (ref 4–11)

## 2021-04-19 PROCEDURE — 99207 PR NO CHARGE LOS: CPT

## 2021-04-19 PROCEDURE — 96413 CHEMO IV INFUSION 1 HR: CPT | Performed by: NURSE PRACTITIONER

## 2021-04-19 PROCEDURE — 80076 HEPATIC FUNCTION PANEL: CPT | Performed by: INTERNAL MEDICINE

## 2021-04-19 PROCEDURE — 99N1145 PR STATISTIC INFLIXIMAB: Performed by: INTERNAL MEDICINE

## 2021-04-19 PROCEDURE — 85025 COMPLETE CBC W/AUTO DIFF WBC: CPT | Performed by: INTERNAL MEDICINE

## 2021-04-19 PROCEDURE — 86140 C-REACTIVE PROTEIN: CPT | Performed by: INTERNAL MEDICINE

## 2021-04-19 PROCEDURE — 85652 RBC SED RATE AUTOMATED: CPT | Performed by: INTERNAL MEDICINE

## 2021-04-19 PROCEDURE — 36415 COLL VENOUS BLD VENIPUNCTURE: CPT | Performed by: INTERNAL MEDICINE

## 2021-04-19 RX ORDER — DIPHENHYDRAMINE HCL 25 MG
25 CAPSULE ORAL ONCE
Status: CANCELLED
Start: 2021-06-10 | End: 2021-06-10

## 2021-04-19 RX ORDER — ACETAMINOPHEN 325 MG/1
650 TABLET ORAL ONCE
Status: CANCELLED
Start: 2021-06-10 | End: 2021-06-10

## 2021-04-19 RX ORDER — HEPARIN SODIUM,PORCINE 10 UNIT/ML
5 VIAL (ML) INTRAVENOUS DAILY PRN
Status: CANCELLED
Start: 2021-06-10

## 2021-04-19 RX ORDER — METHYLPREDNISOLONE SODIUM SUCCINATE 125 MG/2ML
125 INJECTION, POWDER, LYOPHILIZED, FOR SOLUTION INTRAMUSCULAR; INTRAVENOUS ONCE
Status: CANCELLED | OUTPATIENT
Start: 2021-06-10 | End: 2021-06-10

## 2021-04-19 RX ADMIN — Medication 250 ML: at 13:07

## 2021-04-19 ASSESSMENT — PAIN SCALES - GENERAL: PAINLEVEL: NO PAIN (0)

## 2021-04-19 NOTE — PROGRESS NOTES
Infusion Nursing Note:  Ventura Quiroz presents today for Rapid remicade.    Patient seen by provider today: No   present during visit today: Not Applicable.    Note: Pt c/o ongoing diarrhea due to Crohn's disease, see flow sheet for assessment.  Pt states he is also taking Iron supplements but unsure of the dose.        Intravenous Access:  Peripheral IV placed.    Treatment Conditions:  Biological Infusion Checklist:  ~~~ NOTE: If the patient answers yes to any of the questions below, hold the infusion and contact ordering provider or on-call provider.    1. Have you recently had an elevated temperature, fever, chills, productive cough, coughing for 3 weeks or longer or hemoptysis, abnormal vital signs, night sweats,  chest pain or have you noticed a decrease in your appetite, unexplained weight loss or fatigue? No  2. Do you have any open wounds or new incisions? No  3. Do you have any recent or upcoming hospitalizations, surgeries or dental procedures? No  4. Do you currently have or recently have had any signs of illness or infection or are you on any antibiotics? No  5. Have you had any new, sudden or worsening abdominal pain? No  6. Have you or anyone in your household received a live vaccination in the past 4 weeks? Please note:  No live vaccines while on biologic/chemotherapy until 6 months after the last treatment.  Patient can receive the flu vaccine (shot only) and the pneumovax.  It is optimal for the patient to get these vaccines mid cycle, but they can be given at any time as long as it is not on the day of the infusion. No  7. Have you recently been diagnosed with any new nervous system diseases (ie. Multiple sclerosis, Guillain [place], seizures, neurological changes) or cancer diagnosis? No  8. Are you on any form of radiation or chemotherapy? No  9. Are you pregnant or breast feeding or do you have plans of pregnancy in the future? No  10. Have you been having any signs of worsening  depression or suicidal ideations?  (benlysta only) No  11. Have there been any other new onset medical symptoms? No        Post Infusion Assessment:  Patient tolerated infusion without incident.  Blood return noted pre and post infusion.  Site patent and intact, free from redness, edema or discomfort.  No evidence of extravasations.  Access discontinued per protocol.       Discharge Plan:   Patient discharged in stable condition accompanied by: self.  Departure Mode: Ambulatory.  Pt will RTC 6/10/21 for Rapid Remicade.    Raz Cowan RN

## 2021-04-20 ENCOUNTER — VIRTUAL VISIT (OUTPATIENT)
Dept: GASTROENTEROLOGY | Facility: CLINIC | Age: 21
End: 2021-04-20
Payer: COMMERCIAL

## 2021-04-20 VITALS — WEIGHT: 190 LBS | BODY MASS INDEX: 24.39 KG/M2

## 2021-04-20 DIAGNOSIS — K50.012 CROHN'S DISEASE OF SMALL INTESTINE WITH INTESTINAL OBSTRUCTION (H): Primary | ICD-10-CM

## 2021-04-20 PROCEDURE — 99214 OFFICE O/P EST MOD 30 MIN: CPT | Mod: GC | Performed by: INTERNAL MEDICINE

## 2021-04-20 NOTE — LETTER
"    4/20/2021         RE: Ventura Quiroz  [address]  [facility] [address]        Dear Colleague,    Thank you for referring your patient, Ventura Quiroz, to the [facility] [place]. Please see a copy of my visit note below.    Ventura Quiroz is a 20 year old male who is being evaluated via a billable video visit.      The patient has been notified of following:     \"This video visit will be conducted via a call between you and your physician/provider. We have found that certain health care needs can be provided without the need for an in-person physical exam.  This service lets us provide the care you need with a video conversation.  If a prescription is necessary we can send it directly to your pharmacy.  If lab work is needed we can place an order for that and you can then stop by our lab to have the test done at a later time.    If during the course of the call the physician/provider feels a video visit is not appropriate, you will not be charged for this service.\"     Patient confirmed that they are in [place] for today's visit yes.    Video-Visit Details  Type of service:  Video Visit    Video Start Time: 11:40 AM  Video End Time:  12:00 PM    Originating Location (pt. Location): Home    Distant Location (provider location):  [facility] [place]     Platform used: [email or web address]    Virtual visit for patient conducted via three way video conference with myself, patient and Dr. De La Rosa. I reviewed the history and abbreviated physical exam and discussed the management with Dr. De La Rosa on 4/20/2021. I reviewed the note and there are no changes to the past medical, family or social history.  A complete 10 point review of systems was obtained. Please see the HPI for pertinent positives and negatives. All other systems were reviewed and were found to be negative. I agree with the documented findings and plan of care as outlined.    Infliximab trough " concentration 1.7 with antibodies of 20. Thiopurine started (50mg of . He still has some symptoms that seem to improve after infliximab infusion. He still has a mild anemia.     Next step depends on result of infliximab concentration. If persistent antibodies and no level, will likely move away from infliximab (Cimzia or Stelara). If antibodies resolved, will increase infliximab (ongoing symptoms and anemia).      -Dr. Wright          IBD CLINIC VISIT    CC/REFERRING MD:  Referred Self  REASON FOR CONSULTATION: Crohn's disease    ASSESSMENT/PLAN:  20 year old male with Crohn's disease of the ileum initially complicated by phlegmon and abscess now status post ICR.     1. Crohn's disease: Substantially improved after ileocecal resection from symptomatic standpoint.  Restaging colonoscopy 2/2021 showed inflammation and ulceration at ileo-conic anastomosis (Rutgeert's i2b) and required TTS balloon dilation for inner diameter of 10mm. Additionally, his infliximab levels were low and antibodies present so he started 6-MP. Last infliximab infusion 4/19/21. Clinically he is doing well but does note breakthrough symptoms around week #7 between infusions.     -- Infliximab level from 4/19 pending. Depending on what the level is we may need to increased infliximab dosing frequency or, if antibodies present, switch to another therapy (Cimzia or stelara)   -- continue 6-MP 50mg daily     IBD HISTORY  Age at diagnosis: 18  Extent of disease: ileal  Disease phenotype: fistulizing  Nieves-anal disease: No  Current CD medications:   - Infliximab  - 6-MP  Prior IBD surgeries: Ileocecal resection - Rush-Meaux 8/21/20.   Prior IBD Medications:   - Humira   - Ciprofloxacin  - Metronidazole  - Ciprofloxacin - disease progression despite weekly dosing      DRUG MONITORING  TPMT enzyme activity:     6-TGN/6-MMPN levels:    Biologic concentration:  8/23/19: Adalimumab: 5, no antibodies, every other week, 50mg mercaptopurine   2/18/21:  Inflixiamb level 1.6; antibiotics 20    DISEASE ASSESSMENT  Labs  Recent Labs   Lab Test 04/19/21  1229 04/06/21  1525   CRP <2.9 <2.9   SED 5 5     Fecal calprotectin: --  Endoscopy: ileitis with stenosis at IC valve  Enterography: Inflamed ileum, inflammatory mass, likely ileo-ileal distula. > 25cm of ileum involved.   C diff: negative (4/9/19)    sIBDQ:   IBDQ Score Date IBDQ - Total Score  (Higher score better)   9/10/2019 62   5/28/2019 56   4/24/2019 48       IBD Health Care Maintenance:    Vaccinations:  All patients on biologics should avoid live vaccines.    -- Influenza (every year)  -- TdaP (every 10 years)  -- Pneumococcal Pneumonia (once plus booster at 5 years)  -- Yearly assessment for latent Tb (verbal screening and exam, PPD or QuantiFERON-Tb testing)    One time confirmation of immunity or serologies:  -- Hepatitis A (serologies or immunizations)  -- Hepatitis B (serologies or immunizations)  -- Varicella  -- MMR  -- HPV (all aged 18-26)  -- Meningococcal meningitis (all patients at risk for meningitis)  -- Due to the immunosuppression in this patient, I would not advise administration of live vaccines such as varicella/VZV, intranasal influenza, MMR, or yellow fever vaccine (if travelling).      Bone mineral density screening   -- Recommend all patients supplement with calcium and vitamin D  -- Given prior steroid use recommend DEXA if not already done    Cancer Screening:  Colon cancer screening:  Given ileal only disease, he does not need IBD dysplasia surveillance     Skin cancer screening: Annual visual exam of skin by dermatologist since patient is immunocompromised    Depression Screening:  PHQ-2 Score:     PHQ-2 ( 1999 Pfizer) 4/20/2021 8/18/2020   Q1: Little interest or pleasure in doing things 0 0   Q2: Feeling down, depressed or hopeless 0 0   PHQ-2 Score 0 0   Q1: Little interest or pleasure in doing things - -   Q2: Feeling down, depressed or hopeless - -   PHQ-2 Score - -          Misc:  -- Avoid tobacco use  -- Avoid NSAIDs as there is potentially a 25% chance of causing an IBD flare    Return to clinic in 2-3 months    Thank you for this consultation.  It was a pleasure to participate in the care of this patient; please contact us with any further questions.  A total of 30 minutes, face to face, was spent with this patient, >50% of which was counseling regarding the above delineated issues.    Patient was seen and discussed with Dr. Wright during video visit.     Allen De La Rosa MD  [id number]      HPI:   Ventura is contacted for follow up video visit today. He is a patient with crohn's disease s/p ileocecal resection (8/2020) due to phlegmon and abscess. He last saw Dr. Wright in clinic 11/2020 and is currently on infliximab monotherapy.  He had restaging colonoscopy in February 2021 which showed Rutgeert s i2b inflammation at ileocolic anastomosis site.  He also had circumferential ulceration with inner diameter of 10 mm that was sequentially balloon dilated to 14 mm.  At the end of February his infliximab level was 1.6 and had antibodies to infliximab (20) so he was started on mercaptopurine 50 mg daily.    Today, reports overall he has been doing well with the Remicade therapy every 8 weeks however he does note worsening abdominal pain starting 8 days prior to the time his next infusion is due.  He is currently having 4-5 loose nonbloody bowel movements per day.  Urgency symptoms have been worse in the last week as he is due for an upcoming Remicade infusion.  Also notes waking in the middle the night about 2 times per week to have a bowel movement.  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  Weight and energy level have been stable.  He has not noticed any change in symptoms after starting the 6-MP.    ROS:    No fevers or chills  No weight loss  No blurry vision, double vision or change in  vision  No sore throat  No lymphadenopathy  No headache, paraesthesias, or weakness in a limb  No shortness of breath or wheezing  No chest pain or pressure  No arthralgias or myalgias  No rashes or skin changes  No odynophagia or dysphagia  No BRBPR, hematochezia, melena  No dysuria, frequency or urgency  No hot/cold intolerance or polyria  No anxiety or depression    Extra intestinal manifestations of IBD:  No uveitis/episcleritis  No aphthous ulcers   No arthritis   No erythema nodosum/pyoderma gangrenosum.     PREVIOUS ENDOSCOPY:  2/8/2021: Inflammation compatible with Rutgeert's i2b. Circumferential ulceration at the ileo-colonic anastomosis site. Estimated inner diameter of 10mm;dilated with TTS balloon dilator sequentially to 14mm.     4/11/2019: Crohn's ileitis with stenosis.     PERTINENT PAST MEDICAL HISTORY:  Past Medical History:   Diagnosis Date     Allergic rhinitis, cause unspecified     Zyrtec helps     Crohn's Colitis 4/8/2019     Unspecified otitis media     Otitis Media       PREVIOUS SURGERIES:  Past Surgical History:   Procedure Laterality Date     COLONOSCOPY N/A 4/11/2019    Procedure: COMBINED COLONOSCOPY, SINGLE OR MULTIPLE BIOPSY/POLYPECTOMY BY BIOPSY;  Surgeon: Tobin Wright MD;  [facility]     EXCISE LIP OR CHEEK FOLD  10/3/2003    Needle cautery frenulectomy.     INSERT PICC LINE Left 8/10/2020    Procedure: INSERTION, PICC @845;  Surgeon: Karan Farr MD;  Location: [facility]     IR PICC PLACEMENT > 5 YRS OF AGE  8/10/2020     PE TUBES  4/25/2006     RESECTION ILEOCOLIC N/A 8/21/2020    Procedure: Exploratory laparotomy with ileocolic resection with takedown of enterovesical fistula, and low anterior resection;  Surgeon: Brittni Robles MD;  [facility]     SIGMOIDOSCOPY FLEXIBLE N/A 8/21/2020    Procedure: Sigmoidoscopy flexible;  Surgeon: Brittni Robles MD;  [facility]     TONSILLECTOMY & ADENOIDECTOMY  4/25/2006     ALLERGIES:      Allergies   Allergen Reactions     Gadavist [Gadobutrol] Nausea     Patient could not perform timed sequences due to nausea and discomfort.     Glucagon Nausea and Vomiting     Pre medicate with lorazapam if able.       Amoxicillin      Penicillin [Penicillins]        PERTINENT MEDICATIONS:    Current Outpatient Medications:      mercaptopurine (PURINETHOL) 50 MG tablet, Take 1 tablet (50 mg) by mouth daily, Disp: 30 tablet, Rfl: 2    SOCIAL HISTORY:  Social History     Socioeconomic History     Marital status: Single     Spouse name: Not on file     Number of children: Not on file     Years of education: Not on file     Highest education level: Not on file   Occupational History     Not on file   Social Needs     Financial resource strain: Not on file     Food insecurity     Worry: Not on file     Inability: Not on file     Transportation needs     Medical: Not on file     Non-medical: Not on file   Tobacco Use     Smoking status: Never Smoker     Smokeless tobacco: Never Used   Substance and Sexual Activity     Alcohol use: Yes     Frequency: Monthly or less     Drinks per session: 1 or 2     Drug use: No     Sexual activity: Never   Lifestyle     Physical activity     Days per week: Not on file     Minutes per session: Not on file     Stress: Not on file   Relationships     Social connections     Talks on phone: Not on file     Gets together: Not on file     Attends Jehovah's witness service: Not on file     Active member of club or organization: Not on file     Attends meetings of clubs or organizations: Not on file     Relationship status: Not on file     Intimate partner violence     Fear of current or ex partner: Not on file     Emotionally abused: Not on file     Physically abused: Not on file     Forced sexual activity: Not on file   Other Topics Concern      Service Not Asked     Blood Transfusions Not Asked     Caffeine Concern Not Asked     Occupational Exposure Not Asked     Hobby Hazards Not Asked      Sleep Concern Not Asked     Stress Concern Not Asked     Weight Concern Not Asked     Special Diet Not Asked     Back Care Not Asked     Exercise Not Asked     Bike Helmet Not Asked     Seat Belt Not Asked     Self-Exams Not Asked     Parent/sibling w/ CABG, MI or angioplasty before 65F 55M? Not Asked   Social History Narrative     Not on file       FAMILY HISTORY:  Family History   Problem Relation Age of Onset     No Known Problems Mother      No Known Problems Father      Melanoma No family hx of      Skin Cancer No family hx of         No family history of IBD or colon or rectal cancer.     Past/family/social history reviewed and no changes    PHYSICAL EXAMINATION:  Vitals reviewed: Wt 86.2 kg (190 lb)   BMI 24.39 kg/m    Wt:   Wt Readings from Last 2 Encounters:   04/20/21 86.2 kg (190 lb)   04/19/21 86.5 kg (190 lb 9.6 oz)      Constitutional - general appearance is well and in no acute distress. Body habitus normal  Eyes - No redness or discharge  Respiratory - No cough, unlabored breathing  Musculoskeletal - range of motion intact: Neck and arms  Skin - No discoloration or lesions  Neurological - No tremors, headaches  Psychiatric - No anxiety, alert & oriented       Again, thank you for allowing me to participate in the care of your patient.      Sincerely,    Tobin Wright MD

## 2021-04-20 NOTE — PROGRESS NOTES
IBD CLINIC VISIT    CC/REFERRING MD:  Referred Self  REASON FOR CONSULTATION: Crohn's disease    ASSESSMENT/PLAN:  20 year old male with Crohn's disease of the ileum initially complicated by phlegmon and abscess now status post ICR.     1. Crohn's disease: Substantially improved after ileocecal resection from symptomatic standpoint.  Restaging colonoscopy 2/2021 showed inflammation and ulceration at ileo-conic anastomosis (Rutgeert's i2b) and required TTS balloon dilation for inner diameter of 10mm. Additionally, his infliximab levels were low and antibodies present so he started 6-MP. Last infliximab infusion 4/19/21. Clinically he is doing well but does note breakthrough symptoms around week #7 between infusions.     -- Infliximab level from 4/19 pending. Depending on what the level is we may need to increased infliximab dosing frequency or, if antibodies present, switch to another therapy (Cimzia or stelara)   -- continue 6-MP 50mg daily     IBD HISTORY  Age at diagnosis: 18  Extent of disease: ileal  Disease phenotype: fistulizing  Nieves-anal disease: No  Current CD medications:   - Infliximab  - 6-MP  Prior IBD surgeries: Ileocecal resection - Rush-Meaux 8/21/20.   Prior IBD Medications:   - Humira   - Ciprofloxacin  - Metronidazole  - Ciprofloxacin - disease progression despite weekly dosing      DRUG MONITORING  TPMT enzyme activity:     6-TGN/6-MMPN levels:    Biologic concentration:  8/23/19: Adalimumab: 5, no antibodies, every other week, 50mg mercaptopurine   2/18/21: Inflixiamb level 1.6; antibiotics 20    DISEASE ASSESSMENT  Labs  Recent Labs   Lab Test 04/19/21  1229 04/06/21  1525   CRP <2.9 <2.9   SED 5 5     Fecal calprotectin: --  Endoscopy: ileitis with stenosis at IC valve  Enterography: Inflamed ileum, inflammatory mass, likely ileo-ileal distula. > 25cm of ileum involved.   C diff: negative (4/9/19)    sIBDQ:   IBDQ Score Date IBDQ - Total Score  (Higher score better)   9/10/2019 62    5/28/2019 56   4/24/2019 48       IBD Health Care Maintenance:    Vaccinations:  All patients on biologics should avoid live vaccines.    -- Influenza (every year)  -- TdaP (every 10 years)  -- Pneumococcal Pneumonia (once plus booster at 5 years)  -- Yearly assessment for latent Tb (verbal screening and exam, PPD or QuantiFERON-Tb testing)    One time confirmation of immunity or serologies:  -- Hepatitis A (serologies or immunizations)  -- Hepatitis B (serologies or immunizations)  -- Varicella  -- MMR  -- HPV (all aged 18-26)  -- Meningococcal meningitis (all patients at risk for meningitis)  -- Due to the immunosuppression in this patient, I would not advise administration of live vaccines such as varicella/VZV, intranasal influenza, MMR, or yellow fever vaccine (if travelling).      Bone mineral density screening   -- Recommend all patients supplement with calcium and vitamin D  -- Given prior steroid use recommend DEXA if not already done    Cancer Screening:  Colon cancer screening:  Given ileal only disease, he does not need IBD dysplasia surveillance     Skin cancer screening: Annual visual exam of skin by dermatologist since patient is immunocompromised    Depression Screening:  PHQ-2 Score:     PHQ-2 ( 1999 Pfizer) 4/20/2021 8/18/2020   Q1: Little interest or pleasure in doing things 0 0   Q2: Feeling down, depressed or hopeless 0 0   PHQ-2 Score 0 0   Q1: Little interest or pleasure in doing things - -   Q2: Feeling down, depressed or hopeless - -   PHQ-2 Score - -         Misc:  -- Avoid tobacco use  -- Avoid NSAIDs as there is potentially a 25% chance of causing an IBD flare    Return to clinic in 2-3 months    Thank you for this consultation.  It was a pleasure to participate in the care of this patient; please contact us with any further questions.  A total of 30 minutes, face to face, was spent with this patient, >50% of which was counseling regarding the above delineated issues.    Patient was  seen and discussed with Dr. Wright during video visit.     Allen De La Rosa MD  [id number]      HPI:   Ventura is contacted for follow up video visit today. He is a patient with crohn's disease s/p ileocecal resection (8/2020) due to phlegmon and abscess. He last saw Dr. Wright in clinic 11/2020 and is currently on infliximab monotherapy.  He had restaging colonoscopy in February 2021 which showed Rutgeert s i2b inflammation at ileocolic anastomosis site.  He also had circumferential ulceration with inner diameter of 10 mm that was sequentially balloon dilated to 14 mm.  At the end of February his infliximab level was 1.6 and had antibodies to infliximab (20) so he was started on mercaptopurine 50 mg daily.    Today, reports overall he has been doing well with the Remicade therapy every 8 weeks however he does note worsening abdominal pain starting 8 days prior to the time his next infusion is due.  He is currently having 4-5 loose nonbloody bowel movements per day.  Urgency symptoms have been worse in the last week as he is due for an upcoming Remicade infusion.  Also notes waking in the middle the night about 2 times per week to have a bowel movement.  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  Weight and energy level have been stable.  He has not noticed any change in symptoms after starting the 6-MP.    ROS:    No fevers or chills  No weight loss  No blurry vision, double vision or change in vision  No sore throat  No lymphadenopathy  No headache, paraesthesias, or weakness in a limb  No shortness of breath or wheezing  No chest pain or pressure  No arthralgias or myalgias  No rashes or skin changes  No odynophagia or dysphagia  No BRBPR, hematochezia, melena  No dysuria, frequency or urgency  No hot/cold intolerance or polyria  No anxiety or depression    Extra intestinal manifestations of IBD:  No uveitis/episcleritis  No  aphthous ulcers   No arthritis   No erythema nodosum/pyoderma gangrenosum.     PREVIOUS ENDOSCOPY:  2/8/2021: Inflammation compatible with Rutgeert's i2b. Circumferential ulceration at the ileo-colonic anastomosis site. Estimated inner diameter of 10mm;dilated with TTS balloon dilator sequentially to 14mm.     4/11/2019: Crohn's ileitis with stenosis.     PERTINENT PAST MEDICAL HISTORY:  Past Medical History:   Diagnosis Date     Allergic rhinitis, cause unspecified     Zyrtec helps     Crohn's Colitis 4/8/2019     Unspecified otitis media     Otitis Media       PREVIOUS SURGERIES:  Past Surgical History:   Procedure Laterality Date     COLONOSCOPY N/A 4/11/2019    Procedure: COMBINED COLONOSCOPY, SINGLE OR MULTIPLE BIOPSY/POLYPECTOMY BY BIOPSY;  Surgeon: Tobin Wright MD;  [facility]: [facility]     EXCISE LIP OR CHEEK FOLD  10/3/2003    Needle cautery frenulectomy.     INSERT PICC LINE Left 8/10/2020    Procedure: INSERTION, PICC @845;  Surgeon: Karan Farr MD;  [facility]     IR PICC PLACEMENT > 5 YRS OF AGE  8/10/2020     PE TUBES  4/25/2006     RESECTION ILEOCOLIC N/A 8/21/2020    Procedure: Exploratory laparotomy with ileocolic resection with takedown of enterovesical fistula, and low anterior resection;  Surgeon: Brittni Robles MD;  [facility]     SIGMOIDOSCOPY FLEXIBLE N/A 8/21/2020    Procedure: Sigmoidoscopy flexible;  Surgeon: Brittni Robles MD;  [facility]     TONSILLECTOMY & ADENOIDECTOMY  4/25/2006     ALLERGIES:     Allergies   Allergen Reactions     Gadavist [Gadobutrol] Nausea     Patient could not perform timed sequences due to nausea and discomfort.     Glucagon Nausea and Vomiting     Pre medicate with lorazapam if able.       Amoxicillin      Penicillin [Penicillins]        PERTINENT MEDICATIONS:    Current Outpatient Medications:      mercaptopurine (PURINETHOL) 50 MG tablet, Take 1 tablet (50 mg) by mouth daily, Disp: 30 tablet, Rfl: 2    SOCIAL  HISTORY:  Social History     Socioeconomic History     Marital status: Single     Spouse name: Not on file     Number of children: Not on file     Years of education: Not on file     Highest education level: Not on file   Occupational History     Not on file   Social Needs     Financial resource strain: Not on file     Food insecurity     Worry: Not on file     Inability: Not on file     Transportation needs     Medical: Not on file     Non-medical: Not on file   Tobacco Use     Smoking status: Never Smoker     Smokeless tobacco: Never Used   Substance and Sexual Activity     Alcohol use: Yes     Frequency: Monthly or less     Drinks per session: 1 or 2     Drug use: No     Sexual activity: Never   Lifestyle     Physical activity     Days per week: Not on file     Minutes per session: Not on file     Stress: Not on file   Relationships     Social connections     Talks on phone: Not on file     Gets together: Not on file     Attends Jainism service: Not on file     Active member of club or organization: Not on file     Attends meetings of clubs or organizations: Not on file     Relationship status: Not on file     Intimate partner violence     Fear of current or ex partner: Not on file     Emotionally abused: Not on file     Physically abused: Not on file     Forced sexual activity: Not on file   Other Topics Concern      Service Not Asked     Blood Transfusions Not Asked     Caffeine Concern Not Asked     Occupational Exposure Not Asked     Hobby Hazards Not Asked     Sleep Concern Not Asked     Stress Concern Not Asked     Weight Concern Not Asked     Special Diet Not Asked     Back Care Not Asked     Exercise Not Asked     Bike Helmet Not Asked     Seat Belt Not Asked     Self-Exams Not Asked     Parent/sibling w/ CABG, MI or angioplasty before 65F 55M? Not Asked   Social History Narrative     Not on file       FAMILY HISTORY:  Family History   Problem Relation Age of Onset     No Known Problems Mother       No Known Problems Father      Melanoma No family hx of      Skin Cancer No family hx of         No family history of IBD or colon or rectal cancer.     Past/family/social history reviewed and no changes    PHYSICAL EXAMINATION:  Vitals reviewed: Wt 86.2 kg (190 lb)   BMI 24.39 kg/m    Wt:   Wt Readings from Last 2 Encounters:   04/20/21 86.2 kg (190 lb)   04/19/21 86.5 kg (190 lb 9.6 oz)      Constitutional - general appearance is well and in no acute distress. Body habitus normal  Eyes - No redness or discharge  Respiratory - No cough, unlabored breathing  Musculoskeletal - range of motion intact: Neck and arms  Skin - No discoloration or lesions  Neurological - No tremors, headaches  Psychiatric - No anxiety, alert & oriented

## 2021-04-20 NOTE — PATIENT INSTRUCTIONS
-- Next step depends on the Remicade level    -- If there are still antibodies to Remicade, we may have to change to a different drug. Likely Cimzia or Stelara.  Probably Stelara.     -- If the antibodies to Remicade are gone, then we will likely increase the dose of Remicade to treat your ongoing inflammation.     Follow-up in 2-3 months

## 2021-04-20 NOTE — PROGRESS NOTES
"Ventura Quiroz is a 20 year old male who is being evaluated via a billable video visit.      The patient has been notified of following:     \"This video visit will be conducted via a call between you and your physician/provider. We have found that certain health care needs can be provided without the need for an in-person physical exam.  This service lets us provide the care you need with a video conversation.  If a prescription is necessary we can send it directly to your pharmacy.  If lab work is needed we can place an order for that and you can then stop by our lab to have the test done at a later time.    If during the course of the call the physician/provider feels a video visit is not appropriate, you will not be charged for this service.\"     Patient confirmed that they are in [place] for today's visit yes.    Video-Visit Details  Type of service:  Video Visit    Video Start Time: 11:40 AM  Video End Time:  12:00 PM    Originating Location (pt. Location): [place]    Distant Location (provider location):  [facility] [place]     Platform used: [employer] Voice    Virtual visit for patient conducted via three way video conference with myself, patient and Dr. De La Rosa. I reviewed the history and abbreviated physical exam and discussed the management with Dr. De La Rosa on 4/20/2021. I reviewed the note and there are no changes to the past medical, family or social history.  A complete 10 point review of systems was obtained. Please see the HPI for pertinent positives and negatives. All other systems were reviewed and were found to be negative. I agree with the documented findings and plan of care as outlined.    Infliximab trough concentration 1.7 with antibodies of 20. Thiopurine started (50mg of . He still has some symptoms that seem to improve after infliximab infusion. He still has a mild anemia.     Next step depends on result of infliximab concentration. If persistent antibodies and no " level, will likely move away from infliximab (Cimzia or Stelara). If antibodies resolved, will increase infliximab (ongoing symptoms and anemia).      -Dr. Wright

## 2021-04-20 NOTE — NURSING NOTE
Chief Complaint   Patient presents with     Follow Up       Vitals:    04/20/21 1105   Weight: 86.2 kg (190 lb)       Body mass index is 24.39 kg/m .    Amber Mejia CMA

## 2021-04-22 LAB — LAB SCANNED RESULT: NORMAL

## 2021-04-26 ENCOUNTER — PATIENT OUTREACH (OUTPATIENT)
Dept: GASTROENTEROLOGY | Facility: CLINIC | Age: 21
End: 2021-04-26

## 2021-04-26 NOTE — CONFIDENTIAL NOTE
Tobin Wright MD  [facility]             Can we increase to 10mg q8 please     Message to Dr. Wright has pt is on 10 mg/kg every 8 weeks await response back.

## 2021-05-05 ENCOUNTER — PATIENT OUTREACH (OUTPATIENT)
Dept: GASTROENTEROLOGY | Facility: CLINIC | Age: 21
End: 2021-05-05

## 2021-05-05 NOTE — CONFIDENTIAL NOTE
Tobin Wright MD [place], Ann, RN             10mg q4... can you change letter to state q4...    Previous Messages       Edited letter and also call to Ms. Reyna to start the process for every 4 weeks 10mg/kg

## 2021-06-10 ENCOUNTER — INFUSION THERAPY VISIT (OUTPATIENT)
Dept: INFUSION THERAPY | Facility: CLINIC | Age: 21
End: 2021-06-10
Payer: COMMERCIAL

## 2021-06-10 VITALS
HEART RATE: 68 BPM | TEMPERATURE: 98.2 F | OXYGEN SATURATION: 97 % | SYSTOLIC BLOOD PRESSURE: 119 MMHG | WEIGHT: 180.3 LBS | DIASTOLIC BLOOD PRESSURE: 60 MMHG | RESPIRATION RATE: 14 BRPM | BODY MASS INDEX: 23.15 KG/M2

## 2021-06-10 DIAGNOSIS — K50.014 CROHN'S DISEASE OF SMALL INTESTINE WITH ABSCESS (H): Primary | ICD-10-CM

## 2021-06-10 DIAGNOSIS — K50.014 CROHN'S DISEASE OF SMALL INTESTINE WITH ABSCESS (H): ICD-10-CM

## 2021-06-10 DIAGNOSIS — K52.9 COLITIS: ICD-10-CM

## 2021-06-10 DIAGNOSIS — D50.9 IRON DEFICIENCY ANEMIA, UNSPECIFIED IRON DEFICIENCY ANEMIA TYPE: ICD-10-CM

## 2021-06-10 DIAGNOSIS — D50.9 IRON DEFICIENCY ANEMIA, UNSPECIFIED IRON DEFICIENCY ANEMIA TYPE: Primary | ICD-10-CM

## 2021-06-10 LAB
ALBUMIN SERPL-MCNC: 4.2 G/DL (ref 3.4–5)
ALP SERPL-CCNC: 96 U/L (ref 40–150)
ALT SERPL W P-5'-P-CCNC: 36 U/L (ref 0–70)
AST SERPL W P-5'-P-CCNC: 18 U/L (ref 0–45)
BASOPHILS # BLD AUTO: 0 10E9/L (ref 0–0.2)
BASOPHILS NFR BLD AUTO: 0.1 %
BILIRUB DIRECT SERPL-MCNC: 0.2 MG/DL (ref 0–0.2)
BILIRUB SERPL-MCNC: 0.5 MG/DL (ref 0.2–1.3)
CRP SERPL-MCNC: <2.9 MG/L (ref 0–8)
DIFFERENTIAL METHOD BLD: NORMAL
EOSINOPHIL # BLD AUTO: 0 10E9/L (ref 0–0.7)
EOSINOPHIL NFR BLD AUTO: 0.3 %
ERYTHROCYTE [DISTWIDTH] IN BLOOD BY AUTOMATED COUNT: 14.6 % (ref 10–15)
ERYTHROCYTE [SEDIMENTATION RATE] IN BLOOD BY WESTERGREN METHOD: 4 MM/H (ref 0–15)
HCT VFR BLD AUTO: 40.8 % (ref 40–53)
HGB BLD-MCNC: 14.1 G/DL (ref 13.3–17.7)
IMM GRANULOCYTES # BLD: 0 10E9/L (ref 0–0.4)
IMM GRANULOCYTES NFR BLD: 0.1 %
LYMPHOCYTES # BLD AUTO: 1.4 10E9/L (ref 0.8–5.3)
LYMPHOCYTES NFR BLD AUTO: 20.5 %
MCH RBC QN AUTO: 31.3 PG (ref 26.5–33)
MCHC RBC AUTO-ENTMCNC: 34.6 G/DL (ref 31.5–36.5)
MCV RBC AUTO: 91 FL (ref 78–100)
MONOCYTES # BLD AUTO: 0.6 10E9/L (ref 0–1.3)
MONOCYTES NFR BLD AUTO: 8.8 %
NEUTROPHILS # BLD AUTO: 4.7 10E9/L (ref 1.6–8.3)
NEUTROPHILS NFR BLD AUTO: 70.2 %
PLATELET # BLD AUTO: 227 10E9/L (ref 150–450)
PROT SERPL-MCNC: 7.7 G/DL (ref 6.8–8.8)
RBC # BLD AUTO: 4.51 10E12/L (ref 4.4–5.9)
WBC # BLD AUTO: 6.7 10E9/L (ref 4–11)

## 2021-06-10 PROCEDURE — 96413 CHEMO IV INFUSION 1 HR: CPT | Performed by: NURSE PRACTITIONER

## 2021-06-10 PROCEDURE — 86140 C-REACTIVE PROTEIN: CPT | Performed by: INTERNAL MEDICINE

## 2021-06-10 PROCEDURE — 99207 PR NO CHARGE LOS: CPT

## 2021-06-10 PROCEDURE — 85025 COMPLETE CBC W/AUTO DIFF WBC: CPT | Performed by: INTERNAL MEDICINE

## 2021-06-10 PROCEDURE — 85652 RBC SED RATE AUTOMATED: CPT | Performed by: INTERNAL MEDICINE

## 2021-06-10 PROCEDURE — 80076 HEPATIC FUNCTION PANEL: CPT | Performed by: INTERNAL MEDICINE

## 2021-06-10 PROCEDURE — 36415 COLL VENOUS BLD VENIPUNCTURE: CPT | Performed by: INTERNAL MEDICINE

## 2021-06-10 RX ORDER — DIPHENHYDRAMINE HCL 25 MG
25 CAPSULE ORAL ONCE
Status: CANCELLED
Start: 2021-06-14 | End: 2021-06-14

## 2021-06-10 RX ORDER — ACETAMINOPHEN 325 MG/1
650 TABLET ORAL ONCE
Status: CANCELLED
Start: 2021-06-14 | End: 2021-06-14

## 2021-06-10 RX ORDER — METHYLPREDNISOLONE SODIUM SUCCINATE 125 MG/2ML
125 INJECTION, POWDER, LYOPHILIZED, FOR SOLUTION INTRAMUSCULAR; INTRAVENOUS ONCE
Status: CANCELLED | OUTPATIENT
Start: 2021-06-14 | End: 2021-06-14

## 2021-06-10 RX ORDER — HEPARIN SODIUM,PORCINE 10 UNIT/ML
5 VIAL (ML) INTRAVENOUS DAILY PRN
Status: CANCELLED
Start: 2021-06-14

## 2021-06-10 RX ORDER — PNV NO.95/FERROUS FUM/FOLIC AC 28MG-0.8MG
TABLET ORAL
COMMUNITY
Start: 2020-09-16 | End: 2022-09-29

## 2021-06-10 RX ADMIN — Medication 250 ML: at 14:52

## 2021-06-10 NOTE — PROGRESS NOTES
Infusion Nursing Note:  Ventura Quiroz presents today for Remicade infusion.    Patient seen by provider today: No   present during visit today: Not Applicable.    Note: N/A.      Intravenous Access:  Peripheral IV placed.    Treatment Conditions:  Biological Infusion Checklist:  ~~~ NOTE: If the patient answers yes to any of the questions below, hold the infusion and contact ordering provider or on-call provider.    1. Have you recently had an elevated temperature, fever, chills, productive cough, coughing for 3 weeks or longer or hemoptysis, abnormal vital signs, night sweats,  chest pain or have you noticed a decrease in your appetite, unexplained weight loss or fatigue? No  2. Do you have any open wounds or new incisions? No  3. Do you have any recent or upcoming hospitalizations, surgeries or dental procedures? No  4. Do you currently have or recently have had any signs of illness or infection or are you on any antibiotics? No  5. Have you had any new, sudden or worsening abdominal pain? No  6. Have you or anyone in your household received a live vaccination in the past 4 weeks? Please note:  No live vaccines while on biologic/chemotherapy until 6 months after the last treatment.  Patient can receive the flu vaccine (shot only) and the pneumovax.  It is optimal for the patient to get these vaccines mid cycle, but they can be given at any time as long as it is not on the day of the infusion. No  7. Have you recently been diagnosed with any new nervous system diseases (ie. Multiple sclerosis, Guillain [place], seizures, neurological changes) or cancer diagnosis? No  8. Are you on any form of radiation or chemotherapy? No  9. Have there been any other new onset medical symptoms? No    Post Infusion Assessment:  Patient tolerated infusion without incident.  Blood return noted pre and post infusion.  Site patent and intact, free from redness, edema or discomfort.  No evidence of extravasations.  Access  discontinued per protocol.       Discharge Plan:   AVS to patient via MYCHART.  Patient will return 8/5/21 for next appointment.   Patient discharged in stable condition accompanied by: self.  Departure Mode: Ambulatory.      Danae Pace RN

## 2021-06-16 ENCOUNTER — PATIENT OUTREACH (OUTPATIENT)
Dept: GASTROENTEROLOGY | Facility: CLINIC | Age: 21
End: 2021-06-16

## 2021-06-16 DIAGNOSIS — K50.012 CROHN'S DISEASE OF SMALL INTESTINE WITH INTESTINAL OBSTRUCTION (H): Primary | ICD-10-CM

## 2021-06-16 RX ORDER — ACETAMINOPHEN 325 MG/1
650 TABLET ORAL ONCE
Status: CANCELLED
Start: 2021-06-16 | End: 2021-06-16

## 2021-06-16 RX ORDER — EPINEPHRINE 1 MG/ML
0.3 INJECTION, SOLUTION, CONCENTRATE INTRAVENOUS EVERY 5 MIN PRN
Status: CANCELLED | OUTPATIENT
Start: 2021-06-16

## 2021-06-16 RX ORDER — ALBUTEROL SULFATE 90 UG/1
1-2 AEROSOL, METERED RESPIRATORY (INHALATION)
Status: CANCELLED
Start: 2021-06-16

## 2021-06-16 RX ORDER — ALBUTEROL SULFATE 0.83 MG/ML
2.5 SOLUTION RESPIRATORY (INHALATION)
Status: CANCELLED | OUTPATIENT
Start: 2021-06-16

## 2021-06-16 RX ORDER — METHYLPREDNISOLONE SODIUM SUCCINATE 125 MG/2ML
125 INJECTION, POWDER, LYOPHILIZED, FOR SOLUTION INTRAMUSCULAR; INTRAVENOUS
Status: CANCELLED
Start: 2021-06-16

## 2021-06-16 RX ORDER — NALOXONE HYDROCHLORIDE 0.4 MG/ML
0.2 INJECTION, SOLUTION INTRAMUSCULAR; INTRAVENOUS; SUBCUTANEOUS
Status: CANCELLED | OUTPATIENT
Start: 2021-06-16

## 2021-06-16 RX ORDER — MEPERIDINE HYDROCHLORIDE 25 MG/ML
25 INJECTION INTRAMUSCULAR; INTRAVENOUS; SUBCUTANEOUS EVERY 30 MIN PRN
Status: CANCELLED | OUTPATIENT
Start: 2021-06-16

## 2021-06-16 RX ORDER — DIPHENHYDRAMINE HYDROCHLORIDE 50 MG/ML
50 INJECTION INTRAMUSCULAR; INTRAVENOUS
Status: CANCELLED
Start: 2021-06-16

## 2021-06-16 RX ORDER — DIPHENHYDRAMINE HCL 25 MG
25 CAPSULE ORAL ONCE
Status: CANCELLED
Start: 2021-06-16 | End: 2021-06-16

## 2021-06-16 NOTE — PROGRESS NOTES
Due to insurance change insurance mandating switch to inflectra     New therapy plan for inflectra 10mg/kg every 28 days   Pt informed of insurance mandate to inflectra     Ms Axel from infusion finance will submit and update  Will also send colonoscopy report, path and last level

## 2021-08-05 ENCOUNTER — INFUSION THERAPY VISIT (OUTPATIENT)
Dept: INFUSION THERAPY | Facility: CLINIC | Age: 21
End: 2021-08-05
Payer: COMMERCIAL

## 2021-08-05 VITALS
HEART RATE: 56 BPM | OXYGEN SATURATION: 100 % | BODY MASS INDEX: 22.87 KG/M2 | SYSTOLIC BLOOD PRESSURE: 138 MMHG | WEIGHT: 178.1 LBS | TEMPERATURE: 98 F | DIASTOLIC BLOOD PRESSURE: 67 MMHG | RESPIRATION RATE: 16 BRPM

## 2021-08-05 DIAGNOSIS — K52.9 COLITIS: ICD-10-CM

## 2021-08-05 DIAGNOSIS — D50.9 IRON DEFICIENCY ANEMIA, UNSPECIFIED IRON DEFICIENCY ANEMIA TYPE: Primary | ICD-10-CM

## 2021-08-05 DIAGNOSIS — K50.014 CROHN'S DISEASE OF SMALL INTESTINE WITH ABSCESS (H): ICD-10-CM

## 2021-08-05 PROCEDURE — 99207 PR NO CHARGE LOS: CPT

## 2021-08-05 PROCEDURE — 96413 CHEMO IV INFUSION 1 HR: CPT | Performed by: NURSE PRACTITIONER

## 2021-08-05 RX ORDER — ALBUTEROL SULFATE 90 UG/1
1-2 AEROSOL, METERED RESPIRATORY (INHALATION)
Status: CANCELLED
Start: 2021-08-27

## 2021-08-05 RX ORDER — ALBUTEROL SULFATE 0.83 MG/ML
2.5 SOLUTION RESPIRATORY (INHALATION)
Status: CANCELLED | OUTPATIENT
Start: 2021-08-27

## 2021-08-05 RX ORDER — METHYLPREDNISOLONE SODIUM SUCCINATE 125 MG/2ML
125 INJECTION, POWDER, LYOPHILIZED, FOR SOLUTION INTRAMUSCULAR; INTRAVENOUS
Status: CANCELLED
Start: 2021-08-27

## 2021-08-05 RX ORDER — NALOXONE HYDROCHLORIDE 0.4 MG/ML
0.2 INJECTION, SOLUTION INTRAMUSCULAR; INTRAVENOUS; SUBCUTANEOUS
Status: CANCELLED | OUTPATIENT
Start: 2021-08-27

## 2021-08-05 RX ORDER — EPINEPHRINE 1 MG/ML
0.3 INJECTION, SOLUTION INTRAMUSCULAR; SUBCUTANEOUS EVERY 5 MIN PRN
Status: CANCELLED | OUTPATIENT
Start: 2021-08-27

## 2021-08-05 RX ORDER — MEPERIDINE HYDROCHLORIDE 25 MG/ML
25 INJECTION INTRAMUSCULAR; INTRAVENOUS; SUBCUTANEOUS EVERY 30 MIN PRN
Status: CANCELLED | OUTPATIENT
Start: 2021-08-27

## 2021-08-05 RX ORDER — DIPHENHYDRAMINE HCL 25 MG
25 CAPSULE ORAL ONCE
Status: CANCELLED
Start: 2021-08-27 | End: 2021-08-27

## 2021-08-05 RX ORDER — DIPHENHYDRAMINE HYDROCHLORIDE 50 MG/ML
50 INJECTION INTRAMUSCULAR; INTRAVENOUS
Status: CANCELLED
Start: 2021-08-27

## 2021-08-05 RX ORDER — ACETAMINOPHEN 325 MG/1
650 TABLET ORAL ONCE
Status: CANCELLED
Start: 2021-08-27 | End: 2021-08-27

## 2021-08-05 RX ADMIN — Medication 250 ML: at 14:22

## 2021-08-05 ASSESSMENT — PAIN SCALES - GENERAL: PAINLEVEL: NO PAIN (0)

## 2021-08-05 NOTE — PROGRESS NOTES
Infusion Nursing Note:  Ventura Quiroz presents today for Inflectra (first dose of biosimilar, has been on Remicade previously).    Patient seen by provider today: No   present during visit today: Not Applicable.    Note: N/A.  Patient did meet criteria for an asymptomatic covid-19 PCR test in infusion today. Patient declined the covid-19 test.    Intravenous Access:  Peripheral IV placed.    Treatment Conditions:  Biological Infusion Checklist:  ~~~ NOTE: If the patient answers yes to any of the questions below, hold the infusion and contact ordering provider or on-call provider.    1. Have you recently had an elevated temperature, fever, chills, productive cough, coughing for 3 weeks or longer or hemoptysis, abnormal vital signs, night sweats,  chest pain or have you noticed a decrease in your appetite, unexplained weight loss or fatigue? No  2. Do you have any open wounds or new incisions? No  3. Do you have any recent or upcoming hospitalizations, surgeries or dental procedures? No  4. Do you currently have or recently have had any signs of illness or infection or are you on any antibiotics? No  5. Have you had any new, sudden or worsening abdominal pain? No  6. Have you or anyone in your household received a live vaccination in the past 4 weeks? Please note:  No live vaccines while on biologic/chemotherapy until 6 months after the last treatment.  Patient can receive the flu vaccine (shot only) and the pneumovax.  It is optimal for the patient to get these vaccines mid cycle, but they can be given at any time as long as it is not on the day of the infusion. No  7. Have you recently been diagnosed with any new nervous system diseases (ie. Multiple sclerosis, Guillain [place], seizures, neurological changes) or cancer diagnosis? No  8. Are you on any form of radiation or chemotherapy? No  9. Are you pregnant or breast feeding or do you have plans of pregnancy in the future? No  10. Have you been having  any signs of worsening depression or suicidal ideations?  (benlysta only) No  11. Have there been any other new onset medical symptoms? No      Post Infusion Assessment:  Patient tolerated infusion without incident.  Blood return noted pre and post infusion.  Site patent and intact, free from redness, edema or discomfort.  No evidence of extravasations.  Access discontinued per protocol.       Discharge Plan:   Patient will return 9/30/2021 for next appointment.   Patient discharged in stable condition accompanied by: self.  Departure Mode: Ambulatory.    Valeria Scott RN-BSN, PHN, OCN  [facility]

## 2021-08-27 ENCOUNTER — DOCUMENTATION ONLY (OUTPATIENT)
Dept: GASTROENTEROLOGY | Facility: CLINIC | Age: 21
End: 2021-08-27

## 2021-08-27 NOTE — PROGRESS NOTES
We received notification from the infusion finance team that 10 mg/kg every 4 weeks for infliximab has been denied. The original plan was for clinical re-assessment at this time, however, it appears Ventura has been getting infliximab every 8 weeks. Therefore, we will continue with the appeal process for the more frequent dosing and plan for the clinical re-evaluation in 3 months.     Per denilson Levine to use letter from 5/5. Typo in first paragraph reflecting every 8 week dosing corrected to every four weeks. The therapy plan reflects this dose. Infusion finance team updated.

## 2021-08-31 ENCOUNTER — TELEPHONE (OUTPATIENT)
Dept: GASTROENTEROLOGY | Facility: CLINIC | Age: 21
End: 2021-08-31

## 2021-08-31 NOTE — TELEPHONE ENCOUNTER
Representative from [facility] contacted the PA team in regards to infliximab appeal. The appeal has been approved for 6 months.

## 2021-09-01 NOTE — TELEPHONE ENCOUNTER
I connected with [employer] from the infusion finance team. This is his secondary plan - - we will await a determination from R.

## 2021-09-03 ENCOUNTER — PATIENT OUTREACH (OUTPATIENT)
Dept: GASTROENTEROLOGY | Facility: CLINIC | Age: 21
End: 2021-09-03

## 2021-09-03 NOTE — TELEPHONE ENCOUNTER
Just wanted to let you know I received approval for the every four week dosing of Inflectra   Patient voice mail not setup  Talked to mother and she will call [facility]  Also contacted [facility] to request pt be moved up asap    states schedule out until September 28 but will place pt on the cancellation list and will discuss with mother when she calls.

## 2021-09-17 ENCOUNTER — PATIENT OUTREACH (OUTPATIENT)
Dept: GASTROENTEROLOGY | Facility: CLINIC | Age: 21
End: 2021-09-17

## 2021-09-30 ENCOUNTER — LAB (OUTPATIENT)
Dept: LAB | Facility: CLINIC | Age: 21
End: 2021-09-30
Payer: COMMERCIAL

## 2021-09-30 ENCOUNTER — INFUSION THERAPY VISIT (OUTPATIENT)
Dept: INFUSION THERAPY | Facility: CLINIC | Age: 21
End: 2021-09-30
Payer: COMMERCIAL

## 2021-09-30 VITALS
OXYGEN SATURATION: 99 % | DIASTOLIC BLOOD PRESSURE: 61 MMHG | WEIGHT: 189.4 LBS | RESPIRATION RATE: 16 BRPM | TEMPERATURE: 97.4 F | SYSTOLIC BLOOD PRESSURE: 115 MMHG | BODY MASS INDEX: 24.32 KG/M2 | HEART RATE: 84 BPM

## 2021-09-30 DIAGNOSIS — K50.014 CROHN'S DISEASE OF SMALL INTESTINE WITH ABSCESS (H): ICD-10-CM

## 2021-09-30 DIAGNOSIS — D50.9 IRON DEFICIENCY ANEMIA, UNSPECIFIED IRON DEFICIENCY ANEMIA TYPE: Primary | ICD-10-CM

## 2021-09-30 DIAGNOSIS — K52.9 COLITIS: ICD-10-CM

## 2021-09-30 LAB
ALBUMIN SERPL-MCNC: 4 G/DL (ref 3.4–5)
ALP SERPL-CCNC: 84 U/L (ref 40–150)
ALT SERPL W P-5'-P-CCNC: 36 U/L (ref 0–70)
AST SERPL W P-5'-P-CCNC: 25 U/L (ref 0–45)
BASOPHILS # BLD AUTO: 0 10E3/UL (ref 0–0.2)
BASOPHILS NFR BLD AUTO: 0 %
BILIRUB DIRECT SERPL-MCNC: 0.1 MG/DL (ref 0–0.2)
BILIRUB SERPL-MCNC: 0.5 MG/DL (ref 0.2–1.3)
CRP SERPL-MCNC: <2.9 MG/L (ref 0–8)
EOSINOPHIL # BLD AUTO: 0.1 10E3/UL (ref 0–0.7)
EOSINOPHIL NFR BLD AUTO: 1 %
ERYTHROCYTE [DISTWIDTH] IN BLOOD BY AUTOMATED COUNT: 13.3 % (ref 10–15)
ERYTHROCYTE [SEDIMENTATION RATE] IN BLOOD BY WESTERGREN METHOD: 7 MM/HR (ref 0–15)
HCT VFR BLD AUTO: 41.3 % (ref 40–53)
HGB BLD-MCNC: 14.1 G/DL (ref 13.3–17.7)
HOLD SPECIMEN: NORMAL
IMM GRANULOCYTES # BLD: 0 10E3/UL
IMM GRANULOCYTES NFR BLD: 0 %
LYMPHOCYTES # BLD AUTO: 1.4 10E3/UL (ref 0.8–5.3)
LYMPHOCYTES NFR BLD AUTO: 18 %
MCH RBC QN AUTO: 30.7 PG (ref 26.5–33)
MCHC RBC AUTO-ENTMCNC: 34.1 G/DL (ref 31.5–36.5)
MCV RBC AUTO: 90 FL (ref 78–100)
MONOCYTES # BLD AUTO: 0.7 10E3/UL (ref 0–1.3)
MONOCYTES NFR BLD AUTO: 9 %
NEUTROPHILS # BLD AUTO: 5.5 10E3/UL (ref 1.6–8.3)
NEUTROPHILS NFR BLD AUTO: 72 %
NRBC # BLD AUTO: 0 10E3/UL
NRBC BLD AUTO-RTO: 0 /100
PLATELET # BLD AUTO: 196 10E3/UL (ref 150–450)
PROT SERPL-MCNC: 7.9 G/DL (ref 6.8–8.8)
RBC # BLD AUTO: 4.6 10E6/UL (ref 4.4–5.9)
WBC # BLD AUTO: 7.7 10E3/UL (ref 4–11)

## 2021-09-30 PROCEDURE — 80076 HEPATIC FUNCTION PANEL: CPT | Performed by: INTERNAL MEDICINE

## 2021-09-30 PROCEDURE — 96413 CHEMO IV INFUSION 1 HR: CPT | Performed by: NURSE PRACTITIONER

## 2021-09-30 PROCEDURE — 86140 C-REACTIVE PROTEIN: CPT | Performed by: INTERNAL MEDICINE

## 2021-09-30 PROCEDURE — 36415 COLL VENOUS BLD VENIPUNCTURE: CPT | Performed by: INTERNAL MEDICINE

## 2021-09-30 PROCEDURE — 85025 COMPLETE CBC W/AUTO DIFF WBC: CPT | Performed by: INTERNAL MEDICINE

## 2021-09-30 PROCEDURE — 85652 RBC SED RATE AUTOMATED: CPT | Performed by: INTERNAL MEDICINE

## 2021-09-30 PROCEDURE — 99207 PR NO CHARGE LOS: CPT

## 2021-09-30 RX ORDER — ALBUTEROL SULFATE 90 UG/1
1-2 AEROSOL, METERED RESPIRATORY (INHALATION)
Status: CANCELLED
Start: 2021-10-22

## 2021-09-30 RX ORDER — DIPHENHYDRAMINE HCL 25 MG
25 CAPSULE ORAL ONCE
Status: CANCELLED
Start: 2021-10-22 | End: 2021-10-22

## 2021-09-30 RX ORDER — METHYLPREDNISOLONE SODIUM SUCCINATE 125 MG/2ML
125 INJECTION, POWDER, LYOPHILIZED, FOR SOLUTION INTRAMUSCULAR; INTRAVENOUS
Status: CANCELLED
Start: 2021-10-22

## 2021-09-30 RX ORDER — MEPERIDINE HYDROCHLORIDE 25 MG/ML
25 INJECTION INTRAMUSCULAR; INTRAVENOUS; SUBCUTANEOUS EVERY 30 MIN PRN
Status: CANCELLED | OUTPATIENT
Start: 2021-10-22

## 2021-09-30 RX ORDER — NALOXONE HYDROCHLORIDE 0.4 MG/ML
0.2 INJECTION, SOLUTION INTRAMUSCULAR; INTRAVENOUS; SUBCUTANEOUS
Status: CANCELLED | OUTPATIENT
Start: 2021-10-22

## 2021-09-30 RX ORDER — ALBUTEROL SULFATE 0.83 MG/ML
2.5 SOLUTION RESPIRATORY (INHALATION)
Status: CANCELLED | OUTPATIENT
Start: 2021-10-22

## 2021-09-30 RX ORDER — ACETAMINOPHEN 325 MG/1
650 TABLET ORAL ONCE
Status: CANCELLED
Start: 2021-10-22 | End: 2021-10-22

## 2021-09-30 RX ORDER — EPINEPHRINE 1 MG/ML
0.3 INJECTION, SOLUTION INTRAMUSCULAR; SUBCUTANEOUS EVERY 5 MIN PRN
Status: CANCELLED | OUTPATIENT
Start: 2021-10-22

## 2021-09-30 RX ORDER — DIPHENHYDRAMINE HYDROCHLORIDE 50 MG/ML
50 INJECTION INTRAMUSCULAR; INTRAVENOUS
Status: CANCELLED
Start: 2021-10-22

## 2021-09-30 RX ORDER — EPINEPHRINE 1 MG/ML
0.3 INJECTION, SOLUTION, CONCENTRATE INTRAVENOUS EVERY 5 MIN PRN
Status: CANCELLED | OUTPATIENT
Start: 2021-10-22

## 2021-09-30 RX ADMIN — Medication 250 ML: at 14:04

## 2021-09-30 ASSESSMENT — PAIN SCALES - GENERAL: PAINLEVEL: NO PAIN (0)

## 2021-09-30 NOTE — PROGRESS NOTES
Infusion Nursing Note:  Ventura Quiroz presents today for Inflectra.    Patient seen by provider today: No   present during visit today: Not Applicable.    Note: The pt reports tolerating his infusions. He denies any medical concerns.      Intravenous Access:  Peripheral IV placed.    Treatment Conditions:  Biological Infusion Checklist:  ~~~ NOTE: If the patient answers yes to any of the questions below, hold the infusion and contact ordering provider or on-call provider.    1. Have you recently had an elevated temperature, fever, chills, productive cough, coughing for 3 weeks or longer or hemoptysis, abnormal vital signs, night sweats,  chest pain or have you noticed a decrease in your appetite, unexplained weight loss or fatigue? No  2. Do you have any open wounds or new incisions? No  3. Do you have any recent or upcoming hospitalizations, surgeries or dental procedures? No  4. Do you currently have or recently have had any signs of illness or infection or are you on any antibiotics? No  5. Have you had any new, sudden or worsening abdominal pain? No  6. Have you or anyone in your household received a live vaccination in the past 4 weeks? Please note:  No live vaccines while on biologic/chemotherapy until 6 months after the last treatment.  Patient can receive the flu vaccine (shot only) and the pneumovax.  It is optimal for the patient to get these vaccines mid cycle, but they can be given at any time as long as it is not on the day of the infusion. No  7. Have you recently been diagnosed with any new nervous system diseases (ie. Multiple sclerosis, Guillain [place], seizures, neurological changes) or cancer diagnosis? No  8. Are you on any form of radiation or chemotherapy? No  9. Are you pregnant or breast feeding or do you have plans of pregnancy in the future? No  10. Have you been having any signs of worsening depression or suicidal ideations?  (benlysta only) No  11. Have there been any other new  onset medical symptoms? No        Post Infusion Assessment:  Patient tolerated infusion without incident.  Site patent and intact, free from redness, edema or discomfort.  No evidence of extravasations.  Access discontinued per protocol.  Biologic Infusion Post Education: Call the triage nurse at your clinic or seek medical attention if you have chills and/or temperature greater than or equal to 100.5, uncontrolled nausea/vomiting, diarrhea, constipation, dizziness, shortness of breath, chest pain, heart palpitations, weakness or any other new or concerning symptoms, questions or concerns.  You cannot have any live virus vaccines prior to or during treatment or up to 6 months post infusion.  If you have an upcoming surgery, medical procedure or dental procedure during treatment, this should be discussed with your ordering physician and your surgeon/dentist.  If you are having any concerning symptom, if you are unsure if you should get your next infusion or wish to speak to a provider before your next infusion, please call your care coordinator or triage nurse at your clinic to notify them so we can adequately serve you.     Patient will return 10/28/21 for next appointment.     Report given to Valeria HARRIS RN.     Suzanne Sumner RN      Patient tolerated infusion without incident.    Valeria Scott RN-BSN, PHN, OCN  [facility]

## 2021-10-23 ENCOUNTER — HEALTH MAINTENANCE LETTER (OUTPATIENT)
Age: 21
End: 2021-10-23

## 2021-10-28 ENCOUNTER — INFUSION THERAPY VISIT (OUTPATIENT)
Dept: INFUSION THERAPY | Facility: CLINIC | Age: 21
End: 2021-10-28
Payer: COMMERCIAL

## 2021-10-28 VITALS
RESPIRATION RATE: 16 BRPM | SYSTOLIC BLOOD PRESSURE: 138 MMHG | TEMPERATURE: 98 F | BODY MASS INDEX: 24.01 KG/M2 | DIASTOLIC BLOOD PRESSURE: 71 MMHG | OXYGEN SATURATION: 100 % | HEART RATE: 91 BPM | WEIGHT: 187 LBS

## 2021-10-28 DIAGNOSIS — K52.9 COLITIS: ICD-10-CM

## 2021-10-28 DIAGNOSIS — D50.9 IRON DEFICIENCY ANEMIA, UNSPECIFIED IRON DEFICIENCY ANEMIA TYPE: Primary | ICD-10-CM

## 2021-10-28 DIAGNOSIS — K50.014 CROHN'S DISEASE OF SMALL INTESTINE WITH ABSCESS (H): ICD-10-CM

## 2021-10-28 PROCEDURE — 99207 PR NO CHARGE LOS: CPT

## 2021-10-28 PROCEDURE — 96413 CHEMO IV INFUSION 1 HR: CPT | Performed by: NURSE PRACTITIONER

## 2021-10-28 RX ORDER — METHYLPREDNISOLONE SODIUM SUCCINATE 125 MG/2ML
125 INJECTION, POWDER, LYOPHILIZED, FOR SOLUTION INTRAMUSCULAR; INTRAVENOUS
Status: CANCELLED
Start: 2021-11-25

## 2021-10-28 RX ORDER — EPINEPHRINE 1 MG/ML
0.3 INJECTION, SOLUTION INTRAMUSCULAR; SUBCUTANEOUS EVERY 5 MIN PRN
Status: CANCELLED | OUTPATIENT
Start: 2021-11-25

## 2021-10-28 RX ORDER — ALBUTEROL SULFATE 0.83 MG/ML
2.5 SOLUTION RESPIRATORY (INHALATION)
Status: CANCELLED | OUTPATIENT
Start: 2021-11-25

## 2021-10-28 RX ORDER — ACETAMINOPHEN 325 MG/1
650 TABLET ORAL ONCE
Status: CANCELLED
Start: 2021-11-25 | End: 2021-11-25

## 2021-10-28 RX ORDER — NALOXONE HYDROCHLORIDE 0.4 MG/ML
0.2 INJECTION, SOLUTION INTRAMUSCULAR; INTRAVENOUS; SUBCUTANEOUS
Status: CANCELLED | OUTPATIENT
Start: 2021-11-25

## 2021-10-28 RX ORDER — DIPHENHYDRAMINE HYDROCHLORIDE 50 MG/ML
50 INJECTION INTRAMUSCULAR; INTRAVENOUS
Status: CANCELLED
Start: 2021-11-25

## 2021-10-28 RX ORDER — MEPERIDINE HYDROCHLORIDE 25 MG/ML
25 INJECTION INTRAMUSCULAR; INTRAVENOUS; SUBCUTANEOUS EVERY 30 MIN PRN
Status: CANCELLED | OUTPATIENT
Start: 2021-11-25

## 2021-10-28 RX ORDER — DIPHENHYDRAMINE HCL 25 MG
25 CAPSULE ORAL ONCE
Status: CANCELLED
Start: 2021-11-25 | End: 2021-11-25

## 2021-10-28 RX ORDER — ALBUTEROL SULFATE 90 UG/1
1-2 AEROSOL, METERED RESPIRATORY (INHALATION)
Status: CANCELLED
Start: 2021-11-25

## 2021-10-28 RX ADMIN — Medication 250 ML: at 13:19

## 2021-10-28 NOTE — PROGRESS NOTES
Infusion Nursing Note:  Ventura Quiroz presents today for Inflectra.    Patient seen by provider today: No   present during visit today: Not Applicable.    Note: N/A.      Intravenous Access:  Peripheral IV placed.    Treatment Conditions:  Biological Infusion Checklist:  ~~~ NOTE: If the patient answers yes to any of the questions below, hold the infusion and contact ordering provider or on-call provider.    1. Have you recently had an elevated temperature, fever, chills, productive cough, coughing for 3 weeks or longer or hemoptysis, abnormal vital signs, night sweats,  chest pain or have you noticed a decrease in your appetite, unexplained weight loss or fatigue? No  2. Do you have any open wounds or new incisions? No  3. Do you have any recent or upcoming hospitalizations, surgeries or dental procedures? No  4. Do you currently have or recently have had any signs of illness or infection or are you on any antibiotics? No  5. Have you had any new, sudden or worsening abdominal pain? No  6. Have you or anyone in your household received a live vaccination in the past 4 weeks? Please note:  No live vaccines while on biologic/chemotherapy until 6 months after the last treatment.  Patient can receive the flu vaccine (shot only) and the pneumovax.  It is optimal for the patient to get these vaccines mid cycle, but they can be given at any time as long as it is not on the day of the infusion. No  7. Have you recently been diagnosed with any new nervous system diseases (ie. Multiple sclerosis, Guillain [place], seizures, neurological changes) or cancer diagnosis? No  8. Are you on any form of radiation or chemotherapy? No  9. Have you been having any signs of worsening depression or suicidal ideations?  (benlysta only) No  10. Have there been any other new onset medical symptoms? No        Post Infusion Assessment:  Patient tolerated infusion without incident.  Site patent and intact, free from redness, edema or  discomfort.  No evidence of extravasations.  Access discontinued per protocol.       Discharge Plan:   Departure Mode: Ambulatory.  RTC in 4 wks as scheduled.  Patient has Nov & Dec infusions already scheduled    Karen Lui RN

## 2021-11-26 ENCOUNTER — INFUSION THERAPY VISIT (OUTPATIENT)
Dept: INFUSION THERAPY | Facility: CLINIC | Age: 21
End: 2021-11-26
Payer: COMMERCIAL

## 2021-11-26 ENCOUNTER — LAB (OUTPATIENT)
Dept: LAB | Facility: CLINIC | Age: 21
End: 2021-11-26
Payer: COMMERCIAL

## 2021-11-26 VITALS
RESPIRATION RATE: 16 BRPM | DIASTOLIC BLOOD PRESSURE: 56 MMHG | WEIGHT: 175 LBS | TEMPERATURE: 98.1 F | OXYGEN SATURATION: 98 % | BODY MASS INDEX: 22.47 KG/M2 | SYSTOLIC BLOOD PRESSURE: 143 MMHG | HEART RATE: 86 BPM

## 2021-11-26 DIAGNOSIS — D50.9 IRON DEFICIENCY ANEMIA, UNSPECIFIED IRON DEFICIENCY ANEMIA TYPE: Primary | ICD-10-CM

## 2021-11-26 DIAGNOSIS — K50.014 CROHN'S DISEASE OF SMALL INTESTINE WITH ABSCESS (H): ICD-10-CM

## 2021-11-26 DIAGNOSIS — K52.9 COLITIS: ICD-10-CM

## 2021-11-26 LAB
ALBUMIN SERPL-MCNC: 4 G/DL (ref 3.4–5)
ALP SERPL-CCNC: 92 U/L (ref 40–150)
ALT SERPL W P-5'-P-CCNC: 27 U/L (ref 0–70)
AST SERPL W P-5'-P-CCNC: 15 U/L (ref 0–45)
BASOPHILS # BLD AUTO: 0 10E3/UL (ref 0–0.2)
BASOPHILS NFR BLD AUTO: 0 %
BILIRUB DIRECT SERPL-MCNC: 0.2 MG/DL (ref 0–0.2)
BILIRUB SERPL-MCNC: 0.7 MG/DL (ref 0.2–1.3)
CRP SERPL-MCNC: <2.9 MG/L (ref 0–8)
EOSINOPHIL # BLD AUTO: 0 10E3/UL (ref 0–0.7)
EOSINOPHIL NFR BLD AUTO: 0 %
ERYTHROCYTE [DISTWIDTH] IN BLOOD BY AUTOMATED COUNT: 13.4 % (ref 10–15)
ERYTHROCYTE [SEDIMENTATION RATE] IN BLOOD BY WESTERGREN METHOD: 4 MM/HR (ref 0–15)
HCT VFR BLD AUTO: 46 % (ref 40–53)
HGB BLD-MCNC: 15.3 G/DL (ref 13.3–17.7)
HOLD SPECIMEN: NORMAL
IMM GRANULOCYTES # BLD: 0 10E3/UL
IMM GRANULOCYTES NFR BLD: 0 %
LYMPHOCYTES # BLD AUTO: 1.6 10E3/UL (ref 0.8–5.3)
LYMPHOCYTES NFR BLD AUTO: 18 %
MCH RBC QN AUTO: 30.5 PG (ref 26.5–33)
MCHC RBC AUTO-ENTMCNC: 33.3 G/DL (ref 31.5–36.5)
MCV RBC AUTO: 92 FL (ref 78–100)
MONOCYTES # BLD AUTO: 0.8 10E3/UL (ref 0–1.3)
MONOCYTES NFR BLD AUTO: 8 %
NEUTROPHILS # BLD AUTO: 6.8 10E3/UL (ref 1.6–8.3)
NEUTROPHILS NFR BLD AUTO: 74 %
NRBC # BLD AUTO: 0 10E3/UL
NRBC BLD AUTO-RTO: 0 /100
PLATELET # BLD AUTO: 205 10E3/UL (ref 150–450)
PROT SERPL-MCNC: 7.9 G/DL (ref 6.8–8.8)
RBC # BLD AUTO: 5.02 10E6/UL (ref 4.4–5.9)
WBC # BLD AUTO: 9.3 10E3/UL (ref 4–11)

## 2021-11-26 PROCEDURE — 36415 COLL VENOUS BLD VENIPUNCTURE: CPT | Performed by: INTERNAL MEDICINE

## 2021-11-26 PROCEDURE — 85025 COMPLETE CBC W/AUTO DIFF WBC: CPT | Performed by: INTERNAL MEDICINE

## 2021-11-26 PROCEDURE — 85652 RBC SED RATE AUTOMATED: CPT | Performed by: INTERNAL MEDICINE

## 2021-11-26 PROCEDURE — 99207 PR NO CHARGE LOS: CPT

## 2021-11-26 PROCEDURE — 80076 HEPATIC FUNCTION PANEL: CPT | Performed by: INTERNAL MEDICINE

## 2021-11-26 PROCEDURE — 86140 C-REACTIVE PROTEIN: CPT | Performed by: INTERNAL MEDICINE

## 2021-11-26 PROCEDURE — 96413 CHEMO IV INFUSION 1 HR: CPT | Performed by: NURSE PRACTITIONER

## 2021-11-26 RX ORDER — ALBUTEROL SULFATE 0.83 MG/ML
2.5 SOLUTION RESPIRATORY (INHALATION)
Status: CANCELLED | OUTPATIENT
Start: 2021-12-23

## 2021-11-26 RX ORDER — MEPERIDINE HYDROCHLORIDE 25 MG/ML
25 INJECTION INTRAMUSCULAR; INTRAVENOUS; SUBCUTANEOUS EVERY 30 MIN PRN
Status: CANCELLED | OUTPATIENT
Start: 2021-12-23

## 2021-11-26 RX ORDER — ALBUTEROL SULFATE 90 UG/1
1-2 AEROSOL, METERED RESPIRATORY (INHALATION)
Status: CANCELLED
Start: 2021-12-23

## 2021-11-26 RX ORDER — DIPHENHYDRAMINE HCL 25 MG
25 CAPSULE ORAL ONCE
Status: CANCELLED
Start: 2021-12-23 | End: 2021-12-23

## 2021-11-26 RX ORDER — ACETAMINOPHEN 325 MG/1
650 TABLET ORAL ONCE
Status: CANCELLED
Start: 2021-12-23 | End: 2021-12-23

## 2021-11-26 RX ORDER — NALOXONE HYDROCHLORIDE 0.4 MG/ML
0.2 INJECTION, SOLUTION INTRAMUSCULAR; INTRAVENOUS; SUBCUTANEOUS
Status: CANCELLED | OUTPATIENT
Start: 2021-12-23

## 2021-11-26 RX ORDER — DIPHENHYDRAMINE HYDROCHLORIDE 50 MG/ML
50 INJECTION INTRAMUSCULAR; INTRAVENOUS
Status: CANCELLED
Start: 2021-12-23

## 2021-11-26 RX ORDER — METHYLPREDNISOLONE SODIUM SUCCINATE 125 MG/2ML
125 INJECTION, POWDER, LYOPHILIZED, FOR SOLUTION INTRAMUSCULAR; INTRAVENOUS
Status: CANCELLED
Start: 2021-12-23

## 2021-11-26 RX ORDER — EPINEPHRINE 1 MG/ML
0.3 INJECTION, SOLUTION, CONCENTRATE INTRAVENOUS EVERY 5 MIN PRN
Status: CANCELLED | OUTPATIENT
Start: 2021-12-23

## 2021-11-26 RX ORDER — EPINEPHRINE 1 MG/ML
0.3 INJECTION, SOLUTION INTRAMUSCULAR; SUBCUTANEOUS EVERY 5 MIN PRN
Status: CANCELLED | OUTPATIENT
Start: 2021-12-23

## 2021-11-26 RX ADMIN — Medication 250 ML: at 13:50

## 2021-11-26 ASSESSMENT — PAIN SCALES - GENERAL: PAINLEVEL: NO PAIN (0)

## 2021-11-26 NOTE — PROGRESS NOTES
Infusion Nursing Note:  Ventura Quiroz presents today for Inflectra.    Patient seen by provider today: No   present during visit today: Not Applicable.    Note: N/A.    Intravenous Access:  Peripheral IV placed.    Treatment Conditions:  Biological Infusion Checklist:  ~~~ NOTE: If the patient answers yes to any of the questions below, hold the infusion and contact ordering provider or on-call provider.    1. Have you recently had an elevated temperature, fever, chills, productive cough, coughing for 3 weeks or longer or hemoptysis, abnormal vital signs, night sweats,  chest pain or have you noticed a decrease in your appetite, unexplained weight loss or fatigue? No  2. Do you have any open wounds or new incisions? No  3. Do you have any recent or upcoming hospitalizations, surgeries or dental procedures? No  4. Do you currently have or recently have had any signs of illness or infection or are you on any antibiotics? No  5. Have you had any new, sudden or worsening abdominal pain? No  6. Have you or anyone in your household received a live vaccination in the past 4 weeks? Please note:  No live vaccines while on biologic/chemotherapy until 6 months after the last treatment.  Patient can receive the flu vaccine (shot only) and the pneumovax.  It is optimal for the patient to get these vaccines mid cycle, but they can be given at any time as long as it is not on the day of the infusion. No  7. Have you recently been diagnosed with any new nervous system diseases (ie. Multiple sclerosis, Guillain [place], seizures, neurological changes) or cancer diagnosis? No  8. Are you on any form of radiation or chemotherapy? No  9. Are you pregnant or breast feeding or do you have plans of pregnancy in the future? No  10. Have you been having any signs of worsening depression or suicidal ideations?  (benlysta only) No  11. Have there been any other new onset medical symptoms? No    Post Infusion Assessment:  Patient  tolerated infusion without incident.  Site patent and intact, free from redness, edema or discomfort.  No evidence of extravasations.  Access discontinued per protocol.       Discharge Plan:   Patient will return 12/23 for next appointment.   Patient discharged in stable condition accompanied by: self.  Departure Mode: Ambulatory.      Monie Decker RN

## 2021-12-23 ENCOUNTER — INFUSION THERAPY VISIT (OUTPATIENT)
Dept: INFUSION THERAPY | Facility: CLINIC | Age: 21
End: 2021-12-23
Payer: COMMERCIAL

## 2021-12-23 VITALS
SYSTOLIC BLOOD PRESSURE: 134 MMHG | RESPIRATION RATE: 18 BRPM | HEART RATE: 87 BPM | BODY MASS INDEX: 23.5 KG/M2 | OXYGEN SATURATION: 98 % | WEIGHT: 183 LBS | TEMPERATURE: 97.3 F | DIASTOLIC BLOOD PRESSURE: 68 MMHG

## 2021-12-23 DIAGNOSIS — K50.014 CROHN'S DISEASE OF SMALL INTESTINE WITH ABSCESS (H): ICD-10-CM

## 2021-12-23 DIAGNOSIS — D50.9 IRON DEFICIENCY ANEMIA, UNSPECIFIED IRON DEFICIENCY ANEMIA TYPE: Primary | ICD-10-CM

## 2021-12-23 DIAGNOSIS — K52.9 COLITIS: ICD-10-CM

## 2021-12-23 PROCEDURE — 96413 CHEMO IV INFUSION 1 HR: CPT | Performed by: NURSE PRACTITIONER

## 2021-12-23 PROCEDURE — 99207 PR NO CHARGE LOS: CPT

## 2021-12-23 RX ORDER — METHYLPREDNISOLONE SODIUM SUCCINATE 125 MG/2ML
125 INJECTION, POWDER, LYOPHILIZED, FOR SOLUTION INTRAMUSCULAR; INTRAVENOUS
Status: CANCELLED
Start: 2021-12-24

## 2021-12-23 RX ORDER — ALBUTEROL SULFATE 0.83 MG/ML
2.5 SOLUTION RESPIRATORY (INHALATION)
Status: CANCELLED | OUTPATIENT
Start: 2021-12-24

## 2021-12-23 RX ORDER — ACETAMINOPHEN 325 MG/1
650 TABLET ORAL ONCE
Status: CANCELLED
Start: 2021-12-24 | End: 2021-12-24

## 2021-12-23 RX ORDER — EPINEPHRINE 1 MG/ML
0.3 INJECTION, SOLUTION INTRAMUSCULAR; SUBCUTANEOUS EVERY 5 MIN PRN
Status: CANCELLED | OUTPATIENT
Start: 2021-12-24

## 2021-12-23 RX ORDER — DIPHENHYDRAMINE HYDROCHLORIDE 50 MG/ML
50 INJECTION INTRAMUSCULAR; INTRAVENOUS
Status: CANCELLED
Start: 2021-12-24

## 2021-12-23 RX ORDER — ALBUTEROL SULFATE 90 UG/1
1-2 AEROSOL, METERED RESPIRATORY (INHALATION)
Status: CANCELLED
Start: 2021-12-24

## 2021-12-23 RX ORDER — NALOXONE HYDROCHLORIDE 0.4 MG/ML
0.2 INJECTION, SOLUTION INTRAMUSCULAR; INTRAVENOUS; SUBCUTANEOUS
Status: CANCELLED | OUTPATIENT
Start: 2021-12-24

## 2021-12-23 RX ORDER — MEPERIDINE HYDROCHLORIDE 25 MG/ML
25 INJECTION INTRAMUSCULAR; INTRAVENOUS; SUBCUTANEOUS EVERY 30 MIN PRN
Status: CANCELLED | OUTPATIENT
Start: 2021-12-24

## 2021-12-23 RX ORDER — DIPHENHYDRAMINE HCL 25 MG
25 CAPSULE ORAL ONCE
Status: CANCELLED
Start: 2021-12-24 | End: 2021-12-24

## 2021-12-23 RX ADMIN — Medication 250 ML: at 14:02

## 2021-12-23 ASSESSMENT — PAIN SCALES - GENERAL: PAINLEVEL: NO PAIN (0)

## 2021-12-23 NOTE — PROGRESS NOTES
Infusion Nursing Note:  Ventura Quiroz presents today for Inflectra.    Patient seen by provider today: No   present during visit today: Not Applicable.    Note: Pt denies any changes since his last infusion, denies any reason to hold therapy - no premeds needed, will treat as ordered, pt is tolerating the 1 hour infusion.      Intravenous Access:  Peripheral IV placed.    Treatment Conditions:  Biological Infusion Checklist:  ~~~ NOTE: If the patient answers yes to any of the questions below, hold the infusion and contact ordering provider or on-call provider.    1. Have you recently had an elevated temperature, fever, chills, productive cough, coughing for 3 weeks or longer or hemoptysis, abnormal vital signs, night sweats,  chest pain or have you noticed a decrease in your appetite, unexplained weight loss or fatigue? No  2. Do you have any open wounds or new incisions? No  3. Do you have any recent or upcoming hospitalizations, surgeries or dental procedures? No  4. Do you currently have or recently have had any signs of illness or infection or are you on any antibiotics? No  5. Have you had any new, sudden or worsening abdominal pain? No  6. Have you or anyone in your household received a live vaccination in the past 4 weeks? Please note:  No live vaccines while on biologic/chemotherapy until 6 months after the last treatment.  Patient can receive the flu vaccine (shot only) and the pneumovax.  It is optimal for the patient to get these vaccines mid cycle, but they can be given at any time as long as it is not on the day of the infusion. No  7. Have you recently been diagnosed with any new nervous system diseases (ie. Multiple sclerosis, Guillain [place], seizures, neurological changes) or cancer diagnosis? No  8. Are you on any form of radiation or chemotherapy? No  9. Are you pregnant or breast feeding or do you have plans of pregnancy in the future? No  10. Have you been having any signs of worsening  depression or suicidal ideations?  (benlysta only) No  11. Have there been any other new onset medical symptoms? No        Post Infusion Assessment:  Patient tolerated infusion without incident.  Blood return noted pre and post infusion.  Site patent and intact, free from redness, edema or discomfort.  No evidence of extravasations.  Access discontinued per protocol.  Biologic Infusion Post Education: Call the triage nurse at your clinic or seek medical attention if you have chills and/or temperature greater than or equal to 100.5, uncontrolled nausea/vomiting, diarrhea, constipation, dizziness, shortness of breath, chest pain, heart palpitations, weakness or any other new or concerning symptoms, questions or concerns.  You cannot have any live virus vaccines prior to or during treatment or up to 6 months post infusion.  If you have an upcoming surgery, medical procedure or dental procedure during treatment, this should be discussed with your ordering physician and your surgeon/dentist.  If you are having any concerning symptom, if you are unsure if you should get your next infusion or wish to speak to a provider before your next infusion, please call your care coordinator or triage nurse at your clinic to notify them so we can adequately serve you.       Discharge Plan:   Return 01/20/2022 and 02/17/2022  Discharge instructions reviewed with: Patient.  Patient and/or family verbalized understanding of discharge instructions and all questions answered.  Patient discharged in stable condition accompanied by: self.  Departure Mode: Ambulatory.      Bre Martinez RN

## 2022-01-20 ENCOUNTER — LAB (OUTPATIENT)
Dept: LAB | Facility: CLINIC | Age: 22
End: 2022-01-20
Payer: COMMERCIAL

## 2022-01-20 ENCOUNTER — INFUSION THERAPY VISIT (OUTPATIENT)
Dept: INFUSION THERAPY | Facility: CLINIC | Age: 22
End: 2022-01-20
Payer: COMMERCIAL

## 2022-01-20 VITALS
HEART RATE: 65 BPM | WEIGHT: 190.1 LBS | SYSTOLIC BLOOD PRESSURE: 114 MMHG | BODY MASS INDEX: 24.41 KG/M2 | OXYGEN SATURATION: 100 % | TEMPERATURE: 97.9 F | RESPIRATION RATE: 18 BRPM | DIASTOLIC BLOOD PRESSURE: 71 MMHG

## 2022-01-20 DIAGNOSIS — K52.9 COLITIS: ICD-10-CM

## 2022-01-20 DIAGNOSIS — D50.9 IRON DEFICIENCY ANEMIA, UNSPECIFIED IRON DEFICIENCY ANEMIA TYPE: Primary | ICD-10-CM

## 2022-01-20 DIAGNOSIS — D50.9 IRON DEFICIENCY ANEMIA, UNSPECIFIED IRON DEFICIENCY ANEMIA TYPE: ICD-10-CM

## 2022-01-20 DIAGNOSIS — K50.014 CROHN'S DISEASE OF SMALL INTESTINE WITH ABSCESS (H): Primary | ICD-10-CM

## 2022-01-20 DIAGNOSIS — K50.014 CROHN'S DISEASE OF SMALL INTESTINE WITH ABSCESS (H): ICD-10-CM

## 2022-01-20 LAB
ALBUMIN SERPL-MCNC: 4.2 G/DL (ref 3.4–5)
ALP SERPL-CCNC: 78 U/L (ref 40–150)
ALT SERPL W P-5'-P-CCNC: 30 U/L (ref 0–70)
AST SERPL W P-5'-P-CCNC: 27 U/L (ref 0–45)
BASOPHILS # BLD AUTO: 0 10E3/UL (ref 0–0.2)
BASOPHILS NFR BLD AUTO: 0 %
BILIRUB DIRECT SERPL-MCNC: 0.1 MG/DL (ref 0–0.2)
BILIRUB SERPL-MCNC: 0.5 MG/DL (ref 0.2–1.3)
CRP SERPL-MCNC: <2.9 MG/L (ref 0–8)
EOSINOPHIL # BLD AUTO: 0.1 10E3/UL (ref 0–0.7)
EOSINOPHIL NFR BLD AUTO: 1 %
ERYTHROCYTE [DISTWIDTH] IN BLOOD BY AUTOMATED COUNT: 14.1 % (ref 10–15)
ERYTHROCYTE [SEDIMENTATION RATE] IN BLOOD BY WESTERGREN METHOD: 4 MM/HR (ref 0–15)
HCT VFR BLD AUTO: 43.3 % (ref 40–53)
HGB BLD-MCNC: 14.6 G/DL (ref 13.3–17.7)
HOLD SPECIMEN: NORMAL
IMM GRANULOCYTES # BLD: 0 10E3/UL
IMM GRANULOCYTES NFR BLD: 0 %
LYMPHOCYTES # BLD AUTO: 1.5 10E3/UL (ref 0.8–5.3)
LYMPHOCYTES NFR BLD AUTO: 18 %
MCH RBC QN AUTO: 31.3 PG (ref 26.5–33)
MCHC RBC AUTO-ENTMCNC: 33.7 G/DL (ref 31.5–36.5)
MCV RBC AUTO: 93 FL (ref 78–100)
MONOCYTES # BLD AUTO: 0.7 10E3/UL (ref 0–1.3)
MONOCYTES NFR BLD AUTO: 8 %
NEUTROPHILS # BLD AUTO: 6.2 10E3/UL (ref 1.6–8.3)
NEUTROPHILS NFR BLD AUTO: 73 %
NRBC # BLD AUTO: 0 10E3/UL
NRBC BLD AUTO-RTO: 0 /100
PLATELET # BLD AUTO: 220 10E3/UL (ref 150–450)
PROT SERPL-MCNC: 7.6 G/DL (ref 6.8–8.8)
RBC # BLD AUTO: 4.67 10E6/UL (ref 4.4–5.9)
WBC # BLD AUTO: 8.5 10E3/UL (ref 4–11)

## 2022-01-20 PROCEDURE — 99207 PR NO CHARGE LOS: CPT

## 2022-01-20 PROCEDURE — 85025 COMPLETE CBC W/AUTO DIFF WBC: CPT | Performed by: INTERNAL MEDICINE

## 2022-01-20 PROCEDURE — 85652 RBC SED RATE AUTOMATED: CPT | Performed by: INTERNAL MEDICINE

## 2022-01-20 PROCEDURE — 96413 CHEMO IV INFUSION 1 HR: CPT | Performed by: NURSE PRACTITIONER

## 2022-01-20 PROCEDURE — 80076 HEPATIC FUNCTION PANEL: CPT | Performed by: INTERNAL MEDICINE

## 2022-01-20 PROCEDURE — 36415 COLL VENOUS BLD VENIPUNCTURE: CPT | Performed by: INTERNAL MEDICINE

## 2022-01-20 PROCEDURE — 86140 C-REACTIVE PROTEIN: CPT | Performed by: INTERNAL MEDICINE

## 2022-01-20 RX ORDER — DIPHENHYDRAMINE HCL 25 MG
25 CAPSULE ORAL ONCE
Status: CANCELLED
Start: 2022-01-21 | End: 2022-01-21

## 2022-01-20 RX ORDER — DIPHENHYDRAMINE HYDROCHLORIDE 50 MG/ML
50 INJECTION INTRAMUSCULAR; INTRAVENOUS
Status: CANCELLED
Start: 2022-01-21

## 2022-01-20 RX ORDER — ALBUTEROL SULFATE 90 UG/1
1-2 AEROSOL, METERED RESPIRATORY (INHALATION)
Status: CANCELLED
Start: 2022-01-21

## 2022-01-20 RX ORDER — EPINEPHRINE 1 MG/ML
0.3 INJECTION, SOLUTION INTRAMUSCULAR; SUBCUTANEOUS EVERY 5 MIN PRN
Status: CANCELLED | OUTPATIENT
Start: 2022-01-21

## 2022-01-20 RX ORDER — MEPERIDINE HYDROCHLORIDE 25 MG/ML
25 INJECTION INTRAMUSCULAR; INTRAVENOUS; SUBCUTANEOUS EVERY 30 MIN PRN
Status: CANCELLED | OUTPATIENT
Start: 2022-01-21

## 2022-01-20 RX ORDER — METHYLPREDNISOLONE SODIUM SUCCINATE 125 MG/2ML
125 INJECTION, POWDER, LYOPHILIZED, FOR SOLUTION INTRAMUSCULAR; INTRAVENOUS
Status: CANCELLED
Start: 2022-01-21

## 2022-01-20 RX ORDER — NALOXONE HYDROCHLORIDE 0.4 MG/ML
0.2 INJECTION, SOLUTION INTRAMUSCULAR; INTRAVENOUS; SUBCUTANEOUS
Status: CANCELLED | OUTPATIENT
Start: 2022-01-21

## 2022-01-20 RX ORDER — ALBUTEROL SULFATE 0.83 MG/ML
2.5 SOLUTION RESPIRATORY (INHALATION)
Status: CANCELLED | OUTPATIENT
Start: 2022-01-21

## 2022-01-20 RX ORDER — ACETAMINOPHEN 325 MG/1
650 TABLET ORAL ONCE
Status: CANCELLED
Start: 2022-01-21 | End: 2022-01-21

## 2022-01-20 RX ORDER — EPINEPHRINE 1 MG/ML
0.3 INJECTION, SOLUTION, CONCENTRATE INTRAVENOUS EVERY 5 MIN PRN
Status: CANCELLED | OUTPATIENT
Start: 2022-01-21

## 2022-01-20 RX ADMIN — Medication 250 ML: at 13:50

## 2022-01-20 NOTE — PROGRESS NOTES
Infusion Nursing Note:  Ventura Quiroz presents today for monthly Inflectra.  Patient seen by provider today: No   present during visit today: Not Applicable.    Note: Pt states he is doing well, denies any problems with his infusions, denies any reason to hold therapy today, no premeds needed, tolerating 1 hour infusions.      Intravenous Access:  Peripheral IV placed - Left 22 gauge angio place x 1 attempt.    Treatment Conditions:  Biological Infusion Checklist:  ~~~ NOTE: If the patient answers yes to any of the questions below, hold the infusion and contact ordering provider or on-call provider.    1. Have you recently had an elevated temperature, fever, chills, productive cough, coughing for 3 weeks or longer or hemoptysis, abnormal vital signs, night sweats,  chest pain or have you noticed a decrease in your appetite, unexplained weight loss or fatigue? No  2. Do you have any open wounds or new incisions? No  3. Do you have any recent or upcoming hospitalizations, surgeries or dental procedures? No  4. Do you currently have or recently have had any signs of illness or infection or are you on any antibiotics? No  5. Have you had any new, sudden or worsening abdominal pain? No  6. Have you or anyone in your household received a live vaccination in the past 4 weeks? Please note:  No live vaccines while on biologic/chemotherapy until 6 months after the last treatment.  Patient can receive the flu vaccine (shot only) and the pneumovax.  It is optimal for the patient to get these vaccines mid cycle, but they can be given at any time as long as it is not on the day of the infusion. No  7. Have you recently been diagnosed with any new nervous system diseases (ie. Multiple sclerosis, Guillain [place], seizures, neurological changes) or cancer diagnosis? No  8. Are you on any form of radiation or chemotherapy? No  9. Are you pregnant or breast feeding or do you have plans of pregnancy in the future?  No  10. Have you been having any signs of worsening depression or suicidal ideations?  (benlysta only) No  11. Have there been any other new onset medical symptoms? No      Post Infusion Assessment:  Patient tolerated infusion without incident.  Blood return noted pre and post infusion.  Site patent and intact, free from redness, edema or discomfort.  No evidence of extravasations.  Access discontinued per protocol.       Discharge Plan:   Return on 02/17/2022 for Inflectra.  Discharge instructions reviewed with: Patient.  Patient and/or family verbalized understanding of discharge instructions and all questions answered.  Patient discharged in stable condition accompanied by: self.  Departure Mode: Ambulatory.      Bre Martinez RN

## 2022-01-25 ENCOUNTER — OFFICE VISIT (OUTPATIENT)
Dept: FAMILY MEDICINE | Facility: OTHER | Age: 22
End: 2022-01-25
Payer: COMMERCIAL

## 2022-01-25 VITALS
BODY MASS INDEX: 23.95 KG/M2 | DIASTOLIC BLOOD PRESSURE: 74 MMHG | OXYGEN SATURATION: 100 % | RESPIRATION RATE: 20 BRPM | TEMPERATURE: 98.2 F | HEART RATE: 78 BPM | WEIGHT: 186.6 LBS | SYSTOLIC BLOOD PRESSURE: 128 MMHG | HEIGHT: 74 IN

## 2022-01-25 DIAGNOSIS — Z20.2 EXPOSURE TO CHLAMYDIA: Primary | ICD-10-CM

## 2022-01-25 PROCEDURE — 87591 N.GONORRHOEAE DNA AMP PROB: CPT | Performed by: PHYSICIAN ASSISTANT

## 2022-01-25 PROCEDURE — 99213 OFFICE O/P EST LOW 20 MIN: CPT | Performed by: PHYSICIAN ASSISTANT

## 2022-01-25 PROCEDURE — 87491 CHLMYD TRACH DNA AMP PROBE: CPT | Performed by: PHYSICIAN ASSISTANT

## 2022-01-25 RX ORDER — AZITHROMYCIN 500 MG/1
1000 TABLET, FILM COATED ORAL DAILY
Qty: 2 TABLET | Refills: 0 | Status: SHIPPED | OUTPATIENT
Start: 2022-01-25 | End: 2022-01-26

## 2022-01-25 ASSESSMENT — MIFFLIN-ST. JEOR: SCORE: 1921.41

## 2022-01-25 ASSESSMENT — PAIN SCALES - GENERAL: PAINLEVEL: NO PAIN (0)

## 2022-01-25 NOTE — PROGRESS NOTES
"  Assessment & Plan     Exposure to chlamydia  At this time recommended testing if he is positive for G/C and taking medication tonight for Chlamydia. If test is positive recommend repeat testing in the future to verify therapy was effective.  No concerns on exam at this time.   - Chlamydia trachomatis PCR; Future  - Neisseria gonorrhoeae PCR; Future  - azithromycin (ZITHROMAX) 500 MG tablet; Take 2 tablets (1,000 mg) by mouth daily for 1 day  - Chlamydia trachomatis PCR  - Neisseria gonorrhoeae PCR    Return in about 2 weeks (around 2/8/2022) for Lab Work.    Options for treatment and follow-up care were reviewed with the patient and/or guardian. Patient and/or guardian engaged in the decision making process and verbalized understanding of the options discussed and agreed with the final plan.    David Packer PA-C  [facility]    Anastacio Whitehead is a 21 year old who presents for the following health issues     HPI     Concern - Chlamydia   girlfriend diagnosed - 1/22/2022. Patient currently not having any symptoms.     - He has no history of STDS  - Declines dysuria, hematuria, urinary frequency, testicular pain, penile discharge, groin pain.     Review of Systems   Constitutional, gi and gu systems are negative, except as otherwise noted.      Objective    /74   Pulse 78   Temp 98.2  F (36.8  C) (Temporal)   Resp 20   Ht 1.88 m (6' 2.02\")   Wt 84.6 kg (186 lb 9.6 oz)   SpO2 100%   BMI 23.95 kg/m    Body mass index is 23.95 kg/m .  Physical Exam   GENERAL: healthy, alert and no distress   (male): normal male genitalia without lesions or urethral discharge, no hernia, testicles normal to palpation without tenderness or masses, epididymis non-tender to palpation bilaterally no inguinal lymphadenopathy bilaterally.   MS: no gross musculoskeletal defects noted, no edema  SKIN: no suspicious lesions or rashes    No results found for any visits on 01/25/22.            "

## 2022-01-26 ENCOUNTER — MYC MEDICAL ADVICE (OUTPATIENT)
Dept: FAMILY MEDICINE | Facility: OTHER | Age: 22
End: 2022-01-26
Payer: COMMERCIAL

## 2022-01-26 LAB
C TRACH DNA SPEC QL NAA+PROBE: NEGATIVE
N GONORRHOEA DNA SPEC QL NAA+PROBE: NEGATIVE

## 2022-01-31 NOTE — TELEPHONE ENCOUNTER
Please call patient. He wants Urethral swab for Chlamydia, see if someone is able to get him in this week.     David Packer PA-C

## 2022-01-31 NOTE — TELEPHONE ENCOUNTER
Would 4:30 work? Otherwise patient will make 4 pm work.    Routing to provider.    Rosalind Edgar RN  [facility]  January 31, 2022

## 2022-02-02 ENCOUNTER — OFFICE VISIT (OUTPATIENT)
Dept: FAMILY MEDICINE | Facility: OTHER | Age: 22
End: 2022-02-02
Payer: COMMERCIAL

## 2022-02-02 VITALS
TEMPERATURE: 98.2 F | WEIGHT: 187 LBS | SYSTOLIC BLOOD PRESSURE: 122 MMHG | BODY MASS INDEX: 24 KG/M2 | RESPIRATION RATE: 20 BRPM | DIASTOLIC BLOOD PRESSURE: 78 MMHG | HEIGHT: 74 IN | OXYGEN SATURATION: 100 % | HEART RATE: 75 BPM

## 2022-02-02 DIAGNOSIS — Z20.2 EXPOSURE TO CHLAMYDIA: Primary | ICD-10-CM

## 2022-02-02 PROCEDURE — 87491 CHLMYD TRACH DNA AMP PROBE: CPT | Performed by: PHYSICIAN ASSISTANT

## 2022-02-02 PROCEDURE — 99213 OFFICE O/P EST LOW 20 MIN: CPT | Performed by: PHYSICIAN ASSISTANT

## 2022-02-02 ASSESSMENT — MIFFLIN-ST. JEOR: SCORE: 1922.98

## 2022-02-02 ASSESSMENT — PAIN SCALES - GENERAL: PAINLEVEL: NO PAIN (0)

## 2022-02-02 NOTE — PROGRESS NOTES
"  Assessment & Plan       ICD-10-CM    1. Exposure to chlamydia  Z20.2 Chlamydia trachomatis PCR     Chlamydia trachomatis PCR       Urethral swab completed.  Will notify patient of results and if further treatment is necessary.  Encouraged condom use.      JUNAID Hoffmann [facility] MERLY Whitehead is a 21 year old who presents for the following health issues    History of Present Illness       He eats 0-1 servings of fruits and vegetables daily.He consumes 5 sweetened beverage(s) daily.He exercises with enough effort to increase his heart rate 60 or more minutes per day.  He exercises with enough effort to increase his heart rate 5 days per week.   He is taking medications regularly.     He was seen on 1/25 for STD testing due to chlamydia exposure. He had a negative test but was treated with azithromycin due to his exposure. He would like to be retested and is requesting the urethral swab. He denies any symptoms. His girlfriend is being retested as well.    Review of Systems   Constitutional, cardiovascular, pulmonary, gi and gu systems are negative, except as otherwise noted.      Objective    /78   Pulse 75   Temp 98.2  F (36.8  C) (Temporal)   Resp 20   Ht 1.88 m (6' 2\")   Wt 84.8 kg (187 lb)   SpO2 100%   BMI 24.01 kg/m    Body mass index is 24.01 kg/m .  Physical Exam   GENERAL: healthy, alert and no distress   (male): normal male genitalia without lesions or urethral discharge, no hernia  PSYCH: mentation appears normal, affect normal/bright          "

## 2022-02-03 LAB — C TRACH DNA SPEC QL NAA+PROBE: NEGATIVE

## 2022-02-12 ENCOUNTER — HEALTH MAINTENANCE LETTER (OUTPATIENT)
Age: 22
End: 2022-02-12

## 2022-02-17 ENCOUNTER — INFUSION THERAPY VISIT (OUTPATIENT)
Dept: INFUSION THERAPY | Facility: CLINIC | Age: 22
End: 2022-02-17
Payer: COMMERCIAL

## 2022-02-17 VITALS
DIASTOLIC BLOOD PRESSURE: 77 MMHG | RESPIRATION RATE: 16 BRPM | TEMPERATURE: 97.7 F | HEART RATE: 81 BPM | OXYGEN SATURATION: 98 % | SYSTOLIC BLOOD PRESSURE: 130 MMHG

## 2022-02-17 DIAGNOSIS — D50.9 IRON DEFICIENCY ANEMIA, UNSPECIFIED IRON DEFICIENCY ANEMIA TYPE: Primary | ICD-10-CM

## 2022-02-17 DIAGNOSIS — K50.014 CROHN'S DISEASE OF SMALL INTESTINE WITH ABSCESS (H): ICD-10-CM

## 2022-02-17 DIAGNOSIS — K52.9 COLITIS: ICD-10-CM

## 2022-02-17 PROCEDURE — 96413 CHEMO IV INFUSION 1 HR: CPT | Performed by: NURSE PRACTITIONER

## 2022-02-17 PROCEDURE — 99207 PR NO CHARGE LOS: CPT

## 2022-02-17 RX ORDER — METHYLPREDNISOLONE SODIUM SUCCINATE 125 MG/2ML
125 INJECTION, POWDER, LYOPHILIZED, FOR SOLUTION INTRAMUSCULAR; INTRAVENOUS
Status: CANCELLED
Start: 2022-02-18

## 2022-02-17 RX ORDER — ACETAMINOPHEN 325 MG/1
650 TABLET ORAL ONCE
Status: CANCELLED
Start: 2022-02-18 | End: 2022-02-18

## 2022-02-17 RX ORDER — EPINEPHRINE 1 MG/ML
0.3 INJECTION, SOLUTION INTRAMUSCULAR; SUBCUTANEOUS EVERY 5 MIN PRN
Status: CANCELLED | OUTPATIENT
Start: 2022-02-18

## 2022-02-17 RX ORDER — ALBUTEROL SULFATE 0.83 MG/ML
2.5 SOLUTION RESPIRATORY (INHALATION)
Status: CANCELLED | OUTPATIENT
Start: 2022-02-18

## 2022-02-17 RX ORDER — NALOXONE HYDROCHLORIDE 0.4 MG/ML
0.2 INJECTION, SOLUTION INTRAMUSCULAR; INTRAVENOUS; SUBCUTANEOUS
Status: CANCELLED | OUTPATIENT
Start: 2022-02-18

## 2022-02-17 RX ORDER — DIPHENHYDRAMINE HCL 25 MG
25 CAPSULE ORAL ONCE
Status: CANCELLED
Start: 2022-02-18 | End: 2022-02-18

## 2022-02-17 RX ORDER — ALBUTEROL SULFATE 90 UG/1
1-2 AEROSOL, METERED RESPIRATORY (INHALATION)
Status: CANCELLED
Start: 2022-02-18

## 2022-02-17 RX ORDER — DIPHENHYDRAMINE HYDROCHLORIDE 50 MG/ML
50 INJECTION INTRAMUSCULAR; INTRAVENOUS
Status: CANCELLED
Start: 2022-02-18

## 2022-02-17 RX ORDER — MEPERIDINE HYDROCHLORIDE 25 MG/ML
25 INJECTION INTRAMUSCULAR; INTRAVENOUS; SUBCUTANEOUS EVERY 30 MIN PRN
Status: CANCELLED | OUTPATIENT
Start: 2022-02-18

## 2022-02-17 RX ADMIN — Medication 250 ML: at 14:11

## 2022-02-17 ASSESSMENT — PAIN SCALES - GENERAL: PAINLEVEL: NO PAIN (0)

## 2022-02-17 NOTE — PROGRESS NOTES
"Infusion Nursing Note:  Ventura Quiroz presents today for Inflectra.    Patient seen by provider today: No   present during visit today: Not Applicable.    Note: patient has not been taking 6-MP.  Feb 2021 he got Rx and says he \"may have picked it up.\"   However, clearly, he has not taken this as was thought by his provider.  I did yamileth CAI and discussed patient plan with him.  At this point, do not start the Mercap as he does not think it will make any difference in plan.  His GI / MTM team will reach out to patient to obtain drug/antibody levels, likely in 2 weeks.    Also, appt with MD needed for follow up plan of care.         Intravenous Access:  Peripheral IV placed.    Treatment Conditions:  Biological Infusion Checklist:  ~~~ NOTE: If the patient answers yes to any of the questions below, hold the infusion and contact ordering provider or on-call provider.    1. Have you recently had an elevated temperature, fever, chills, productive cough, coughing for 3 weeks or longer or hemoptysis, abnormal vital signs, night sweats,  chest pain or have you noticed a decrease in your appetite, unexplained weight loss or fatigue? No  2. Do you have any open wounds or new incisions? No  3. Do you have any recent or upcoming hospitalizations, surgeries or dental procedures? No  4. Do you currently have or recently have had any signs of illness or infection or are you on any antibiotics? No  5. Have you had any new, sudden or worsening abdominal pain? No  6. Have you or anyone in your household received a live vaccination in the past 4 weeks? Please note:  No live vaccines while on biologic/chemotherapy until 6 months after the last treatment.  Patient can receive the flu vaccine (shot only) and the pneumovax.  It is optimal for the patient to get these vaccines mid cycle, but they can be given at any time as long as it is not on the day of the infusion. No  7. Have you recently been diagnosed with any new nervous " system diseases (ie. Multiple sclerosis, Guillain [place], seizures, neurological changes) or cancer diagnosis? No  8. Are you on any form of radiation or chemotherapy? No  9. Are you pregnant or breast feeding or do you have plans of pregnancy in the future? No  10. Have you been having any signs of worsening depression or suicidal ideations?  (benlysta only) No  11. Have there been any other new onset medical symptoms? No    Post Infusion Assessment:  Patient tolerated infusion without incident.       Discharge Plan:   See above.      RONA RIZVI RN

## 2022-02-21 ENCOUNTER — PATIENT OUTREACH (OUTPATIENT)
Dept: GASTROENTEROLOGY | Facility: CLINIC | Age: 22
End: 2022-02-21
Payer: COMMERCIAL

## 2022-02-21 NOTE — PROGRESS NOTES
Apparently he is still not doing well.     He is also not taking 6-mp     Anastacia - can you do an MTM visit     We will need get a drug level in 2 weeks...     Colonoscopy with me     Then apt with me

## 2022-02-25 ENCOUNTER — TELEPHONE (OUTPATIENT)
Dept: GASTROENTEROLOGY | Facility: CLINIC | Age: 22
End: 2022-02-25
Payer: COMMERCIAL

## 2022-02-25 ENCOUNTER — DOCUMENTATION ONLY (OUTPATIENT)
Dept: GASTROENTEROLOGY | Facility: CLINIC | Age: 22
End: 2022-02-25
Payer: COMMERCIAL

## 2022-02-25 ENCOUNTER — PATIENT OUTREACH (OUTPATIENT)
Dept: GASTROENTEROLOGY | Facility: CLINIC | Age: 22
End: 2022-02-25
Payer: COMMERCIAL

## 2022-02-25 DIAGNOSIS — K50.012 CROHN'S DISEASE OF SMALL INTESTINE WITH INTESTINAL OBSTRUCTION (H): Primary | ICD-10-CM

## 2022-02-25 NOTE — PROGRESS NOTES
[employer] form filled out and scanned into patient chart. Lab order is in. Added to patient list.

## 2022-02-25 NOTE — TELEPHONE ENCOUNTER
Dr Wright note   Apparently he is still not doing well.     He is also not taking 6-mp     Anastacia - can you do an MTM visit (pt does not have mtm coverage so will need to be out of pocket)    We will need get a drug level in 2 weeks... (infusion on feb 17)  Level March 3      Colonoscopy with me     Then appt    Orders placed   My chart message sent    Patient has been having infusions on time  Per his mother patient has been doing well  Per mother pt has a girl that works nights so patient at times does not get adequate hours of sleep  MTM not covered by insurance  Await results of the level and procedure

## 2022-02-25 NOTE — TELEPHONE ENCOUNTER
Screening Questions  BlueKIND OF PREP RedLOCATION [review exclusion criteria] GreenSEDATION TYPE    1. Have you had a positive covid test in the last 90 days? N    2. Are you active on mychart? Y    3. What insurance is in the chart? Will Call Back to update insurance to [employer]      3.  Ordering/Referring Provider: Tobin Wright MD     4. BMI Tobin Wright MD  [BMI OVER 40-EXTENDED PREP]  If greater than 40 review exclusion criteria [PAC APPT IF @ [facility]]    5.  Respiratory Screening :  [If yes to any of the following HOSPITAL setting only]     Do you use daily home oxygen? N    Do you have mod to severe Obstructive Sleep Apnea? N  [OKAY @ [facility] SH PH RI]   Do you have Pulmonary Hypertension? N     Do you have UNCONTROLLED asthma? N      6. Have you had a heart or lung transplant? N      7. Are you currently on dialysis? N [ If yes, G-PREP & HOSPITAL setting only]     8. Do you have chronic kidney disease? N [ If yes, G-PREP ]    9. Have you had a stroke or Transient ischemic attack (TIA) within 6 months? N (If yes, do not schedule at [facility])    10. In the past 6 months, have you had any heart related issues including cardiomyopathy or heart attack? N        If yes, did it require cardiac stenting or other implantable device? NA      11. Do you have any implantable devices in your body (pacemaker, defib, LVAD)? N (If yes, schedule at [facility])    12. Do you take nitroglycerin? N   If yes, how often? NA  (if yes, HOSPITAL setting ONLY)    13. Are you currently taking any blood thinners? N   [IF YES, INFORM PATIENT TO FOLLOW UP W/ ORDERING PROVIDER FOR BRIDGING INSTRUCTIONS]     14. Are you a diabetic? N   [ If yes, G-PREP ]    15. [FEMALES] Are you currently pregnant? NA    If yes, how many weeks? NA    16. Are you taking any prescription pain medications on a routine schedule?  N  [ If yes, EXTENDED PREP.]    17. Do you have any chemical dependencies such as alcohol, street drugs, or methadone?  N [If  yes, MAC]    18. Do you have any history of post-traumatic stress syndrome, severe anxiety or history of psychosis?  N  [If yes, MAC]    19. Do you transfer independently?  Y    20.  Do you have any issues with constipation?  N  [ If yes, EXTENDED PREP.]    21. Preferred LOCAL Pharmacy for Pre Prescription  [facility] - [place], [place] - [address]  Scheduling Details      Caller : Cami Richie [phone]  (Please ask for phone number if not scheduled by patient)    Type of Procedure Scheduled: Colon  Which Colonoscopy Prep was Sent?: Miralax  KHORUTS CF PATIENTS & GROEN'S PATIENTS NEEDS EXTENDED PREP  Surgeon: Kyle  Date of Procedure: 4/18  Location: [facility]      Sedation Type: MAC  Conscious Sedation- Needs  for 6 hours after the procedure  MAC/General-Needs  for 24 hours after procedure    Pre-op Required at [facility], [place], [place] and OR for MAC sedation: N  (advise patient they will need a pre-op prior to procedure -)      Informed patient they will need an adult  Y  Cannot take any type of public or medical transportation alone    Pre-Procedure [facility] or Externally: Yes, [place] 4/14    Confirmed Nurse will call to complete assessment Y    Additional comments:

## 2022-03-01 ENCOUNTER — MEDICAL CORRESPONDENCE (OUTPATIENT)
Dept: HEALTH INFORMATION MANAGEMENT | Facility: CLINIC | Age: 22
End: 2022-03-01
Payer: COMMERCIAL

## 2022-03-01 ENCOUNTER — LAB (OUTPATIENT)
Dept: LAB | Facility: CLINIC | Age: 22
End: 2022-03-01
Payer: COMMERCIAL

## 2022-03-01 DIAGNOSIS — K50.012 CROHN'S DISEASE OF SMALL INTESTINE WITH INTESTINAL OBSTRUCTION (H): ICD-10-CM

## 2022-03-01 PROCEDURE — 36415 COLL VENOUS BLD VENIPUNCTURE: CPT

## 2022-03-01 PROCEDURE — 82542 COL CHROMOTOGRAPHY QUAL/QUAN: CPT | Mod: 90

## 2022-03-01 PROCEDURE — 99000 SPECIMEN HANDLING OFFICE-LAB: CPT

## 2022-03-01 PROCEDURE — 80230 DRUG ASSAY INFLIXIMAB: CPT | Mod: 90

## 2022-03-07 LAB — INFLIXIMAB SERPL-MCNC: >34 UG/ML

## 2022-03-08 DIAGNOSIS — Z11.59 ENCOUNTER FOR SCREENING FOR OTHER VIRAL DISEASES: Primary | ICD-10-CM

## 2022-03-17 ENCOUNTER — LAB (OUTPATIENT)
Dept: LAB | Facility: CLINIC | Age: 22
End: 2022-03-17

## 2022-03-17 ENCOUNTER — INFUSION THERAPY VISIT (OUTPATIENT)
Dept: INFUSION THERAPY | Facility: CLINIC | Age: 22
End: 2022-03-17
Payer: COMMERCIAL

## 2022-03-17 VITALS
DIASTOLIC BLOOD PRESSURE: 65 MMHG | SYSTOLIC BLOOD PRESSURE: 126 MMHG | HEART RATE: 84 BPM | TEMPERATURE: 97.8 F | RESPIRATION RATE: 16 BRPM | WEIGHT: 191.7 LBS | OXYGEN SATURATION: 97 % | BODY MASS INDEX: 24.61 KG/M2

## 2022-03-17 DIAGNOSIS — K52.9 COLITIS: ICD-10-CM

## 2022-03-17 DIAGNOSIS — D50.9 IRON DEFICIENCY ANEMIA, UNSPECIFIED IRON DEFICIENCY ANEMIA TYPE: Primary | ICD-10-CM

## 2022-03-17 DIAGNOSIS — K50.014 CROHN'S DISEASE OF SMALL INTESTINE WITH ABSCESS (H): ICD-10-CM

## 2022-03-17 LAB
ALBUMIN SERPL-MCNC: 4.1 G/DL (ref 3.4–5)
ALP SERPL-CCNC: 65 U/L (ref 40–150)
ALT SERPL W P-5'-P-CCNC: 37 U/L (ref 0–70)
AST SERPL W P-5'-P-CCNC: 30 U/L (ref 0–45)
BASOPHILS # BLD AUTO: 0 10E3/UL (ref 0–0.2)
BASOPHILS NFR BLD AUTO: 0 %
BILIRUB DIRECT SERPL-MCNC: 0.2 MG/DL (ref 0–0.2)
BILIRUB SERPL-MCNC: 0.6 MG/DL (ref 0.2–1.3)
CRP SERPL-MCNC: <2.9 MG/L (ref 0–8)
EOSINOPHIL # BLD AUTO: 0.1 10E3/UL (ref 0–0.7)
EOSINOPHIL NFR BLD AUTO: 1 %
ERYTHROCYTE [DISTWIDTH] IN BLOOD BY AUTOMATED COUNT: 12.8 % (ref 10–15)
ERYTHROCYTE [SEDIMENTATION RATE] IN BLOOD BY WESTERGREN METHOD: 3 MM/HR (ref 0–15)
HCT VFR BLD AUTO: 44.3 % (ref 40–53)
HGB BLD-MCNC: 14.4 G/DL (ref 13.3–17.7)
HOLD SPECIMEN: NORMAL
IMM GRANULOCYTES # BLD: 0 10E3/UL
IMM GRANULOCYTES NFR BLD: 0 %
LYMPHOCYTES # BLD AUTO: 1.7 10E3/UL (ref 0.8–5.3)
LYMPHOCYTES NFR BLD AUTO: 23 %
MCH RBC QN AUTO: 31.6 PG (ref 26.5–33)
MCHC RBC AUTO-ENTMCNC: 32.5 G/DL (ref 31.5–36.5)
MCV RBC AUTO: 97 FL (ref 78–100)
MONOCYTES # BLD AUTO: 0.7 10E3/UL (ref 0–1.3)
MONOCYTES NFR BLD AUTO: 9 %
NEUTROPHILS # BLD AUTO: 4.9 10E3/UL (ref 1.6–8.3)
NEUTROPHILS NFR BLD AUTO: 67 %
NRBC # BLD AUTO: 0 10E3/UL
NRBC BLD AUTO-RTO: 0 /100
PLAT MORPH BLD: NORMAL
PLATELET # BLD AUTO: 170 10E3/UL (ref 150–450)
PROT SERPL-MCNC: 7.7 G/DL (ref 6.8–8.8)
RBC # BLD AUTO: 4.56 10E6/UL (ref 4.4–5.9)
RBC MORPH BLD: NORMAL
WBC # BLD AUTO: 7.3 10E3/UL (ref 4–11)

## 2022-03-17 PROCEDURE — 36415 COLL VENOUS BLD VENIPUNCTURE: CPT | Performed by: INTERNAL MEDICINE

## 2022-03-17 PROCEDURE — 85025 COMPLETE CBC W/AUTO DIFF WBC: CPT | Performed by: INTERNAL MEDICINE

## 2022-03-17 PROCEDURE — 86140 C-REACTIVE PROTEIN: CPT | Performed by: INTERNAL MEDICINE

## 2022-03-17 PROCEDURE — 99207 PR NO CHARGE LOS: CPT

## 2022-03-17 PROCEDURE — 85652 RBC SED RATE AUTOMATED: CPT | Performed by: INTERNAL MEDICINE

## 2022-03-17 PROCEDURE — 96413 CHEMO IV INFUSION 1 HR: CPT | Performed by: INTERNAL MEDICINE

## 2022-03-17 PROCEDURE — 80076 HEPATIC FUNCTION PANEL: CPT | Performed by: INTERNAL MEDICINE

## 2022-03-17 RX ORDER — ALBUTEROL SULFATE 0.83 MG/ML
2.5 SOLUTION RESPIRATORY (INHALATION)
Status: CANCELLED | OUTPATIENT
Start: 2022-03-18

## 2022-03-17 RX ORDER — METHYLPREDNISOLONE SODIUM SUCCINATE 125 MG/2ML
125 INJECTION, POWDER, LYOPHILIZED, FOR SOLUTION INTRAMUSCULAR; INTRAVENOUS
Status: CANCELLED
Start: 2022-03-18

## 2022-03-17 RX ORDER — NALOXONE HYDROCHLORIDE 0.4 MG/ML
0.2 INJECTION, SOLUTION INTRAMUSCULAR; INTRAVENOUS; SUBCUTANEOUS
Status: CANCELLED | OUTPATIENT
Start: 2022-03-18

## 2022-03-17 RX ORDER — DIPHENHYDRAMINE HYDROCHLORIDE 50 MG/ML
50 INJECTION INTRAMUSCULAR; INTRAVENOUS
Status: CANCELLED
Start: 2022-03-18

## 2022-03-17 RX ORDER — EPINEPHRINE 1 MG/ML
0.3 INJECTION, SOLUTION INTRAMUSCULAR; SUBCUTANEOUS EVERY 5 MIN PRN
Status: CANCELLED | OUTPATIENT
Start: 2022-03-18

## 2022-03-17 RX ORDER — MEPERIDINE HYDROCHLORIDE 25 MG/ML
25 INJECTION INTRAMUSCULAR; INTRAVENOUS; SUBCUTANEOUS EVERY 30 MIN PRN
Status: CANCELLED | OUTPATIENT
Start: 2022-03-18

## 2022-03-17 RX ORDER — EPINEPHRINE 1 MG/ML
0.3 INJECTION, SOLUTION, CONCENTRATE INTRAVENOUS EVERY 5 MIN PRN
Status: CANCELLED | OUTPATIENT
Start: 2022-03-18

## 2022-03-17 RX ORDER — ALBUTEROL SULFATE 90 UG/1
1-2 AEROSOL, METERED RESPIRATORY (INHALATION)
Status: CANCELLED
Start: 2022-03-18

## 2022-03-17 RX ORDER — DIPHENHYDRAMINE HCL 25 MG
25 CAPSULE ORAL ONCE
Status: CANCELLED
Start: 2022-03-18 | End: 2022-03-18

## 2022-03-17 RX ORDER — ACETAMINOPHEN 325 MG/1
650 TABLET ORAL ONCE
Status: CANCELLED
Start: 2022-03-18 | End: 2022-03-18

## 2022-03-17 RX ADMIN — Medication 250 ML: at 14:01

## 2022-03-17 NOTE — PROGRESS NOTES
Infusion Nursing Note:  Ventura Quiroz presents today for Inflectra.    Patient seen by provider today: No   present during visit today: Not applicable    Note: Patient reports overall feeling well today. States he is tolerating his infusions.    In basket message sent to Dr. Wright regarding April infusion appointment (4/14/2022)  and a colonoscopy that pt is scheduled for on 4/18/2022.     Intravenous Access:  Peripheral IV placed.    Treatment Conditions:  Biological Infusion Checklist:  ~~~ NOTE: If the patient answers yes to any of the questions below, hold the infusion and contact ordering provider or on-call provider.    1. Have you recently had an elevated temperature, fever, chills, productive cough, coughing for 3 weeks or longer or hemoptysis, abnormal vital signs, night sweats,  chest pain or have you noticed a decrease in your appetite, unexplained weight loss or fatigue? No  2. Do you have any open wounds or new incisions? No  3. Do you have any recent or upcoming hospitalizations, surgeries or dental procedures? No   4. Do you currently have or recently have had any signs of illness or infection or are you on any antibiotics? No  5. Have you had any new, sudden or worsening abdominal pain? No  6. Have you or anyone in your household received a live vaccination in the past 4 weeks? Please note:  No live vaccines while on biologic/chemotherapy until 6 months after the last treatment.  Patient can receive the flu vaccine (shot only) and the pneumovax.  It is optimal for the patient to get these vaccines mid cycle, but they can be given at any time as long as it is not on the day of the infusion. No  7. Have you recently been diagnosed with any new nervous system diseases (ie. Multiple sclerosis, Guillain [place], seizures, neurological changes) or cancer diagnosis? No  8. Are you on any form of radiation or chemotherapy? No  9. Have there been any other new onset medical symptoms? No        Post  Infusion Assessment:  Patient tolerated infusion without incident.  Site patent and intact, free from redness, edema or discomfort.  No evidence of extravasations.  Access discontinued per protocol.  Biologic Infusion Post Education: Call the triage nurse at your clinic or seek medical attention if you have chills and/or temperature greater than or equal to 100.5, uncontrolled nausea/vomiting, diarrhea, constipation, dizziness, shortness of breath, chest pain, heart palpitations, weakness or any other new or concerning symptoms, questions or concerns.  You cannot have any live virus vaccines prior to or during treatment or up to 6 months post infusion.  If you have an upcoming surgery, medical procedure or dental procedure during treatment, this should be discussed with your ordering physician and your surgeon/dentist.  If you are having any concerning symptom, if you are unsure if you should get your next infusion or wish to speak to a provider before your next infusion, please call your care coordinator or triage nurse at your clinic to notify them so we can adequately serve you.       Discharge Plan:   AVS to patient via [employer].  Patient will return 4/14/2022 for next appointment.   Patient discharged in stable condition accompanied by: self.  Departure Mode: Ambulatory.      Jacquelin Simmons RN

## 2022-03-29 ENCOUNTER — PATIENT OUTREACH (OUTPATIENT)
Dept: GASTROENTEROLOGY | Facility: CLINIC | Age: 22
End: 2022-03-29
Payer: COMMERCIAL

## 2022-04-11 ENCOUNTER — TELEPHONE (OUTPATIENT)
Dept: GASTROENTEROLOGY | Facility: CLINIC | Age: 22
End: 2022-04-11

## 2022-04-11 DIAGNOSIS — K50.919 CROHN'S DISEASE WITH COMPLICATION, UNSPECIFIED GASTROINTESTINAL TRACT LOCATION (H): ICD-10-CM

## 2022-04-11 DIAGNOSIS — Z12.11 ENCOUNTER FOR SCREENING COLONOSCOPY: Primary | ICD-10-CM

## 2022-04-11 RX ORDER — BISACODYL 5 MG/1
TABLET, DELAYED RELEASE ORAL
Qty: 4 TABLET | Refills: 0 | Status: SHIPPED | OUTPATIENT
Start: 2022-04-11 | End: 2022-05-12

## 2022-04-11 NOTE — TELEPHONE ENCOUNTER
"Attempted to contact patient regarding upcoming colonoscopy procedure on 4.18.2022 for pre assessment questions. No answer.     Left message to return call to [phone] #2    Covid test scheduled: 4.14.2022    Arrival time: 0900    Facility location: [facility]    Sedation type: MAC    Indication for procedure: screening, Crohn's    Referring provider:  Tobin Wright MD    Bowel prep recommendation: Golytely prep d/t \"fair\" prep with Miralax/Magnesium citrate/Dulcolax with last colonoscopy prep.    Prep instructions sent via [employer].  Prep prescription sent to [facility] - [place], [place] - [address]      Minerva Nguyen RN    "

## 2022-04-13 NOTE — TELEPHONE ENCOUNTER
Second attempt for pre-assessment prior to upcoming 4.18.2022.    No answer.  Left message to return call [phone] #2    Discuss bowel prep options; see 4.11.2022 mychart messages    Minerva Nguyen RN

## 2022-04-14 ENCOUNTER — INFUSION THERAPY VISIT (OUTPATIENT)
Dept: INFUSION THERAPY | Facility: CLINIC | Age: 22
End: 2022-04-14

## 2022-04-14 VITALS
WEIGHT: 187 LBS | SYSTOLIC BLOOD PRESSURE: 133 MMHG | TEMPERATURE: 97 F | DIASTOLIC BLOOD PRESSURE: 79 MMHG | BODY MASS INDEX: 24.01 KG/M2 | HEART RATE: 97 BPM | RESPIRATION RATE: 18 BRPM | OXYGEN SATURATION: 98 %

## 2022-04-14 DIAGNOSIS — D50.9 IRON DEFICIENCY ANEMIA, UNSPECIFIED IRON DEFICIENCY ANEMIA TYPE: Primary | ICD-10-CM

## 2022-04-14 DIAGNOSIS — K52.9 COLITIS: ICD-10-CM

## 2022-04-14 DIAGNOSIS — K50.014 CROHN'S DISEASE OF SMALL INTESTINE WITH ABSCESS (H): ICD-10-CM

## 2022-04-14 DIAGNOSIS — Z11.59 ENCOUNTER FOR SCREENING FOR OTHER VIRAL DISEASES: ICD-10-CM

## 2022-04-14 PROCEDURE — U0005 INFEC AGEN DETEC AMPLI PROBE: HCPCS | Performed by: NURSE PRACTITIONER

## 2022-04-14 PROCEDURE — U0003 INFECTIOUS AGENT DETECTION BY NUCLEIC ACID (DNA OR RNA); SEVERE ACUTE RESPIRATORY SYNDROME CORONAVIRUS 2 (SARS-COV-2) (CORONAVIRUS DISEASE [COVID-19]), AMPLIFIED PROBE TECHNIQUE, MAKING USE OF HIGH THROUGHPUT TECHNOLOGIES AS DESCRIBED BY CMS-2020-01-R: HCPCS | Performed by: NURSE PRACTITIONER

## 2022-04-14 PROCEDURE — 99207 PR NO CHARGE LOS: CPT

## 2022-04-14 PROCEDURE — 96413 CHEMO IV INFUSION 1 HR: CPT | Performed by: NURSE PRACTITIONER

## 2022-04-14 RX ORDER — MEPERIDINE HYDROCHLORIDE 25 MG/ML
25 INJECTION INTRAMUSCULAR; INTRAVENOUS; SUBCUTANEOUS EVERY 30 MIN PRN
Status: CANCELLED | OUTPATIENT
Start: 2022-05-12

## 2022-04-14 RX ORDER — ACETAMINOPHEN 325 MG/1
650 TABLET ORAL ONCE
Status: CANCELLED
Start: 2022-05-12 | End: 2022-05-12

## 2022-04-14 RX ORDER — DIPHENHYDRAMINE HCL 25 MG
25 CAPSULE ORAL ONCE
Status: CANCELLED
Start: 2022-05-12 | End: 2022-05-12

## 2022-04-14 RX ORDER — NALOXONE HYDROCHLORIDE 0.4 MG/ML
0.2 INJECTION, SOLUTION INTRAMUSCULAR; INTRAVENOUS; SUBCUTANEOUS
Status: CANCELLED | OUTPATIENT
Start: 2022-05-12

## 2022-04-14 RX ORDER — DIPHENHYDRAMINE HYDROCHLORIDE 50 MG/ML
50 INJECTION INTRAMUSCULAR; INTRAVENOUS
Status: CANCELLED
Start: 2022-05-12

## 2022-04-14 RX ORDER — EPINEPHRINE 1 MG/ML
0.3 INJECTION, SOLUTION INTRAMUSCULAR; SUBCUTANEOUS EVERY 5 MIN PRN
Status: CANCELLED | OUTPATIENT
Start: 2022-05-12

## 2022-04-14 RX ORDER — METHYLPREDNISOLONE SODIUM SUCCINATE 125 MG/2ML
125 INJECTION, POWDER, LYOPHILIZED, FOR SOLUTION INTRAMUSCULAR; INTRAVENOUS
Status: CANCELLED
Start: 2022-05-12

## 2022-04-14 RX ORDER — ALBUTEROL SULFATE 0.83 MG/ML
2.5 SOLUTION RESPIRATORY (INHALATION)
Status: CANCELLED | OUTPATIENT
Start: 2022-05-12

## 2022-04-14 RX ORDER — ALBUTEROL SULFATE 90 UG/1
1-2 AEROSOL, METERED RESPIRATORY (INHALATION)
Status: CANCELLED
Start: 2022-05-12

## 2022-04-14 RX ADMIN — Medication 250 ML: at 13:25

## 2022-04-14 ASSESSMENT — PAIN SCALES - GENERAL: PAINLEVEL: NO PAIN (0)

## 2022-04-14 NOTE — PROGRESS NOTES
Infusion Nursing Note:  Ventura Quiroz presents today for Inflectra.  Patient seen by provider today: No   present during visit today: Not Applicable.    Note: Pt states he is doing well, denies any problems with his last infusion, denies any reason to hold therapy today. Pt worried he won't be able to make it to his next appointment - Due for a pre-procedure COVID test in [place] today at 15:50 per Dr Wright - Collected specimen today and sent to lab during infusion appointment..  Cancelled appointment.      Intravenous Access:  Peripheral IV placed - Left FA 24 gauge angio after 1 attempt.    Treatment Conditions:  Biological Infusion Checklist:  ~~~ NOTE: If the patient answers yes to any of the questions below, hold the infusion and contact ordering provider or on-call provider.    1. Have you recently had an elevated temperature, fever, chills, productive cough, coughing for 3 weeks or longer or hemoptysis, abnormal vital signs, night sweats,  chest pain or have you noticed a decrease in your appetite, unexplained weight loss or fatigue? No  2. Do you have any open wounds or new incisions? No  3. Do you have any recent or upcoming hospitalizations, surgeries or dental procedures? No  4. Do you currently have or recently have had any signs of illness or infection or are you on any antibiotics? No  5. Have you had any new, sudden or worsening abdominal pain? No  6. Have you or anyone in your household received a live vaccination in the past 4 weeks? Please note:  No live vaccines while on biologic/chemotherapy until 6 months after the last treatment.  Patient can receive the flu vaccine (shot only) and the pneumovax.  It is optimal for the patient to get these vaccines mid cycle, but they can be given at any time as long as it is not on the day of the infusion. No  7. Have you recently been diagnosed with any new nervous system diseases (ie. Multiple sclerosis, Guillain [place], seizures, neurological  changes) or cancer diagnosis? No  8. Are you on any form of radiation or chemotherapy? No  9. Are you pregnant or breast feeding or do you have plans of pregnancy in the future? No  10. Have you been having any signs of worsening depression or suicidal ideations?  (benlysta only) No  11. Have there been any other new onset medical symptoms? No    Post Infusion Assessment:  Patient tolerated infusion without incident.  Blood return noted pre and post infusion.  Site patent and intact, free from redness, edema or discomfort.  No evidence of extravasations.  Access discontinued per protocol.  Biologic Infusion Post Education: Call the triage nurse at your clinic or seek medical attention if you have chills and/or temperature greater than or equal to 100.5, uncontrolled nausea/vomiting, diarrhea, constipation, dizziness, shortness of breath, chest pain, heart palpitations, weakness or any other new or concerning symptoms, questions or concerns.  You cannot have any live virus vaccines prior to or during treatment or up to 6 months post infusion.  If you have an upcoming surgery, medical procedure or dental procedure during treatment, this should be discussed with your ordering physician and your surgeon/dentist.  If you are having any concerning symptom, if you are unsure if you should get your next infusion or wish to speak to a provider before your next infusion, please call your care coordinator or triage nurse at your clinic to notify them so we can adequately serve you.       Discharge Plan:   Return on 05/12/0222 for labs and Inflectra infusion.  Discharge instructions reviewed with: Patient.  Patient and/or family verbalized understanding of discharge instructions and all questions answered.  Patient discharged in stable condition accompanied by: self.  Departure Mode: Ambulatory.      Bre Martinez RN

## 2022-04-15 LAB — SARS-COV-2 RNA RESP QL NAA+PROBE: NEGATIVE

## 2022-04-18 ENCOUNTER — DOCUMENTATION ONLY (OUTPATIENT)
Dept: GASTROENTEROLOGY | Facility: OUTPATIENT CENTER | Age: 22
End: 2022-04-18
Payer: COMMERCIAL

## 2022-04-18 ENCOUNTER — TRANSFERRED RECORDS (OUTPATIENT)
Dept: HEALTH INFORMATION MANAGEMENT | Facility: CLINIC | Age: 22
End: 2022-04-18
Payer: COMMERCIAL

## 2022-04-18 DIAGNOSIS — K50.012 CROHN'S DISEASE OF SMALL INTESTINE WITH INTESTINAL OBSTRUCTION (H): ICD-10-CM

## 2022-04-18 PROCEDURE — 88305 TISSUE EXAM BY PATHOLOGIST: CPT | Mod: TC,ORL | Performed by: INTERNAL MEDICINE

## 2022-04-19 ENCOUNTER — LAB REQUISITION (OUTPATIENT)
Dept: LAB | Facility: CLINIC | Age: 22
End: 2022-04-19
Payer: COMMERCIAL

## 2022-04-20 LAB
PATH REPORT.COMMENTS IMP SPEC: NORMAL
PATH REPORT.COMMENTS IMP SPEC: NORMAL
PATH REPORT.FINAL DX SPEC: NORMAL
PATH REPORT.GROSS SPEC: NORMAL
PATH REPORT.MICROSCOPIC SPEC OTHER STN: NORMAL
PATH REPORT.RELEVANT HX SPEC: NORMAL
PHOTO IMAGE: NORMAL

## 2022-04-20 PROCEDURE — 88305 TISSUE EXAM BY PATHOLOGIST: CPT | Mod: 26 | Performed by: PATHOLOGY

## 2022-04-26 ENCOUNTER — TELEPHONE (OUTPATIENT)
Dept: GASTROENTEROLOGY | Facility: CLINIC | Age: 22
End: 2022-04-26

## 2022-04-26 ENCOUNTER — VIRTUAL VISIT (OUTPATIENT)
Dept: GASTROENTEROLOGY | Facility: CLINIC | Age: 22
End: 2022-04-26
Payer: COMMERCIAL

## 2022-04-26 DIAGNOSIS — K50.012 CROHN'S DISEASE OF SMALL INTESTINE WITH INTESTINAL OBSTRUCTION (H): Primary | ICD-10-CM

## 2022-04-26 PROCEDURE — 99214 OFFICE O/P EST MOD 30 MIN: CPT | Mod: GT | Performed by: INTERNAL MEDICINE

## 2022-04-26 NOTE — PROGRESS NOTES
Ventura is a 21 year old who is being evaluated via a billable video visit.      How would you like to obtain your AVS? [employer]  If the video visit is dropped, the invitation should be resent by: Text to cell phone: [phone]  Will anyone else be joining your video visit? No      Video start time: 10:00 AM  Video endo time: 10:20 AM

## 2022-04-26 NOTE — LETTER
4/26/2022     RE: Ventura Quiroz  [address]  [facility] [address]        Dear Colleague,    Thank you for referring your patient, Ventura Quiroz, to the [facility] [place]. Please see a copy of my visit note below.    Ventura is a 21 year old who is being evaluated via a billable video visit.      How would you like to obtain your AVS? MyChart  If the video visit is dropped, the invitation should be resent by: Text to cell phone: [phone]  Will anyone else be joining your video visit? No      Video start time: 10:00 AM  Video endo time: 10:20 AM      IBD CLINIC VISIT    CC/REFERRING MD:  Referred Self  REASON FOR CONSULTATION: Crohn's disease    ASSESSMENT/PLAN:  21 year old male with Crohn's disease of the ileum initially complicated by phlegmon and abscess now status post ICR.     1. Crohn's disease:  Current CD medications:    - Infliximab 10mg/kg q28 days   - 6-MP 50mg daily  Current clinical disease activity: HBI:   Last endoscopic disease activity: Rutgeert's i2a recurrence with stenosis. Dilated to 15mm.     Most likely he is in clinical remission from his Crohn's disease.  He has ulceration limited to his anastomosis which is unlikely to cause clinical symptoms.  His stool count is likely postsurgical in nature.  We had a long discussion today about the natural history of ulcers limited to the anastomosis and his main risk for developing intestinal obstruction.  Discussed data behind sequential dilation to diameter over 15 mm.  He is not having any clinical symptoms at this time but does agree that he could benefit from subsequent dilation.    Given his prior antibody formation, I am hesitant to de-escalate his infliximab.  However with the addition of 6-MP he may not need 10mg/kg every 4 weeks.  Will repeat an infliximab concentration later this summer and if it remains greater than 34 will likely decrease to 7.5 mg/kg every 4 weeks    -- Repeat colonoscopy with  dilation of anastomosis end of summer or fall 2022  -- Discussed FMT and postoperative Crohn's clinical trial which he is interested in, will refer to  for timing  -- Continue infliximab and 6-MP at this time.   -- Repeat infliximab level in June.     -- Infliximab level from 4/19 pending. Depending on what the level is we may need to increased infliximab dosing frequency or, if antibodies present, switch to another therapy (Cimzia or stelara)   -- continue 6-MP 50mg daily     2. IBD dysplasia surveillance:Given ileal only disease, he does not need IBD dysplasia surveillance   -- Age appropriate colon cancer screening    Misc:  -- Avoid tobacco use  -- Avoid NSAIDs as there is potentially a 25% chance of causing an IBD flare    Return to clinic in 2-3 months    Thank you for this consultation.  It was a pleasure to participate in the care of this patient; please contact us with any further questions.     IBD HISTORY  Age at diagnosis: 18  Extent of disease: ileal  Disease phenotype: fistulizing  Nieves-anal disease: No  Prior IBD surgeries: Ileocecal resection - Rush-Meaux 8/21/20.   Prior IBD Medications:   - Humira   - Ciprofloxacin  - Metronidazole  - Ciprofloxacin - disease progression despite weekly dosing      DRUG MONITORING  TPMT enzyme activity:     6-TGN/6-MMPN levels:  9/10/19: 97 / 548    Biologic concentration:  8/23/19: Adalimumab: 5, no antibodies, every other week, 50mg mercaptopurine   2/18/21: Inflixiamb level 1.6; antibodies: 20  4/19/21 Infliximab: 0, antibodies: 0 (IFX + 6-MP)  3/1/22: IFX: > 34, no antibodies (10mg/kg q4 + 6-MP)    sIBDQ:   IBDQ Score Date IBDQ - Total Score  (Higher score better)   4/25/2022 59   9/10/2019 62   5/28/2019 56   4/24/2019 48     HPI:   Here for follow-up    Reports about 5 stools a day. Stools are mostly loose. Occasional urgency. Maybe in the past few days, stools might have been more formed, but no clear change after dilation.     No abdominal  pain. No upper GI symptoms.     HBI:  Overall patient well being (prior day): 0 (Very well)  Abdominal pain (prior day): 0 (None)  Number of liquid or soft stools (prior day): 5 (1 point per stool)  Abdominal mass on exam:   Complications (1 point for each):   None    Remission <5  Mild activity 5-7  Moderate activity 8-16  Severe > 16    Extra intestinal manifestations of IBD:  No uveitis/episcleritis  No aphthous ulcers   No arthritis   No erythema nodosum/pyoderma gangrenosum.     ROS:    Constitutional, HEENT, cardiovascular, pulmonary, GI, , musculoskeletal, neuro, skin, endocrine and psych systems are negative, except as otherwise noted.     PERTINENT PAST MEDICAL HISTORY:  Past Medical History:   Diagnosis Date     Allergic rhinitis, cause unspecified     Zyrtec helps     Crohn's Colitis 4/8/2019     Unspecified otitis media     Otitis Media       PREVIOUS SURGERIES:  Past Surgical History:   Procedure Laterality Date     COLONOSCOPY N/A 4/11/2019    Procedure: COMBINED COLONOSCOPY, SINGLE OR MULTIPLE BIOPSY/POLYPECTOMY BY BIOPSY;  Surgeon: Tobin Wright MD;  Location: [facility]     EXCISE LIP OR CHEEK FOLD  10/3/2003    Needle cautery frenulectomy.     INSERT PICC LINE Left 8/10/2020    Procedure: INSERTION, PICC @845;  Surgeon: Karan Farr MD;  Location: [facility]     IR PICC PLACEMENT > 5 YRS OF AGE  8/10/2020     PE TUBES  4/25/2006     RESECTION ILEOCOLIC N/A 8/21/2020    Procedure: Exploratory laparotomy with ileocolic resection with takedown of enterovesical fistula, and low anterior resection;  Surgeon: Brittni Robles MD;  Location: [facility]     SIGMOIDOSCOPY FLEXIBLE N/A 8/21/2020    Procedure: Sigmoidoscopy flexible;  Surgeon: Brittni Robles MD;  Location: [facility]     TONSILLECTOMY & ADENOIDECTOMY  4/25/2006     ALLERGIES:     Allergies   Allergen Reactions     Gadavist [Gadobutrol] Nausea     Patient could not perform timed sequences due to nausea and discomfort.      Glucagon Nausea and Vomiting     Pre medicate with lorazapam if able.       Amoxicillin      Penicillin [Penicillins]        PERTINENT MEDICATIONS:    Current Outpatient Medications:      Ferrous Sulfate (IRON) 325 (65 Fe) MG tablet, TAKE ONE TABLET BY MOUTH ONCE DAILY WITH LUNCH, Disp: , Rfl:      bisacodyl (DULCOLAX) 5 MG EC tablet, Take as directed. One day before exam take 2 tablets at 3 PM. Take 2 tablets at 11 PM. (Patient not taking: Reported on 4/26/2022), Disp: 4 tablet, Rfl: 0     mercaptopurine (PURINETHOL) 50 MG tablet, Take 1 tablet (50 mg) by mouth daily (Patient not taking: No sig reported), Disp: 30 tablet, Rfl: 2     polyethylene glycol (GOLYTELY) 236 g suspension, Take as directed. One day before exam fill the jug with water. Cover and shake until well mixed. At 6 PM start drinking an 8oz glass of mixture every 15 minutes until jug is 1/2 empty. Store remainder in the refrigerator.  At 11 PM Start drinking the other half of the Golytely jug. Drink one 8-ounce glass every 15 minutes until the jug is empty. (Patient not taking: Reported on 4/26/2022), Disp: 4000 mL, Rfl: 0    SOCIAL HISTORY:  Social History     Socioeconomic History     Marital status: Single     Spouse name: Not on file     Number of children: Not on file     Years of education: Not on file     Highest education level: Not on file   Occupational History     Not on file   Tobacco Use     Smoking status: Never Smoker     Smokeless tobacco: Current User     Tobacco comment: Vape   Vaping Use     Vaping Use: Every day     Substances: Nicotine, Flavoring     Devices: Pre-filled or refillable cartridge   Substance and Sexual Activity     Alcohol use: Yes     Drug use: No     Sexual activity: Yes     Partners: Female     Birth control/protection: Pill   Other Topics Concern      Service Not Asked     Blood Transfusions Not Asked     Caffeine Concern Not Asked     Occupational Exposure Not Asked     Hobby Hazards Not Asked     Sleep  Concern Not Asked     Stress Concern Not Asked     Weight Concern Not Asked     Special Diet Not Asked     Back Care Not Asked     Exercise Not Asked     Bike Helmet Not Asked     Seat Belt Not Asked     Self-Exams Not Asked     Parent/sibling w/ CABG, MI or angioplasty before 65F 55M? Not Asked   Social History Narrative     Not on file     Social Determinants of Health     Financial Resource Strain: Not on file   Food Insecurity: Not on file   Transportation Needs: Not on file   Physical Activity: Not on file   Stress: Not on file   Social Connections: Not on file   Intimate Partner Violence: Not on file   Housing Stability: Not on file       FAMILY HISTORY:  Family History   Problem Relation Age of Onset     No Known Problems Mother      No Known Problems Father      Melanoma No family hx of      Skin Cancer No family hx of         No family history of IBD or colon or rectal cancer.     Past/family/social history reviewed and no changes    PHYSICAL EXAMINATION:  Vitals reviewed: There were no vitals taken for this visit.  Wt:   Wt Readings from Last 2 Encounters:   04/14/22 84.8 kg (187 lb)   03/17/22 87 kg (191 lb 11.2 oz)      Constitutional - general appearance is well and in no acute distress. Body habitus normal  Eyes - No redness or discharge  Respiratory - No cough, unlabored breathing  Musculoskeletal - range of motion intact: Neck and arms  Skin - No discoloration or lesions  Neurological - No tremors, headaches  Psychiatric - No anxiety, alert & oriented     Again, thank you for allowing me to participate in the care of your patient.      Sincerely,    Tobin Wright MD

## 2022-04-26 NOTE — PROGRESS NOTES
IBD CLINIC VISIT    CC/REFERRING MD:  Referred Self  REASON FOR CONSULTATION: Crohn's disease    ASSESSMENT/PLAN:  21 year old male with Crohn's disease of the ileum initially complicated by phlegmon and abscess now status post ICR.     1. Crohn's disease:  Current CD medications:    - Infliximab 10mg/kg q28 days   - 6-MP 50mg daily  Current clinical disease activity: HBI:   Last endoscopic disease activity: Rutgeert's i2a recurrence with stenosis. Dilated to 15mm.     Most likely he is in clinical remission from his Crohn's disease.  He has ulceration limited to his anastomosis which is unlikely to cause clinical symptoms.  His stool count is likely postsurgical in nature.  We had a long discussion today about the natural history of ulcers limited to the anastomosis and his main risk for developing intestinal obstruction.  Discussed data behind sequential dilation to diameter over 15 mm.  He is not having any clinical symptoms at this time but does agree that he could benefit from subsequent dilation.    Given his prior antibody formation, I am hesitant to de-escalate his infliximab.  However with the addition of 6-MP he may not need 10mg/kg every 4 weeks.  Will repeat an infliximab concentration later this summer and if it remains greater than 34 will likely decrease to 7.5 mg/kg every 4 weeks    -- Repeat colonoscopy with dilation of anastomosis end of summer or fall 2022  -- Discussed FMT and postoperative Crohn's clinical trial which he is interested in, will refer to  for timing  -- Continue infliximab and 6-MP at this time.   -- Repeat infliximab level in June.     -- Infliximab level from 4/19 pending. Depending on what the level is we may need to increased infliximab dosing frequency or, if antibodies present, switch to another therapy (Cimzia or stelara)   -- continue 6-MP 50mg daily     2. IBD dysplasia surveillance:Given ileal only disease, he does not need IBD dysplasia surveillance    -- Age appropriate colon cancer screening    Misc:  -- Avoid tobacco use  -- Avoid NSAIDs as there is potentially a 25% chance of causing an IBD flare    Return to clinic in 2-3 months    Thank you for this consultation.  It was a pleasure to participate in the care of this patient; please contact us with any further questions.     IBD HISTORY  Age at diagnosis: 18  Extent of disease: ileal  Disease phenotype: fistulizing  Nieves-anal disease: No  Prior IBD surgeries: Ileocecal resection - Rush-Meaux 8/21/20.   Prior IBD Medications:   - Humira   - Ciprofloxacin  - Metronidazole  - Ciprofloxacin - disease progression despite weekly dosing      DRUG MONITORING  TPMT enzyme activity:     6-TGN/6-MMPN levels:  9/10/19: 97 / 548    Biologic concentration:  8/23/19: Adalimumab: 5, no antibodies, every other week, 50mg mercaptopurine   2/18/21: Inflixiamb level 1.6; antibodies: 20  4/19/21 Infliximab: 0, antibodies: 0 (IFX + 6-MP)  3/1/22: IFX: > 34, no antibodies (10mg/kg q4 + 6-MP)    sIBDQ:   IBDQ Score Date IBDQ - Total Score  (Higher score better)   4/25/2022 59   9/10/2019 62   5/28/2019 56   4/24/2019 48     HPI:   Here for follow-up    Reports about 5 stools a day. Stools are mostly loose. Occasional urgency. Maybe in the past few days, stools might have been more formed, but no clear change after dilation.     No abdominal pain. No upper GI symptoms.     HBI:  Overall patient well being (prior day): 0 (Very well)  Abdominal pain (prior day): 0 (None)  Number of liquid or soft stools (prior day): 5 (1 point per stool)  Abdominal mass on exam:   Complications (1 point for each):   None    Remission <5  Mild activity 5-7  Moderate activity 8-16  Severe > 16    Extra intestinal manifestations of IBD:  No uveitis/episcleritis  No aphthous ulcers   No arthritis   No erythema nodosum/pyoderma gangrenosum.     ROS:    Constitutional, HEENT, cardiovascular, pulmonary, GI, , musculoskeletal, neuro, skin, endocrine and  psych systems are negative, except as otherwise noted.     PERTINENT PAST MEDICAL HISTORY:  Past Medical History:   Diagnosis Date     Allergic rhinitis, cause unspecified     Zyrtec helps     Crohn's Colitis 4/8/2019     Unspecified otitis media     Otitis Media       PREVIOUS SURGERIES:  Past Surgical History:   Procedure Laterality Date     COLONOSCOPY N/A 4/11/2019    Procedure: COMBINED COLONOSCOPY, SINGLE OR MULTIPLE BIOPSY/POLYPECTOMY BY BIOPSY;  Surgeon: Tobin Wright MD;  Location: [facility]     EXCISE LIP OR CHEEK FOLD  10/3/2003    Needle cautery frenulectomy.     INSERT PICC LINE Left 8/10/2020    Procedure: INSERTION, PICC @845;  Surgeon: Karan Farr MD;  [facility]: [facility]     IR PICC PLACEMENT > 5 YRS OF AGE  8/10/2020     PE TUBES  4/25/2006     RESECTION ILEOCOLIC N/A 8/21/2020    Procedure: Exploratory laparotomy with ileocolic resection with takedown of enterovesical fistula, and low anterior resection;  Surgeon: Brittni Robles MD;  Location: [facility]     SIGMOIDOSCOPY FLEXIBLE N/A 8/21/2020    Procedure: Sigmoidoscopy flexible;  Surgeon: Brittni Robles MD;  Location: [facility]     TONSILLECTOMY & ADENOIDECTOMY  4/25/2006     ALLERGIES:     Allergies   Allergen Reactions     Gadavist [Gadobutrol] Nausea     Patient could not perform timed sequences due to nausea and discomfort.     Glucagon Nausea and Vomiting     Pre medicate with lorazapam if able.       Amoxicillin      Penicillin [Penicillins]        PERTINENT MEDICATIONS:    Current Outpatient Medications:      Ferrous Sulfate (IRON) 325 (65 Fe) MG tablet, TAKE ONE TABLET BY MOUTH ONCE DAILY WITH LUNCH, Disp: , Rfl:      bisacodyl (DULCOLAX) 5 MG EC tablet, Take as directed. One day before exam take 2 tablets at 3 PM. Take 2 tablets at 11 PM. (Patient not taking: Reported on 4/26/2022), Disp: 4 tablet, Rfl: 0     mercaptopurine (PURINETHOL) 50 MG tablet, Take 1 tablet (50 mg) by mouth daily (Patient not taking: No  sig reported), Disp: 30 tablet, Rfl: 2     polyethylene glycol (GOLYTELY) 236 g suspension, Take as directed. One day before exam fill the jug with water. Cover and shake until well mixed. At 6 PM start drinking an 8oz glass of mixture every 15 minutes until jug is 1/2 empty. Store remainder in the refrigerator.  At 11 PM Start drinking the other half of the Golytely jug. Drink one 8-ounce glass every 15 minutes until the jug is empty. (Patient not taking: Reported on 4/26/2022), Disp: 4000 mL, Rfl: 0    SOCIAL HISTORY:  Social History     Socioeconomic History     Marital status: Single     Spouse name: Not on file     Number of children: Not on file     Years of education: Not on file     Highest education level: Not on file   Occupational History     Not on file   Tobacco Use     Smoking status: Never Smoker     Smokeless tobacco: Current User     Tobacco comment: Vape   Vaping Use     Vaping Use: Every day     Substances: Nicotine, Flavoring     Devices: Pre-filled or refillable cartridge   Substance and Sexual Activity     Alcohol use: Yes     Drug use: No     Sexual activity: Yes     Partners: Female     Birth control/protection: Pill   Other Topics Concern      Service Not Asked     Blood Transfusions Not Asked     Caffeine Concern Not Asked     Occupational Exposure Not Asked     Hobby Hazards Not Asked     Sleep Concern Not Asked     Stress Concern Not Asked     Weight Concern Not Asked     Special Diet Not Asked     Back Care Not Asked     Exercise Not Asked     Bike Helmet Not Asked     Seat Belt Not Asked     Self-Exams Not Asked     Parent/sibling w/ CABG, MI or angioplasty before 65F 55M? Not Asked   Social History Narrative     Not on file     Social Determinants of Health     Financial Resource Strain: Not on file   Food Insecurity: Not on file   Transportation Needs: Not on file   Physical Activity: Not on file   Stress: Not on file   Social Connections: Not on file   Intimate Partner  Violence: Not on file   Housing Stability: Not on file       FAMILY HISTORY:  Family History   Problem Relation Age of Onset     No Known Problems Mother      No Known Problems Father      Melanoma No family hx of      Skin Cancer No family hx of         No family history of IBD or colon or rectal cancer.     Past/family/social history reviewed and no changes    PHYSICAL EXAMINATION:  Vitals reviewed: There were no vitals taken for this visit.  Wt:   Wt Readings from Last 2 Encounters:   04/14/22 84.8 kg (187 lb)   03/17/22 87 kg (191 lb 11.2 oz)      Constitutional - general appearance is well and in no acute distress. Body habitus normal  Eyes - No redness or discharge  Respiratory - No cough, unlabored breathing  Musculoskeletal - range of motion intact: Neck and arms  Skin - No discoloration or lesions  Neurological - No tremors, headaches  Psychiatric - No anxiety, alert & oriented

## 2022-04-26 NOTE — PATIENT INSTRUCTIONS
-- Colonoscopy with Dr. Wright in Fall 2022    --  will reach out to you to discuss the FMT for post-operative Crohn's disease study. This would involve 1 additional colonoscopy in the fall.     You can learn more about the study here: [email or web address]    -- Continue Inflximab and 6-MP at this time.     -- Will repeat the infliximab level in June. If it is still high, we will likely decrease your dose.     Reach out with any questions.     -Dr. Wright

## 2022-04-29 ENCOUNTER — PATIENT OUTREACH (OUTPATIENT)
Dept: GASTROENTEROLOGY | Facility: CLINIC | Age: 22
End: 2022-04-29
Payer: COMMERCIAL

## 2022-04-29 ENCOUNTER — MEDICAL CORRESPONDENCE (OUTPATIENT)
Dept: HEALTH INFORMATION MANAGEMENT | Facility: CLINIC | Age: 22
End: 2022-04-29
Payer: COMMERCIAL

## 2022-04-29 ENCOUNTER — DOCUMENTATION ONLY (OUTPATIENT)
Dept: GASTROENTEROLOGY | Facility: CLINIC | Age: 22
End: 2022-04-29
Payer: COMMERCIAL

## 2022-04-29 DIAGNOSIS — K50.012 CROHN'S DISEASE OF SMALL INTESTINE WITH INTESTINAL OBSTRUCTION (H): Primary | ICD-10-CM

## 2022-04-29 NOTE — TELEPHONE ENCOUNTER
Tobin Wright MD [place], Ann, RN  Hey - can we repeat infliximab trough in June    Edited therapy plan to add inflectra level   Also note in lab appt   Order placed and in basket to Crystal to complete the form

## 2022-05-12 ENCOUNTER — LAB (OUTPATIENT)
Dept: LAB | Facility: CLINIC | Age: 22
End: 2022-05-12
Payer: COMMERCIAL

## 2022-05-12 ENCOUNTER — INFUSION THERAPY VISIT (OUTPATIENT)
Dept: INFUSION THERAPY | Facility: CLINIC | Age: 22
End: 2022-05-12
Payer: COMMERCIAL

## 2022-05-12 VITALS
WEIGHT: 190.7 LBS | RESPIRATION RATE: 14 BRPM | OXYGEN SATURATION: 98 % | BODY MASS INDEX: 24.48 KG/M2 | DIASTOLIC BLOOD PRESSURE: 64 MMHG | SYSTOLIC BLOOD PRESSURE: 117 MMHG | TEMPERATURE: 97.8 F | HEART RATE: 72 BPM

## 2022-05-12 DIAGNOSIS — K52.9 COLITIS: ICD-10-CM

## 2022-05-12 DIAGNOSIS — K50.012 CROHN'S DISEASE OF SMALL INTESTINE WITH INTESTINAL OBSTRUCTION (H): ICD-10-CM

## 2022-05-12 DIAGNOSIS — D50.9 IRON DEFICIENCY ANEMIA, UNSPECIFIED IRON DEFICIENCY ANEMIA TYPE: Primary | ICD-10-CM

## 2022-05-12 DIAGNOSIS — K50.014 CROHN'S DISEASE OF SMALL INTESTINE WITH ABSCESS (H): ICD-10-CM

## 2022-05-12 LAB
ALBUMIN SERPL-MCNC: 4.1 G/DL (ref 3.4–5)
ALP SERPL-CCNC: 74 U/L (ref 40–150)
ALT SERPL W P-5'-P-CCNC: 26 U/L (ref 0–70)
AST SERPL W P-5'-P-CCNC: 18 U/L (ref 0–45)
BASOPHILS # BLD AUTO: 0 10E3/UL (ref 0–0.2)
BASOPHILS NFR BLD AUTO: 0 %
BILIRUB DIRECT SERPL-MCNC: <0.1 MG/DL (ref 0–0.2)
BILIRUB SERPL-MCNC: 0.3 MG/DL (ref 0.2–1.3)
CRP SERPL-MCNC: <2.9 MG/L (ref 0–8)
EOSINOPHIL # BLD AUTO: 0.1 10E3/UL (ref 0–0.7)
EOSINOPHIL NFR BLD AUTO: 1 %
ERYTHROCYTE [DISTWIDTH] IN BLOOD BY AUTOMATED COUNT: 12.8 % (ref 10–15)
ERYTHROCYTE [SEDIMENTATION RATE] IN BLOOD BY WESTERGREN METHOD: 4 MM/HR (ref 0–15)
HCT VFR BLD AUTO: 40.6 % (ref 40–53)
HGB BLD-MCNC: 13.8 G/DL (ref 13.3–17.7)
IMM GRANULOCYTES # BLD: 0 10E3/UL
IMM GRANULOCYTES NFR BLD: 0 %
LYMPHOCYTES # BLD AUTO: 1.8 10E3/UL (ref 0.8–5.3)
LYMPHOCYTES NFR BLD AUTO: 21 %
MCH RBC QN AUTO: 31.2 PG (ref 26.5–33)
MCHC RBC AUTO-ENTMCNC: 34 G/DL (ref 31.5–36.5)
MCV RBC AUTO: 92 FL (ref 78–100)
MONOCYTES # BLD AUTO: 0.6 10E3/UL (ref 0–1.3)
MONOCYTES NFR BLD AUTO: 7 %
NEUTROPHILS # BLD AUTO: 6 10E3/UL (ref 1.6–8.3)
NEUTROPHILS NFR BLD AUTO: 71 %
NRBC # BLD AUTO: 0 10E3/UL
NRBC BLD AUTO-RTO: 0 /100
PLATELET # BLD AUTO: 213 10E3/UL (ref 150–450)
PROT SERPL-MCNC: 7.5 G/DL (ref 6.8–8.8)
RBC # BLD AUTO: 4.43 10E6/UL (ref 4.4–5.9)
WBC # BLD AUTO: 8.5 10E3/UL (ref 4–11)

## 2022-05-12 PROCEDURE — 86140 C-REACTIVE PROTEIN: CPT

## 2022-05-12 PROCEDURE — 80230 DRUG ASSAY INFLIXIMAB: CPT | Mod: 90

## 2022-05-12 PROCEDURE — 96413 CHEMO IV INFUSION 1 HR: CPT | Performed by: NURSE PRACTITIONER

## 2022-05-12 PROCEDURE — 85025 COMPLETE CBC W/AUTO DIFF WBC: CPT

## 2022-05-12 PROCEDURE — 85652 RBC SED RATE AUTOMATED: CPT

## 2022-05-12 PROCEDURE — 80076 HEPATIC FUNCTION PANEL: CPT

## 2022-05-12 PROCEDURE — 82542 COL CHROMOTOGRAPHY QUAL/QUAN: CPT | Mod: 90

## 2022-05-12 PROCEDURE — 99207 PR NO CHARGE LOS: CPT

## 2022-05-12 PROCEDURE — 36415 COLL VENOUS BLD VENIPUNCTURE: CPT

## 2022-05-12 PROCEDURE — 99000 SPECIMEN HANDLING OFFICE-LAB: CPT

## 2022-05-12 RX ORDER — ALBUTEROL SULFATE 90 UG/1
1-2 AEROSOL, METERED RESPIRATORY (INHALATION)
Status: CANCELLED
Start: 2022-06-09

## 2022-05-12 RX ORDER — DIPHENHYDRAMINE HCL 25 MG
25 CAPSULE ORAL ONCE
Status: CANCELLED
Start: 2022-06-09 | End: 2022-06-09

## 2022-05-12 RX ORDER — MEPERIDINE HYDROCHLORIDE 25 MG/ML
25 INJECTION INTRAMUSCULAR; INTRAVENOUS; SUBCUTANEOUS EVERY 30 MIN PRN
Status: CANCELLED | OUTPATIENT
Start: 2022-06-09

## 2022-05-12 RX ORDER — METHYLPREDNISOLONE SODIUM SUCCINATE 125 MG/2ML
125 INJECTION, POWDER, LYOPHILIZED, FOR SOLUTION INTRAMUSCULAR; INTRAVENOUS
Status: CANCELLED
Start: 2022-06-09

## 2022-05-12 RX ORDER — NALOXONE HYDROCHLORIDE 0.4 MG/ML
0.2 INJECTION, SOLUTION INTRAMUSCULAR; INTRAVENOUS; SUBCUTANEOUS
Status: CANCELLED | OUTPATIENT
Start: 2022-06-09

## 2022-05-12 RX ORDER — ALBUTEROL SULFATE 0.83 MG/ML
2.5 SOLUTION RESPIRATORY (INHALATION)
Status: CANCELLED | OUTPATIENT
Start: 2022-06-09

## 2022-05-12 RX ORDER — EPINEPHRINE 1 MG/ML
0.3 INJECTION, SOLUTION INTRAMUSCULAR; SUBCUTANEOUS EVERY 5 MIN PRN
Status: CANCELLED | OUTPATIENT
Start: 2022-06-09

## 2022-05-12 RX ORDER — ACETAMINOPHEN 325 MG/1
650 TABLET ORAL ONCE
Status: CANCELLED
Start: 2022-06-09 | End: 2022-06-09

## 2022-05-12 RX ORDER — DIPHENHYDRAMINE HYDROCHLORIDE 50 MG/ML
50 INJECTION INTRAMUSCULAR; INTRAVENOUS
Status: CANCELLED
Start: 2022-06-09

## 2022-05-12 RX ADMIN — Medication 250 ML: at 13:53

## 2022-05-12 NOTE — PROGRESS NOTES
Infusion Nursing Note:  Ventura Quiroz presents today for Inflectra infusion.    Patient seen by provider today: No   present during visit today: Not Applicable.    Note: N/A.      Intravenous Access:  Peripheral IV placed.    Treatment Conditions:  Biological Infusion Checklist:  ~~~ NOTE: If the patient answers yes to any of the questions below, hold the infusion and contact ordering provider or on-call provider.    1. Have you recently had an elevated temperature, fever, chills, productive cough, coughing for 3 weeks or longer or hemoptysis, abnormal vital signs, night sweats,  chest pain or have you noticed a decrease in your appetite, unexplained weight loss or fatigue? No  2. Do you have any open wounds or new incisions? No  3. Do you have any recent or upcoming hospitalizations, surgeries or dental procedures? No  4. Do you currently have or recently have had any signs of illness or infection or are you on any antibiotics? No  5. Have you had any new, sudden or worsening abdominal pain? No  6. Have you or anyone in your household received a live vaccination in the past 4 weeks? Please note:  No live vaccines while on biologic/chemotherapy until 6 months after the last treatment.  Patient can receive the flu vaccine (shot only) and the pneumovax.  It is optimal for the patient to get these vaccines mid cycle, but they can be given at any time as long as it is not on the day of the infusion. No  7. Have you recently been diagnosed with any new nervous system diseases (ie. Multiple sclerosis, Guillain Lentner, seizures, neurological changes) or cancer diagnosis? No  8. Are you on any form of radiation or chemotherapy? No  9. Have there been any other new onset medical symptoms? No      Post Infusion Assessment:  Patient tolerated infusion without incident.  Blood return noted pre and post infusion.  Site patent and intact, free from redness, edema or discomfort.  No evidence of extravasations.  Access  discontinued per protocol.       Discharge Plan:   AVS to patient via MYCHART.  Patient will schedule next infusion before leaving today.  Patient discharged in stable condition accompanied by: self.  Departure Mode: Ambulatory.      Danae Pace RN

## 2022-05-19 LAB
INFLIXIMAB AB SERPL IA-MCNC: <3.1 U/ML
INFLIXIMAB SERPL-MCNC: 25.6 UG/ML

## 2022-06-09 ENCOUNTER — INFUSION THERAPY VISIT (OUTPATIENT)
Dept: INFUSION THERAPY | Facility: CLINIC | Age: 22
End: 2022-06-09
Payer: COMMERCIAL

## 2022-06-09 VITALS
RESPIRATION RATE: 16 BRPM | WEIGHT: 192.8 LBS | OXYGEN SATURATION: 96 % | TEMPERATURE: 98.3 F | SYSTOLIC BLOOD PRESSURE: 118 MMHG | HEART RATE: 73 BPM | BODY MASS INDEX: 24.75 KG/M2 | DIASTOLIC BLOOD PRESSURE: 61 MMHG

## 2022-06-09 DIAGNOSIS — K52.9 COLITIS: ICD-10-CM

## 2022-06-09 DIAGNOSIS — D50.9 IRON DEFICIENCY ANEMIA, UNSPECIFIED IRON DEFICIENCY ANEMIA TYPE: Primary | ICD-10-CM

## 2022-06-09 DIAGNOSIS — K50.014 CROHN'S DISEASE OF SMALL INTESTINE WITH ABSCESS (H): ICD-10-CM

## 2022-06-09 PROCEDURE — 99207 PR NO CHARGE LOS: CPT

## 2022-06-09 PROCEDURE — 96413 CHEMO IV INFUSION 1 HR: CPT | Performed by: NURSE PRACTITIONER

## 2022-06-09 RX ORDER — EPINEPHRINE 1 MG/ML
0.3 INJECTION, SOLUTION INTRAMUSCULAR; SUBCUTANEOUS EVERY 5 MIN PRN
Status: CANCELLED | OUTPATIENT
Start: 2022-06-09

## 2022-06-09 RX ORDER — METHYLPREDNISOLONE SODIUM SUCCINATE 125 MG/2ML
125 INJECTION, POWDER, LYOPHILIZED, FOR SOLUTION INTRAMUSCULAR; INTRAVENOUS
Status: CANCELLED
Start: 2022-06-09

## 2022-06-09 RX ORDER — DIPHENHYDRAMINE HYDROCHLORIDE 50 MG/ML
50 INJECTION INTRAMUSCULAR; INTRAVENOUS
Status: CANCELLED
Start: 2022-06-09

## 2022-06-09 RX ORDER — MEPERIDINE HYDROCHLORIDE 25 MG/ML
25 INJECTION INTRAMUSCULAR; INTRAVENOUS; SUBCUTANEOUS EVERY 30 MIN PRN
Status: CANCELLED | OUTPATIENT
Start: 2022-06-09

## 2022-06-09 RX ORDER — ACETAMINOPHEN 325 MG/1
650 TABLET ORAL ONCE
Status: CANCELLED
Start: 2022-06-09 | End: 2022-06-09

## 2022-06-09 RX ORDER — NALOXONE HYDROCHLORIDE 0.4 MG/ML
0.2 INJECTION, SOLUTION INTRAMUSCULAR; INTRAVENOUS; SUBCUTANEOUS
Status: CANCELLED | OUTPATIENT
Start: 2022-06-09

## 2022-06-09 RX ORDER — ALBUTEROL SULFATE 90 UG/1
1-2 AEROSOL, METERED RESPIRATORY (INHALATION)
Status: CANCELLED
Start: 2022-06-09

## 2022-06-09 RX ORDER — DIPHENHYDRAMINE HCL 25 MG
25 CAPSULE ORAL ONCE
Status: CANCELLED
Start: 2022-06-09 | End: 2022-06-09

## 2022-06-09 RX ORDER — ALBUTEROL SULFATE 0.83 MG/ML
2.5 SOLUTION RESPIRATORY (INHALATION)
Status: CANCELLED | OUTPATIENT
Start: 2022-06-09

## 2022-06-09 RX ADMIN — Medication 250 ML: at 15:12

## 2022-06-09 NOTE — PROGRESS NOTES
Infusion Nursing Note:  Ventura Quiroz presents today for Inflectra.    Patient seen by provider today: No   present during visit today: Not Applicable.    Note: patient has tolerated Inflectra in past without any complications. No new concerns today    Intravenous Access:  Peripheral IV placed.    Treatment Conditions:  Biological Infusion Checklist:  ~~~ NOTE: If the patient answers yes to any of the questions below, hold the infusion and contact ordering provider or on-call provider.    1. Have you recently had an elevated temperature, fever, chills, productive cough, coughing for 3 weeks or longer or hemoptysis, abnormal vital signs, night sweats,  chest pain or have you noticed a decrease in your appetite, unexplained weight loss or fatigue? No  2. Do you have any open wounds or new incisions? No  3. Do you have any recent or upcoming hospitalizations, surgeries or dental procedures? No  4. Do you currently have or recently have had any signs of illness or infection or are you on any antibiotics? No  5. Have you had any new, sudden or worsening abdominal pain? No  6. Have you or anyone in your household received a live vaccination in the past 4 weeks? Please note:  No live vaccines while on biologic/chemotherapy until 6 months after the last treatment.  Patient can receive the flu vaccine (shot only) and the pneumovax.  It is optimal for the patient to get these vaccines mid cycle, but they can be given at any time as long as it is not on the day of the infusion. No  7. Have you recently been diagnosed with any new nervous system diseases (ie. Multiple sclerosis, Guillain Berkley, seizures, neurological changes) or cancer diagnosis? No  8. Are you on any form of radiation or chemotherapy? No  9. Are you pregnant or breast feeding or do you have plans of pregnancy in the future? No  10. Have there been any other new onset medical symptoms? No      Post Infusion Assessment:  Patient tolerated infusion  without incident.  Site patent and intact, free from redness, edema or discomfort.  No evidence of extravasations.  Access discontinued per protocol.   Biologic Infusion Post Education: Call the triage nurse at your clinic or seek medical attention if you have chills and/or temperature greater than or equal to 100.5, uncontrolled nausea/vomiting, diarrhea, constipation, dizziness, shortness of breath, chest pain, heart palpitations, weakness or any other new or concerning symptoms, questions or concerns.  You cannot have any live virus vaccines prior to or during treatment or up to 6 months post infusion.  If you have an upcoming surgery, medical procedure or dental procedure during treatment, this should be discussed with your ordering physician and your surgeon/dentist.  If you are having any concerning symptom, if you are unsure if you should get your next infusion or wish to speak to a provider before your next infusion, please call your care coordinator or triage nurse at your clinic to notify them so we can adequately serve you.     Discharge Plan:   AVS to patient via [employer].  Patient will return 7/7 for next appointment.   Patient discharged in stable condition accompanied by: self.  Departure Mode: Ambulatory.      Grisel Pimentel RN

## 2022-06-15 ENCOUNTER — PATIENT OUTREACH (OUTPATIENT)
Dept: GASTROENTEROLOGY | Facility: CLINIC | Age: 22
End: 2022-06-15
Payer: COMMERCIAL

## 2022-06-22 ENCOUNTER — PATIENT OUTREACH (OUTPATIENT)
Dept: GASTROENTEROLOGY | Facility: CLINIC | Age: 22
End: 2022-06-22

## 2022-06-22 NOTE — PROGRESS NOTES
Patient had sent images via my chart   Images not clear and asked patient to take new photos  Reports that started out as small bump   Hard and tender   Has had for two weeks  Above his surgical scar  Will send new images.

## 2022-06-24 ENCOUNTER — PATIENT OUTREACH (OUTPATIENT)
Dept: SURGERY | Facility: CLINIC | Age: 22
End: 2022-06-24

## 2022-06-24 DIAGNOSIS — K50.014 CROHN'S DISEASE OF SMALL INTESTINE WITH ABSCESS (H): Primary | ICD-10-CM

## 2022-06-24 NOTE — PROGRESS NOTES
Received a message from GI with concern for patient developing an abscess near his old incision site.  Discussed with Ines Maria NP.  Plan for clinic visit and possible CT scan afterwards.     I left a VM and MyChart with plan.     Addendum: I was able to get a hold of the patient and reviewed the plan.   
(4) completely dependent

## 2022-06-24 NOTE — TELEPHONE ENCOUNTER
Diagnosis, Referred by & from: Possible Abscess at Old Incision   Appt date: 6/27/2022   NOTES STATUS DETAILS   OFFICE NOTE from referring provider [facility]:  6/21/22 - MyChart photos from pt with Dr. Wright  4/26/22 - GI OV with Dr. Wright   OFFICE NOTE from other specialist [facility]:  9/11/20 - CR OV with Ines Campbell, NP  7/22/20 - CR OV with Dr. Robles    [facility]:  4/25/19 - PCC OV with Dr. Moss   DISCHARGE SUMMARY from hospital N/A    DISCHARGE REPORT from the ER [facility]:  4/7/19 - ED OV with Dr. Sheriff   OPERATIVE REPORT [facility]:  8/21/20 - OP Note for Exploratory laparotomy with ileocolic resection with takedown of enterovesical fistula, and low anterior resection, Flexible Sigmoidoscopy with Dr. Robles   MEDICATION LIST Internal    LABS     BIOPSIES/PATHOLOGY RELATED TO DIAGNOSIS [facility]:  4/18/22 - Ileum Biopsy (Csae: [id number])  2/8/21 - Ileum Biopsy (Case: [id number])  8/21/20 - Colon Biopsy (Case: [id number])  4/11/19 - Colon Biopsy (Case: [id number])   DIAGNOSTIC PROCEDURES     COLONOSCOPY Received / [facility]:  4/18/22 - Colonoscopy  2/8/21 - Colonoscopy    [facility]:  4/8/19 - Colonoscopy   IMAGING (DISC & REPORT)      CT [facility]:  4/24/19 - CT Abd/Pelvis  4/7/19 - CT Abd/Pelvis   MRI [facility]:  8/19/20 - MRI Enterography  6/9/20 - MRI Enterography  11/14/19 - MRI Enterography  8/29/19 - MRI Enterography  4/8/19 - MRI Enterography

## 2022-06-27 ENCOUNTER — OFFICE VISIT (OUTPATIENT)
Dept: SURGERY | Facility: CLINIC | Age: 22
End: 2022-06-27

## 2022-06-27 ENCOUNTER — PRE VISIT (OUTPATIENT)
Dept: SURGERY | Facility: CLINIC | Age: 22
End: 2022-06-27

## 2022-06-27 VITALS
OXYGEN SATURATION: 96 % | BODY MASS INDEX: 24.76 KG/M2 | HEIGHT: 74 IN | SYSTOLIC BLOOD PRESSURE: 117 MMHG | HEART RATE: 67 BPM | WEIGHT: 192.9 LBS | DIASTOLIC BLOOD PRESSURE: 66 MMHG

## 2022-06-27 DIAGNOSIS — K50.014 CROHN'S DISEASE OF SMALL INTESTINE WITH ABSCESS (H): Primary | ICD-10-CM

## 2022-06-27 PROCEDURE — 99212 OFFICE O/P EST SF 10 MIN: CPT | Performed by: NURSE PRACTITIONER

## 2022-06-27 ASSESSMENT — PAIN SCALES - GENERAL: PAINLEVEL: NO PAIN (0)

## 2022-06-27 NOTE — NURSING NOTE
"Chief Complaint   Patient presents with     New Patient     Possible abscess       Vitals:    06/27/22 1344   BP: 117/66   BP Location: Left arm   Patient Position: Sitting   Cuff Size: Adult Regular   Pulse: 67   SpO2: 96%   Weight: 192 lb 14.4 oz   Height: 6' 2\"       Body mass index is 24.77 kg/m .                          Belinda Fried, EMT    "

## 2022-06-27 NOTE — LETTER
"2022       RE: Ventura Quiroz  [address]  [facility] [address]     Dear Colleague,    Thank you for referring your patient, Ventura Quiroz, to the [facility] COLON AND RECTAL [facility]GERY [facility]IN[facility] [place] [facility]. Please see a copy of my visit note below.    Colon and Rectal Surgery Follow-Up Clinic Note    RE: Ventura Quiroz  : 2000  ETHAN: 2022    Ventura Quiroz is a very pleasant 21 year old male with Crohn's disease of the ileum initially complicated by phlegmon and abscess status post ileocolic resection 2020 with Dr. Robles who presents today with concern for new abscess.    Interval history:   Ventura noticed a bump at his incision site that was painful. It has gotten smaller and less painful over the past few days. No drainage or bleeding. No fevers or chills.  He follows with Dr. Wright of GI. Restaging colonoscopy 2021 with inflammation and ulceration at the ileo-colonic anastomosis and required balloon dilation and this was repeated again 2022. He is currently on infliximab and 6-MP.     Physical Examination:   /66 (BP Location: Left arm, Patient Position: Sitting, Cuff Size: Adult Regular)   Pulse 67   Ht 6' 2\"   Wt 192 lb 14.4 oz   SpO2 96%   BMI 24.77 kg/m    General: alert, oriented, in no acute distress, sitting comfortably  HEENT: mucous membranes moist  Abdomen: midline scar with a small area of fluctuance without erythema and no tenderness on palpation.    Assessment/Plan: 21 year old male with Crohn's disease with history of ileocolic resection in . He has what looks like a suture granuloma at his prior incision site. This is no longer painful and is getting smaller. We discussed opening that area up but since his symptoms are improving will just watch it. However, if it gets larger could consider this. Also need to consider the possibility of a fistula if symptoms worsen, given his Crohn's disease. " He will contact the clinic for any worsening symptoms. Patient's questions were answered to his stated satisfaction and he is in agreement with this plan.    Medical history:  Past Medical History:   Diagnosis Date     Allergic rhinitis, cause unspecified     Zyrtec helps     Crohn's Colitis 4/8/2019     Unspecified otitis media     Otitis Media       Surgical history:  Past Surgical History:   Procedure Laterality Date     COLONOSCOPY N/A 4/11/2019    Procedure: COMBINED COLONOSCOPY, SINGLE OR MULTIPLE BIOPSY/POLYPECTOMY BY BIOPSY;  Surgeon: Tobin Wright MD;  Location: [facility]     EXCISE LIP OR CHEEK FOLD  10/3/2003    Needle cautery frenulectomy.     INSERT PICC LINE Left 8/10/2020    Procedure: INSERTION, PICC @845;  Surgeon: Karan Farr MD;  [facility]     IR PICC PLACEMENT > 5 YRS OF AGE  8/10/2020     PE TUBES  4/25/2006     RESECTION ILEOCOLIC N/A 8/21/2020    Procedure: Exploratory laparotomy with ileocolic resection with takedown of enterovesical fistula, and low anterior resection;  Surgeon: Brittni Robles MD;  Location: [facility] OR     SIGMOIDOSCOPY FLEXIBLE N/A 8/21/2020    Procedure: Sigmoidoscopy flexible;  Surgeon: Brittni Robles MD;  Location: [facility] OR     TONSILLECTOMY & ADENOIDECTOMY  4/25/2006       Problem list:    Patient Active Problem List    Diagnosis Date Noted     Crohn disease (H) 08/21/2020     Priority: Medium     Anemia, iron deficiency 08/19/2019     Priority: Medium     Crohn's disease of small intestine with abscess (H) 08/19/2019     Priority: Medium     Crohn's Colitis 04/08/2019     Priority: Medium       Medications:  Current Outpatient Medications   Medication Sig Dispense Refill     Ferrous Sulfate (IRON) 325 (65 Fe) MG tablet TAKE ONE TABLET BY MOUTH ONCE DAILY WITH LUNCH (Patient not taking: No sig reported)       mercaptopurine (PURINETHOL) 50 MG tablet Take 1 tablet (50 mg) by mouth daily (Patient not taking: No sig reported) 30 tablet 2  "      Allergies:  Allergies   Allergen Reactions     Gadavist [Gadobutrol] Nausea     Patient could not perform timed sequences due to nausea and discomfort.     Glucagon Nausea and Vomiting     Pre medicate with lorazapam if able.       Amoxicillin      Penicillin [Penicillins]        Family history:  Family History   Problem Relation Age of Onset     No Known Problems Mother      No Known Problems Father      Melanoma No family hx of      Skin Cancer No family hx of        Social history:  Social History     Tobacco Use     Smoking status: Never Smoker     Smokeless tobacco: Current User     Tobacco comment: Vape   Substance Use Topics     Alcohol use: Yes     Marital status: single.    Nursing Notes:   Belinda Fried, EMT  6/27/2022  1:46 PM  Signed  Chief Complaint   Patient presents with     New Patient     Possible abscess       Vitals:    06/27/22 1344   BP: 117/66   BP Location: Left arm   Patient Position: Sitting   Cuff Size: Adult Regular   Pulse: 67   SpO2: 96%   Weight: 192 lb 14.4 oz   Height: 6' 2\"       Body mass index is 24.77 kg/m .        Belinda Fried, EMT         15 minutes spent on the date of encounter (excluding time performing procedures) performing chart review, history and exam, documentation and further activities as noted above.      Ines Maria, NP-C  Colon and Rectal Surgery  [facility]  "

## 2022-06-27 NOTE — PROGRESS NOTES
"Colon and Rectal Surgery Follow-Up Clinic Note    RE: Ventura Quiroz  : 2000  ETHAN: 2022    Ventura Quiroz is a very pleasant 21 year old male with Crohn's disease of the ileum initially complicated by phlegmon and abscess status post ileocolic resection 2020 with Dr. Robles who presents today with concern for new abscess.    Interval history:   Ventura noticed a bump at his incision site that was painful. It has gotten smaller and less painful over the past few days. No drainage or bleeding. No fevers or chills.  He follows with Dr. Wright of GI. Restaging colonoscopy 2021 with inflammation and ulceration at the ileo-colonic anastomosis and required balloon dilation and this was repeated again 2022. He is currently on infliximab and 6-MP.     Physical Examination:   /66 (BP Location: Left arm, Patient Position: Sitting, Cuff Size: Adult Regular)   Pulse 67   Ht 6' 2\"   Wt 192 lb 14.4 oz   SpO2 96%   BMI 24.77 kg/m    General: alert, oriented, in no acute distress, sitting comfortably  HEENT: mucous membranes moist  Abdomen: midline scar with a small area of fluctuance without erythema and no tenderness on palpation.    Assessment/Plan: 21 year old male with Crohn's disease with history of ileocolic resection in . He has what looks like a suture granuloma at his prior incision site. This is no longer painful and is getting smaller. We discussed opening that area up but since his symptoms are improving will just watch it. However, if it gets larger could consider this. Also need to consider the possibility of a fistula if symptoms worsen, given his Crohn's disease. He will contact the clinic for any worsening symptoms. Patient's questions were answered to his stated satisfaction and he is in agreement with this plan.    Medical history:  Past Medical History:   Diagnosis Date     Allergic rhinitis, cause unspecified     Zyrtec helps     Crohn's Colitis 2019     Unspecified otitis " media     Otitis Media       Surgical history:  Past Surgical History:   Procedure Laterality Date     COLONOSCOPY N/A 4/11/2019    Procedure: COMBINED COLONOSCOPY, SINGLE OR MULTIPLE BIOPSY/POLYPECTOMY BY BIOPSY;  Surgeon: Tobin Wright MD;  Location: [facility]     EXCISE LIP OR CHEEK FOLD  10/3/2003    Needle cautery frenulectomy.     INSERT PICC LINE Left 8/10/2020    Procedure: INSERTION, PICC @845;  Surgeon: Karan Farr MD;  [facility]     IR PICC PLACEMENT > 5 YRS OF AGE  8/10/2020     PE TUBES  4/25/2006     RESECTION ILEOCOLIC N/A 8/21/2020    Procedure: Exploratory laparotomy with ileocolic resection with takedown of enterovesical fistula, and low anterior resection;  Surgeon: Brittni Robles MD;  Location[facility]     SIGMOIDOSCOPY FLEXIBLE N/A 8/21/2020    Procedure: Sigmoidoscopy flexible;  Surgeon: Brittni Robles MD;  Location: [facility]     TONSILLECTOMY & ADENOIDECTOMY  4/25/2006       Problem list:    Patient Active Problem List    Diagnosis Date Noted     Crohn disease (H) 08/21/2020     Priority: Medium     Anemia, iron deficiency 08/19/2019     Priority: Medium     Crohn's disease of small intestine with abscess (H) 08/19/2019     Priority: Medium     Crohn's Colitis 04/08/2019     Priority: Medium       Medications:  Current Outpatient Medications   Medication Sig Dispense Refill     Ferrous Sulfate (IRON) 325 (65 Fe) MG tablet TAKE ONE TABLET BY MOUTH ONCE DAILY WITH LUNCH (Patient not taking: No sig reported)       mercaptopurine (PURINETHOL) 50 MG tablet Take 1 tablet (50 mg) by mouth daily (Patient not taking: No sig reported) 30 tablet 2       Allergies:  Allergies   Allergen Reactions     Gadavist [Gadobutrol] Nausea     Patient could not perform timed sequences due to nausea and discomfort.     Glucagon Nausea and Vomiting     Pre medicate with lorazapam if able.       Amoxicillin      Penicillin [Penicillins]        Family history:  Family History  "  Problem Relation Age of Onset     No Known Problems Mother      No Known Problems Father      Melanoma No family hx of      Skin Cancer No family hx of        Social history:  Social History     Tobacco Use     Smoking status: Never Smoker     Smokeless tobacco: Current User     Tobacco comment: Vape   Substance Use Topics     Alcohol use: Yes     Marital status: single.    Nursing Notes:   Belinda Fried, EMT  6/27/2022  1:46 PM  Signed  Chief Complaint   Patient presents with     New Patient     Possible abscess       Vitals:    06/27/22 1344   BP: 117/66   BP Location: Left arm   Patient Position: Sitting   Cuff Size: Adult Regular   Pulse: 67   SpO2: 96%   Weight: 192 lb 14.4 oz   Height: 6' 2\"       Body mass index is 24.77 kg/m .                          Belinda Fried, EMT         15 minutes spent on the date of encounter (excluding time performing procedures) performing chart review, history and exam, documentation and further activities as noted above.    Ines Maria, NP-C  Colon and Rectal Surgery  [facility]    "

## 2022-06-28 ENCOUNTER — TELEPHONE (OUTPATIENT)
Dept: GASTROENTEROLOGY | Facility: CLINIC | Age: 22
End: 2022-06-28

## 2022-06-28 NOTE — TELEPHONE ENCOUNTER
Patient will be taking part in a study.  Please message Dr Wright and  Tiffanie Ayala with the date of the colonoscopy.  Please see message below    Tobin Wright MD Orness, Laurie A; Tiffanie Ayala  Thanks for reading these notes!     We are trying to recruit people with Crohn's disease who have had surgery.     Basically we want to get them into the study before their clinical colonoscopy so we can consent them and take research biopsies.     Then they need a 2nd colonoscopy that the coordinators and I help to schedule as it needs to be 8 weeks after the first one.     So we can go ahead and schedule his colonoscopy normally - but please let me and Tiffanie know when its scheduled so we can talk to him and maybe get him in the study first.     If it would help, I would be happy to talk to you and other endo schedulers about this study for 15 minutes at a meeting?

## 2022-06-30 ENCOUNTER — HOSPITAL ENCOUNTER (OUTPATIENT)
Facility: AMBULATORY SURGERY CENTER | Age: 22
End: 2022-06-30
Attending: INTERNAL MEDICINE
Payer: COMMERCIAL

## 2022-06-30 ENCOUNTER — TELEPHONE (OUTPATIENT)
Dept: GASTROENTEROLOGY | Facility: CLINIC | Age: 22
End: 2022-06-30

## 2022-06-30 NOTE — TELEPHONE ENCOUNTER
Screening Questions    BlueKIND OF PREP RedLOCATION [review exclusion criteria] GreenSEDATION TYPE      1. Are you active on mychart? y    2. What insurance is in the chart? [employer]     3.   Ordering/Referring Provider: Tobin Wright MD in [facility] GA[facility]OLOG[facility]    4. BMI   (If greater than 40 review exclusion criteria [PAC APPT IF [MAC] @ [facility])  24.77  [If yes, BMI OVER 40-EXTENDED PREP]      **(Sedation review/consideration needed)**  Do you have a legal guardian or Medical Power of    and/or are you able to give consent for your medical care?     N    5. Have you had a positive covid test in the last 90 days?   N - N    6.  Are you currently on dialysis?   N [ If yes, G-PREP & HOSPITAL setting ONLY]     7.  Do you have chronic kidney disease?  N [ If yes, G-PREP ]    8.   Do you have a diagnosis of diabetes?   N   [ If yes, G-PREP ]    9.  On a regular basis do you go 3-5 days between bowel movements?   N   [ If yes, EXTENDED PREP]    10.  Are you taking any prescription pain medications on a routine schedule?    N - N [ If yes, EXTENDED PREP] [If yes, MAC]      11.   Do you have any chemical dependencies such as alcohol, street drugs, or methadone?    N [If yes, MAC]    12.   Do you have any history of post-traumatic stress syndrome, severe anxiety or history of psychosis?    N  [If yes, MAC]    13.  [FEMALES] Are you currently pregnant?     If yes, how many weeks?       Respiratory/Heart Screening:  [If yes to any of the following HOSPITAL setting only]     14. Do you have Pulmonary Hypertension [Lungs]?   N       15. Do you have UNCONTROLLED asthma?   N     16.  Do you use daily home oxygen?  N      17. Do you have mod to severe Obstructive Sleep Apnea?         (OKAY @ [facility]  [facility]  SH  PH  RI  MG - if pt is not on OXYGEN)  N      18.   Have you had a heart or lung transplant?   N      19.   Have you had a stroke or Transient ischemic attack (TIA - aka  mini stroke ) within 6 months?   (If yes, please review exclusion criteria)  N     20.   In the past 6 months, have you had any heart related issues including cardiomyopathy or heart attack?   N           If yes, did it require cardiac stenting or other implantable device?   N      21.   Do you have any implantable devices in your body (pacemaker, defib, LVAD)? (If yes, please review exclusion criteria)  N     22. Do you take nitroglycerin?   N           If yes, how often? N  (if yes, HOSPITAL setting ONLY)    23.  Are you currently taking any blood thinners?    [If yes, INFORM patient to follw up w/ ORDERING PROVIDER FOR BRIDGING INSTRUCTIONS]     N    24.   Do you transfer independently?                (If NO, please HOSPITAL setting ONLY)  N    25.   Preferred LOCAL Pharmacy for Pre Prescription:      [facility] - [place], [place] - [address]    Scheduling Details  (Please ask for phone number if not scheduled by patient)      Caller : Cami Quiroz   Date of Procedure: 09/21/2022  Surgeon: DR. MARIN  Location: [facility]        Sedation Type: MAC l   Conscious Sedation- Needs  for 6 hours after the procedure  MAC/General-Needs  for 24 hours after procedure    N :[Pre-op Required] at [facility]  MG and OR for MAC sedation   (advise patient they will need a pre-op WITH IN 30 DAYS of procedure date)     Type of Procedure Scheduled:   Lower Endoscopy [Colonoscopy]    Which Colonoscopy Prep was Sent?:   MIRALAX  -     KHORUTS CF PATIENTS & GROEN'S PATIENTS NEEDS EXTENDED PREP       Informed patient they will need an adult  Y  Cannot take any type of public or medical transportation alone    Pre-Procedure Covid test to be completed at [facility] or Externally: Y  **INFORMED OF HOME TESTING & LAB OPTION**        Confirmed Nurse will call to complete assessment Y    Additional comments: N

## 2022-07-06 ENCOUNTER — PATIENT OUTREACH (OUTPATIENT)
Dept: INTERNAL MEDICINE | Facility: CLINIC | Age: 22
End: 2022-07-06

## 2022-07-06 DIAGNOSIS — K52.9 COLITIS: Primary | ICD-10-CM

## 2022-07-06 RX ORDER — ALBUTEROL SULFATE 0.83 MG/ML
2.5 SOLUTION RESPIRATORY (INHALATION)
Status: CANCELLED | OUTPATIENT
Start: 2022-07-06

## 2022-07-06 RX ORDER — MEPERIDINE HYDROCHLORIDE 25 MG/ML
25 INJECTION INTRAMUSCULAR; INTRAVENOUS; SUBCUTANEOUS EVERY 30 MIN PRN
Status: CANCELLED | OUTPATIENT
Start: 2022-07-06

## 2022-07-06 RX ORDER — DIPHENHYDRAMINE HYDROCHLORIDE 50 MG/ML
50 INJECTION INTRAMUSCULAR; INTRAVENOUS
Status: CANCELLED
Start: 2022-07-06

## 2022-07-06 RX ORDER — DIPHENHYDRAMINE HCL 25 MG
25 CAPSULE ORAL ONCE
Status: CANCELLED
Start: 2022-07-06 | End: 2022-07-06

## 2022-07-06 RX ORDER — EPINEPHRINE 1 MG/ML
0.3 INJECTION, SOLUTION, CONCENTRATE INTRAVENOUS EVERY 5 MIN PRN
Status: CANCELLED | OUTPATIENT
Start: 2022-07-06

## 2022-07-06 RX ORDER — ACETAMINOPHEN 325 MG/1
650 TABLET ORAL ONCE
Status: CANCELLED
Start: 2022-07-06 | End: 2022-07-06

## 2022-07-06 RX ORDER — ALBUTEROL SULFATE 90 UG/1
1-2 AEROSOL, METERED RESPIRATORY (INHALATION)
Status: CANCELLED
Start: 2022-07-06

## 2022-07-06 RX ORDER — METHYLPREDNISOLONE SODIUM SUCCINATE 125 MG/2ML
125 INJECTION, POWDER, LYOPHILIZED, FOR SOLUTION INTRAMUSCULAR; INTRAVENOUS
Status: CANCELLED
Start: 2022-07-06

## 2022-07-07 ENCOUNTER — INFUSION THERAPY VISIT (OUTPATIENT)
Dept: INFUSION THERAPY | Facility: CLINIC | Age: 22
End: 2022-07-07
Payer: COMMERCIAL

## 2022-07-07 VITALS
OXYGEN SATURATION: 98 % | DIASTOLIC BLOOD PRESSURE: 87 MMHG | WEIGHT: 189.5 LBS | BODY MASS INDEX: 24.33 KG/M2 | RESPIRATION RATE: 16 BRPM | TEMPERATURE: 98.5 F | SYSTOLIC BLOOD PRESSURE: 145 MMHG | HEART RATE: 83 BPM

## 2022-07-07 DIAGNOSIS — K52.9 COLITIS: ICD-10-CM

## 2022-07-07 DIAGNOSIS — K50.014 CROHN'S DISEASE OF SMALL INTESTINE WITH ABSCESS (H): ICD-10-CM

## 2022-07-07 DIAGNOSIS — D50.9 IRON DEFICIENCY ANEMIA, UNSPECIFIED IRON DEFICIENCY ANEMIA TYPE: Primary | ICD-10-CM

## 2022-07-07 LAB
ALBUMIN SERPL-MCNC: 3.9 G/DL (ref 3.4–5)
ALP SERPL-CCNC: 82 U/L (ref 40–150)
ALT SERPL W P-5'-P-CCNC: 30 U/L (ref 0–70)
AST SERPL W P-5'-P-CCNC: 20 U/L (ref 0–45)
BASOPHILS # BLD AUTO: 0 10E3/UL (ref 0–0.2)
BASOPHILS NFR BLD AUTO: 0 %
BILIRUB DIRECT SERPL-MCNC: 0.1 MG/DL (ref 0–0.2)
BILIRUB SERPL-MCNC: 0.6 MG/DL (ref 0.2–1.3)
CRP SERPL-MCNC: <2.9 MG/L (ref 0–8)
EOSINOPHIL # BLD AUTO: 0.1 10E3/UL (ref 0–0.7)
EOSINOPHIL NFR BLD AUTO: 1 %
ERYTHROCYTE [DISTWIDTH] IN BLOOD BY AUTOMATED COUNT: 13.1 % (ref 10–15)
ERYTHROCYTE [SEDIMENTATION RATE] IN BLOOD BY WESTERGREN METHOD: 4 MM/HR (ref 0–15)
HCT VFR BLD AUTO: 39.1 % (ref 40–53)
HGB BLD-MCNC: 13.2 G/DL (ref 13.3–17.7)
IMM GRANULOCYTES # BLD: 0 10E3/UL
IMM GRANULOCYTES NFR BLD: 0 %
LYMPHOCYTES # BLD AUTO: 1.9 10E3/UL (ref 0.8–5.3)
LYMPHOCYTES NFR BLD AUTO: 24 %
MCH RBC QN AUTO: 29.8 PG (ref 26.5–33)
MCHC RBC AUTO-ENTMCNC: 33.8 G/DL (ref 31.5–36.5)
MCV RBC AUTO: 88 FL (ref 78–100)
MONOCYTES # BLD AUTO: 0.7 10E3/UL (ref 0–1.3)
MONOCYTES NFR BLD AUTO: 8 %
NEUTROPHILS # BLD AUTO: 5.3 10E3/UL (ref 1.6–8.3)
NEUTROPHILS NFR BLD AUTO: 67 %
NRBC # BLD AUTO: 0 10E3/UL
NRBC BLD AUTO-RTO: 0 /100
PLATELET # BLD AUTO: 216 10E3/UL (ref 150–450)
PROT SERPL-MCNC: 7.1 G/DL (ref 6.8–8.8)
RBC # BLD AUTO: 4.43 10E6/UL (ref 4.4–5.9)
WBC # BLD AUTO: 8 10E3/UL (ref 4–11)

## 2022-07-07 PROCEDURE — 86140 C-REACTIVE PROTEIN: CPT | Performed by: INTERNAL MEDICINE

## 2022-07-07 PROCEDURE — 80076 HEPATIC FUNCTION PANEL: CPT | Performed by: INTERNAL MEDICINE

## 2022-07-07 PROCEDURE — 36415 COLL VENOUS BLD VENIPUNCTURE: CPT | Performed by: INTERNAL MEDICINE

## 2022-07-07 PROCEDURE — 99207 PR NO CHARGE LOS: CPT

## 2022-07-07 PROCEDURE — 96413 CHEMO IV INFUSION 1 HR: CPT | Performed by: INTERNAL MEDICINE

## 2022-07-07 PROCEDURE — 86481 TB AG RESPONSE T-CELL SUSP: CPT | Performed by: INTERNAL MEDICINE

## 2022-07-07 PROCEDURE — 85652 RBC SED RATE AUTOMATED: CPT | Performed by: INTERNAL MEDICINE

## 2022-07-07 PROCEDURE — 85025 COMPLETE CBC W/AUTO DIFF WBC: CPT | Performed by: INTERNAL MEDICINE

## 2022-07-07 RX ORDER — ACETAMINOPHEN 325 MG/1
650 TABLET ORAL ONCE
Status: CANCELLED
Start: 2022-08-04 | End: 2022-08-04

## 2022-07-07 RX ORDER — ALBUTEROL SULFATE 90 UG/1
1-2 AEROSOL, METERED RESPIRATORY (INHALATION)
Status: CANCELLED
Start: 2022-08-04

## 2022-07-07 RX ORDER — EPINEPHRINE 1 MG/ML
0.3 INJECTION, SOLUTION INTRAMUSCULAR; SUBCUTANEOUS EVERY 5 MIN PRN
Status: CANCELLED | OUTPATIENT
Start: 2022-08-04

## 2022-07-07 RX ORDER — DIPHENHYDRAMINE HYDROCHLORIDE 50 MG/ML
50 INJECTION INTRAMUSCULAR; INTRAVENOUS
Status: CANCELLED
Start: 2022-08-04

## 2022-07-07 RX ORDER — DIPHENHYDRAMINE HCL 25 MG
25 CAPSULE ORAL ONCE
Status: CANCELLED
Start: 2022-08-04 | End: 2022-08-04

## 2022-07-07 RX ORDER — MEPERIDINE HYDROCHLORIDE 25 MG/ML
25 INJECTION INTRAMUSCULAR; INTRAVENOUS; SUBCUTANEOUS EVERY 30 MIN PRN
Status: CANCELLED | OUTPATIENT
Start: 2022-08-04

## 2022-07-07 RX ORDER — METHYLPREDNISOLONE SODIUM SUCCINATE 125 MG/2ML
125 INJECTION, POWDER, LYOPHILIZED, FOR SOLUTION INTRAMUSCULAR; INTRAVENOUS
Status: CANCELLED
Start: 2022-08-04

## 2022-07-07 RX ORDER — ALBUTEROL SULFATE 0.83 MG/ML
2.5 SOLUTION RESPIRATORY (INHALATION)
Status: CANCELLED | OUTPATIENT
Start: 2022-08-04

## 2022-07-07 RX ADMIN — Medication 250 ML: at 13:44

## 2022-07-07 NOTE — PROGRESS NOTES
Infusion Nursing Note:  Ventura Quiroz presents today for Rapid Remicade.    Patient seen by provider today: No   present during visit today: Not Applicable.    Note: N/A.    Intravenous Access:  Peripheral IV placed.    Treatment Conditions:  Biological Infusion Checklist:  ~~~ NOTE: If the patient answers yes to any of the questions below, hold the infusion and contact ordering provider or on-call provider.    1. Have you recently had an elevated temperature, fever, chills, productive cough, coughing for 3 weeks or longer or hemoptysis, abnormal vital signs, night sweats,  chest pain or have you noticed a decrease in your appetite, unexplained weight loss or fatigue? No  2. Do you have any open wounds or new incisions? No  3. Do you have any recent or upcoming hospitalizations, surgeries or dental procedures? No  4. Do you currently have or recently have had any signs of illness or infection or are you on any antibiotics? No  5. Have you had any new, sudden or worsening abdominal pain? No  6. Have you or anyone in your household received a live vaccination in the past 4 weeks? Please note:  No live vaccines while on biologic/chemotherapy until 6 months after the last treatment.  Patient can receive the flu vaccine (shot only) and the pneumovax.  It is optimal for the patient to get these vaccines mid cycle, but they can be given at any time as long as it is not on the day of the infusion. No  7. Have you recently been diagnosed with any new nervous system diseases (ie. Multiple sclerosis, Guillain Ramona, seizures, neurological changes) or cancer diagnosis? No  8. Are you on any form of radiation or chemotherapy? No  9. Are you pregnant or breast feeding or do you have plans of pregnancy in the future? No  10. Have there been any other new onset medical symptoms? No      Post Infusion Assessment:  Patient tolerated infusion without incident.  Blood return noted pre and post infusion.  Site patent and  intact, free from redness, edema or discomfort.  No evidence of extravasations.  Access discontinued per protocol.  Biologic Infusion Post Education: Call the triage nurse at your clinic or seek medical attention if you have chills and/or temperature greater than or equal to 100.5, uncontrolled nausea/vomiting, diarrhea, constipation, dizziness, shortness of breath, chest pain, heart palpitations, weakness or any other new or concerning symptoms, questions or concerns.  You cannot have any live virus vaccines prior to or during treatment or up to 6 months post infusion.  If you have an upcoming surgery, medical procedure or dental procedure during treatment, this should be discussed with your ordering physician and your surgeon/dentist.  If you are having any concerning symptom, if you are unsure if you should get your next infusion or wish to speak to a provider before your next infusion, please call your care coordinator or triage nurse at your clinic to notify them so we can adequately serve you.     Discharge Plan:   Discharge instructions reviewed with: Patient.  Patient and/or family verbalized understanding of discharge instructions and all questions answered.  Patient discharged in stable condition accompanied by: self.  Departure Mode: Ambulatory.      Kim Scherer RN

## 2022-07-08 ENCOUNTER — TELEPHONE (OUTPATIENT)
Dept: GASTROENTEROLOGY | Facility: CLINIC | Age: 22
End: 2022-07-08

## 2022-07-08 NOTE — TELEPHONE ENCOUNTER
EMELIA and sent mychart, per RN ok to schedule in CHERY[facility] slot. Schedule for f/u with Dr. Wright, virtual or in person, sometime around late Oct or early Nov.

## 2022-07-09 LAB
GAMMA INTERFERON BACKGROUND BLD IA-ACNC: 0 IU/ML
M TB IFN-G BLD-IMP: NEGATIVE
M TB IFN-G CD4+ BCKGRND COR BLD-ACNC: 10 IU/ML
MITOGEN IGNF BCKGRD COR BLD-ACNC: 0 IU/ML
MITOGEN IGNF BCKGRD COR BLD-ACNC: 0 IU/ML
QUANTIFERON MITOGEN: 10 IU/ML
QUANTIFERON NIL TUBE: 0 IU/ML
QUANTIFERON TB1 TUBE: 0 IU/ML
QUANTIFERON TB2 TUBE: 0

## 2022-08-11 ENCOUNTER — INFUSION THERAPY VISIT (OUTPATIENT)
Dept: INFUSION THERAPY | Facility: CLINIC | Age: 22
End: 2022-08-11
Payer: COMMERCIAL

## 2022-08-11 VITALS
BODY MASS INDEX: 23.61 KG/M2 | TEMPERATURE: 98.2 F | DIASTOLIC BLOOD PRESSURE: 66 MMHG | HEART RATE: 93 BPM | SYSTOLIC BLOOD PRESSURE: 115 MMHG | RESPIRATION RATE: 16 BRPM | OXYGEN SATURATION: 97 % | WEIGHT: 183.9 LBS

## 2022-08-11 DIAGNOSIS — K52.9 COLITIS: ICD-10-CM

## 2022-08-11 DIAGNOSIS — D50.9 IRON DEFICIENCY ANEMIA, UNSPECIFIED IRON DEFICIENCY ANEMIA TYPE: Primary | ICD-10-CM

## 2022-08-11 DIAGNOSIS — K50.014 CROHN'S DISEASE OF SMALL INTESTINE WITH ABSCESS (H): ICD-10-CM

## 2022-08-11 PROCEDURE — 99207 PR NO CHARGE LOS: CPT

## 2022-08-11 PROCEDURE — 96413 CHEMO IV INFUSION 1 HR: CPT | Performed by: NURSE PRACTITIONER

## 2022-08-11 RX ORDER — DIPHENHYDRAMINE HCL 25 MG
25 CAPSULE ORAL ONCE
Status: CANCELLED
Start: 2022-09-01 | End: 2022-09-01

## 2022-08-11 RX ORDER — METHYLPREDNISOLONE SODIUM SUCCINATE 125 MG/2ML
125 INJECTION, POWDER, LYOPHILIZED, FOR SOLUTION INTRAMUSCULAR; INTRAVENOUS
Status: CANCELLED
Start: 2022-09-01

## 2022-08-11 RX ORDER — ALBUTEROL SULFATE 0.83 MG/ML
2.5 SOLUTION RESPIRATORY (INHALATION)
Status: CANCELLED | OUTPATIENT
Start: 2022-09-01

## 2022-08-11 RX ORDER — DIPHENHYDRAMINE HYDROCHLORIDE 50 MG/ML
50 INJECTION INTRAMUSCULAR; INTRAVENOUS
Status: CANCELLED
Start: 2022-09-01

## 2022-08-11 RX ORDER — ACETAMINOPHEN 325 MG/1
650 TABLET ORAL ONCE
Status: CANCELLED
Start: 2022-09-01 | End: 2022-09-01

## 2022-08-11 RX ORDER — MEPERIDINE HYDROCHLORIDE 25 MG/ML
25 INJECTION INTRAMUSCULAR; INTRAVENOUS; SUBCUTANEOUS EVERY 30 MIN PRN
Status: CANCELLED | OUTPATIENT
Start: 2022-09-01

## 2022-08-11 RX ORDER — ALBUTEROL SULFATE 90 UG/1
1-2 AEROSOL, METERED RESPIRATORY (INHALATION)
Status: CANCELLED
Start: 2022-09-01

## 2022-08-11 RX ORDER — EPINEPHRINE 1 MG/ML
0.3 INJECTION, SOLUTION INTRAMUSCULAR; SUBCUTANEOUS EVERY 5 MIN PRN
Status: CANCELLED | OUTPATIENT
Start: 2022-09-01

## 2022-08-11 RX ADMIN — Medication 250 ML: at 13:20

## 2022-08-11 NOTE — PROGRESS NOTES
Infusion Nursing Note:  Ventura Quiroz presents today for Inflectra.    Patient seen by provider today: No   present during visit today: Not Applicable.    Note: The pt denies any medical concerns at this time and reports tolerating his infusions well.    Intravenous Access:  Peripheral IV placed.    Treatment Conditions:  Biological Infusion Checklist:  ~~~ NOTE: If the patient answers yes to any of the questions below, hold the infusion and contact ordering provider or on-call provider.    1. Have you recently had an elevated temperature, fever, chills, productive cough, coughing for 3 weeks or longer or hemoptysis, abnormal vital signs, night sweats,  chest pain or have you noticed a decrease in your appetite, unexplained weight loss or fatigue? No  2. Do you have any open wounds or new incisions? No  3. Do you have any recent or upcoming hospitalizations, surgeries or dental procedures? No  4. Do you currently have or recently have had any signs of illness or infection or are you on any antibiotics? No  5. Have you had any new, sudden or worsening abdominal pain? No  6. Have you or anyone in your household received a live vaccination in the past 4 weeks? Please note:  No live vaccines while on biologic/chemotherapy until 6 months after the last treatment.  Patient can receive the flu vaccine (shot only) and the pneumovax.  It is optimal for the patient to get these vaccines mid cycle, but they can be given at any time as long as it is not on the day of the infusion. No  7. Have you recently been diagnosed with any new nervous system diseases (ie. Multiple sclerosis, Guillain [place], seizures, neurological changes) or cancer diagnosis? No  8. Are you on any form of radiation or chemotherapy? No  9. Are you pregnant or breast feeding or do you have plans of pregnancy in the future? No  10. Have you been having any signs of worsening depression or suicidal ideations?  (benlysta only) No  11. Have there  been any other new onset medical symptoms? No      Post Infusion Assessment:  Patient tolerated infusion without incident.  Site patent and intact, free from redness, edema or discomfort.  No evidence of extravasations.  Access discontinued per protocol.     Discharge Plan:   AVS to patient via MYCHART.  Patient will return 9/8/22 for next appointment.   Patient discharged in stable condition accompanied by: self.  Departure Mode: Ambulatory.      Suzanne Sumner RN

## 2022-09-08 ENCOUNTER — INFUSION THERAPY VISIT (OUTPATIENT)
Dept: INFUSION THERAPY | Facility: CLINIC | Age: 22
End: 2022-09-08
Payer: COMMERCIAL

## 2022-09-08 ENCOUNTER — LAB (OUTPATIENT)
Dept: LAB | Facility: CLINIC | Age: 22
End: 2022-09-08

## 2022-09-08 VITALS
DIASTOLIC BLOOD PRESSURE: 64 MMHG | HEART RATE: 82 BPM | RESPIRATION RATE: 18 BRPM | OXYGEN SATURATION: 98 % | WEIGHT: 186.1 LBS | SYSTOLIC BLOOD PRESSURE: 106 MMHG | TEMPERATURE: 98.4 F | BODY MASS INDEX: 23.89 KG/M2

## 2022-09-08 DIAGNOSIS — K52.9 COLITIS: ICD-10-CM

## 2022-09-08 DIAGNOSIS — D50.9 IRON DEFICIENCY ANEMIA, UNSPECIFIED IRON DEFICIENCY ANEMIA TYPE: Primary | ICD-10-CM

## 2022-09-08 DIAGNOSIS — K50.014 CROHN'S DISEASE OF SMALL INTESTINE WITH ABSCESS (H): ICD-10-CM

## 2022-09-08 LAB
ALBUMIN SERPL-MCNC: 3.7 G/DL (ref 3.4–5)
ALP SERPL-CCNC: 63 U/L (ref 40–150)
ALT SERPL W P-5'-P-CCNC: 23 U/L (ref 0–70)
AST SERPL W P-5'-P-CCNC: 20 U/L (ref 0–45)
BASOPHILS # BLD AUTO: 0 10E3/UL (ref 0–0.2)
BASOPHILS NFR BLD AUTO: 0 %
BILIRUB DIRECT SERPL-MCNC: 0.2 MG/DL (ref 0–0.2)
BILIRUB SERPL-MCNC: 0.6 MG/DL (ref 0.2–1.3)
CRP SERPL-MCNC: <2.9 MG/L (ref 0–8)
EOSINOPHIL # BLD AUTO: 0.1 10E3/UL (ref 0–0.7)
EOSINOPHIL NFR BLD AUTO: 1 %
ERYTHROCYTE [DISTWIDTH] IN BLOOD BY AUTOMATED COUNT: 13.2 % (ref 10–15)
ERYTHROCYTE [SEDIMENTATION RATE] IN BLOOD BY WESTERGREN METHOD: 5 MM/HR (ref 0–15)
HCT VFR BLD AUTO: 39.4 % (ref 40–53)
HGB BLD-MCNC: 12.9 G/DL (ref 13.3–17.7)
HOLD SPECIMEN: NORMAL
IMM GRANULOCYTES # BLD: 0 10E3/UL
IMM GRANULOCYTES NFR BLD: 0 %
LYMPHOCYTES # BLD AUTO: 2 10E3/UL (ref 0.8–5.3)
LYMPHOCYTES NFR BLD AUTO: 22 %
MCH RBC QN AUTO: 29.5 PG (ref 26.5–33)
MCHC RBC AUTO-ENTMCNC: 32.7 G/DL (ref 31.5–36.5)
MCV RBC AUTO: 90 FL (ref 78–100)
MONOCYTES # BLD AUTO: 0.8 10E3/UL (ref 0–1.3)
MONOCYTES NFR BLD AUTO: 9 %
NEUTROPHILS # BLD AUTO: 6 10E3/UL (ref 1.6–8.3)
NEUTROPHILS NFR BLD AUTO: 68 %
NRBC # BLD AUTO: 0 10E3/UL
NRBC BLD AUTO-RTO: 0 /100
PLATELET # BLD AUTO: 218 10E3/UL (ref 150–450)
PROT SERPL-MCNC: 6.9 G/DL (ref 6.8–8.8)
RBC # BLD AUTO: 4.37 10E6/UL (ref 4.4–5.9)
WBC # BLD AUTO: 8.9 10E3/UL (ref 4–11)

## 2022-09-08 PROCEDURE — 85652 RBC SED RATE AUTOMATED: CPT

## 2022-09-08 PROCEDURE — 86140 C-REACTIVE PROTEIN: CPT

## 2022-09-08 PROCEDURE — 96413 CHEMO IV INFUSION 1 HR: CPT | Performed by: NURSE PRACTITIONER

## 2022-09-08 PROCEDURE — 85025 COMPLETE CBC W/AUTO DIFF WBC: CPT

## 2022-09-08 PROCEDURE — 99207 PR NO CHARGE LOS: CPT

## 2022-09-08 PROCEDURE — 36415 COLL VENOUS BLD VENIPUNCTURE: CPT

## 2022-09-08 PROCEDURE — 80076 HEPATIC FUNCTION PANEL: CPT

## 2022-09-08 RX ORDER — DIPHENHYDRAMINE HCL 25 MG
25 CAPSULE ORAL ONCE
Status: CANCELLED
Start: 2022-10-05 | End: 2022-10-05

## 2022-09-08 RX ORDER — ACETAMINOPHEN 325 MG/1
650 TABLET ORAL ONCE
Status: CANCELLED
Start: 2022-10-05 | End: 2022-10-05

## 2022-09-08 RX ORDER — METHYLPREDNISOLONE SODIUM SUCCINATE 125 MG/2ML
125 INJECTION, POWDER, LYOPHILIZED, FOR SOLUTION INTRAMUSCULAR; INTRAVENOUS
Status: CANCELLED
Start: 2022-10-05

## 2022-09-08 RX ORDER — ALBUTEROL SULFATE 90 UG/1
1-2 AEROSOL, METERED RESPIRATORY (INHALATION)
Status: CANCELLED
Start: 2022-10-05

## 2022-09-08 RX ORDER — ALBUTEROL SULFATE 0.83 MG/ML
2.5 SOLUTION RESPIRATORY (INHALATION)
Status: CANCELLED | OUTPATIENT
Start: 2022-10-05

## 2022-09-08 RX ORDER — DIPHENHYDRAMINE HYDROCHLORIDE 50 MG/ML
50 INJECTION INTRAMUSCULAR; INTRAVENOUS
Status: CANCELLED
Start: 2022-10-05

## 2022-09-08 RX ORDER — EPINEPHRINE 1 MG/ML
0.3 INJECTION, SOLUTION INTRAMUSCULAR; SUBCUTANEOUS EVERY 5 MIN PRN
Status: CANCELLED | OUTPATIENT
Start: 2022-10-05

## 2022-09-08 RX ORDER — MEPERIDINE HYDROCHLORIDE 25 MG/ML
25 INJECTION INTRAMUSCULAR; INTRAVENOUS; SUBCUTANEOUS EVERY 30 MIN PRN
Status: CANCELLED | OUTPATIENT
Start: 2022-10-05

## 2022-09-08 RX ADMIN — Medication 250 ML: at 13:44

## 2022-09-08 NOTE — PROGRESS NOTES
Infusion Nursing Note:  Ventura Quiroz presents today for Rapid Infliximab .    Patient seen by provider today: No   present during visit today: Not Applicable.    Note: Plans to have a colonoscopy on 9/21 .    Intravenous Access:  Peripheral IV placed.    Treatment Conditions:  Biological Infusion Checklist:  ~~~ NOTE: If the patient answers yes to any of the questions below, hold the infusion and contact ordering provider or on-call provider.    1. Have you recently had an elevated temperature, fever, chills, productive cough, coughing for 3 weeks or longer or hemoptysis, abnormal vital signs, night sweats,  chest pain or have you noticed a decrease in your appetite, unexplained weight loss or fatigue? No  2. Do you have any open wounds or new incisions? No  3. Do you have any recent or upcoming hospitalizations, surgeries or dental procedures? No  4. Do you currently have or recently have had any signs of illness or infection or are you on any antibiotics? No  5. Have you had any new, sudden or worsening abdominal pain? No  6. Have you or anyone in your household received a live vaccination in the past 4 weeks? Please note:  No live vaccines while on biologic/chemotherapy until 6 months after the last treatment.  Patient can receive the flu vaccine (shot only) and the pneumovax.  It is optimal for the patient to get these vaccines mid cycle, but they can be given at any time as long as it is not on the day of the infusion. No  7. Have you recently been diagnosed with any new nervous system diseases (ie. Multiple sclerosis, Guillain [place], seizures, neurological changes) or cancer diagnosis? No  8. Are you on any form of radiation or chemotherapy? No  9. Have there been any other new onset medical symptoms? No      Post Infusion Assessment:  Patient tolerated infusion without incident.  Site patent and intact, free from redness, edema or discomfort.  No evidence of extravasations.  Access discontinued  per protocol.  Biologic Infusion Post Education: Call the triage nurse at your clinic or seek medical attention if you have chills and/or temperature greater than or equal to 100.5, uncontrolled nausea/vomiting, diarrhea, constipation, dizziness, shortness of breath, chest pain, heart palpitations, weakness or any other new or concerning symptoms, questions or concerns.  You cannot have any live virus vaccines prior to or during treatment or up to 6 months post infusion.  If you have an upcoming surgery, medical procedure or dental procedure during treatment, this should be discussed with your ordering physician and your surgeon/dentist.  If you are having any concerning symptom, if you are unsure if you should get your next infusion or wish to speak to a provider before your next infusion, please call your care coordinator or triage nurse at your clinic to notify them so we can adequately serve you.     Discharge Plan:   Discharge instructions reviewed with: Patient.  Patient and/or family verbalized understanding of discharge instructions and all questions answered.  Patient discharged in stable condition accompanied by: self.  Departure Mode: Ambulatory.  Next apt verified.      Mayela Zaragoza RN

## 2022-09-14 ENCOUNTER — TELEPHONE (OUTPATIENT)
Dept: GASTROENTEROLOGY | Facility: CLINIC | Age: 22
End: 2022-09-14

## 2022-09-14 NOTE — TELEPHONE ENCOUNTER
Attempted to contact patient regarding upcoming colonoscopy procedure on 9.21.2022 for pre assessment questions. No answer.     Left message to return call to [phone] #[phone]    Discuss at home rapid antigen COVID test 1-2 days prior to procedure.    Arrival time: 0855    Facility location: [facility]    Sedation type: MAC    Indication for procedure: Crohn's    Bowel prep recommendation: Golytely d/t mag citrate recall    Prep instructions sent via [employer].  Prep prescription sent to [facility] [id number] - [place], [place] - [facility] [address] CURTIS Nguyen RN

## 2022-09-16 NOTE — TELEPHONE ENCOUNTER
Pre assessment questions completed for upcoming colonoscopy procedure scheduled on 9.21.22    Discussed at home rapid antigen COVID test 1-2 days prior to procedure.    Reviewed procedural arrival time 0855 and facility location [facility].    Designated  policy reviewed. Instructed to have someone stay 24 hours post procedure.     Anticoagulation/blood thinners? No    Electronic implanted devices? No    Reviewed standard golytely prep instructions with patient. No fiber/iron supplements or foods that contain nuts/seeds prior to procedure.     Patient verbalized understanding and had no questions or concerns at this time.    Aparna Bosch RN

## 2022-09-26 ENCOUNTER — TELEPHONE (OUTPATIENT)
Dept: GASTROENTEROLOGY | Facility: CLINIC | Age: 22
End: 2022-09-26

## 2022-09-26 NOTE — TELEPHONE ENCOUNTER
----- Message from Tobin Wright MD sent at 9/26/2022 10:39 AM CDT -----  Regarding: RE: Colonoscopy re schedule   OK!    ENdo scheduleing team - let me know exact time. He is expecting call.     Sounds like 10:45 is the official time.     Thanks all!   ----- Message -----  From: Melinda Burton RN  Sent: 9/26/2022   9:46 AM CDT  To: Tobin Wright MD, #  Subject: RE: Colonoscopy re schedule                      If you schedule, we will do it in whatever room is available. It can be in [place] at the end of his block  ----- Message -----  From: Tobin Wright MD  Sent: 9/26/2022   9:42 AM CDT  To: Melinda Burton, RN, #  Subject: RE: Colonoscopy re schedule                      No - around that time is fine.     I can be flexible.     Want to say 10:45         ----- Message -----  From: Melinda Burton RN  Sent: 9/23/2022  11:02 AM CDT  To: Tobin Wright MD, #  Subject: RE: Colonoscopy re schedule                      Not sure if you are looking at the schedule, if you are planning to have it in [place] break (10:45), that may work, If she is running late, we could do it after sloans block is done. Also, being aware of cancellations, it may work out just fine. Does it need to be at 10:30 exactly?   ----- Message -----  From: Tobin Wright MD  Sent: 9/23/2022   9:44 AM CDT  To: Melinda Burton, RN, #  Subject: Colonoscopy re schedule                          Hi,     Can I add on this [place] [facility] [place] (Thursday) at 10:30am.    Can be Mac or moderate sedation.    Let me know,  Thanks

## 2022-09-26 NOTE — TELEPHONE ENCOUNTER
Outbound call: To Ventura,  1st attempt  LVM & sent mc msg      To be scheduled as follows: (See staff msgs below)    Colonoscopy  AGNIESZKA Wright  9/29   MAC or CS is approved.  There has not been a hold placed on the snapboard.

## 2022-09-27 ENCOUNTER — TELEPHONE (OUTPATIENT)
Dept: GASTROENTEROLOGY | Facility: CLINIC | Age: 22
End: 2022-09-27

## 2022-09-27 NOTE — TELEPHONE ENCOUNTER
Upon review patient rescheduled to 9/29/22.     Attempted to contact patient regarding upcoming colonoscopy  procedure on 9/29/22 for pre assessment questions. No answer.     Left message to return call to [phone] #4    Covid test scheduled? No Discuss at home rapid antigen COVID test 1-2 days prior to procedure.    Arrival time: 0945    Facility location: [facility]    Sedation type: CS (per TE 9/26/22 can be either CS or MAC)    Indication for procedure: crohns (research?)     Anticoagulants: no .     Bowel prep recommendation: Golytely d/t mg citrate recall.     Verify that patient has bowel prep     Cami Schaeffer RN

## 2022-09-28 NOTE — TELEPHONE ENCOUNTER
Second attempt for pre-assessment prior to upcoming colonoscopy.    No answer.  Left message to return call [phone] #4    Minerva Nguyen RN

## 2022-09-28 NOTE — TELEPHONE ENCOUNTER
Pre assessment questions completed for upcoming colonoscopy  procedure scheduled on 9/29/22    COVID policy reviewed. Patient to complete rapid antigen test one to two days before their scheduled procedure. Patient to bring photo of the results when they come in for their procedure.    Reviewed procedural arrival time 0945 and facility location [facility].    Designated  policy reviewed. Instructed to have someone stay 6 hours post procedure.     Reviewed Golytely prep instructions. Clear liquid diet today. Patient states they have instructions and prep     Patient verbalized understanding and had no questions or concerns at this time.    Cami Schaeffer RN

## 2022-09-29 ENCOUNTER — HOSPITAL ENCOUNTER (OUTPATIENT)
Facility: AMBULATORY SURGERY CENTER | Age: 22
Discharge: HOME | End: 2022-09-29
Attending: INTERNAL MEDICINE | Admitting: INTERNAL MEDICINE
Payer: COMMERCIAL

## 2022-09-29 VITALS
TEMPERATURE: 98.4 F | HEART RATE: 78 BPM | OXYGEN SATURATION: 100 % | SYSTOLIC BLOOD PRESSURE: 118 MMHG | BODY MASS INDEX: 23.1 KG/M2 | WEIGHT: 180 LBS | RESPIRATION RATE: 16 BRPM | HEIGHT: 74 IN | DIASTOLIC BLOOD PRESSURE: 72 MMHG

## 2022-09-29 LAB — COLONOSCOPY: NORMAL

## 2022-09-29 PROCEDURE — 45380 COLONOSCOPY AND BIOPSY: CPT

## 2022-09-29 PROCEDURE — 88305 TISSUE EXAM BY PATHOLOGIST: CPT | Mod: TC | Performed by: INTERNAL MEDICINE

## 2022-09-29 RX ORDER — PROCHLORPERAZINE MALEATE 10 MG
10 TABLET ORAL EVERY 6 HOURS PRN
Status: DISCONTINUED | OUTPATIENT
Start: 2022-09-29 | End: 2022-09-30 | Stop reason: HOSPADM

## 2022-09-29 RX ORDER — FLUMAZENIL 0.1 MG/ML
0.2 INJECTION, SOLUTION INTRAVENOUS
Status: DISCONTINUED | OUTPATIENT
Start: 2022-09-29 | End: 2022-09-30 | Stop reason: HOSPADM

## 2022-09-29 RX ORDER — LIDOCAINE 40 MG/G
CREAM TOPICAL
Status: DISCONTINUED | OUTPATIENT
Start: 2022-09-29 | End: 2022-09-29 | Stop reason: HOSPADM

## 2022-09-29 RX ORDER — NALOXONE HYDROCHLORIDE 0.4 MG/ML
0.2 INJECTION, SOLUTION INTRAMUSCULAR; INTRAVENOUS; SUBCUTANEOUS
Status: DISCONTINUED | OUTPATIENT
Start: 2022-09-29 | End: 2022-09-30 | Stop reason: HOSPADM

## 2022-09-29 RX ORDER — ONDANSETRON 2 MG/ML
4 INJECTION INTRAMUSCULAR; INTRAVENOUS
Status: DISCONTINUED | OUTPATIENT
Start: 2022-09-29 | End: 2022-09-29 | Stop reason: HOSPADM

## 2022-09-29 RX ORDER — NALOXONE HYDROCHLORIDE 0.4 MG/ML
0.4 INJECTION, SOLUTION INTRAMUSCULAR; INTRAVENOUS; SUBCUTANEOUS
Status: DISCONTINUED | OUTPATIENT
Start: 2022-09-29 | End: 2022-09-30 | Stop reason: HOSPADM

## 2022-09-29 RX ORDER — ONDANSETRON 2 MG/ML
4 INJECTION INTRAMUSCULAR; INTRAVENOUS EVERY 6 HOURS PRN
Status: DISCONTINUED | OUTPATIENT
Start: 2022-09-29 | End: 2022-09-30 | Stop reason: HOSPADM

## 2022-09-29 RX ORDER — ONDANSETRON 4 MG/1
4 TABLET, ORALLY DISINTEGRATING ORAL EVERY 6 HOURS PRN
Status: DISCONTINUED | OUTPATIENT
Start: 2022-09-29 | End: 2022-09-30 | Stop reason: HOSPADM

## 2022-09-29 RX ORDER — FENTANYL CITRATE 50 UG/ML
INJECTION, SOLUTION INTRAMUSCULAR; INTRAVENOUS PRN
Status: DISCONTINUED | OUTPATIENT
Start: 2022-09-29 | End: 2022-09-29 | Stop reason: HOSPADM

## 2022-09-29 NOTE — H&P
Ventura Quiroz  [id number]  male  22 year old      Reason for procedure/surgery: Crohn's disease    Patient Active Problem List   Diagnosis     Crohn's Colitis     Anemia, iron deficiency     Crohn's disease of small intestine with abscess (H)     Crohn disease (H)       Past Surgical History:    Past Surgical History:   Procedure Laterality Date     COLONOSCOPY N/A 4/11/2019    Procedure: COMBINED COLONOSCOPY, SINGLE OR MULTIPLE BIOPSY/POLYPECTOMY BY BIOPSY;  Surgeon: Tobin Wright MD;  Location: [facility]     EXCISE LIP OR CHEEK FOLD  10/3/2003    Needle cautery frenulectomy.     INSERT PICC LINE Left 8/10/2020    Procedure: INSERTION, PICC @845;  Surgeon: Karan Farr MD;  Location: [facility] OR     IR PICC PLACEMENT > 5 YRS OF AGE  8/10/2020     PE TUBES  4/25/2006     RESECTION ILEOCOLIC N/A 8/21/2020    Procedure: Exploratory laparotomy with ileocolic resection with takedown of enterovesical fistula, and low anterior resection;  Surgeon: Brittni Robles MD;  Location: [facility] OR     SIGMOIDOSCOPY FLEXIBLE N/A 8/21/2020    Procedure: Sigmoidoscopy flexible;  Surgeon: Brittni Robles MD;  Location: [facility] OR     TONSILLECTOMY & ADENOIDECTOMY  4/25/2006       Past Medical History:   Past Medical History:   Diagnosis Date     Allergic rhinitis, cause unspecified     Zyrtec helps     Crohn's Colitis 4/8/2019     Unspecified otitis media     Otitis Media       Social History:   Social History     Tobacco Use     Smoking status: Never Smoker     Smokeless tobacco: Current User     Tobacco comment: Vape   Substance Use Topics     Alcohol use: Yes       Family History:   Family History   Problem Relation Age of Onset     No Known Problems Mother      No Known Problems Father      Melanoma No family hx of      Skin Cancer No family hx of        Allergies:   Allergies   Allergen Reactions     Gadavist [Gadobutrol] Nausea     Patient could not perform timed sequences due to nausea and discomfort.      "Glucagon Nausea and Vomiting     Pre medicate with lorazapam if able.       Amoxicillin      Penicillin [Penicillins]        Active Medications:   Current Outpatient Medications   Medication Sig Dispense Refill     inFLIXimab (REMICADE IV) Inject into the vein every 30 days         Systemic Review:   CONSTITUTIONAL: NEGATIVE for fever, chills, change in weight  ENT/MOUTH: NEGATIVE for ear, mouth and throat problems  RESP: NEGATIVE for significant cough or SOB  CV: NEGATIVE for chest pain, palpitations or peripheral edema    Physical Examination:   Vital Signs: /71 (BP Location: Right arm)   Pulse 82   Temp 97  F (36.1  C) (Skin)   Resp 17   Ht 1.88 m (6' 2\")   Wt 81.6 kg (180 lb)   SpO2 100%   BMI 23.11 kg/m    GENERAL: healthy, alert and no distress  NECK: no adenopathy, no asymmetry, masses, or scars  RESP: lungs clear to auscultation - no rales, rhonchi or wheezes  CV: regular rate and rhythm, normal S1 S2, no S3 or S4, no murmur, click or rub, no peripheral edema and peripheral pulses strong  ABDOMEN: soft, nontender, no hepatosplenomegaly, no masses and bowel sounds normal  MS: no gross musculoskeletal defects noted, no edema    ASA Classification: (II)  Mild systemic disease  Airway Exam: Mallampati Score: Class II (Complete visualization of uvula)    Plan: Appropriate to proceed as scheduled.      Tobin Wright MD  9/29/2022    PCP:  Chuy Moss"

## 2022-09-30 PROCEDURE — 88305 TISSUE EXAM BY PATHOLOGIST: CPT | Mod: 26 | Performed by: PATHOLOGY

## 2022-10-09 ENCOUNTER — HEALTH MAINTENANCE LETTER (OUTPATIENT)
Age: 22
End: 2022-10-09

## 2022-10-13 ENCOUNTER — INFUSION THERAPY VISIT (OUTPATIENT)
Dept: INFUSION THERAPY | Facility: CLINIC | Age: 22
End: 2022-10-13
Payer: COMMERCIAL

## 2022-10-13 VITALS
TEMPERATURE: 97.7 F | BODY MASS INDEX: 23.65 KG/M2 | WEIGHT: 184.2 LBS | RESPIRATION RATE: 16 BRPM | SYSTOLIC BLOOD PRESSURE: 113 MMHG | DIASTOLIC BLOOD PRESSURE: 75 MMHG | OXYGEN SATURATION: 99 % | HEART RATE: 76 BPM

## 2022-10-13 DIAGNOSIS — K50.014 CROHN'S DISEASE OF SMALL INTESTINE WITH ABSCESS (H): ICD-10-CM

## 2022-10-13 DIAGNOSIS — D50.9 IRON DEFICIENCY ANEMIA, UNSPECIFIED IRON DEFICIENCY ANEMIA TYPE: Primary | ICD-10-CM

## 2022-10-13 DIAGNOSIS — K52.9 COLITIS: ICD-10-CM

## 2022-10-13 PROCEDURE — 99207 PR NO CHARGE LOS: CPT

## 2022-10-13 PROCEDURE — 96413 CHEMO IV INFUSION 1 HR: CPT | Performed by: NURSE PRACTITIONER

## 2022-10-13 RX ORDER — ALBUTEROL SULFATE 0.83 MG/ML
2.5 SOLUTION RESPIRATORY (INHALATION)
Status: CANCELLED | OUTPATIENT
Start: 2022-11-03

## 2022-10-13 RX ORDER — METHYLPREDNISOLONE SODIUM SUCCINATE 125 MG/2ML
125 INJECTION, POWDER, LYOPHILIZED, FOR SOLUTION INTRAMUSCULAR; INTRAVENOUS
Status: CANCELLED
Start: 2022-11-03

## 2022-10-13 RX ORDER — EPINEPHRINE 1 MG/ML
0.3 INJECTION, SOLUTION INTRAMUSCULAR; SUBCUTANEOUS EVERY 5 MIN PRN
Status: CANCELLED | OUTPATIENT
Start: 2022-11-03

## 2022-10-13 RX ORDER — DIPHENHYDRAMINE HYDROCHLORIDE 50 MG/ML
50 INJECTION INTRAMUSCULAR; INTRAVENOUS
Status: CANCELLED
Start: 2022-11-03

## 2022-10-13 RX ORDER — ALBUTEROL SULFATE 90 UG/1
1-2 AEROSOL, METERED RESPIRATORY (INHALATION)
Status: CANCELLED
Start: 2022-11-03

## 2022-10-13 RX ORDER — MEPERIDINE HYDROCHLORIDE 25 MG/ML
25 INJECTION INTRAMUSCULAR; INTRAVENOUS; SUBCUTANEOUS EVERY 30 MIN PRN
Status: CANCELLED | OUTPATIENT
Start: 2022-11-03

## 2022-10-13 RX ORDER — DIPHENHYDRAMINE HCL 25 MG
25 CAPSULE ORAL ONCE
Status: CANCELLED
Start: 2022-11-03 | End: 2022-11-03

## 2022-10-13 RX ORDER — ACETAMINOPHEN 325 MG/1
650 TABLET ORAL ONCE
Status: CANCELLED
Start: 2022-11-03 | End: 2022-11-03

## 2022-10-13 RX ADMIN — Medication 250 ML: at 13:25

## 2022-10-13 NOTE — PROGRESS NOTES
Infusion Nursing Note:  Ventura Quiroz presents today for Rapid Infliximab.    Patient seen by provider today: No   present during visit today: Not Applicable.    Note: Patient reports having a colonoscopy on 9/29, reports doing well. No new medical concerns.     Intravenous Access:  Peripheral IV placed.    Treatment Conditions:  Biological Infusion Checklist:  ~~~ NOTE: If the patient answers yes to any of the questions below, hold the infusion and contact ordering provider or on-call provider.    1. Have you recently had an elevated temperature, fever, chills, productive cough, coughing for 3 weeks or longer or hemoptysis, abnormal vital signs, night sweats,  chest pain or have you noticed a decrease in your appetite, unexplained weight loss or fatigue? No  2. Do you have any open wounds or new incisions? No  3. Do you have any recent or upcoming hospitalizations, surgeries or dental procedures? No  4. Do you currently have or recently have had any signs of illness or infection or are you on any antibiotics? No  5. Have you had any new, sudden or worsening abdominal pain? No  6. Have you or anyone in your household received a live vaccination in the past 4 weeks? Please note:  No live vaccines while on biologic/chemotherapy until 6 months after the last treatment.  Patient can receive the flu vaccine (shot only) and the pneumovax.  It is optimal for the patient to get these vaccines mid cycle, but they can be given at any time as long as it is not on the day of the infusion. No  7. Have you recently been diagnosed with any new nervous system diseases (ie. Multiple sclerosis, Guillain [place], seizures, neurological changes) or cancer diagnosis? No  8. Are you on any form of radiation or chemotherapy? No  9. Have there been any other new onset medical symptoms? No    Post Infusion Assessment:  Patient tolerated infusion without incident.  Site patent and intact, free from redness, edema or discomfort.  No  evidence of extravasations.  Access discontinued per protocol.  Biologic Infusion Post Education: Call the triage nurse at your clinic or seek medical attention if you have chills and/or temperature greater than or equal to 100.5, uncontrolled nausea/vomiting, diarrhea, constipation, dizziness, shortness of breath, chest pain, heart palpitations, weakness or any other new or concerning symptoms, questions or concerns.  You cannot have any live virus vaccines prior to or during treatment or up to 6 months post infusion.  If you have an upcoming surgery, medical procedure or dental procedure during treatment, this should be discussed with your ordering physician and your surgeon/dentist.  If you are having any concerning symptom, if you are unsure if you should get your next infusion or wish to speak to a provider before your next infusion, please call your care coordinator or triage nurse at your clinic to notify them so we can adequately serve you.     Discharge Plan:   Discharge instructions reviewed with: Patient.  Patient and/or family verbalized understanding of discharge instructions and all questions answered.  Patient discharged in stable condition accompanied by: self.  Departure Mode: Ambulatory.  Verified next apt with patient.      Mayela Zaragoza RN

## 2022-11-01 ENCOUNTER — PATIENT OUTREACH (OUTPATIENT)
Dept: GASTROENTEROLOGY | Facility: CLINIC | Age: 22
End: 2022-11-01

## 2022-11-01 NOTE — TELEPHONE ENCOUNTER
Mother of patient left a message that patient recently was seen by Dr Wright at a procedure and questioning if patient needs to keep his appointment   Left a voice mail message that patient needs to keep his video visit today in order to continue to have infusions as part of protocol and insurance requirements. Mother is sending patient a text that needs to keep video visit today

## 2022-11-02 ENCOUNTER — TELEPHONE (OUTPATIENT)
Dept: GASTROENTEROLOGY | Facility: CLINIC | Age: 22
End: 2022-11-02

## 2022-11-05 ENCOUNTER — MYC MEDICAL ADVICE (OUTPATIENT)
Dept: GASTROENTEROLOGY | Facility: CLINIC | Age: 22
End: 2022-11-05

## 2022-11-05 DIAGNOSIS — K50.014 CROHN'S DISEASE OF SMALL INTESTINE WITH ABSCESS (H): Primary | ICD-10-CM

## 2022-11-07 ENCOUNTER — TELEPHONE (OUTPATIENT)
Dept: GASTROENTEROLOGY | Facility: CLINIC | Age: 22
End: 2022-11-07

## 2022-11-07 DIAGNOSIS — Z00.6 EXAMINATION OF PARTICIPANT OR CONTROL IN CLINICAL RESEARCH: Primary | ICD-10-CM

## 2022-11-07 NOTE — TELEPHONE ENCOUNTER
- SCHEDULING DETAILS -    DIRK  Surgeon    11/18  Date of Procedure  Lower Endoscopy [Colonoscopy]  Type of Procedure Scheduled   [facility] Location  STANDARD [facility]-If you answer yes to questions #8, #20, #21Which Colonoscopy Prep was Sent?     MODERATE Sedation Type     N PAC / Pre-op Required         Additional comments:  Per staff message from Kyle and care team, okay to add onto [facility] 11/18 at 12pm in [place].      Melinda Burton, Tobin Chakraborty MD; P Endoscopy Scheduling Pool  That is the date I was thinking of. I see the 0800 slot with in [place] slot is filled now. Need to put it in 1200 [place] slot           Previous Messages     ----- Message -----   From: Tobin Wright MD   Sent: 11/7/2022   8:35 AM CST   To: Melinda Burton RN, *   Subject: RE:                                               I am sorry - is there any ability for 11/18?   I believe I forgot to include the date.       MAC or conscious is fine.     I can be available at any time a room is available       ----- Message -----   From: Melinda Burton, LEXY   Sent: 11/2/2022   3:30 PM CST   To: Tobin Wright MD, *   Subject: RE:                                               The only other Friday that works in the near future would be the 11th. The rest of November is booked. You can add it on the end of [place] block or put it in room three at anytime from 10-12, I have the staff to do it.   ----- Message -----   From: Karina Camarillo   Sent: 11/2/2022   3:16 PM CDT   To: Tobin Wright MD, *   Subject: RE:                                               Hey all,     I spoke with Ventura today and he is unable to make it to the clinic this Friday for a colonoscopy .     Is there another Friday that would work?   Let us know.     Thank you!   Karina Camarillo     ----- Message -----   From: Melinda Burton RN   Sent: 11/2/2022   2:05 PM CDT   To: Tobin Wright MD, *   Subject: RE:                                                I am sending this thread to scheduling so they will be on it   ----- Message -----   From: Tobin Wright MD   Sent: 11/2/2022   1:43 PM CDT   To: Melinda Burton RN   Subject: RE:                                               Awesome, thank you! I will place the order!   ----- Message -----   From: Melinda Burton RN   Sent: 11/2/2022   9:38 AM CDT   To: Tobin Wright MD     There is an 800 slot before wise starts. Otherwise we can make it work if we open another room   ----- Message -----   From: Tobin Wright MD   Sent: 11/1/2022   2:25 PM CDT   To: LEXY Humphrey - is there accommodations for me to do a colonoscopy on this patient on Friday AM - around 8-10?     I will come whenever if there is a room available and patient can make it.     Does not have to be MAC     Thanks

## 2022-11-07 NOTE — TELEPHONE ENCOUNTER
To: [facility]      From: Ventura IBETH Richie      Created: 11/5/2022 1:03 PM        *-*-*This message has not been handled.*-*-*    I have been having a lot of bright red blood in my stools yesterday and today.  I feel fine but it s a lot of blood          To: [facility]      From: Ventura A Richie      Created: 11/7/2022 5:15 AM        *-*-*This message has not been handled.*-*-*    Blood has stopped        RV: No show on 11/1/22 (no future appt schedule)  LV: 4/26/22    Dr. Wright,  Spoke with Indigo - Mother of Ventura, she states Ventura has a colonoscopy on Friday but does not know the time. Please advice.    He was a no show for his 11/1/22 appt with you and does not think he needs an appt when he is part of your study.    He is also having symptoms. Order flare labs and stool study. Indigo wrote the above portal message and states above symptoms.    Thank you.  Nena

## 2022-11-07 NOTE — TELEPHONE ENCOUNTER
Spoke with Indigo Whitehead's mother, Per Dr. Wright's recommendation, pt had the blood once and now is ok. Cancel lab appt. Will call back if he continues to have more blood in his stool.

## 2022-11-15 ENCOUNTER — TELEPHONE (OUTPATIENT)
Dept: GASTROENTEROLOGY | Facility: CLINIC | Age: 22
End: 2022-11-15

## 2022-11-15 DIAGNOSIS — Z00.6 EXAMINATION OF PARTICIPANT OR CONTROL IN CLINICAL RESEARCH: Primary | ICD-10-CM

## 2022-11-15 RX ORDER — BISACODYL 5 MG
TABLET, DELAYED RELEASE (ENTERIC COATED) ORAL
Qty: 4 TABLET | Refills: 0 | Status: SHIPPED | OUTPATIENT
Start: 2022-11-15 | End: 2023-01-19

## 2022-11-15 NOTE — TELEPHONE ENCOUNTER
Patient scheduled for Colonoscopy on 11/18/22.     Covid test scheduled? No. Discuss at home test option.     Pre op exam scheduled: N/A    Arrival time: 1100    Facility location: [facility]    Sedation type: CS    Indication for procedure: Research    Anticoagulations? None listed.    Diabetic? Not indicated.     Bowel prep recommendation: Golytely d/t mag citrate recall    Golytely prep sent to [facility] - [place], [place] - [address] [place] [address] pharmacy. Prep instructions sent via [employer].    Pre visit planning completed.    HUGH COBIAN RN

## 2022-11-15 NOTE — TELEPHONE ENCOUNTER
Attempted to contact patient regarding upcoming Colonoscopy procedure on 11/18/22 for pre assessment questions. No answer.     Left message to return call to [phone] #4      HUGH COBIAN RN

## 2022-11-16 NOTE — TELEPHONE ENCOUNTER
Pre assessment questions completed for upcoming colonoscopy procedure scheduled on 11.18.2022    COVID policy reviewed. Patient to complete rapid antigen test one to two days before their scheduled procedure. Patient to bring photo of the results when they come in for their procedure.    Reviewed procedural arrival time 1100 and facility location [facility].    Designated  policy reviewed. Instructed to have someone stay 6 hours post procedure.     Anticoagulation/blood thinners? no    Electronic implanted devices? no    Reviewed Golytely prep instructions with patient.     Patient verbalized understanding and had no questions or concerns at this time.    Minerva Nguyen RN

## 2022-11-18 ENCOUNTER — HOSPITAL ENCOUNTER (OUTPATIENT)
Facility: AMBULATORY SURGERY CENTER | Age: 22
Discharge: HOME | End: 2022-11-18
Attending: INTERNAL MEDICINE | Admitting: INTERNAL MEDICINE
Payer: COMMERCIAL

## 2022-11-18 VITALS
TEMPERATURE: 98.2 F | WEIGHT: 176 LBS | BODY MASS INDEX: 22.59 KG/M2 | RESPIRATION RATE: 16 BRPM | OXYGEN SATURATION: 100 % | HEART RATE: 66 BPM | SYSTOLIC BLOOD PRESSURE: 117 MMHG | DIASTOLIC BLOOD PRESSURE: 79 MMHG | HEIGHT: 74 IN

## 2022-11-18 LAB — COLONOSCOPY: NORMAL

## 2022-11-18 PROCEDURE — 45378 DIAGNOSTIC COLONOSCOPY: CPT

## 2022-11-18 RX ORDER — LIDOCAINE 40 MG/G
CREAM TOPICAL
Status: DISCONTINUED | OUTPATIENT
Start: 2022-11-18 | End: 2022-11-19 | Stop reason: HOSPADM

## 2022-11-18 RX ORDER — FENTANYL CITRATE 50 UG/ML
INJECTION, SOLUTION INTRAMUSCULAR; INTRAVENOUS DAILY PRN
Status: DISCONTINUED | OUTPATIENT
Start: 2022-11-18 | End: 2022-11-18 | Stop reason: HOSPADM

## 2022-11-18 RX ORDER — ONDANSETRON 2 MG/ML
4 INJECTION INTRAMUSCULAR; INTRAVENOUS
Status: DISCONTINUED | OUTPATIENT
Start: 2022-11-18 | End: 2022-11-19 | Stop reason: HOSPADM

## 2022-11-18 NOTE — H&P
Ventura Quiroz  [id number]  male  22 year old      Reason for procedure/surgery: Research    Patient Active Problem List   Diagnosis     Crohn's Colitis     Anemia, iron deficiency     Crohn's disease of small intestine with abscess (H)     Crohn disease (H)       Past Surgical History:    Past Surgical History:   Procedure Laterality Date     COLONOSCOPY N/A 4/11/2019    Procedure: COMBINED COLONOSCOPY, SINGLE OR MULTIPLE BIOPSY/POLYPECTOMY BY BIOPSY;  Surgeon: Tobin Wright MD;  Location: [facility]     COLONOSCOPY N/A 9/29/2022    Procedure: COLONOSCOPY with  BIOPSY;  Surgeon: Tobin Wright MD;  Location: [facility]     EXCISE LIP OR CHEEK FOLD  10/3/2003    Needle cautery frenulectomy.     INSERT PICC LINE Left 8/10/2020    Procedure: INSERTION, PICC @845;  Surgeon: Karan Farr MD;  [facility]: [facility]     IR PICC PLACEMENT > 5 YRS OF AGE  8/10/2020     PE TUBES  4/25/2006     RESECTION ILEOCOLIC N/A 8/21/2020    Procedure: Exploratory laparotomy with ileocolic resection with takedown of enterovesical fistula, and low anterior resection;  Surgeon: Brittni Robles MD;  Location: [facility]     SIGMOIDOSCOPY FLEXIBLE N/A 8/21/2020    Procedure: Sigmoidoscopy flexible;  Surgeon: Brittni Robles MD;  Location: [facility]     TONSILLECTOMY & ADENOIDECTOMY  4/25/2006       Past Medical History:   Past Medical History:   Diagnosis Date     Allergic rhinitis, cause unspecified     Zyrtec helps     Crohn's Colitis 4/8/2019     Unspecified otitis media     Otitis Media       Social History:   Social History     Tobacco Use     Smoking status: Never     Smokeless tobacco: Current     Tobacco comments:     Vape   Substance Use Topics     Alcohol use: Yes       Family History:   Family History   Problem Relation Age of Onset     No Known Problems Mother      No Known Problems Father      Melanoma No family hx of      Skin Cancer No family hx of        Allergies:   Allergies   Allergen Reactions      "Gadavist [Gadobutrol] Nausea     Patient could not perform timed sequences due to nausea and discomfort.     Glucagon Nausea and Vomiting     Pre medicate with lorazapam if able.       Amoxicillin      Penicillin [Penicillins]        Active Medications:   Current Outpatient Medications   Medication Sig Dispense Refill     bisacodyl (DULCOLAX) 5 MG EC tablet Take 2 tablets at 3 pm the day before your procedure. If your procedure is before 11 am, take 2 additional tablets at 11 pm. If your procedure is after 11 am, take 2 additional tablets at 6 am. For additional instructions refer to your colonoscopy prep instructions. 4 tablet 0     inFLIXimab (REMICADE IV) Inject into the vein every 30 days       polyethylene glycol (GOLYTELY) 236 g suspension The night before the exam at 6 pm drink an 8-ounce glass every 15 minutes until the jug is half empty. If you arrive before 11 AM: Drink the other half of the Golytely jug at 11 PM night before procedure. If you arrive after 11 AM: Drink the other half of the Golytely jug at 6 AM day of procedure. For additional instructions refer to your colonoscopy prep instructions. 4000 mL 0       Systemic Review:   CONSTITUTIONAL: NEGATIVE for fever, chills, change in weight  ENT/MOUTH: NEGATIVE for ear, mouth and throat problems  RESP: NEGATIVE for significant cough or SOB  CV: NEGATIVE for chest pain, palpitations or peripheral edema    Physical Examination:   Vital Signs: /77 (BP Location: Right arm)   Pulse 74   Temp 98.6  F (37  C) (Temporal)   Resp 18   Ht 1.88 m (6' 2\")   Wt 79.8 kg (176 lb)   SpO2 99%   BMI 22.60 kg/m    GENERAL: healthy, alert and no distress  NECK: no adenopathy, no asymmetry, masses, or scars  RESP: lungs clear to auscultation - no rales, rhonchi or wheezes  CV: regular rate and rhythm, normal S1 S2, no S3 or S4, no murmur, click or rub, no peripheral edema and peripheral pulses strong  ABDOMEN: soft, nontender, no hepatosplenomegaly, no masses " and bowel sounds normal  MS: no gross musculoskeletal defects noted, no edema    ASA Classification: (I)  Normal healthy patient  Airway Exam: Mallampati Score: Class I (Complete visualization of soft palate)    Plan: Appropriate to proceed as scheduled.      Tobin Wright MD  11/18/2022    PCP:  Chuy Moss

## 2022-11-18 NOTE — PROGRESS NOTES
Temp: 98.9    HBI:  Overall patient well being (prior day): 0 (Very well)  Abdominal pain (prior day): 0 (None)  Number of liquid or soft stools (prior day): 3 (1 point per stool)  Abdominal mass on exam: 0 (None)  Complications (1 point for each):   None    Remission <5  Mild activity 5-7  Moderate activity 8-16  Severe > 16

## 2022-12-12 ENCOUNTER — INFUSION THERAPY VISIT (OUTPATIENT)
Dept: INFUSION THERAPY | Facility: CLINIC | Age: 22
End: 2022-12-12
Payer: COMMERCIAL

## 2022-12-12 ENCOUNTER — LAB (OUTPATIENT)
Dept: LAB | Facility: CLINIC | Age: 22
End: 2022-12-12
Payer: COMMERCIAL

## 2022-12-12 VITALS
TEMPERATURE: 98 F | DIASTOLIC BLOOD PRESSURE: 68 MMHG | WEIGHT: 189.9 LBS | HEART RATE: 81 BPM | SYSTOLIC BLOOD PRESSURE: 113 MMHG | RESPIRATION RATE: 16 BRPM | OXYGEN SATURATION: 98 % | BODY MASS INDEX: 24.38 KG/M2

## 2022-12-12 DIAGNOSIS — D50.9 IRON DEFICIENCY ANEMIA, UNSPECIFIED IRON DEFICIENCY ANEMIA TYPE: Primary | ICD-10-CM

## 2022-12-12 DIAGNOSIS — D50.9 IRON DEFICIENCY ANEMIA, UNSPECIFIED IRON DEFICIENCY ANEMIA TYPE: ICD-10-CM

## 2022-12-12 DIAGNOSIS — K50.014 CROHN'S DISEASE OF SMALL INTESTINE WITH ABSCESS (H): ICD-10-CM

## 2022-12-12 DIAGNOSIS — K50.014 CROHN'S DISEASE OF SMALL INTESTINE WITH ABSCESS (H): Primary | ICD-10-CM

## 2022-12-12 DIAGNOSIS — K52.9 COLITIS: ICD-10-CM

## 2022-12-12 DIAGNOSIS — D50.0 IRON DEFICIENCY ANEMIA DUE TO CHRONIC BLOOD LOSS: ICD-10-CM

## 2022-12-12 LAB
ALBUMIN SERPL-MCNC: 3.5 G/DL (ref 3.4–5)
ALP SERPL-CCNC: 71 U/L (ref 40–150)
ALT SERPL W P-5'-P-CCNC: 23 U/L (ref 0–70)
AST SERPL W P-5'-P-CCNC: 16 U/L (ref 0–45)
BASOPHILS # BLD AUTO: 0 10E3/UL (ref 0–0.2)
BASOPHILS NFR BLD AUTO: 0 %
BILIRUB DIRECT SERPL-MCNC: <0.1 MG/DL (ref 0–0.2)
BILIRUB SERPL-MCNC: 0.3 MG/DL (ref 0.2–1.3)
CRP SERPL-MCNC: <2.9 MG/L (ref 0–8)
EOSINOPHIL # BLD AUTO: 0.2 10E3/UL (ref 0–0.7)
EOSINOPHIL NFR BLD AUTO: 3 %
ERYTHROCYTE [DISTWIDTH] IN BLOOD BY AUTOMATED COUNT: 13.1 % (ref 10–15)
ERYTHROCYTE [SEDIMENTATION RATE] IN BLOOD BY WESTERGREN METHOD: 8 MM/HR (ref 0–15)
HCT VFR BLD AUTO: 35.9 % (ref 40–53)
HGB BLD-MCNC: 11.5 G/DL (ref 13.3–17.7)
HOLD SPECIMEN: NORMAL
IMM GRANULOCYTES # BLD: 0 10E3/UL
IMM GRANULOCYTES NFR BLD: 0 %
LYMPHOCYTES # BLD AUTO: 1.4 10E3/UL (ref 0.8–5.3)
LYMPHOCYTES NFR BLD AUTO: 22 %
MCH RBC QN AUTO: 27.3 PG (ref 26.5–33)
MCHC RBC AUTO-ENTMCNC: 32 G/DL (ref 31.5–36.5)
MCV RBC AUTO: 85 FL (ref 78–100)
MONOCYTES # BLD AUTO: 0.9 10E3/UL (ref 0–1.3)
MONOCYTES NFR BLD AUTO: 13 %
NEUTROPHILS # BLD AUTO: 4.1 10E3/UL (ref 1.6–8.3)
NEUTROPHILS NFR BLD AUTO: 62 %
NRBC # BLD AUTO: 0 10E3/UL
NRBC BLD AUTO-RTO: 0 /100
PLATELET # BLD AUTO: 214 10E3/UL (ref 150–450)
PROT SERPL-MCNC: 7 G/DL (ref 6.8–8.8)
RBC # BLD AUTO: 4.21 10E6/UL (ref 4.4–5.9)
WBC # BLD AUTO: 6.6 10E3/UL (ref 4–11)

## 2022-12-12 PROCEDURE — 85025 COMPLETE CBC W/AUTO DIFF WBC: CPT | Performed by: INTERNAL MEDICINE

## 2022-12-12 PROCEDURE — 86140 C-REACTIVE PROTEIN: CPT | Performed by: INTERNAL MEDICINE

## 2022-12-12 PROCEDURE — 85652 RBC SED RATE AUTOMATED: CPT | Performed by: INTERNAL MEDICINE

## 2022-12-12 PROCEDURE — 80076 HEPATIC FUNCTION PANEL: CPT | Performed by: INTERNAL MEDICINE

## 2022-12-12 PROCEDURE — 83550 IRON BINDING TEST: CPT

## 2022-12-12 PROCEDURE — 36415 COLL VENOUS BLD VENIPUNCTURE: CPT | Performed by: INTERNAL MEDICINE

## 2022-12-12 PROCEDURE — 83540 ASSAY OF IRON: CPT

## 2022-12-12 PROCEDURE — 82728 ASSAY OF FERRITIN: CPT

## 2022-12-12 PROCEDURE — 99207 PR NO CHARGE LOS: CPT

## 2022-12-12 PROCEDURE — 82784 ASSAY IGA/IGD/IGG/IGM EACH: CPT

## 2022-12-12 PROCEDURE — 96413 CHEMO IV INFUSION 1 HR: CPT | Performed by: NURSE PRACTITIONER

## 2022-12-12 RX ORDER — EPINEPHRINE 1 MG/ML
0.3 INJECTION, SOLUTION INTRAMUSCULAR; SUBCUTANEOUS EVERY 5 MIN PRN
Status: CANCELLED | OUTPATIENT
Start: 2023-01-05

## 2022-12-12 RX ORDER — ALBUTEROL SULFATE 90 UG/1
1-2 AEROSOL, METERED RESPIRATORY (INHALATION)
Status: CANCELLED
Start: 2023-01-05

## 2022-12-12 RX ORDER — ALBUTEROL SULFATE 0.83 MG/ML
2.5 SOLUTION RESPIRATORY (INHALATION)
Status: CANCELLED | OUTPATIENT
Start: 2023-01-05

## 2022-12-12 RX ORDER — DIPHENHYDRAMINE HCL 25 MG
25 CAPSULE ORAL ONCE
Status: CANCELLED
Start: 2023-01-05 | End: 2023-01-05

## 2022-12-12 RX ORDER — METHYLPREDNISOLONE SODIUM SUCCINATE 125 MG/2ML
125 INJECTION, POWDER, LYOPHILIZED, FOR SOLUTION INTRAMUSCULAR; INTRAVENOUS
Status: CANCELLED
Start: 2023-01-05

## 2022-12-12 RX ORDER — DIPHENHYDRAMINE HYDROCHLORIDE 50 MG/ML
50 INJECTION INTRAMUSCULAR; INTRAVENOUS
Status: CANCELLED
Start: 2023-01-05

## 2022-12-12 RX ORDER — MEPERIDINE HYDROCHLORIDE 25 MG/ML
25 INJECTION INTRAMUSCULAR; INTRAVENOUS; SUBCUTANEOUS EVERY 30 MIN PRN
Status: CANCELLED | OUTPATIENT
Start: 2023-01-05

## 2022-12-12 RX ORDER — EPINEPHRINE 1 MG/ML
0.3 INJECTION, SOLUTION, CONCENTRATE INTRAVENOUS EVERY 5 MIN PRN
Status: CANCELLED | OUTPATIENT
Start: 2023-01-05

## 2022-12-12 RX ORDER — ACETAMINOPHEN 325 MG/1
650 TABLET ORAL ONCE
Status: CANCELLED
Start: 2023-01-05 | End: 2023-01-05

## 2022-12-12 RX ADMIN — Medication 250 ML: at 13:22

## 2022-12-12 NOTE — PROGRESS NOTES
Infusion Nursing Note:  Ventura Quiroz presents today for Inflectra.    Patient seen by provider today: No   present during visit today: Not Applicable.    Note: Patient reports no new concerns today.    Intravenous Access:  Peripheral IV placed.    Treatment Conditions:  Biological Infusion Checklist:  ~~~ NOTE: If the patient answers yes to any of the questions below, hold the infusion and contact ordering provider or on-call provider.    1. Have you recently had an elevated temperature, fever, chills, productive cough, coughing for 3 weeks or longer or hemoptysis, abnormal vital signs, night sweats,  chest pain or have you noticed a decrease in your appetite, unexplained weight loss or fatigue? No  2. Do you have any open wounds or new incisions? No  3. Do you have any recent or upcoming hospitalizations, surgeries or dental procedures? No  4. Do you currently have or recently have had any signs of illness or infection or are you on any antibiotics? No  5. Have you had any new, sudden or worsening abdominal pain? No  6. Have you or anyone in your household received a live vaccination in the past 4 weeks? Please note:  No live vaccines while on biologic/chemotherapy until 6 months after the last treatment.  Patient can receive the flu vaccine (shot only) and the pneumovax.  It is optimal for the patient to get these vaccines mid cycle, but they can be given at any time as long as it is not on the day of the infusion. No  7. Have you recently been diagnosed with any new nervous system diseases (ie. Multiple sclerosis, Guillain [place], seizures, neurological changes) or cancer diagnosis? No  8. Are you on any form of radiation or chemotherapy? No  9. Are you pregnant or breast feeding or do you have plans of pregnancy in the future? No  10. Have you been having any signs of worsening depression or suicidal ideations?  (benlysta only) No  11. Have there been any other new onset medical symptoms? No      Post  Infusion Assessment:  Patient tolerated infusion without incident.  Site patent and intact, free from redness, edema or discomfort.  No evidence of extravasations.  Access discontinued per protocol.  Biologic Infusion Post Education: Call the triage nurse at your clinic or seek medical attention if you have chills and/or temperature greater than or equal to 100.5, uncontrolled nausea/vomiting, diarrhea, constipation, dizziness, shortness of breath, chest pain, heart palpitations, weakness or any other new or concerning symptoms, questions or concerns.  You cannot have any live virus vaccines prior to or during treatment or up to 6 months post infusion.  If you have an upcoming surgery, medical procedure or dental procedure during treatment, this should be discussed with your ordering physician and your surgeon/dentist.  If you are having any concerning symptom, if you are unsure if you should get your next infusion or wish to speak to a provider before your next infusion, please call your care coordinator or triage nurse at your clinic to notify them so we can adequately serve you.     Discharge Plan:   AVS to patient via [email or web address].  Patient will return 1/13 for next appointment.   Patient discharged in stable condition accompanied by: self.  Departure Mode: Ambulatory.      Grisel Pimentel RN

## 2022-12-13 ENCOUNTER — DOCUMENTATION ONLY (OUTPATIENT)
Dept: LAB | Facility: CLINIC | Age: 22
End: 2022-12-13

## 2022-12-13 ENCOUNTER — TELEPHONE (OUTPATIENT)
Dept: GASTROENTEROLOGY | Facility: CLINIC | Age: 22
End: 2022-12-13

## 2022-12-13 DIAGNOSIS — K50.014 CROHN'S DISEASE OF SMALL INTESTINE WITH ABSCESS (H): ICD-10-CM

## 2022-12-13 DIAGNOSIS — D50.9 IRON DEFICIENCY ANEMIA, UNSPECIFIED IRON DEFICIENCY ANEMIA TYPE: Primary | ICD-10-CM

## 2022-12-13 DIAGNOSIS — K52.9 COLITIS: ICD-10-CM

## 2022-12-13 DIAGNOSIS — K50.014 CROHN'S DISEASE OF SMALL INTESTINE WITH ABSCESS (H): Primary | ICD-10-CM

## 2022-12-13 DIAGNOSIS — D50.0 IRON DEFICIENCY ANEMIA DUE TO CHRONIC BLOOD LOSS: Primary | ICD-10-CM

## 2022-12-13 LAB
FERRITIN SERPL-MCNC: 2 NG/ML (ref 26–388)
IRON SATN MFR SERPL: 3 % (ref 15–46)
IRON SERPL-MCNC: 14 UG/DL (ref 35–180)
TIBC SERPL-MCNC: 486 UG/DL (ref 240–430)

## 2022-12-13 RX ORDER — ALBUTEROL SULFATE 90 UG/1
1-2 AEROSOL, METERED RESPIRATORY (INHALATION)
Status: CANCELLED
Start: 2022-12-13

## 2022-12-13 RX ORDER — ALBUTEROL SULFATE 0.83 MG/ML
2.5 SOLUTION RESPIRATORY (INHALATION)
Status: CANCELLED | OUTPATIENT
Start: 2022-12-13

## 2022-12-13 RX ORDER — MEPERIDINE HYDROCHLORIDE 25 MG/ML
25 INJECTION INTRAMUSCULAR; INTRAVENOUS; SUBCUTANEOUS EVERY 30 MIN PRN
Status: CANCELLED | OUTPATIENT
Start: 2022-12-13

## 2022-12-13 RX ORDER — METHYLPREDNISOLONE SODIUM SUCCINATE 125 MG/2ML
125 INJECTION, POWDER, LYOPHILIZED, FOR SOLUTION INTRAMUSCULAR; INTRAVENOUS
Status: CANCELLED
Start: 2022-12-13

## 2022-12-13 RX ORDER — EPINEPHRINE 1 MG/ML
0.3 INJECTION, SOLUTION, CONCENTRATE INTRAVENOUS EVERY 5 MIN PRN
Status: CANCELLED | OUTPATIENT
Start: 2022-12-13

## 2022-12-13 RX ORDER — DIPHENHYDRAMINE HYDROCHLORIDE 50 MG/ML
50 INJECTION INTRAMUSCULAR; INTRAVENOUS
Status: CANCELLED
Start: 2022-12-13

## 2022-12-13 NOTE — TELEPHONE ENCOUNTER
RV:  3/6/23  LV: 4/26/22    Last TB: NEGATIVE 7/7/22    New Therapy Plan:  Venofer 200mg x5    [facility] ,  Please see new therapy plan in pt's chart.  Please obtain PA.    Thank you.  Nena

## 2022-12-13 NOTE — TELEPHONE ENCOUNTER
----- Message from Tobin Wright MD sent at 12/13/2022  2:06 PM CST -----  Martine Barrett -     Can you set Ventura up with iron infusions?    Thanks!

## 2022-12-13 NOTE — PROGRESS NOTES
The add-on test tissue trasglutaminase that was requested on 12/13/22 is unable to be completed due to the original specimen is no longer meeting requirements for the test ( specimen needed to be spun down and  within 2 hours of collection). Future order is available for collection. If labs are needed before next appointment, please arrange for collection.     lab team

## 2022-12-14 DIAGNOSIS — D50.0 IRON DEFICIENCY ANEMIA DUE TO CHRONIC BLOOD LOSS: Primary | ICD-10-CM

## 2022-12-14 LAB — IGA SERPL-MCNC: 74 MG/DL (ref 84–499)

## 2022-12-14 NOTE — TELEPHONE ENCOUNTER
----- Message from Tobin Wright MD sent at 12/14/2022 10:36 AM CST -----  Nena -   I ordered celiac labs for him    Can we have him drawn with one of his infusions?    Not urgent, but just want to assess    THanks

## 2022-12-19 NOTE — TELEPHONE ENCOUNTER
December 19, 2022    Called Ventura Left message and contact info to return call regarding: venofer infusion appt    Sent PM for pt to call  infusion center at [phone] to schedule his Venofer infusion appt.

## 2023-01-13 ENCOUNTER — INFUSION THERAPY VISIT (OUTPATIENT)
Dept: INFUSION THERAPY | Facility: CLINIC | Age: 23
End: 2023-01-13
Payer: COMMERCIAL

## 2023-01-13 VITALS
OXYGEN SATURATION: 97 % | BODY MASS INDEX: 24.72 KG/M2 | RESPIRATION RATE: 16 BRPM | WEIGHT: 192.5 LBS | TEMPERATURE: 98.1 F | DIASTOLIC BLOOD PRESSURE: 65 MMHG | HEART RATE: 71 BPM | SYSTOLIC BLOOD PRESSURE: 117 MMHG

## 2023-01-13 DIAGNOSIS — K50.014 CROHN'S DISEASE OF SMALL INTESTINE WITH ABSCESS (H): Primary | ICD-10-CM

## 2023-01-13 DIAGNOSIS — K52.9 COLITIS: ICD-10-CM

## 2023-01-13 DIAGNOSIS — D50.9 IRON DEFICIENCY ANEMIA, UNSPECIFIED IRON DEFICIENCY ANEMIA TYPE: ICD-10-CM

## 2023-01-13 PROCEDURE — 99207 PR NO CHARGE LOS: CPT

## 2023-01-13 PROCEDURE — 96413 CHEMO IV INFUSION 1 HR: CPT | Performed by: INTERNAL MEDICINE

## 2023-01-13 PROCEDURE — 96367 TX/PROPH/DG ADDL SEQ IV INF: CPT | Performed by: INTERNAL MEDICINE

## 2023-01-13 RX ORDER — ALBUTEROL SULFATE 0.83 MG/ML
2.5 SOLUTION RESPIRATORY (INHALATION)
Status: CANCELLED | OUTPATIENT
Start: 2023-02-06

## 2023-01-13 RX ORDER — EPINEPHRINE 1 MG/ML
0.3 INJECTION, SOLUTION INTRAMUSCULAR; SUBCUTANEOUS EVERY 5 MIN PRN
Status: CANCELLED | OUTPATIENT
Start: 2023-01-15

## 2023-01-13 RX ORDER — METHYLPREDNISOLONE SODIUM SUCCINATE 125 MG/2ML
125 INJECTION, POWDER, LYOPHILIZED, FOR SOLUTION INTRAMUSCULAR; INTRAVENOUS
Status: CANCELLED
Start: 2023-01-15

## 2023-01-13 RX ORDER — DIPHENHYDRAMINE HYDROCHLORIDE 50 MG/ML
50 INJECTION INTRAMUSCULAR; INTRAVENOUS
Status: CANCELLED
Start: 2023-02-06

## 2023-01-13 RX ORDER — METHYLPREDNISOLONE SODIUM SUCCINATE 125 MG/2ML
125 INJECTION, POWDER, LYOPHILIZED, FOR SOLUTION INTRAMUSCULAR; INTRAVENOUS
Status: CANCELLED
Start: 2023-02-06

## 2023-01-13 RX ORDER — EPINEPHRINE 1 MG/ML
0.3 INJECTION, SOLUTION INTRAMUSCULAR; SUBCUTANEOUS EVERY 5 MIN PRN
Status: CANCELLED | OUTPATIENT
Start: 2023-02-06

## 2023-01-13 RX ORDER — MEPERIDINE HYDROCHLORIDE 25 MG/ML
25 INJECTION INTRAMUSCULAR; INTRAVENOUS; SUBCUTANEOUS EVERY 30 MIN PRN
Status: CANCELLED | OUTPATIENT
Start: 2023-02-06

## 2023-01-13 RX ORDER — DIPHENHYDRAMINE HCL 25 MG
25 CAPSULE ORAL ONCE
Status: CANCELLED
Start: 2023-02-06 | End: 2023-02-06

## 2023-01-13 RX ORDER — ALBUTEROL SULFATE 90 UG/1
1-2 AEROSOL, METERED RESPIRATORY (INHALATION)
Status: CANCELLED
Start: 2023-01-15

## 2023-01-13 RX ORDER — ALBUTEROL SULFATE 90 UG/1
1-2 AEROSOL, METERED RESPIRATORY (INHALATION)
Status: CANCELLED
Start: 2023-02-06

## 2023-01-13 RX ORDER — ACETAMINOPHEN 325 MG/1
650 TABLET ORAL ONCE
Status: CANCELLED
Start: 2023-02-06 | End: 2023-02-06

## 2023-01-13 RX ORDER — MEPERIDINE HYDROCHLORIDE 25 MG/ML
25 INJECTION INTRAMUSCULAR; INTRAVENOUS; SUBCUTANEOUS EVERY 30 MIN PRN
Status: CANCELLED | OUTPATIENT
Start: 2023-01-15

## 2023-01-13 RX ORDER — ALBUTEROL SULFATE 0.83 MG/ML
2.5 SOLUTION RESPIRATORY (INHALATION)
Status: CANCELLED | OUTPATIENT
Start: 2023-01-15

## 2023-01-13 RX ORDER — DIPHENHYDRAMINE HYDROCHLORIDE 50 MG/ML
50 INJECTION INTRAMUSCULAR; INTRAVENOUS
Status: CANCELLED
Start: 2023-01-15

## 2023-01-13 RX ADMIN — Medication 250 ML: at 14:55

## 2023-01-13 ASSESSMENT — PAIN SCALES - GENERAL: PAINLEVEL: NO PAIN (0)

## 2023-01-13 NOTE — PROGRESS NOTES
Infusion Nursing Note:  Ventura Quiroz presents today for Inflectra and Venofer.    Patient seen by provider today: No   present during visit today: Not Applicable.    Note: The patient denies any medical concerns today. First dose of Venofer given. The patient reports receiving Injectafer in the past. Educated patient on Venofer. He declined the need for a handout on the drug.     Intravenous Access:  Peripheral IV placed.    Treatment Conditions:  Biological Infusion Checklist:  ~~~ NOTE: If the patient answers yes to any of the questions below, hold the infusion and contact ordering provider or on-call provider.    1. Have you recently had an elevated temperature, fever, chills, productive cough, coughing for 3 weeks or longer or hemoptysis, abnormal vital signs, night sweats,  chest pain or have you noticed a decrease in your appetite, unexplained weight loss or fatigue? No  2. Do you have any open wounds or new incisions? No  3. Do you have any recent or upcoming hospitalizations, surgeries or dental procedures? No  4. Do you currently have or recently have had any signs of illness or infection or are you on any antibiotics? No  5. Have you had any new, sudden or worsening abdominal pain? No  6. Have you or anyone in your household received a live vaccination in the past 4 weeks? Please note:  No live vaccines while on biologic/chemotherapy until 6 months after the last treatment.  Patient can receive the flu vaccine (shot only) and the pneumovax.  It is optimal for the patient to get these vaccines mid cycle, but they can be given at any time as long as it is not on the day of the infusion. No  7. Have you recently been diagnosed with any new nervous system diseases (ie. Multiple sclerosis, Guillain [place], seizures, neurological changes) or cancer diagnosis? No  8. Are you on any form of radiation or chemotherapy? No  9. Are you pregnant or breast feeding or do you have plans of pregnancy in the  future? No  10. Have you been having any signs of worsening depression or suicidal ideations?  (benlysta only) No  11. Have there been any other new onset medical symptoms? No    Post Infusion Assessment:  Patient tolerated infusion without incident.  Site patent and intact, free from redness, edema or discomfort.  No evidence of extravasations.  Access discontinued per protocol.  Biologic Infusion Post Education: Call the triage nurse at your clinic or seek medical attention if you have chills and/or temperature greater than or equal to 100.5, uncontrolled nausea/vomiting, diarrhea, constipation, dizziness, shortness of breath, chest pain, heart palpitations, weakness or any other new or concerning symptoms, questions or concerns.  You cannot have any live virus vaccines prior to or during treatment or up to 6 months post infusion.  If you have an upcoming surgery, medical procedure or dental procedure during treatment, this should be discussed with your ordering physician and your surgeon/dentist.  If you are having any concerning symptom, if you are unsure if you should get your next infusion or wish to speak to a provider before your next infusion, please call your care coordinator or triage nurse at your clinic to notify them so we can adequately serve you.     Discharge Plan:   AVS to patient via [employer].  Patient will return 1/16/23 for next Venofer appointment. Future appts have been reviewed and crosschecked with appt note and plan.   Patient discharged in stable condition accompanied by: self.  Departure Mode: Ambulatory.      Suzanne Sumner RN

## 2023-01-16 ENCOUNTER — INFUSION THERAPY VISIT (OUTPATIENT)
Dept: INFUSION THERAPY | Facility: CLINIC | Age: 23
End: 2023-01-16
Payer: COMMERCIAL

## 2023-01-16 VITALS
OXYGEN SATURATION: 97 % | WEIGHT: 195.1 LBS | DIASTOLIC BLOOD PRESSURE: 70 MMHG | TEMPERATURE: 98.4 F | BODY MASS INDEX: 25.05 KG/M2 | HEART RATE: 88 BPM | SYSTOLIC BLOOD PRESSURE: 136 MMHG | RESPIRATION RATE: 18 BRPM

## 2023-01-16 DIAGNOSIS — K50.014 CROHN'S DISEASE OF SMALL INTESTINE WITH ABSCESS (H): ICD-10-CM

## 2023-01-16 DIAGNOSIS — K52.9 COLITIS: ICD-10-CM

## 2023-01-16 DIAGNOSIS — D50.9 IRON DEFICIENCY ANEMIA, UNSPECIFIED IRON DEFICIENCY ANEMIA TYPE: Primary | ICD-10-CM

## 2023-01-16 PROCEDURE — 96365 THER/PROPH/DIAG IV INF INIT: CPT | Performed by: NURSE PRACTITIONER

## 2023-01-16 PROCEDURE — 99207 PR NO CHARGE LOS: CPT

## 2023-01-16 RX ORDER — METHYLPREDNISOLONE SODIUM SUCCINATE 125 MG/2ML
125 INJECTION, POWDER, LYOPHILIZED, FOR SOLUTION INTRAMUSCULAR; INTRAVENOUS
Status: CANCELLED
Start: 2023-01-17

## 2023-01-16 RX ORDER — DIPHENHYDRAMINE HYDROCHLORIDE 50 MG/ML
50 INJECTION INTRAMUSCULAR; INTRAVENOUS
Status: CANCELLED
Start: 2023-01-17

## 2023-01-16 RX ORDER — ALBUTEROL SULFATE 0.83 MG/ML
2.5 SOLUTION RESPIRATORY (INHALATION)
Status: CANCELLED | OUTPATIENT
Start: 2023-01-17

## 2023-01-16 RX ORDER — ALBUTEROL SULFATE 90 UG/1
1-2 AEROSOL, METERED RESPIRATORY (INHALATION)
Status: CANCELLED
Start: 2023-01-17

## 2023-01-16 RX ORDER — EPINEPHRINE 1 MG/ML
0.3 INJECTION, SOLUTION INTRAMUSCULAR; SUBCUTANEOUS EVERY 5 MIN PRN
Status: CANCELLED | OUTPATIENT
Start: 2023-01-17

## 2023-01-16 RX ORDER — MEPERIDINE HYDROCHLORIDE 25 MG/ML
25 INJECTION INTRAMUSCULAR; INTRAVENOUS; SUBCUTANEOUS EVERY 30 MIN PRN
Status: CANCELLED | OUTPATIENT
Start: 2023-01-17

## 2023-01-16 RX ADMIN — Medication 250 ML: at 15:47

## 2023-01-16 NOTE — PROGRESS NOTES
Infusion Nursing Note:  Ventura Quiroz presents today for Venofer.    Patient seen by provider today: No   present during visit today: Not Applicable.    Note: patient tolerated first Venofer infusion without concerns.    Intravenous Access:  Peripheral IV placed.    Treatment Conditions:  Not Applicable.    Post Infusion Assessment:  Patient tolerated infusion without incident.  Site patent and intact, free from redness, edema or discomfort.  No evidence of extravasations.  Access discontinued per protocol.     Discharge Plan:   AVS to patient via [employer].  Patient will return 1/19 for next appointment.   Patient discharged in stable condition accompanied by: self.  Departure Mode: Ambulatory.      Grisel Pimentel RN

## 2023-01-19 ENCOUNTER — INFUSION THERAPY VISIT (OUTPATIENT)
Dept: INFUSION THERAPY | Facility: CLINIC | Age: 23
End: 2023-01-19
Payer: COMMERCIAL

## 2023-01-19 VITALS
HEART RATE: 77 BPM | BODY MASS INDEX: 24.65 KG/M2 | DIASTOLIC BLOOD PRESSURE: 72 MMHG | OXYGEN SATURATION: 97 % | WEIGHT: 192 LBS | RESPIRATION RATE: 14 BRPM | TEMPERATURE: 98 F | SYSTOLIC BLOOD PRESSURE: 119 MMHG

## 2023-01-19 DIAGNOSIS — K52.9 COLITIS: ICD-10-CM

## 2023-01-19 DIAGNOSIS — K50.014 CROHN'S DISEASE OF SMALL INTESTINE WITH ABSCESS (H): ICD-10-CM

## 2023-01-19 DIAGNOSIS — D50.9 IRON DEFICIENCY ANEMIA, UNSPECIFIED IRON DEFICIENCY ANEMIA TYPE: Primary | ICD-10-CM

## 2023-01-19 PROCEDURE — 99207 PR NO CHARGE LOS: CPT

## 2023-01-19 PROCEDURE — 96365 THER/PROPH/DIAG IV INF INIT: CPT | Performed by: NURSE PRACTITIONER

## 2023-01-19 RX ORDER — METHYLPREDNISOLONE SODIUM SUCCINATE 125 MG/2ML
125 INJECTION, POWDER, LYOPHILIZED, FOR SOLUTION INTRAMUSCULAR; INTRAVENOUS
Status: CANCELLED
Start: 2023-01-20

## 2023-01-19 RX ORDER — ALBUTEROL SULFATE 0.83 MG/ML
2.5 SOLUTION RESPIRATORY (INHALATION)
Status: CANCELLED | OUTPATIENT
Start: 2023-01-20

## 2023-01-19 RX ORDER — DIPHENHYDRAMINE HYDROCHLORIDE 50 MG/ML
50 INJECTION INTRAMUSCULAR; INTRAVENOUS
Status: CANCELLED
Start: 2023-01-20

## 2023-01-19 RX ORDER — EPINEPHRINE 1 MG/ML
0.3 INJECTION, SOLUTION INTRAMUSCULAR; SUBCUTANEOUS EVERY 5 MIN PRN
Status: CANCELLED | OUTPATIENT
Start: 2023-01-20

## 2023-01-19 RX ORDER — ALBUTEROL SULFATE 90 UG/1
1-2 AEROSOL, METERED RESPIRATORY (INHALATION)
Status: CANCELLED
Start: 2023-01-20

## 2023-01-19 RX ORDER — MEPERIDINE HYDROCHLORIDE 25 MG/ML
25 INJECTION INTRAMUSCULAR; INTRAVENOUS; SUBCUTANEOUS EVERY 30 MIN PRN
Status: CANCELLED | OUTPATIENT
Start: 2023-01-20

## 2023-01-19 RX ADMIN — Medication 250 ML: at 15:20

## 2023-01-19 NOTE — PROGRESS NOTES
Infusion Nursing Note:  Ventura Quiroz presents today for Venofer dose 2 of 5.    Patient seen by provider today: No   present during visit today: Not Applicable.    Note: N/A.    Intravenous Access:  Peripheral IV placed.    Treatment Conditions:  Not Applicable.    Post Infusion Assessment:  Patient tolerated infusion without incident.  Blood return noted pre and post infusion.  Site patent and intact, free from redness, edema or discomfort.  No evidence of extravasations.  Access discontinued per protocol.     Discharge Plan:   AVS to patient via MYCHART.  Patient will return 01/23/23 for next appointment.   Patient discharged in stable condition accompanied by: self.  Departure Mode: Ambulatory.      Danae Pace RN

## 2023-01-23 ENCOUNTER — INFUSION THERAPY VISIT (OUTPATIENT)
Dept: INFUSION THERAPY | Facility: CLINIC | Age: 23
End: 2023-01-23
Payer: COMMERCIAL

## 2023-01-23 VITALS
OXYGEN SATURATION: 99 % | WEIGHT: 195.1 LBS | TEMPERATURE: 97.9 F | SYSTOLIC BLOOD PRESSURE: 122 MMHG | HEART RATE: 67 BPM | BODY MASS INDEX: 25.05 KG/M2 | DIASTOLIC BLOOD PRESSURE: 67 MMHG | RESPIRATION RATE: 16 BRPM

## 2023-01-23 DIAGNOSIS — D50.9 IRON DEFICIENCY ANEMIA, UNSPECIFIED IRON DEFICIENCY ANEMIA TYPE: Primary | ICD-10-CM

## 2023-01-23 DIAGNOSIS — K52.9 COLITIS: ICD-10-CM

## 2023-01-23 DIAGNOSIS — K50.014 CROHN'S DISEASE OF SMALL INTESTINE WITH ABSCESS (H): ICD-10-CM

## 2023-01-23 PROCEDURE — 96365 THER/PROPH/DIAG IV INF INIT: CPT | Performed by: NURSE PRACTITIONER

## 2023-01-23 RX ORDER — METHYLPREDNISOLONE SODIUM SUCCINATE 125 MG/2ML
125 INJECTION, POWDER, LYOPHILIZED, FOR SOLUTION INTRAMUSCULAR; INTRAVENOUS
Status: CANCELLED
Start: 2023-01-25

## 2023-01-23 RX ORDER — MEPERIDINE HYDROCHLORIDE 25 MG/ML
25 INJECTION INTRAMUSCULAR; INTRAVENOUS; SUBCUTANEOUS EVERY 30 MIN PRN
Status: CANCELLED | OUTPATIENT
Start: 2023-01-25

## 2023-01-23 RX ORDER — EPINEPHRINE 1 MG/ML
0.3 INJECTION, SOLUTION INTRAMUSCULAR; SUBCUTANEOUS EVERY 5 MIN PRN
Status: CANCELLED | OUTPATIENT
Start: 2023-01-25

## 2023-01-23 RX ORDER — ALBUTEROL SULFATE 0.83 MG/ML
2.5 SOLUTION RESPIRATORY (INHALATION)
Status: CANCELLED | OUTPATIENT
Start: 2023-01-25

## 2023-01-23 RX ORDER — ALBUTEROL SULFATE 90 UG/1
1-2 AEROSOL, METERED RESPIRATORY (INHALATION)
Status: CANCELLED
Start: 2023-01-25

## 2023-01-23 RX ORDER — DIPHENHYDRAMINE HYDROCHLORIDE 50 MG/ML
50 INJECTION INTRAMUSCULAR; INTRAVENOUS
Status: CANCELLED
Start: 2023-01-25

## 2023-01-23 RX ADMIN — Medication 250 ML: at 16:39

## 2023-01-23 NOTE — PROGRESS NOTES
Infusion Nursing Note:  Ventura Quiroz presents today for Venofer.    Patient seen by provider today: No   present during visit today: Not Applicable.    Note: patient reports no new concerns. Tolerating venofer infusions without complications.    Intravenous Access:  Peripheral IV placed.    Treatment Conditions:  Not Applicable.    Post Infusion Assessment:  Patient tolerated infusion without incident.  Site patent and intact, free from redness, edema or discomfort.  No evidence of extravasations.  Access discontinued per protocol.     Discharge Plan:   AVS to patient via MYCHART.    Patient discharged in stable condition accompanied by: self.  Departure Mode: Ambulatory.      Grisel Pimentel RN

## 2023-01-30 ENCOUNTER — INFUSION THERAPY VISIT (OUTPATIENT)
Dept: INFUSION THERAPY | Facility: CLINIC | Age: 23
End: 2023-01-30
Payer: COMMERCIAL

## 2023-01-30 VITALS
SYSTOLIC BLOOD PRESSURE: 142 MMHG | OXYGEN SATURATION: 98 % | TEMPERATURE: 98 F | RESPIRATION RATE: 16 BRPM | DIASTOLIC BLOOD PRESSURE: 85 MMHG | HEART RATE: 70 BPM

## 2023-01-30 DIAGNOSIS — K52.9 COLITIS: ICD-10-CM

## 2023-01-30 DIAGNOSIS — D50.9 IRON DEFICIENCY ANEMIA, UNSPECIFIED IRON DEFICIENCY ANEMIA TYPE: Primary | ICD-10-CM

## 2023-01-30 DIAGNOSIS — K50.014 CROHN'S DISEASE OF SMALL INTESTINE WITH ABSCESS (H): ICD-10-CM

## 2023-01-30 PROCEDURE — 96365 THER/PROPH/DIAG IV INF INIT: CPT | Performed by: NURSE PRACTITIONER

## 2023-01-30 PROCEDURE — 99207 PR NO CHARGE LOS: CPT

## 2023-01-30 RX ORDER — ALBUTEROL SULFATE 90 UG/1
1-2 AEROSOL, METERED RESPIRATORY (INHALATION)
Status: CANCELLED
Start: 2023-01-31

## 2023-01-30 RX ORDER — ALBUTEROL SULFATE 0.83 MG/ML
2.5 SOLUTION RESPIRATORY (INHALATION)
Status: CANCELLED | OUTPATIENT
Start: 2023-01-31

## 2023-01-30 RX ORDER — DIPHENHYDRAMINE HYDROCHLORIDE 50 MG/ML
50 INJECTION INTRAMUSCULAR; INTRAVENOUS
Status: CANCELLED
Start: 2023-01-31

## 2023-01-30 RX ORDER — METHYLPREDNISOLONE SODIUM SUCCINATE 125 MG/2ML
125 INJECTION, POWDER, LYOPHILIZED, FOR SOLUTION INTRAMUSCULAR; INTRAVENOUS
Status: CANCELLED
Start: 2023-01-31

## 2023-01-30 RX ORDER — EPINEPHRINE 1 MG/ML
0.3 INJECTION, SOLUTION INTRAMUSCULAR; SUBCUTANEOUS EVERY 5 MIN PRN
Status: CANCELLED | OUTPATIENT
Start: 2023-01-31

## 2023-01-30 RX ORDER — MEPERIDINE HYDROCHLORIDE 25 MG/ML
25 INJECTION INTRAMUSCULAR; INTRAVENOUS; SUBCUTANEOUS EVERY 30 MIN PRN
Status: CANCELLED | OUTPATIENT
Start: 2023-01-31

## 2023-01-30 RX ADMIN — Medication 250 ML: at 16:13

## 2023-01-30 NOTE — PROGRESS NOTES
Infusion Nursing Note:  Ventura Quiroz presents today for Venofer 5/5.    Patient seen by provider today: No   present during visit today: Not Applicable.    Note: Patient states that he has no idea if the iron infusions are even helping.  States he stays up late and gets up early, so he is always tired. Denies shortness or breath, restless legs or brain fog.    Intravenous Access:  Peripheral IV placed.    Treatment Conditions:  Not Applicable.    Post Infusion Assessment:  Patient tolerated infusion without incident.  Blood return noted pre and post infusion.  Site patent and intact, free from redness, edema or discomfort.  No evidence of extravasations.     Discharge Plan:   Discharge instructions reviewed with: Patient.  Patient and/or family verbalized understanding of discharge instructions and all questions answered.      Kim Scherer RN

## 2023-02-10 ENCOUNTER — INFUSION THERAPY VISIT (OUTPATIENT)
Dept: INFUSION THERAPY | Facility: CLINIC | Age: 23
End: 2023-02-10
Payer: COMMERCIAL

## 2023-02-10 ENCOUNTER — LAB (OUTPATIENT)
Dept: LAB | Facility: CLINIC | Age: 23
End: 2023-02-10
Payer: COMMERCIAL

## 2023-02-10 VITALS
WEIGHT: 192.4 LBS | HEART RATE: 68 BPM | SYSTOLIC BLOOD PRESSURE: 132 MMHG | DIASTOLIC BLOOD PRESSURE: 74 MMHG | RESPIRATION RATE: 16 BRPM | OXYGEN SATURATION: 98 % | BODY MASS INDEX: 24.7 KG/M2 | TEMPERATURE: 97.9 F

## 2023-02-10 DIAGNOSIS — K52.9 COLITIS: ICD-10-CM

## 2023-02-10 DIAGNOSIS — D50.9 IRON DEFICIENCY ANEMIA, UNSPECIFIED IRON DEFICIENCY ANEMIA TYPE: ICD-10-CM

## 2023-02-10 DIAGNOSIS — K50.014 CROHN'S DISEASE OF SMALL INTESTINE WITH ABSCESS (H): Primary | ICD-10-CM

## 2023-02-10 DIAGNOSIS — D50.9 IRON DEFICIENCY ANEMIA, UNSPECIFIED IRON DEFICIENCY ANEMIA TYPE: Primary | ICD-10-CM

## 2023-02-10 DIAGNOSIS — K50.014 CROHN'S DISEASE OF SMALL INTESTINE WITH ABSCESS (H): ICD-10-CM

## 2023-02-10 LAB
ALBUMIN SERPL-MCNC: 4.2 G/DL (ref 3.4–5)
ALP SERPL-CCNC: 66 U/L (ref 40–150)
ALT SERPL W P-5'-P-CCNC: 28 U/L (ref 0–70)
AST SERPL W P-5'-P-CCNC: 12 U/L (ref 0–45)
BASOPHILS # BLD AUTO: 0 10E3/UL (ref 0–0.2)
BASOPHILS NFR BLD AUTO: 0 %
BILIRUB DIRECT SERPL-MCNC: 0.2 MG/DL (ref 0–0.2)
BILIRUB SERPL-MCNC: 0.6 MG/DL (ref 0.2–1.3)
CRP SERPL-MCNC: <2.9 MG/L (ref 0–8)
EOSINOPHIL # BLD AUTO: 0.1 10E3/UL (ref 0–0.7)
EOSINOPHIL NFR BLD AUTO: 1 %
ERYTHROCYTE [DISTWIDTH] IN BLOOD BY AUTOMATED COUNT: 19.9 % (ref 10–15)
ERYTHROCYTE [SEDIMENTATION RATE] IN BLOOD BY WESTERGREN METHOD: 5 MM/HR (ref 0–15)
HCT VFR BLD AUTO: 42.2 % (ref 40–53)
HGB BLD-MCNC: 13.6 G/DL (ref 13.3–17.7)
IMM GRANULOCYTES # BLD: 0 10E3/UL
IMM GRANULOCYTES NFR BLD: 0 %
LYMPHOCYTES # BLD AUTO: 2 10E3/UL (ref 0.8–5.3)
LYMPHOCYTES NFR BLD AUTO: 25 %
MCH RBC QN AUTO: 26.9 PG (ref 26.5–33)
MCHC RBC AUTO-ENTMCNC: 32.2 G/DL (ref 31.5–36.5)
MCV RBC AUTO: 84 FL (ref 78–100)
MONOCYTES # BLD AUTO: 0.8 10E3/UL (ref 0–1.3)
MONOCYTES NFR BLD AUTO: 10 %
NEUTROPHILS # BLD AUTO: 5.2 10E3/UL (ref 1.6–8.3)
NEUTROPHILS NFR BLD AUTO: 64 %
NRBC # BLD AUTO: 0 10E3/UL
NRBC BLD AUTO-RTO: 0 /100
PLATELET # BLD AUTO: 209 10E3/UL (ref 150–450)
PROT SERPL-MCNC: 7.5 G/DL (ref 6.8–8.8)
RBC # BLD AUTO: 5.05 10E6/UL (ref 4.4–5.9)
WBC # BLD AUTO: 8.2 10E3/UL (ref 4–11)

## 2023-02-10 PROCEDURE — 80076 HEPATIC FUNCTION PANEL: CPT | Performed by: INTERNAL MEDICINE

## 2023-02-10 PROCEDURE — 85025 COMPLETE CBC W/AUTO DIFF WBC: CPT | Performed by: INTERNAL MEDICINE

## 2023-02-10 PROCEDURE — 99207 PR NO CHARGE LOS: CPT

## 2023-02-10 PROCEDURE — 36415 COLL VENOUS BLD VENIPUNCTURE: CPT

## 2023-02-10 PROCEDURE — 86140 C-REACTIVE PROTEIN: CPT | Performed by: INTERNAL MEDICINE

## 2023-02-10 PROCEDURE — 96413 CHEMO IV INFUSION 1 HR: CPT | Performed by: INTERNAL MEDICINE

## 2023-02-10 PROCEDURE — 86364 TISS TRNSGLTMNASE EA IG CLAS: CPT

## 2023-02-10 PROCEDURE — 85652 RBC SED RATE AUTOMATED: CPT | Performed by: INTERNAL MEDICINE

## 2023-02-10 PROCEDURE — 86258 DGP ANTIBODY EACH IG CLASS: CPT

## 2023-02-10 RX ORDER — ALBUTEROL SULFATE 0.83 MG/ML
2.5 SOLUTION RESPIRATORY (INHALATION)
Status: CANCELLED | OUTPATIENT
Start: 2023-03-10

## 2023-02-10 RX ORDER — MEPERIDINE HYDROCHLORIDE 25 MG/ML
25 INJECTION INTRAMUSCULAR; INTRAVENOUS; SUBCUTANEOUS EVERY 30 MIN PRN
Status: CANCELLED | OUTPATIENT
Start: 2023-03-10

## 2023-02-10 RX ORDER — ALBUTEROL SULFATE 90 UG/1
1-2 AEROSOL, METERED RESPIRATORY (INHALATION)
Status: CANCELLED
Start: 2023-03-10

## 2023-02-10 RX ORDER — DIPHENHYDRAMINE HCL 25 MG
25 CAPSULE ORAL ONCE
Status: CANCELLED
Start: 2023-03-10 | End: 2023-03-10

## 2023-02-10 RX ORDER — METHYLPREDNISOLONE SODIUM SUCCINATE 125 MG/2ML
125 INJECTION, POWDER, LYOPHILIZED, FOR SOLUTION INTRAMUSCULAR; INTRAVENOUS
Status: CANCELLED
Start: 2023-03-10

## 2023-02-10 RX ORDER — DIPHENHYDRAMINE HYDROCHLORIDE 50 MG/ML
50 INJECTION INTRAMUSCULAR; INTRAVENOUS
Status: CANCELLED
Start: 2023-03-10

## 2023-02-10 RX ORDER — ACETAMINOPHEN 325 MG/1
650 TABLET ORAL ONCE
Status: CANCELLED
Start: 2023-03-10 | End: 2023-03-10

## 2023-02-10 RX ORDER — EPINEPHRINE 1 MG/ML
0.3 INJECTION, SOLUTION, CONCENTRATE INTRAVENOUS EVERY 5 MIN PRN
Status: CANCELLED | OUTPATIENT
Start: 2023-03-10

## 2023-02-10 RX ORDER — EPINEPHRINE 1 MG/ML
0.3 INJECTION, SOLUTION INTRAMUSCULAR; SUBCUTANEOUS EVERY 5 MIN PRN
Status: CANCELLED | OUTPATIENT
Start: 2023-03-10

## 2023-02-10 RX ADMIN — Medication 250 ML: at 14:56

## 2023-02-10 ASSESSMENT — PAIN SCALES - GENERAL: PAINLEVEL: NO PAIN (0)

## 2023-02-10 NOTE — PROGRESS NOTES
Infusion Nursing Note:  Ventura Quiroz presents today for rapid remicade.    Patient seen by provider today: No   present during visit today: Not Applicable.    Note: Pt voices no new concerns today.    Intravenous Access:  Peripheral IV placed.    Treatment Conditions:  Biological Infusion Checklist:  ~~~ NOTE: If the patient answers yes to any of the questions below, hold the infusion and contact ordering provider or on-call provider.    1. Have you recently had an elevated temperature, fever, chills, productive cough, coughing for 3 weeks or longer or hemoptysis, abnormal vital signs, night sweats,  chest pain or have you noticed a decrease in your appetite, unexplained weight loss or fatigue? No  2. Do you have any open wounds or new incisions? No  3. Do you have any recent or upcoming hospitalizations, surgeries or dental procedures? No  4. Do you currently have or recently have had any signs of illness or infection or are you on any antibiotics? No  5. Have you had any new, sudden or worsening abdominal pain? No  6. Have you or anyone in your household received a live vaccination in the past 4 weeks? Please note:  No live vaccines while on biologic/chemotherapy until 6 months after the last treatment.  Patient can receive the flu vaccine (shot only) and the pneumovax.  It is optimal for the patient to get these vaccines mid cycle, but they can be given at any time as long as it is not on the day of the infusion. No  7. Have you recently been diagnosed with any new nervous system diseases (ie. Multiple sclerosis, Guillain [place], seizures, neurological changes) or cancer diagnosis? No  8. Are you on any form of radiation or chemotherapy? No  9. Are you pregnant or breast feeding or do you have plans of pregnancy in the future? No  10. Have there been any other new onset medical symptoms? No  Post Infusion Assessment:  Patient tolerated infusion without incident.  Blood return noted pre and post  infusion.  Site patent and intact, free from redness, edema or discomfort.  No evidence of extravasations.  Access discontinued per protocol.  Biologic Infusion Post Education: Call the triage nurse at your clinic or seek medical attention if you have chills and/or temperature greater than or equal to 100.5, uncontrolled nausea/vomiting, diarrhea, constipation, dizziness, shortness of breath, chest pain, heart palpitations, weakness or any other new or concerning symptoms, questions or concerns.  You cannot have any live virus vaccines prior to or during treatment or up to 6 months post infusion.  If you have an upcoming surgery, medical procedure or dental procedure during treatment, this should be discussed with your ordering physician and your surgeon/dentist.  If you are having any concerning symptom, if you are unsure if you should get your next infusion or wish to speak to a provider before your next infusion, please call your care coordinator or triage nurse at your clinic to notify them so we can adequately serve you.     Discharge Plan:   AVS to patient via [employer].  Patient will return 3/10/23 for next appointment. Future appts have been reviewed and crosschecked with appt note and plan.  Patient discharged in stable condition accompanied by: self.  Departure Mode: Ambulatory.      Gaby Hicks RN

## 2023-02-13 LAB
GLIADIN IGA SER-ACNC: 0.7 U/ML
GLIADIN IGG SER-ACNC: <0.6 U/ML
TTG IGA SER-ACNC: <0.2 U/ML
TTG IGG SER-ACNC: <0.6 U/ML

## 2023-03-06 ENCOUNTER — VIRTUAL VISIT (OUTPATIENT)
Dept: GASTROENTEROLOGY | Facility: CLINIC | Age: 23
End: 2023-03-06
Payer: COMMERCIAL

## 2023-03-06 DIAGNOSIS — K50.014 CROHN'S DISEASE OF SMALL INTESTINE WITH ABSCESS (H): Primary | ICD-10-CM

## 2023-03-06 PROCEDURE — 99214 OFFICE O/P EST MOD 30 MIN: CPT | Mod: VID | Performed by: INTERNAL MEDICINE

## 2023-03-06 RX ORDER — BUDESONIDE 3 MG/1
CAPSULE, COATED PELLETS ORAL
Qty: 126 CAPSULE | Refills: 0 | Status: SHIPPED | OUTPATIENT
Start: 2023-03-06 | End: 2023-09-08

## 2023-03-06 NOTE — PATIENT INSTRUCTIONS
-- Continue infliximab    -- Trial of budesonide: 3 pills daily for 1 month, then 2 pills daily for 2 weeks, then 1 pill daily for 2 weeks.     -- If urgency and loose stools not improved on budesonide, we will trial cholestyramine or Bentyl    Follow-up in 6 months with physician assistant

## 2023-03-06 NOTE — PROGRESS NOTES
Video-Visit Details    Type of service:  Video Visit    Video Start Time (time video started): 3:41 PM    Video End Time (time video stopped): 3:54 PM    Originating Location (pt. Location): Home    Distant Location (provider location):  On-site    Mode of Communication:  Video Conference via [employer]

## 2023-03-06 NOTE — PROGRESS NOTES
IBD CLINIC VISIT    CC/REFERRING MD:  Referred Self  REASON FOR CONSULTATION: Crohn's disease    ASSESSMENT/PLAN:  22 year old male with Crohn's disease of the ileum initially complicated by phlegmon and abscess now status post ICR.     1. Crohn's disease:  Current CD medications:    - Infliximab 10mg/kg q28 days  Current clinical disease activity: HBI: 4  Last endoscopic disease activity: Rutgeert's i2a / SES-CD: 3 (11/18/22)    He continues in clinical remission with an HBI less than 5.  On infliximab monotherapy.  Infliximab concentration is adequate at 25.  I do not think he should de-escalate given his prior rapid metabolism of infliximab.  He does have mild endoscopic disease activity.  Given his endoscopic activity and some mild symptoms, despite technically meeting the definition for clinical remission, we will try him on a course of budesonide.  We discussed that his symptoms of urgency and loose stools could be related to his ileal disease or could also be postsurgical irritable bowel.  Other possibilities include bile salt diarrhea.    -- Continue infliximab  -- Labs every 3 to 4 months on infliximab  -- Trial budesonide 9 mg a day for 1 month then taper  -- If no improvement on budesonide, trial of cholestyramine or Bentyl    2. IBD dysplasia surveillance:Given ileal only disease, he does not need IBD dysplasia surveillance   -- Age appropriate colon cancer screening    3. Iron deficient anemia: Suspect this is due to chronic blood loss in his ileocolonic anastomosis.  May be sequelae of mild ileal Crohn's endoscopically.  Celiac markers were negative.  He responded well after IV iron.  He may have been chronically low prior to being in remission and just needed to have his iron stores replenished.  We will continue to trend for anemia and check annual iron labs.    4.  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  Subsequent celiac labs negative.  He does not have respiratory infections.  Will  monitor.  6    Misc:  -- Avoid tobacco use  -- Avoid NSAIDs as there is potentially a 25% chance of causing an IBD flare    Return to clinic in 2-3 months    Thank you for this consultation.  It was a pleasure to participate in the care of this patient; please contact us with any further questions.     IBD HISTORY  Age at diagnosis: 18  Extent of disease: ileal  Disease phenotype: fistulizing  Nieves-anal disease: No  Prior IBD surgeries: Ileocecal resection - Rush-Meaux 8/21/20.   Prior IBD Medications:    - Humira disease progression despite weekly dosing, no ADA   - Ciprofloxacin   - Metronidazole      DRUG MONITORING  TPMT enzyme activity:     6-TGN/6-MMPN levels:  9/10/19: 97 / 548    Biologic concentration:  8/23/19: Adalimumab: 5, no antibodies, every other week, 50mg mercaptopurine   2/18/21: Inflixiamb level 1.6; antibodies: 20  4/19/21 Infliximab: 0, antibodies: 0 (IFX + 6-MP)  3/1/22: IFX: > 34, no antibodies (10mg/kg q4 + 6-MP)  5/12/22: IFX: 25.6, no antibodies.     sIBDQ:   IBDQ Score Date IBDQ - Total Score  (Higher score better)   3/6/2023 60   4/25/2022 59   9/10/2019 62   5/28/2019 56   4/24/2019 48     HPI:   Here for routine follow-up.     Having 3-4 stools per day. Stools are pretty loose. No blood. No abdominal pain.     Main issue is his urgency with his stool. No upper GI symptoms. No EIM of IBD:     Extra intestinal manifestations of IBD:  No uveitis/episcleritis  No aphthous ulcers   No arthritis   No erythema nodosum/pyoderma gangrenosum.     Fatigue improved after iron infusions.     HBI:  Overall patient well being (prior day): 0 (Very well)  Abdominal pain (prior day): 0 (None)  Number of liquid or soft stools (prior day): 4 (1 point per stool)  Abdominal mass on exam:   Complications (1 point for each):   None    Remission <5  Mild activity 5-7  Moderate activity 8-16  Severe > 16      ROS:    Constitutional, HEENT, cardiovascular, pulmonary, GI, , musculoskeletal, neuro, skin,  endocrine and psych systems are negative, except as otherwise noted.     PERTINENT PAST MEDICAL HISTORY:  Past Medical History:   Diagnosis Date     Allergic rhinitis, cause unspecified     Zyrtec helps     Crohn's Colitis 4/8/2019     Unspecified otitis media     Otitis Media       PREVIOUS SURGERIES:  Past Surgical History:   Procedure Laterality Date     COLONOSCOPY N/A 4/11/2019    Procedure: COMBINED COLONOSCOPY, SINGLE OR MULTIPLE BIOPSY/POLYPECTOMY BY BIOPSY;  Surgeon: Tobin Wright MD;  Location: [facility]     COLONOSCOPY N/A 9/29/2022    Procedure: COLONOSCOPY with  BIOPSY;  Surgeon: Tobin Wright MD;  Location: [facility] OR     COLONOSCOPY N/A 11/18/2022    Procedure: COLONOSCOPY;  Surgeon: Tobin Wright MD;  Location: [facility] OR     EXCISE LIP OR CHEEK FOLD  10/3/2003    Needle cautery frenulectomy.     INSERT PICC LINE Left 8/10/2020    Procedure: INSERTION, PICC @845;  Surgeon: Karan Farr MD;  [facility]     IR PICC PLACEMENT > 5 YRS OF AGE  8/10/2020     PE TUBES  4/25/2006     RESECTION ILEOCOLIC N/A 8/21/2020    Procedure: Exploratory laparotomy with ileocolic resection with takedown of enterovesical fistula, and low anterior resection;  Surgeon: Birttni Robles MD;  Location: [facility] OR     SIGMOIDOSCOPY FLEXIBLE N/A 8/21/2020    Procedure: Sigmoidoscopy flexible;  Surgeon: Brittni Robles MD;  Location: [facility] OR     TONSILLECTOMY & ADENOIDECTOMY  4/25/2006     ALLERGIES:     Allergies   Allergen Reactions     Gadavist [Gadobutrol] Nausea     Patient could not perform timed sequences due to nausea and discomfort.     Glucagon Nausea and Vomiting     Pre medicate with lorazapam if able.       Amoxicillin      Penicillin [Penicillins]        PERTINENT MEDICATIONS:    Current Outpatient Medications:      inFLIXimab (REMICADE IV), Inject into the vein every 30 days, Disp: , Rfl:     SOCIAL HISTORY:  Social History     Socioeconomic History     Marital status:  Single     Spouse name: Not on file     Number of children: Not on file     Years of education: Not on file     Highest education level: Not on file   Occupational History     Not on file   Tobacco Use     Smoking status: Never     Smokeless tobacco: Current     Tobacco comments:     Vape   Vaping Use     Vaping Use: Every day     Substances: Nicotine, Flavoring     Devices: Pre-filled or refillable cartridge   Substance and Sexual Activity     Alcohol use: Yes     Drug use: No     Sexual activity: Yes     Partners: Female     Birth control/protection: Pill   Other Topics Concern      Service Not Asked     Blood Transfusions Not Asked     Caffeine Concern Not Asked     Occupational Exposure Not Asked     Hobby Hazards Not Asked     Sleep Concern Not Asked     Stress Concern Not Asked     Weight Concern Not Asked     Special Diet Not Asked     Back Care Not Asked     Exercise Not Asked     Bike Helmet Not Asked     Seat Belt Not Asked     Self-Exams Not Asked     Parent/sibling w/ CABG, MI or angioplasty before 65F 55M? Not Asked   Social History Narrative     Not on file     Social Determinants of Health     Financial Resource Strain: Not on file   Food Insecurity: Not on file   Transportation Needs: Not on file   Physical Activity: Not on file   Stress: Not on file   Social Connections: Not on file   Intimate Partner Violence: Not on file   Housing Stability: Not on file       FAMILY HISTORY:  Family History   Problem Relation Age of Onset     No Known Problems Mother      No Known Problems Father      Melanoma No family hx of      Skin Cancer No family hx of         No family history of IBD or colon or rectal cancer.     Past/family/social history reviewed and no changes    PHYSICAL EXAMINATION:  Vitals reviewed: There were no vitals taken for this visit.  Wt:   Wt Readings from Last 2 Encounters:   02/10/23 87.3 kg (192 lb 6.4 oz)   01/23/23 88.5 kg (195 lb 1.6 oz)      Constitutional - general appearance  is well and in no acute distress. Body habitus normal  Eyes - No redness or discharge  Respiratory - No cough, unlabored breathing  Musculoskeletal - range of motion intact: Neck and arms  Skin - No discoloration or lesions  Neurological - No tremors, headaches  Psychiatric - No anxiety, alert & oriented

## 2023-03-06 NOTE — NURSING NOTE
Is the patient currently in the state of [place]? YES    Visit mode:VIDEO    If the visit is dropped, the patient can be reconnected by: VIDEO VISIT: Text to cell phone: [phone]    Will anyone else be joining the visit? NO      How would you like to obtain your AVS? MyChart    Are changes needed to the allergy or medication list? NO    Reason for visit: No comments or concerns

## 2023-03-08 ENCOUNTER — TELEPHONE (OUTPATIENT)
Dept: GASTROENTEROLOGY | Facility: CLINIC | Age: 23
End: 2023-03-08
Payer: COMMERCIAL

## 2023-03-08 ENCOUNTER — MYC MEDICAL ADVICE (OUTPATIENT)
Dept: GASTROENTEROLOGY | Facility: CLINIC | Age: 23
End: 2023-03-08
Payer: COMMERCIAL

## 2023-03-08 NOTE — TELEPHONE ENCOUNTER
Prior Authorization Not Needed per Insurance    Medication: budesonide (ENTOCORT EC) 3 MG EC capsule-PA NOT NEEDED   Insurance Company: [employer] - Phone [phone] Fax [phone]  Expected CoPay: $116    Pharmacy Filling the Rx: [id number] - [place], [place] - [address]  Pharmacy Notified: Yes  Patient Notified: No    Called pharmacy and pharmacy stated that PA is Not Needed and medication is covered. Pharmacy stated that they have a paid claim on medication through patients insurance quantity 126 for 56 day supply; however, co-pay is high. Pharmacy stated that co-pay is high due to patients deductible. Requested pharmacy to contact patient on cost due to deductible prior to picking up medication. Cash price Quantity 126 capsules is $2,000.

## 2023-03-08 NOTE — TELEPHONE ENCOUNTER
To: SURGERY CLINIC GI NURSES-      From: Ventura Quiroz      Created: 3/8/2023 6:25 AM        *-*-*This message has not been handled.*-*-*    Hi , I went to  the prescription that you gave me and the total was over $100, so I didn t get it and you said to let you know.  Thanks,  Ventura        Prior Authorization Retail Medication Request  Please obtain urgent PA    Medication/Dose:      Disp Refills Start End MESHA   budesonide (ENTOCORT EC) 3 MG EC capsule 126 capsule 0 3/6/2023 5/1/2023 --   Sig - Route: Take 3 capsules (9 mg) by mouth every morning for 28 days, THEN 2 capsules (6 mg) every morning for 14 days, THEN 1 capsule (3 mg) every morning for 14 days. - Oral   Sent to pharmacy as: Budesonide 3 MG Oral Capsule Delayed Release Particles   Class: E-Prescribe   Order: [id number]   E-Prescribing Status: Receipt confirmed by pharmacy (3/6/2023  3:49 PM CST)       ICD code (if different than what is on RX):    Previously Tried and Failed:    Rationale:      Insurance Name:    Insurance ID:        Pharmacy Information (if different than what is on RX)  Name:    Phone:

## 2023-03-10 ENCOUNTER — INFUSION THERAPY VISIT (OUTPATIENT)
Dept: INFUSION THERAPY | Facility: CLINIC | Age: 23
End: 2023-03-10
Payer: COMMERCIAL

## 2023-03-10 VITALS
SYSTOLIC BLOOD PRESSURE: 137 MMHG | TEMPERATURE: 98.1 F | HEART RATE: 88 BPM | DIASTOLIC BLOOD PRESSURE: 86 MMHG | WEIGHT: 184.5 LBS | BODY MASS INDEX: 23.69 KG/M2 | OXYGEN SATURATION: 97 % | RESPIRATION RATE: 18 BRPM

## 2023-03-10 DIAGNOSIS — D50.9 IRON DEFICIENCY ANEMIA, UNSPECIFIED IRON DEFICIENCY ANEMIA TYPE: Primary | ICD-10-CM

## 2023-03-10 DIAGNOSIS — K50.014 CROHN'S DISEASE OF SMALL INTESTINE WITH ABSCESS (H): ICD-10-CM

## 2023-03-10 DIAGNOSIS — K52.9 COLITIS: ICD-10-CM

## 2023-03-10 PROCEDURE — 96413 CHEMO IV INFUSION 1 HR: CPT | Performed by: INTERNAL MEDICINE

## 2023-03-10 PROCEDURE — 99207 PR NO CHARGE LOS: CPT

## 2023-03-10 RX ORDER — EPINEPHRINE 1 MG/ML
0.3 INJECTION, SOLUTION INTRAMUSCULAR; SUBCUTANEOUS EVERY 5 MIN PRN
Status: CANCELLED | OUTPATIENT
Start: 2023-04-07

## 2023-03-10 RX ORDER — DIPHENHYDRAMINE HCL 25 MG
25 CAPSULE ORAL ONCE
Status: CANCELLED
Start: 2023-04-07 | End: 2023-04-07

## 2023-03-10 RX ORDER — ALBUTEROL SULFATE 0.83 MG/ML
2.5 SOLUTION RESPIRATORY (INHALATION)
Status: CANCELLED | OUTPATIENT
Start: 2023-04-07

## 2023-03-10 RX ORDER — MEPERIDINE HYDROCHLORIDE 25 MG/ML
25 INJECTION INTRAMUSCULAR; INTRAVENOUS; SUBCUTANEOUS EVERY 30 MIN PRN
Status: CANCELLED | OUTPATIENT
Start: 2023-04-07

## 2023-03-10 RX ORDER — ACETAMINOPHEN 325 MG/1
650 TABLET ORAL ONCE
Status: CANCELLED
Start: 2023-04-07 | End: 2023-04-07

## 2023-03-10 RX ORDER — METHYLPREDNISOLONE SODIUM SUCCINATE 125 MG/2ML
125 INJECTION, POWDER, LYOPHILIZED, FOR SOLUTION INTRAMUSCULAR; INTRAVENOUS
Status: CANCELLED
Start: 2023-04-07

## 2023-03-10 RX ORDER — ALBUTEROL SULFATE 90 UG/1
1-2 AEROSOL, METERED RESPIRATORY (INHALATION)
Status: CANCELLED
Start: 2023-04-07

## 2023-03-10 RX ORDER — DIPHENHYDRAMINE HYDROCHLORIDE 50 MG/ML
50 INJECTION INTRAMUSCULAR; INTRAVENOUS
Status: CANCELLED
Start: 2023-04-07

## 2023-03-10 RX ADMIN — Medication 250 ML: at 13:28

## 2023-03-10 NOTE — PROGRESS NOTES
Infusion Nursing Note:  Ventura Quiroz presents today for Inflectra.    Patient seen by provider today: No   present during visit today: Not Applicable.    Note: Patient reports no concerns and no complications with previous infusions.    Intravenous Access:  Peripheral IV placed.    Treatment Conditions:  Biological Infusion Checklist:  ~~~ NOTE: If the patient answers yes to any of the questions below, hold the infusion and contact ordering provider or on-call provider.    1. Have you recently had an elevated temperature, fever, chills, productive cough, coughing for 3 weeks or longer or hemoptysis, abnormal vital signs, night sweats,  chest pain or have you noticed a decrease in your appetite, unexplained weight loss or fatigue? No  2. Do you have any open wounds or new incisions? No  3. Do you have any recent or upcoming hospitalizations, surgeries or dental procedures? No  4. Do you currently have or recently have had any signs of illness or infection or are you on any antibiotics? No  5. Have you had any new, sudden or worsening abdominal pain? No  6. Have you or anyone in your household received a live vaccination in the past 4 weeks? Please note:  No live vaccines while on biologic/chemotherapy until 6 months after the last treatment.  Patient can receive the flu vaccine (shot only) and the pneumovax.  It is optimal for the patient to get these vaccines mid cycle, but they can be given at any time as long as it is not on the day of the infusion. No  7. Have you recently been diagnosed with any new nervous system diseases (ie. Multiple sclerosis, Guillain [place], seizures, neurological changes) or cancer diagnosis? No  8. Are you on any form of radiation or chemotherapy? No  9. Are you pregnant or breast feeding or do you have plans of pregnancy in the future? No  10. Have you been having any signs of worsening depression or suicidal ideations?  (benlysta only) No  11. Have there been any other new  onset medical symptoms? No      Post Infusion Assessment:  Patient tolerated infusion without incident.  Site patent and intact, free from redness, edema or discomfort.  No evidence of extravasations.  Access discontinued per protocol.  Biologic Infusion Post Education: Call the triage nurse at your clinic or seek medical attention if you have chills and/or temperature greater than or equal to 100.5, uncontrolled nausea/vomiting, diarrhea, constipation, dizziness, shortness of breath, chest pain, heart palpitations, weakness or any other new or concerning symptoms, questions or concerns.  You cannot have any live virus vaccines prior to or during treatment or up to 6 months post infusion.  If you have an upcoming surgery, medical procedure or dental procedure during treatment, this should be discussed with your ordering physician and your surgeon/dentist.  If you are having any concerning symptom, if you are unsure if you should get your next infusion or wish to speak to a provider before your next infusion, please call your care coordinator or triage nurse at your clinic to notify them so we can adequately serve you.     Discharge Plan:   AVS to patient via [employer].  Patient will return 4/10 for next appointment.   Patient discharged in stable condition accompanied by: self.  Departure Mode: Ambulatory.      Grisel Pimentel RN

## 2023-03-25 ENCOUNTER — HEALTH MAINTENANCE LETTER (OUTPATIENT)
Age: 23
End: 2023-03-25

## 2023-04-11 ENCOUNTER — TELEPHONE (OUTPATIENT)
Dept: GASTROENTEROLOGY | Facility: CLINIC | Age: 23
End: 2023-04-11

## 2023-04-11 NOTE — TELEPHONE ENCOUNTER
----- Message from Tobin Wright MD sent at 4/10/2023  8:40 PM CDT -----  Regarding: RE: Missed Inflectra appointment  Nena -     Can you see what happened?    He is usually reliable    Thanks    ----- Message -----  From: Grisel Pimentel RN  Sent: 4/10/2023   5:20 PM CDT  To: Tobin Wright MD  Subject: Missed Inflectra appointment                     Hello,    FYI- Patient was a no call no show for today's infusion appointment.     Thank you,    Grisel PUGH

## 2023-04-11 NOTE — TELEPHONE ENCOUNTER
Tried to call Ventura but the phone just keeps ringing and no VM available.    Sent HEALBE message to reach out and reschedule his [facility] appt

## 2023-04-14 NOTE — TELEPHONE ENCOUNTER
Dr. Wright,  Spoke with Ventura Almanza's mother, she states Ventura had something going on and has reschedule his inflectra appointment for 4/19/23. She will make sure Ventura makes it to this appointment.    Thank you.  Nena

## 2023-04-17 ENCOUNTER — INFUSION THERAPY VISIT (OUTPATIENT)
Dept: INFUSION THERAPY | Facility: CLINIC | Age: 23
End: 2023-04-17
Payer: COMMERCIAL

## 2023-04-17 ENCOUNTER — LAB (OUTPATIENT)
Dept: LAB | Facility: CLINIC | Age: 23
End: 2023-04-17
Payer: COMMERCIAL

## 2023-04-17 VITALS
WEIGHT: 179.4 LBS | HEART RATE: 85 BPM | OXYGEN SATURATION: 96 % | BODY MASS INDEX: 23.03 KG/M2 | RESPIRATION RATE: 18 BRPM | DIASTOLIC BLOOD PRESSURE: 70 MMHG | SYSTOLIC BLOOD PRESSURE: 121 MMHG | TEMPERATURE: 98.1 F

## 2023-04-17 DIAGNOSIS — D50.9 IRON DEFICIENCY ANEMIA, UNSPECIFIED IRON DEFICIENCY ANEMIA TYPE: Primary | ICD-10-CM

## 2023-04-17 DIAGNOSIS — K52.9 COLITIS: ICD-10-CM

## 2023-04-17 DIAGNOSIS — K50.014 CROHN'S DISEASE OF SMALL INTESTINE WITH ABSCESS (H): ICD-10-CM

## 2023-04-17 DIAGNOSIS — K50.014 CROHN'S DISEASE OF SMALL INTESTINE WITH ABSCESS (H): Primary | ICD-10-CM

## 2023-04-17 DIAGNOSIS — D50.9 IRON DEFICIENCY ANEMIA, UNSPECIFIED IRON DEFICIENCY ANEMIA TYPE: ICD-10-CM

## 2023-04-17 LAB
ALBUMIN SERPL-MCNC: 4 G/DL (ref 3.4–5)
ALP SERPL-CCNC: 79 U/L (ref 40–150)
ALT SERPL W P-5'-P-CCNC: 23 U/L (ref 0–70)
AST SERPL W P-5'-P-CCNC: 15 U/L (ref 0–45)
BASOPHILS # BLD AUTO: 0 10E3/UL (ref 0–0.2)
BASOPHILS NFR BLD AUTO: 0 %
BILIRUB DIRECT SERPL-MCNC: 0.1 MG/DL (ref 0–0.2)
BILIRUB SERPL-MCNC: 0.5 MG/DL (ref 0.2–1.3)
CRP SERPL-MCNC: <2.9 MG/L (ref 0–8)
EOSINOPHIL # BLD AUTO: 0 10E3/UL (ref 0–0.7)
EOSINOPHIL NFR BLD AUTO: 0 %
ERYTHROCYTE [DISTWIDTH] IN BLOOD BY AUTOMATED COUNT: 16.9 % (ref 10–15)
ERYTHROCYTE [SEDIMENTATION RATE] IN BLOOD BY WESTERGREN METHOD: 2 MM/HR (ref 0–15)
HCT VFR BLD AUTO: 41.7 % (ref 40–53)
HGB BLD-MCNC: 14.5 G/DL (ref 13.3–17.7)
IMM GRANULOCYTES # BLD: 0 10E3/UL
IMM GRANULOCYTES NFR BLD: 0 %
LYMPHOCYTES # BLD AUTO: 2.1 10E3/UL (ref 0.8–5.3)
LYMPHOCYTES NFR BLD AUTO: 20 %
MCH RBC QN AUTO: 29.8 PG (ref 26.5–33)
MCHC RBC AUTO-ENTMCNC: 34.8 G/DL (ref 31.5–36.5)
MCV RBC AUTO: 86 FL (ref 78–100)
MONOCYTES # BLD AUTO: 0.8 10E3/UL (ref 0–1.3)
MONOCYTES NFR BLD AUTO: 8 %
NEUTROPHILS # BLD AUTO: 7.4 10E3/UL (ref 1.6–8.3)
NEUTROPHILS NFR BLD AUTO: 72 %
NRBC # BLD AUTO: 0 10E3/UL
NRBC BLD AUTO-RTO: 0 /100
PLATELET # BLD AUTO: 250 10E3/UL (ref 150–450)
PROT SERPL-MCNC: 7.5 G/DL (ref 6.8–8.8)
RBC # BLD AUTO: 4.86 10E6/UL (ref 4.4–5.9)
WBC # BLD AUTO: 10.4 10E3/UL (ref 4–11)

## 2023-04-17 PROCEDURE — 80076 HEPATIC FUNCTION PANEL: CPT | Performed by: INTERNAL MEDICINE

## 2023-04-17 PROCEDURE — 85652 RBC SED RATE AUTOMATED: CPT | Performed by: INTERNAL MEDICINE

## 2023-04-17 PROCEDURE — 85025 COMPLETE CBC W/AUTO DIFF WBC: CPT | Performed by: INTERNAL MEDICINE

## 2023-04-17 PROCEDURE — 96413 CHEMO IV INFUSION 1 HR: CPT | Performed by: NURSE PRACTITIONER

## 2023-04-17 PROCEDURE — 86140 C-REACTIVE PROTEIN: CPT | Performed by: INTERNAL MEDICINE

## 2023-04-17 PROCEDURE — 36415 COLL VENOUS BLD VENIPUNCTURE: CPT | Performed by: INTERNAL MEDICINE

## 2023-04-17 PROCEDURE — 99207 PR NO CHARGE LOS: CPT

## 2023-04-17 RX ORDER — MEPERIDINE HYDROCHLORIDE 25 MG/ML
25 INJECTION INTRAMUSCULAR; INTRAVENOUS; SUBCUTANEOUS EVERY 30 MIN PRN
Status: CANCELLED | OUTPATIENT
Start: 2023-05-05

## 2023-04-17 RX ORDER — DIPHENHYDRAMINE HYDROCHLORIDE 50 MG/ML
50 INJECTION INTRAMUSCULAR; INTRAVENOUS
Status: CANCELLED
Start: 2023-05-05

## 2023-04-17 RX ORDER — EPINEPHRINE 1 MG/ML
0.3 INJECTION, SOLUTION INTRAMUSCULAR; SUBCUTANEOUS EVERY 5 MIN PRN
Status: CANCELLED | OUTPATIENT
Start: 2023-05-05

## 2023-04-17 RX ORDER — ALBUTEROL SULFATE 90 UG/1
1-2 AEROSOL, METERED RESPIRATORY (INHALATION)
Status: CANCELLED
Start: 2023-05-05

## 2023-04-17 RX ORDER — DIPHENHYDRAMINE HCL 25 MG
25 CAPSULE ORAL ONCE
Status: CANCELLED
Start: 2023-05-05 | End: 2023-05-05

## 2023-04-17 RX ORDER — ALBUTEROL SULFATE 0.83 MG/ML
2.5 SOLUTION RESPIRATORY (INHALATION)
Status: CANCELLED | OUTPATIENT
Start: 2023-05-05

## 2023-04-17 RX ORDER — ACETAMINOPHEN 325 MG/1
650 TABLET ORAL ONCE
Status: CANCELLED
Start: 2023-05-05 | End: 2023-05-05

## 2023-04-17 RX ORDER — EPINEPHRINE 1 MG/ML
0.3 INJECTION, SOLUTION, CONCENTRATE INTRAVENOUS EVERY 5 MIN PRN
Status: CANCELLED | OUTPATIENT
Start: 2023-05-05

## 2023-04-17 RX ORDER — METHYLPREDNISOLONE SODIUM SUCCINATE 125 MG/2ML
125 INJECTION, POWDER, LYOPHILIZED, FOR SOLUTION INTRAMUSCULAR; INTRAVENOUS
Status: CANCELLED
Start: 2023-05-05

## 2023-04-17 RX ADMIN — Medication 250 ML: at 12:33

## 2023-04-17 ASSESSMENT — PAIN SCALES - GENERAL: PAINLEVEL: NO PAIN (0)

## 2023-04-17 NOTE — PROGRESS NOTES
Infusion Nursing Note:  Ventura Quiroz presents today for Rapid Inflectra.    Patient seen by provider today: No   present during visit today: Not Applicable.    Note: Pt denies any medical concerns. Stools have been decreasing to about 2/day on average. The pt reports tolerating their treatments well.      Intravenous Access:  Peripheral IV placed.    Treatment Conditions:    ~~~ NOTE: If the patient answers yes to any of the questions below, hold the infusion and contact ordering provider or on-call provider.    1. Have you recently had an elevated temperature, fever, chills, productive cough, coughing for 3 weeks or longer or hemoptysis,  abnormal vital signs, night sweats,  chest pain or have you noticed a decrease in your appetite, unexplained weight loss or fatigue? No  2. Do you have any open wounds or new incisions? No  3. Do you have any upcoming hospitalizations or surgeries? Does not include esophagogastroduodenoscopy, colonoscopy, endoscopic retrograde cholangiopancreatography (ERCP), endoscopic ultrasound (EUS), dental procedures or joint aspiration/steroid injections No  4. Do you currently have any signs of illness or infection or are you on any antibiotics? No  5. Have you had any new, sudden or worsening abdominal pain? No  6. Have you or anyone in your household received a live vaccination in the past 4 weeks? Please note: No live vaccines while on biologic/chemotherapy until 6 months after the last treatment. Patient can receive the flu vaccine (shot only), pneumovax and the Covid vaccine. It is optimal for the patient to get these vaccines mid cycle, but they can be given at any time as long as it is not on the day of the infusion. No  7. Have you recently been diagnosed with any new nervous system diseases (ie. Multiple sclerosis, Guillain [place], seizures, neurological changes) or cancer diagnosis? Are you on any form of radiation or chemotherapy? No  8. Are you pregnant or breast  feeding or do you have plans of pregnancy in the future? No  Have you been having any signs of worsening depression or suicidal ideations?  (benlysta only) No     9. Have there been any other new onset medical symptoms? No    Post Infusion Assessment:  Patient tolerated infusion without incident.  Blood return noted pre and post infusion.  Site patent and intact, free from redness, edema or discomfort.  No evidence of extravasations.  Access discontinued per protocol.  Biologic Infusion Post Education: Call the triage nurse at your clinic or seek medical attention if you have chills and/or temperature greater than or equal to 100.5, uncontrolled nausea/vomiting, diarrhea, constipation, dizziness, shortness of breath, chest pain, heart palpitations, weakness or any other new or concerning symptoms, questions or concerns.  You cannot have any live virus vaccines prior to or during treatment or up to 6 months post infusion.  If you have an upcoming surgery, medical procedure or dental procedure during treatment, this should be discussed with your ordering physician and your surgeon/dentist.  If you are having any concerning symptom, if you are unsure if you should get your next infusion or wish to speak to a provider before your next infusion, please call your care coordinator or triage nurse at your clinic to notify them so we can adequately serve you.       Discharge Plan:   Patient and/or family verbalized understanding of discharge instructions and all questions answered.  AVS to patient via [employer].  Patient will return 5/5 for next appointment. Future appts have been reviewed and crosschecked with appt note and plan.  Patient discharged in stable condition accompanied by: self.  Departure Mode: Ambulatory.      June Núñez RN

## 2023-05-15 ENCOUNTER — INFUSION THERAPY VISIT (OUTPATIENT)
Dept: INFUSION THERAPY | Facility: CLINIC | Age: 23
End: 2023-05-15
Payer: COMMERCIAL

## 2023-05-15 VITALS
HEART RATE: 70 BPM | BODY MASS INDEX: 22.21 KG/M2 | TEMPERATURE: 98 F | WEIGHT: 173 LBS | RESPIRATION RATE: 16 BRPM | DIASTOLIC BLOOD PRESSURE: 71 MMHG | SYSTOLIC BLOOD PRESSURE: 121 MMHG | OXYGEN SATURATION: 97 %

## 2023-05-15 DIAGNOSIS — D50.9 IRON DEFICIENCY ANEMIA, UNSPECIFIED IRON DEFICIENCY ANEMIA TYPE: Primary | ICD-10-CM

## 2023-05-15 DIAGNOSIS — K50.014 CROHN'S DISEASE OF SMALL INTESTINE WITH ABSCESS (H): ICD-10-CM

## 2023-05-15 DIAGNOSIS — K52.9 COLITIS: ICD-10-CM

## 2023-05-15 PROCEDURE — 99207 PR NO CHARGE LOS: CPT

## 2023-05-15 PROCEDURE — 96413 CHEMO IV INFUSION 1 HR: CPT | Performed by: NURSE PRACTITIONER

## 2023-05-15 RX ORDER — METHYLPREDNISOLONE SODIUM SUCCINATE 125 MG/2ML
125 INJECTION, POWDER, LYOPHILIZED, FOR SOLUTION INTRAMUSCULAR; INTRAVENOUS
Status: CANCELLED
Start: 2023-06-12

## 2023-05-15 RX ORDER — ALBUTEROL SULFATE 0.83 MG/ML
2.5 SOLUTION RESPIRATORY (INHALATION)
Status: CANCELLED | OUTPATIENT
Start: 2023-06-12

## 2023-05-15 RX ORDER — ALBUTEROL SULFATE 90 UG/1
1-2 AEROSOL, METERED RESPIRATORY (INHALATION)
Status: CANCELLED
Start: 2023-06-12

## 2023-05-15 RX ORDER — EPINEPHRINE 1 MG/ML
0.3 INJECTION, SOLUTION INTRAMUSCULAR; SUBCUTANEOUS EVERY 5 MIN PRN
Status: CANCELLED | OUTPATIENT
Start: 2023-06-12

## 2023-05-15 RX ORDER — ACETAMINOPHEN 325 MG/1
650 TABLET ORAL ONCE
Status: CANCELLED
Start: 2023-06-12 | End: 2023-06-12

## 2023-05-15 RX ORDER — MEPERIDINE HYDROCHLORIDE 25 MG/ML
25 INJECTION INTRAMUSCULAR; INTRAVENOUS; SUBCUTANEOUS EVERY 30 MIN PRN
Status: CANCELLED | OUTPATIENT
Start: 2023-06-12

## 2023-05-15 RX ORDER — DIPHENHYDRAMINE HYDROCHLORIDE 50 MG/ML
50 INJECTION INTRAMUSCULAR; INTRAVENOUS
Status: CANCELLED
Start: 2023-06-12

## 2023-05-15 RX ORDER — DIPHENHYDRAMINE HCL 25 MG
25 CAPSULE ORAL ONCE
Status: CANCELLED
Start: 2023-06-12 | End: 2023-06-12

## 2023-05-15 RX ADMIN — Medication 250 ML: at 15:46

## 2023-05-15 ASSESSMENT — PAIN SCALES - GENERAL: PAINLEVEL: NO PAIN (0)

## 2023-05-15 NOTE — PROGRESS NOTES
Infusion Nursing Note:  Ventura Quiroz presents today for Rapid Inflectra.    Patient seen by provider today: No   present during visit today: Not Applicable.    Note: N/A.    Intravenous Access:  Peripheral IV placed.    Treatment Conditions:  Biological Infusion Checklist:  ~~~ NOTE: If the patient answers yes to any of the questions below, hold the infusion and contact ordering provider or on-call provider.    1. Have you recently had an elevated temperature, fever, chills, productive cough, coughing for 3 weeks or longer or hemoptysis, abnormal vital signs, night sweats,  chest pain or have you noticed a decrease in your appetite, unexplained weight loss or fatigue? No  2. Do you have any open wounds or new incisions? No  3. Do you have any recent or upcoming hospitalizations, surgeries or dental procedures? No  4. Do you currently have or recently have had any signs of illness or infection or are you on any antibiotics? No  5. Have you had any new, sudden or worsening abdominal pain? No  6. Have you or anyone in your household received a live vaccination in the past 4 weeks? Please note:  No live vaccines while on biologic/chemotherapy until 6 months after the last treatment.  Patient can receive the flu vaccine (shot only) and the pneumovax.  It is optimal for the patient to get these vaccines mid cycle, but they can be given at any time as long as it is not on the day of the infusion. No  7. Have you recently been diagnosed with any new nervous system diseases (ie. Multiple sclerosis, Guillain [place], seizures, neurological changes) or cancer diagnosis? No  8. Are you on any form of radiation or chemotherapy? No  9. Are you pregnant or breast feeding or do you have plans of pregnancy in the future? No  10. Have you been having any signs of worsening depression or suicidal ideations?  (benlysta only) No  11. Have there been any other new onset medical symptoms? No      Post Infusion  Assessment:  Patient tolerated infusion without incident.  Blood return noted pre and post infusion.  Site patent and intact, free from redness, edema or discomfort.  No evidence of extravasations.  Access discontinued per protocol.     Discharge Plan:   Patient will return 6/12/2023 for next appointment.   Future appts have been reviewed and crosschecked with appt note and plan.  Patient discharged in stable condition accompanied by: self.  Departure Mode: Ambulatory.    Valeria Scott RN-BSN, PHN, OCN  [facility]

## 2023-06-09 ENCOUNTER — INFUSION THERAPY VISIT (OUTPATIENT)
Dept: INFUSION THERAPY | Facility: CLINIC | Age: 23
End: 2023-06-09
Payer: COMMERCIAL

## 2023-06-09 ENCOUNTER — LAB (OUTPATIENT)
Dept: LAB | Facility: CLINIC | Age: 23
End: 2023-06-09
Payer: COMMERCIAL

## 2023-06-09 VITALS
OXYGEN SATURATION: 97 % | RESPIRATION RATE: 16 BRPM | DIASTOLIC BLOOD PRESSURE: 75 MMHG | HEART RATE: 66 BPM | SYSTOLIC BLOOD PRESSURE: 124 MMHG | TEMPERATURE: 97.6 F

## 2023-06-09 DIAGNOSIS — K52.9 COLITIS: ICD-10-CM

## 2023-06-09 DIAGNOSIS — K50.014 CROHN'S DISEASE OF SMALL INTESTINE WITH ABSCESS (H): Primary | ICD-10-CM

## 2023-06-09 DIAGNOSIS — D50.9 IRON DEFICIENCY ANEMIA, UNSPECIFIED IRON DEFICIENCY ANEMIA TYPE: ICD-10-CM

## 2023-06-09 DIAGNOSIS — K50.014 CROHN'S DISEASE OF SMALL INTESTINE WITH ABSCESS (H): ICD-10-CM

## 2023-06-09 DIAGNOSIS — D50.0 IRON DEFICIENCY ANEMIA DUE TO CHRONIC BLOOD LOSS: ICD-10-CM

## 2023-06-09 LAB
ALBUMIN SERPL BCG-MCNC: 4.6 G/DL (ref 3.5–5.2)
ALP SERPL-CCNC: 69 U/L (ref 40–129)
ALT SERPL W P-5'-P-CCNC: 14 U/L (ref 10–50)
AST SERPL W P-5'-P-CCNC: 25 U/L (ref 10–50)
BASOPHILS # BLD AUTO: 0 10E3/UL (ref 0–0.2)
BASOPHILS NFR BLD AUTO: 0 %
BILIRUB DIRECT SERPL-MCNC: 0.22 MG/DL (ref 0–0.3)
BILIRUB SERPL-MCNC: 0.7 MG/DL
CRP SERPL-MCNC: <3 MG/L
EOSINOPHIL # BLD AUTO: 0.1 10E3/UL (ref 0–0.7)
EOSINOPHIL NFR BLD AUTO: 1 %
ERYTHROCYTE [DISTWIDTH] IN BLOOD BY AUTOMATED COUNT: 13.1 % (ref 10–15)
ERYTHROCYTE [SEDIMENTATION RATE] IN BLOOD BY WESTERGREN METHOD: 5 MM/HR (ref 0–15)
HCT VFR BLD AUTO: 40.8 % (ref 40–53)
HGB BLD-MCNC: 14 G/DL (ref 13.3–17.7)
IMM GRANULOCYTES # BLD: 0 10E3/UL
IMM GRANULOCYTES NFR BLD: 1 %
LYMPHOCYTES # BLD AUTO: 1.9 10E3/UL (ref 0.8–5.3)
LYMPHOCYTES NFR BLD AUTO: 22 %
MCH RBC QN AUTO: 31.3 PG (ref 26.5–33)
MCHC RBC AUTO-ENTMCNC: 34.3 G/DL (ref 31.5–36.5)
MCV RBC AUTO: 91 FL (ref 78–100)
MONOCYTES # BLD AUTO: 0.6 10E3/UL (ref 0–1.3)
MONOCYTES NFR BLD AUTO: 8 %
NEUTROPHILS # BLD AUTO: 5.8 10E3/UL (ref 1.6–8.3)
NEUTROPHILS NFR BLD AUTO: 68 %
NRBC # BLD AUTO: 0 10E3/UL
NRBC BLD AUTO-RTO: 0 /100
PLATELET # BLD AUTO: 245 10E3/UL (ref 150–450)
PROT SERPL-MCNC: 7.2 G/DL (ref 6.4–8.3)
RBC # BLD AUTO: 4.48 10E6/UL (ref 4.4–5.9)
WBC # BLD AUTO: 8.5 10E3/UL (ref 4–11)

## 2023-06-09 PROCEDURE — 80076 HEPATIC FUNCTION PANEL: CPT

## 2023-06-09 PROCEDURE — 86140 C-REACTIVE PROTEIN: CPT

## 2023-06-09 PROCEDURE — 86364 TISS TRNSGLTMNASE EA IG CLAS: CPT

## 2023-06-09 PROCEDURE — 96413 CHEMO IV INFUSION 1 HR: CPT | Performed by: INTERNAL MEDICINE

## 2023-06-09 PROCEDURE — 36415 COLL VENOUS BLD VENIPUNCTURE: CPT

## 2023-06-09 PROCEDURE — 85025 COMPLETE CBC W/AUTO DIFF WBC: CPT

## 2023-06-09 PROCEDURE — 86481 TB AG RESPONSE T-CELL SUSP: CPT

## 2023-06-09 PROCEDURE — 86258 DGP ANTIBODY EACH IG CLASS: CPT

## 2023-06-09 PROCEDURE — 99207 PR NO CHARGE LOS: CPT

## 2023-06-09 PROCEDURE — 85652 RBC SED RATE AUTOMATED: CPT

## 2023-06-09 RX ORDER — DIPHENHYDRAMINE HYDROCHLORIDE 50 MG/ML
50 INJECTION INTRAMUSCULAR; INTRAVENOUS
Status: CANCELLED
Start: 2023-06-12

## 2023-06-09 RX ORDER — ALBUTEROL SULFATE 0.83 MG/ML
2.5 SOLUTION RESPIRATORY (INHALATION)
Status: CANCELLED | OUTPATIENT
Start: 2023-06-12

## 2023-06-09 RX ORDER — EPINEPHRINE 1 MG/ML
0.3 INJECTION, SOLUTION INTRAMUSCULAR; SUBCUTANEOUS EVERY 5 MIN PRN
Status: CANCELLED | OUTPATIENT
Start: 2023-06-12

## 2023-06-09 RX ORDER — METHYLPREDNISOLONE SODIUM SUCCINATE 125 MG/2ML
125 INJECTION, POWDER, LYOPHILIZED, FOR SOLUTION INTRAMUSCULAR; INTRAVENOUS
Status: CANCELLED
Start: 2023-06-12

## 2023-06-09 RX ORDER — DIPHENHYDRAMINE HCL 25 MG
25 CAPSULE ORAL ONCE
Status: CANCELLED
Start: 2023-06-12 | End: 2023-06-12

## 2023-06-09 RX ORDER — MEPERIDINE HYDROCHLORIDE 25 MG/ML
25 INJECTION INTRAMUSCULAR; INTRAVENOUS; SUBCUTANEOUS EVERY 30 MIN PRN
Status: CANCELLED | OUTPATIENT
Start: 2023-06-12

## 2023-06-09 RX ORDER — ALBUTEROL SULFATE 90 UG/1
1-2 AEROSOL, METERED RESPIRATORY (INHALATION)
Status: CANCELLED
Start: 2023-06-12

## 2023-06-09 RX ORDER — ACETAMINOPHEN 325 MG/1
650 TABLET ORAL ONCE
Status: CANCELLED
Start: 2023-06-12 | End: 2023-06-12

## 2023-06-09 RX ADMIN — Medication 250 ML: at 13:37

## 2023-06-09 NOTE — PROGRESS NOTES
Infusion Nursing Note:  Ventura Quiroz presents today for Inflectra.    Patient seen by provider today: No   present during visit today: Not Applicable.    Note: Patient reports no changes and no new concerns today.      Intravenous Access:  Peripheral IV placed.    Treatment Conditions:  Biological Infusion Checklist:  ~~~ NOTE: If the patient answers yes to any of the questions below, hold the infusion and contact ordering provider or on-call provider.    1. Have you recently had an elevated temperature, fever, chills, productive cough, coughing for 3 weeks or longer or hemoptysis,  abnormal vital signs, night sweats,  chest pain or have you noticed a decrease in your appetite, unexplained weight loss or fatigue? No  2. Do you have any open wounds or new incisions? No  3. Do you have any upcoming hospitalizations or surgeries? Does not include esophagogastroduodenoscopy, colonoscopy, endoscopic retrograde cholangiopancreatography (ERCP), endoscopic ultrasound (EUS), dental procedures or joint aspiration/steroid injections No  4. Do you currently have any signs of illness or infection or are you on any antibiotics? No  5. Have you had any new, sudden or worsening abdominal pain? No  6. Have you or anyone in your household received a live vaccination in the past 4 weeks? Please note: No live vaccines while on biologic/chemotherapy until 6 months after the last treatment. Patient can receive the flu vaccine (shot only), pneumovax and the Covid vaccine. It is optimal for the patient to get these vaccines mid cycle, but they can be given at any time as long as it is not on the day of the infusion. No  7. Have you recently been diagnosed with any new nervous system diseases (ie. Multiple sclerosis, Guillain [place], seizures, neurological changes) or cancer diagnosis? Are you on any form of radiation or chemotherapy? No  8. Are you pregnant or breast feeding or do you have plans of pregnancy in the future?  No  9. Have you been having any signs of worsening depression or suicidal ideations?  (benlysta only) No  10. Have there been any other new onset medical symptoms? No  11. Have you had any new blood clots? (IVIG only) No        Post Infusion Assessment:  Patient tolerated infusion without incident.  Site patent and intact, free from redness, edema or discomfort.  No evidence of extravasations.  Access discontinued per protocol.  Biologic Infusion Post Education: Call the triage nurse at your clinic or seek medical attention if you have chills and/or temperature greater than or equal to 100.5, uncontrolled nausea/vomiting, diarrhea, constipation, dizziness, shortness of breath, chest pain, heart palpitations, weakness or any other new or concerning symptoms, questions or concerns.  You cannot have any live virus vaccines prior to or during treatment or up to 6 months post infusion.  If you have an upcoming surgery, medical procedure or dental procedure during treatment, this should be discussed with your ordering physician and your surgeon/dentist.  If you are having any concerning symptom, if you are unsure if you should get your next infusion or wish to speak to a provider before your next infusion, please call your care coordinator or triage nurse at your clinic to notify them so we can adequately serve you.       Discharge Plan:   AVS to patient via paymioHART.  Patient will return 7/10 for next appointment.   Patient discharged in stable condition accompanied by: self.  Departure Mode: Ambulatory.      Grisel Pimentel RN

## 2023-06-12 LAB
GAMMA INTERFERON BACKGROUND BLD IA-ACNC: 0.02 IU/ML
GLIADIN IGA SER-ACNC: 0.5 U/ML
GLIADIN IGG SER-ACNC: <0.6 U/ML
M TB IFN-G BLD-IMP: NEGATIVE
M TB IFN-G CD4+ BCKGRND COR BLD-ACNC: 9.98 IU/ML
MITOGEN IGNF BCKGRD COR BLD-ACNC: -0.01 IU/ML
MITOGEN IGNF BCKGRD COR BLD-ACNC: -0.01 IU/ML
QUANTIFERON MITOGEN: 10 IU/ML
QUANTIFERON NIL TUBE: 0.02 IU/ML
QUANTIFERON TB1 TUBE: 0.01 IU/ML
QUANTIFERON TB2 TUBE: 0.01
TTG IGA SER-ACNC: <0.2 U/ML
TTG IGG SER-ACNC: <0.6 U/ML

## 2023-07-07 RX ORDER — ALBUTEROL SULFATE 0.83 MG/ML
2.5 SOLUTION RESPIRATORY (INHALATION)
Status: CANCELLED | OUTPATIENT
Start: 2023-07-10

## 2023-07-07 RX ORDER — ALBUTEROL SULFATE 90 UG/1
1-2 AEROSOL, METERED RESPIRATORY (INHALATION)
Status: CANCELLED
Start: 2023-07-10

## 2023-07-07 RX ORDER — MEPERIDINE HYDROCHLORIDE 25 MG/ML
25 INJECTION INTRAMUSCULAR; INTRAVENOUS; SUBCUTANEOUS EVERY 30 MIN PRN
Status: CANCELLED | OUTPATIENT
Start: 2023-07-10

## 2023-07-07 RX ORDER — EPINEPHRINE 1 MG/ML
0.3 INJECTION, SOLUTION INTRAMUSCULAR; SUBCUTANEOUS EVERY 5 MIN PRN
Status: CANCELLED | OUTPATIENT
Start: 2023-07-10

## 2023-07-07 RX ORDER — DIPHENHYDRAMINE HCL 25 MG
25 CAPSULE ORAL ONCE
Status: CANCELLED
Start: 2023-07-10 | End: 2023-07-10

## 2023-07-07 RX ORDER — ACETAMINOPHEN 325 MG/1
650 TABLET ORAL ONCE
Status: CANCELLED
Start: 2023-07-10 | End: 2023-07-10

## 2023-07-07 RX ORDER — METHYLPREDNISOLONE SODIUM SUCCINATE 125 MG/2ML
125 INJECTION, POWDER, LYOPHILIZED, FOR SOLUTION INTRAMUSCULAR; INTRAVENOUS
Status: CANCELLED
Start: 2023-07-10

## 2023-07-07 RX ORDER — DIPHENHYDRAMINE HYDROCHLORIDE 50 MG/ML
50 INJECTION INTRAMUSCULAR; INTRAVENOUS
Status: CANCELLED
Start: 2023-07-10

## 2023-07-10 ENCOUNTER — INFUSION THERAPY VISIT (OUTPATIENT)
Dept: INFUSION THERAPY | Facility: CLINIC | Age: 23
End: 2023-07-10
Payer: COMMERCIAL

## 2023-07-10 VITALS
RESPIRATION RATE: 14 BRPM | TEMPERATURE: 97.7 F | SYSTOLIC BLOOD PRESSURE: 129 MMHG | HEART RATE: 80 BPM | WEIGHT: 175.9 LBS | BODY MASS INDEX: 22.58 KG/M2 | DIASTOLIC BLOOD PRESSURE: 70 MMHG | OXYGEN SATURATION: 95 %

## 2023-07-10 DIAGNOSIS — K50.014 CROHN'S DISEASE OF SMALL INTESTINE WITH ABSCESS (H): ICD-10-CM

## 2023-07-10 DIAGNOSIS — K52.9 COLITIS: ICD-10-CM

## 2023-07-10 DIAGNOSIS — D50.9 IRON DEFICIENCY ANEMIA, UNSPECIFIED IRON DEFICIENCY ANEMIA TYPE: Primary | ICD-10-CM

## 2023-07-10 PROCEDURE — 96413 CHEMO IV INFUSION 1 HR: CPT | Performed by: NURSE PRACTITIONER

## 2023-07-10 PROCEDURE — 99207 PR NO CHARGE LOS: CPT

## 2023-07-10 RX ORDER — DIPHENHYDRAMINE HCL 25 MG
25 CAPSULE ORAL ONCE
Status: CANCELLED
Start: 2023-08-07 | End: 2023-08-07

## 2023-07-10 RX ORDER — METHYLPREDNISOLONE SODIUM SUCCINATE 125 MG/2ML
125 INJECTION, POWDER, LYOPHILIZED, FOR SOLUTION INTRAMUSCULAR; INTRAVENOUS
Status: CANCELLED
Start: 2023-08-07

## 2023-07-10 RX ORDER — DIPHENHYDRAMINE HYDROCHLORIDE 50 MG/ML
50 INJECTION INTRAMUSCULAR; INTRAVENOUS
Status: CANCELLED
Start: 2023-08-07

## 2023-07-10 RX ORDER — ALBUTEROL SULFATE 90 UG/1
1-2 AEROSOL, METERED RESPIRATORY (INHALATION)
Status: CANCELLED
Start: 2023-08-07

## 2023-07-10 RX ORDER — ACETAMINOPHEN 325 MG/1
650 TABLET ORAL ONCE
Status: CANCELLED
Start: 2023-08-07 | End: 2023-08-07

## 2023-07-10 RX ORDER — EPINEPHRINE 1 MG/ML
0.3 INJECTION, SOLUTION INTRAMUSCULAR; SUBCUTANEOUS EVERY 5 MIN PRN
Status: CANCELLED | OUTPATIENT
Start: 2023-08-07

## 2023-07-10 RX ORDER — ALBUTEROL SULFATE 0.83 MG/ML
2.5 SOLUTION RESPIRATORY (INHALATION)
Status: CANCELLED | OUTPATIENT
Start: 2023-08-07

## 2023-07-10 RX ORDER — MEPERIDINE HYDROCHLORIDE 25 MG/ML
25 INJECTION INTRAMUSCULAR; INTRAVENOUS; SUBCUTANEOUS EVERY 30 MIN PRN
Status: CANCELLED | OUTPATIENT
Start: 2023-08-07

## 2023-07-10 RX ADMIN — Medication 250 ML: at 15:25

## 2023-07-10 NOTE — PROGRESS NOTES
Infusion Nursing Note:  Ventura Quiroz presents today for rapid Inflectra infusion.    Patient seen by provider today: No   present during visit today: Not Applicable.    Note: N/A.    Intravenous Access:  Peripheral IV placed.    Treatment Conditions:  Biological Infusion Checklist:  ~~~ NOTE: If the patient answers yes to any of the questions below, hold the infusion and contact ordering provider or on-call provider.    1. Have you recently had an elevated temperature, fever, chills, productive cough, coughing for 3 weeks or longer or hemoptysis,  abnormal vital signs, night sweats,  chest pain or have you noticed a decrease in your appetite, unexplained weight loss or fatigue? No  2. Do you have any open wounds or new incisions? No  3. Do you have any upcoming hospitalizations or surgeries? Does not include esophagogastroduodenoscopy, colonoscopy, endoscopic retrograde cholangiopancreatography (ERCP), endoscopic ultrasound (EUS), dental procedures or joint aspiration/steroid injections No  4. Do you currently have any signs of illness or infection or are you on any antibiotics? No  5. Have you had any new, sudden or worsening abdominal pain? No  6. Have you or anyone in your household received a live vaccination in the past 4 weeks? Please note: No live vaccines while on biologic/chemotherapy until 6 months after the last treatment. Patient can receive the flu vaccine (shot only), pneumovax and the Covid vaccine. It is optimal for the patient to get these vaccines mid cycle, but they can be given at any time as long as it is not on the day of the infusion. No  7. Have you recently been diagnosed with any new nervous system diseases (ie. Multiple sclerosis, Guillain [place], seizures, neurological changes) or cancer diagnosis? Are you on any form of radiation or chemotherapy? No  8. Have there been any other new onset medical symptoms? No      Post Infusion Assessment:  Patient tolerated infusion without  incident.  Blood return noted pre and post infusion.  Site patent and intact, free from redness, edema or discomfort.  No evidence of extravasations.  Access discontinued per protocol.       Discharge Plan:   AVS to patient via MYCHART.  Patient will return 8/11/23 for next appointment.   Patient discharged in stable condition accompanied by: self.  Departure Mode: Ambulatory.      Danae Pace RN

## 2023-08-03 DIAGNOSIS — K50.014 CROHN'S DISEASE OF SMALL INTESTINE WITH ABSCESS (H): Primary | ICD-10-CM

## 2023-08-03 NOTE — PROGRESS NOTES
Venofer therapy plan discontinued. Dose 5 of 5 completed 1/30/23.    Inflectra therapy plan and standing lab orders updated to Jon Chavez as plan provider.

## 2023-08-11 ENCOUNTER — INFUSION THERAPY VISIT (OUTPATIENT)
Dept: INFUSION THERAPY | Facility: CLINIC | Age: 23
End: 2023-08-11
Payer: COMMERCIAL

## 2023-08-11 ENCOUNTER — LAB (OUTPATIENT)
Dept: LAB | Facility: CLINIC | Age: 23
End: 2023-08-11
Payer: COMMERCIAL

## 2023-08-11 VITALS
BODY MASS INDEX: 22.89 KG/M2 | DIASTOLIC BLOOD PRESSURE: 69 MMHG | WEIGHT: 178.3 LBS | SYSTOLIC BLOOD PRESSURE: 124 MMHG | RESPIRATION RATE: 16 BRPM | TEMPERATURE: 97.6 F | HEART RATE: 61 BPM | OXYGEN SATURATION: 98 %

## 2023-08-11 DIAGNOSIS — K52.9 COLITIS: Primary | ICD-10-CM

## 2023-08-11 DIAGNOSIS — D50.9 IRON DEFICIENCY ANEMIA, UNSPECIFIED IRON DEFICIENCY ANEMIA TYPE: ICD-10-CM

## 2023-08-11 DIAGNOSIS — K50.014 CROHN'S DISEASE OF SMALL INTESTINE WITH ABSCESS (H): ICD-10-CM

## 2023-08-11 LAB
ALBUMIN SERPL BCG-MCNC: 4.4 G/DL (ref 3.5–5.2)
ALP SERPL-CCNC: 64 U/L (ref 40–129)
ALT SERPL W P-5'-P-CCNC: 18 U/L (ref 0–70)
AST SERPL W P-5'-P-CCNC: 25 U/L (ref 0–45)
BASOPHILS # BLD AUTO: 0 10E3/UL (ref 0–0.2)
BASOPHILS NFR BLD AUTO: 0 %
BILIRUB DIRECT SERPL-MCNC: <0.2 MG/DL (ref 0–0.3)
BILIRUB SERPL-MCNC: 0.4 MG/DL
CRP SERPL-MCNC: <3 MG/L
EOSINOPHIL # BLD AUTO: 0.1 10E3/UL (ref 0–0.7)
EOSINOPHIL NFR BLD AUTO: 1 %
ERYTHROCYTE [DISTWIDTH] IN BLOOD BY AUTOMATED COUNT: 12.7 % (ref 10–15)
ERYTHROCYTE [SEDIMENTATION RATE] IN BLOOD BY WESTERGREN METHOD: 2 MM/HR (ref 0–15)
HCT VFR BLD AUTO: 41.9 % (ref 40–53)
HGB BLD-MCNC: 14.1 G/DL (ref 13.3–17.7)
IMM GRANULOCYTES # BLD: 0 10E3/UL
IMM GRANULOCYTES NFR BLD: 0 %
LYMPHOCYTES # BLD AUTO: 1.8 10E3/UL (ref 0.8–5.3)
LYMPHOCYTES NFR BLD AUTO: 22 %
MCH RBC QN AUTO: 31.1 PG (ref 26.5–33)
MCHC RBC AUTO-ENTMCNC: 33.7 G/DL (ref 31.5–36.5)
MCV RBC AUTO: 92 FL (ref 78–100)
MONOCYTES # BLD AUTO: 0.6 10E3/UL (ref 0–1.3)
MONOCYTES NFR BLD AUTO: 8 %
NEUTROPHILS # BLD AUTO: 5.6 10E3/UL (ref 1.6–8.3)
NEUTROPHILS NFR BLD AUTO: 69 %
NRBC # BLD AUTO: 0 10E3/UL
NRBC BLD AUTO-RTO: 0 /100
PLATELET # BLD AUTO: 204 10E3/UL (ref 150–450)
PROT SERPL-MCNC: 7.1 G/DL (ref 6.4–8.3)
RBC # BLD AUTO: 4.54 10E6/UL (ref 4.4–5.9)
WBC # BLD AUTO: 8.1 10E3/UL (ref 4–11)

## 2023-08-11 PROCEDURE — 86140 C-REACTIVE PROTEIN: CPT

## 2023-08-11 PROCEDURE — 85652 RBC SED RATE AUTOMATED: CPT

## 2023-08-11 PROCEDURE — 96413 CHEMO IV INFUSION 1 HR: CPT | Performed by: INTERNAL MEDICINE

## 2023-08-11 PROCEDURE — 36415 COLL VENOUS BLD VENIPUNCTURE: CPT

## 2023-08-11 PROCEDURE — 80076 HEPATIC FUNCTION PANEL: CPT

## 2023-08-11 PROCEDURE — 85025 COMPLETE CBC W/AUTO DIFF WBC: CPT

## 2023-08-11 RX ORDER — DIPHENHYDRAMINE HCL 25 MG
25 CAPSULE ORAL ONCE
Status: CANCELLED
Start: 2023-09-04 | End: 2023-09-04

## 2023-08-11 RX ORDER — ALBUTEROL SULFATE 90 UG/1
1-2 AEROSOL, METERED RESPIRATORY (INHALATION)
Status: CANCELLED
Start: 2023-09-04

## 2023-08-11 RX ORDER — ACETAMINOPHEN 325 MG/1
650 TABLET ORAL ONCE
Status: CANCELLED
Start: 2023-09-04 | End: 2023-09-04

## 2023-08-11 RX ORDER — MEPERIDINE HYDROCHLORIDE 25 MG/ML
25 INJECTION INTRAMUSCULAR; INTRAVENOUS; SUBCUTANEOUS EVERY 30 MIN PRN
Status: CANCELLED | OUTPATIENT
Start: 2023-09-04

## 2023-08-11 RX ORDER — METHYLPREDNISOLONE SODIUM SUCCINATE 125 MG/2ML
125 INJECTION, POWDER, LYOPHILIZED, FOR SOLUTION INTRAMUSCULAR; INTRAVENOUS
Status: CANCELLED
Start: 2023-09-04

## 2023-08-11 RX ORDER — DIPHENHYDRAMINE HYDROCHLORIDE 50 MG/ML
50 INJECTION INTRAMUSCULAR; INTRAVENOUS
Status: CANCELLED
Start: 2023-09-04

## 2023-08-11 RX ORDER — ALBUTEROL SULFATE 0.83 MG/ML
2.5 SOLUTION RESPIRATORY (INHALATION)
Status: CANCELLED | OUTPATIENT
Start: 2023-09-04

## 2023-08-11 RX ORDER — EPINEPHRINE 1 MG/ML
0.3 INJECTION, SOLUTION INTRAMUSCULAR; SUBCUTANEOUS EVERY 5 MIN PRN
Status: CANCELLED | OUTPATIENT
Start: 2023-09-04

## 2023-08-11 RX ADMIN — Medication 250 ML: at 13:28

## 2023-08-11 NOTE — PROGRESS NOTES
Infusion Nursing Note:  Ventura Quiroz presents today for Rapid Inflectra.    Patient seen by provider today: No   present during visit today: Not Applicable.    Note: no new symptoms.  Denies side effects to Inflectra.   Patient does have diarrhea daily.  Less than 4.   Otherwise, good disease control on current regimen and interval      Intravenous Access:  Peripheral IV placed.    Treatment Conditions:  Biological Infusion Checklist:  ~~~ NOTE: If the patient answers yes to any of the questions below, hold the infusion and contact ordering provider or on-call provider.    Have you recently had an elevated temperature, fever, chills, productive cough, coughing for 3 weeks or longer or hemoptysis,  abnormal vital signs, night sweats,  chest pain or have you noticed a decrease in your appetite, unexplained weight loss or fatigue? No  Do you have any open wounds or new incisions? No  Do you have any upcoming hospitalizations or surgeries? Does not include esophagogastroduodenoscopy, colonoscopy, endoscopic retrograde cholangiopancreatography (ERCP), endoscopic ultrasound (EUS), dental procedures or joint aspiration/steroid injections No  Do you currently have any signs of illness or infection or are you on any antibiotics? No  Have you had any new, sudden or worsening abdominal pain? No  Have you or anyone in your household received a live vaccination in the past 4 weeks? Please note: No live vaccines while on biologic/chemotherapy until 6 months after the last treatment. Patient can receive the flu vaccine (shot only), pneumovax and the Covid vaccine. It is optimal for the patient to get these vaccines mid cycle, but they can be given at any time as long as it is not on the day of the infusion. No  Have you recently been diagnosed with any new nervous system diseases (ie. Multiple sclerosis, Guillain [place], seizures, neurological changes) or cancer diagnosis? Are you on any form of radiation or  chemotherapy? No  Are you pregnant or breast feeding or do you have plans of pregnancy in the future? No  Have you been having any signs of worsening depression or suicidal ideations?  (benlysta only) No  Have there been any other new onset medical symptoms? No  Have you had any new blood clots? (IVIG only) No      Post Infusion Assessment:  Patient tolerated infusion without incident.       Discharge Plan:   RTC as scheduled in 4 wks 9/8/23.      RONA RIZVI RN

## 2023-09-05 ENCOUNTER — PATIENT OUTREACH (OUTPATIENT)
Dept: GASTROENTEROLOGY | Facility: CLINIC | Age: 23
End: 2023-09-05

## 2023-09-05 ENCOUNTER — MYC MEDICAL ADVICE (OUTPATIENT)
Dept: GASTROENTEROLOGY | Facility: CLINIC | Age: 23
End: 2023-09-05
Payer: COMMERCIAL

## 2023-09-05 DIAGNOSIS — K52.9 COLITIS: ICD-10-CM

## 2023-09-05 DIAGNOSIS — D50.9 IRON DEFICIENCY ANEMIA, UNSPECIFIED IRON DEFICIENCY ANEMIA TYPE: Primary | ICD-10-CM

## 2023-09-05 DIAGNOSIS — K50.014 CROHN'S DISEASE OF SMALL INTESTINE WITH ABSCESS (H): ICD-10-CM

## 2023-09-05 RX ORDER — METHYLPREDNISOLONE SODIUM SUCCINATE 125 MG/2ML
125 INJECTION, POWDER, LYOPHILIZED, FOR SOLUTION INTRAMUSCULAR; INTRAVENOUS
Status: CANCELLED
Start: 2023-09-05

## 2023-09-05 RX ORDER — DIPHENHYDRAMINE HCL 25 MG
25 CAPSULE ORAL ONCE
Status: CANCELLED
Start: 2023-09-05 | End: 2023-09-05

## 2023-09-05 RX ORDER — EPINEPHRINE 1 MG/ML
0.3 INJECTION, SOLUTION, CONCENTRATE INTRAVENOUS EVERY 5 MIN PRN
Status: CANCELLED | OUTPATIENT
Start: 2023-09-05

## 2023-09-05 RX ORDER — ALBUTEROL SULFATE 0.83 MG/ML
2.5 SOLUTION RESPIRATORY (INHALATION)
Status: CANCELLED | OUTPATIENT
Start: 2023-09-05

## 2023-09-05 RX ORDER — ALBUTEROL SULFATE 90 UG/1
1-2 AEROSOL, METERED RESPIRATORY (INHALATION)
Status: CANCELLED
Start: 2023-09-05

## 2023-09-05 RX ORDER — MEPERIDINE HYDROCHLORIDE 25 MG/ML
25 INJECTION INTRAMUSCULAR; INTRAVENOUS; SUBCUTANEOUS EVERY 30 MIN PRN
Status: CANCELLED | OUTPATIENT
Start: 2023-09-05

## 2023-09-05 RX ORDER — DIPHENHYDRAMINE HYDROCHLORIDE 50 MG/ML
50 INJECTION INTRAMUSCULAR; INTRAVENOUS
Status: CANCELLED
Start: 2023-09-05

## 2023-09-05 RX ORDER — ACETAMINOPHEN 325 MG/1
650 TABLET ORAL ONCE
Status: CANCELLED
Start: 2023-09-05 | End: 2023-09-05

## 2023-09-05 NOTE — TELEPHONE ENCOUNTER
Hello Team,  Please call pt to schedule Return/follow up visit soonest.    Please let RN know when scheduled.    Thank you.  Nena

## 2023-09-05 NOTE — TELEPHONE ENCOUNTER
RV:  Not scheduled (RTN in 6months)  LV: 3/6/23  Crohn's disease:  Current CD medications:               - Infliximab 10mg/kg q28 days      TB:  NEGATIVE 23    Infusion Facility:  [facility]  Renew Therapy Plan:  Inflectra 10 mg/kg q28 days (4 weeks)  Reorder Standing Labs: CBC, CRP, ESR, LFTs  Order TB:   no  Discontinue  Therapy Plan: yes    Last dose: 23  Next dose: 23    Patient therapy plan updated due to plan expiration. Patient continues on medication per last clinic encounter. Standing lab orders also updated in the patient chart.

## 2023-09-07 ENCOUNTER — TELEPHONE (OUTPATIENT)
Dept: GASTROENTEROLOGY | Facility: CLINIC | Age: 23
End: 2023-09-07
Payer: COMMERCIAL

## 2023-09-07 NOTE — TELEPHONE ENCOUNTER
Contacted pt to schedule appointment as provider is not available at time. Lvmx1, sent myc      *  Hello Team,  Please call pt to schedule Return/follow up visit soonest.     Please let RN know when scheduled.     Thank you.  Nena

## 2023-09-08 ENCOUNTER — INFUSION THERAPY VISIT (OUTPATIENT)
Dept: INFUSION THERAPY | Facility: CLINIC | Age: 23
End: 2023-09-08
Payer: COMMERCIAL

## 2023-09-08 VITALS
TEMPERATURE: 97.4 F | HEART RATE: 83 BPM | WEIGHT: 177.5 LBS | OXYGEN SATURATION: 100 % | SYSTOLIC BLOOD PRESSURE: 121 MMHG | DIASTOLIC BLOOD PRESSURE: 78 MMHG | BODY MASS INDEX: 22.79 KG/M2 | RESPIRATION RATE: 16 BRPM

## 2023-09-08 DIAGNOSIS — K50.014 CROHN'S DISEASE OF SMALL INTESTINE WITH ABSCESS (H): ICD-10-CM

## 2023-09-08 DIAGNOSIS — D50.9 IRON DEFICIENCY ANEMIA, UNSPECIFIED IRON DEFICIENCY ANEMIA TYPE: Primary | ICD-10-CM

## 2023-09-08 DIAGNOSIS — K52.9 COLITIS: ICD-10-CM

## 2023-09-08 PROCEDURE — 96413 CHEMO IV INFUSION 1 HR: CPT | Performed by: PHYSICIAN ASSISTANT

## 2023-09-08 RX ORDER — DIPHENHYDRAMINE HCL 25 MG
25 CAPSULE ORAL ONCE
Status: CANCELLED
Start: 2023-10-06 | End: 2023-10-06

## 2023-09-08 RX ORDER — ACETAMINOPHEN 325 MG/1
650 TABLET ORAL ONCE
Status: CANCELLED
Start: 2023-10-06 | End: 2023-10-06

## 2023-09-08 RX ORDER — ALBUTEROL SULFATE 90 UG/1
1-2 AEROSOL, METERED RESPIRATORY (INHALATION)
Status: CANCELLED
Start: 2023-10-06

## 2023-09-08 RX ORDER — METHYLPREDNISOLONE SODIUM SUCCINATE 125 MG/2ML
125 INJECTION, POWDER, LYOPHILIZED, FOR SOLUTION INTRAMUSCULAR; INTRAVENOUS
Status: CANCELLED
Start: 2023-10-06

## 2023-09-08 RX ORDER — MEPERIDINE HYDROCHLORIDE 25 MG/ML
25 INJECTION INTRAMUSCULAR; INTRAVENOUS; SUBCUTANEOUS EVERY 30 MIN PRN
Status: CANCELLED | OUTPATIENT
Start: 2023-10-06

## 2023-09-08 RX ORDER — EPINEPHRINE 1 MG/ML
0.3 INJECTION, SOLUTION INTRAMUSCULAR; SUBCUTANEOUS EVERY 5 MIN PRN
Status: CANCELLED | OUTPATIENT
Start: 2023-10-06

## 2023-09-08 RX ORDER — DIPHENHYDRAMINE HYDROCHLORIDE 50 MG/ML
50 INJECTION INTRAMUSCULAR; INTRAVENOUS
Status: CANCELLED
Start: 2023-10-06

## 2023-09-08 RX ORDER — ALBUTEROL SULFATE 0.83 MG/ML
2.5 SOLUTION RESPIRATORY (INHALATION)
Status: CANCELLED | OUTPATIENT
Start: 2023-10-06

## 2023-09-08 RX ADMIN — Medication 250 ML: at 13:21

## 2023-09-08 ASSESSMENT — PAIN SCALES - GENERAL: PAINLEVEL: NO PAIN (0)

## 2023-09-08 NOTE — PROGRESS NOTES
Infusion Nursing Note:  Ventura Quiroz presents today for Rapid Inflectra.    Patient seen by provider today: No   present during visit today: Not Applicable.    Note: The patient reports baseline diarrhea and states he is having 4-5 stools per day. He denies any other concerns at this time.      Intravenous Access:  Peripheral IV placed.    Treatment Conditions:  Biological Infusion Checklist:  ~~~ NOTE: If the patient answers yes to any of the questions below, hold the infusion and contact ordering provider or on-call provider.    Have you recently had an elevated temperature, fever, chills, productive cough, coughing for 3 weeks or longer or hemoptysis,  abnormal vital signs, night sweats,  chest pain or have you noticed a decrease in your appetite, unexplained weight loss or fatigue? No  Do you have any open wounds or new incisions? No  Do you have any upcoming hospitalizations or surgeries? Does not include esophagogastroduodenoscopy, colonoscopy, endoscopic retrograde cholangiopancreatography (ERCP), endoscopic ultrasound (EUS), dental procedures or joint aspiration/steroid injections No  Do you currently have any signs of illness or infection or are you on any antibiotics? No  Have you had any new, sudden or worsening abdominal pain? No  Have you or anyone in your household received a live vaccination in the past 4 weeks? Please note: No live vaccines while on biologic/chemotherapy until 6 months after the last treatment. Patient can receive the flu vaccine (shot only), pneumovax and the Covid vaccine. It is optimal for the patient to get these vaccines mid cycle, but they can be given at any time as long as it is not on the day of the infusion. No  Have you recently been diagnosed with any new nervous system diseases (ie. Multiple sclerosis, Guillain [place], seizures, neurological changes) or cancer diagnosis? Are you on any form of radiation or chemotherapy? No  Are you pregnant or breast feeding  or do you have plans of pregnancy in the future? No  Have you been having any signs of worsening depression or suicidal ideations?  (benlysta only) No  Have there been any other new onset medical symptoms? No  Have you had any new blood clots? (IVIG only) No      Post Infusion Assessment:  Patient tolerated infusion without incident.  Site patent and intact, free from redness, edema or discomfort.  No evidence of extravasations.  Access discontinued per protocol.  Biologic Infusion Post Education: Call the triage nurse at your clinic or seek medical attention if you have chills and/or temperature greater than or equal to 100.5, uncontrolled nausea/vomiting, diarrhea, constipation, dizziness, shortness of breath, chest pain, heart palpitations, weakness or any other new or concerning symptoms, questions or concerns.  You cannot have any live virus vaccines prior to or during treatment or up to 6 months post infusion.  If you have an upcoming surgery, medical procedure or dental procedure during treatment, this should be discussed with your ordering physician and your surgeon/dentist.  If you are having any concerning symptom, if you are unsure if you should get your next infusion or wish to speak to a provider before your next infusion, please call your care coordinator or triage nurse at your clinic to notify them so we can adequately serve you.       Discharge Plan:   AVS to patient via L[employer]ART.  Patient will return 10/6/23 for next appointment.   Patient discharged in stable condition accompanied by: self.  Departure Mode: Ambulatory.      Suzanne Sumner RN

## 2023-09-11 ENCOUNTER — TELEPHONE (OUTPATIENT)
Dept: GASTROENTEROLOGY | Facility: CLINIC | Age: 23
End: 2023-09-11
Payer: COMMERCIAL

## 2023-09-11 NOTE — TELEPHONE ENCOUNTER
Contacted pt to schedule appointment per IB received. No voicemail, sent myc, sent letter      *  Hello Team,  Please call pt to schedule Return/follow up visit soonest.     Please let RN know when scheduled.     Thank you.

## 2023-09-12 ENCOUNTER — TELEPHONE (OUTPATIENT)
Dept: GASTROENTEROLOGY | Facility: CLINIC | Age: 23
End: 2023-09-12
Payer: COMMERCIAL

## 2023-09-12 NOTE — TELEPHONE ENCOUNTER
received a message from LEXY PARMAR to help get Pt scheduled for a follow up appointment with an IBD JONA. Francor called and left message for Pt's mom Cami. Francor left call back number.      also sent Pt a GetO2 message.

## 2023-09-26 NOTE — TELEPHONE ENCOUNTER
Spoke with Cami - Ventura's mother and made appt for 1/3/23 at 2pm in -person visit.    [facility] Team,  Please schedule pt for CHELA Chavez on 1/3/23 at 2 pm in -person visit. Matias - pt's mother accepted the appt. RN put a hold on time slot.    Thank you.  Nena

## 2023-09-28 NOTE — TELEPHONE ENCOUNTER
----- Message from Nena Godinez RN sent at 8/23/2023  3:09 PM CDT -----  Regarding: FW: inflectra infusion    ----- Message -----  From: Tr Tubbs  Sent: 8/23/2023   8:51 AM CDT  To: Nena Godinez RN  Subject: RE: inflectra infusion                           I am looking into this one - per our authorization we have approval starting in January of this year through January of 2024 so not 100% sure what they are denying - I show he has coverage    Thank you,  Tr Tubbs   - Infusion Therapy   [facility]   [email or web address]  [email or web address]

## 2023-10-06 ENCOUNTER — LAB (OUTPATIENT)
Dept: LAB | Facility: CLINIC | Age: 23
End: 2023-10-06
Payer: COMMERCIAL

## 2023-10-06 ENCOUNTER — INFUSION THERAPY VISIT (OUTPATIENT)
Dept: INFUSION THERAPY | Facility: CLINIC | Age: 23
End: 2023-10-06
Payer: COMMERCIAL

## 2023-10-06 VITALS
HEART RATE: 95 BPM | SYSTOLIC BLOOD PRESSURE: 133 MMHG | WEIGHT: 169.5 LBS | OXYGEN SATURATION: 100 % | TEMPERATURE: 97.7 F | BODY MASS INDEX: 21.76 KG/M2 | DIASTOLIC BLOOD PRESSURE: 80 MMHG | RESPIRATION RATE: 16 BRPM

## 2023-10-06 DIAGNOSIS — D50.9 IRON DEFICIENCY ANEMIA, UNSPECIFIED IRON DEFICIENCY ANEMIA TYPE: Primary | ICD-10-CM

## 2023-10-06 DIAGNOSIS — K50.014 CROHN'S DISEASE OF SMALL INTESTINE WITH ABSCESS (H): ICD-10-CM

## 2023-10-06 DIAGNOSIS — K52.9 COLITIS: ICD-10-CM

## 2023-10-06 LAB
ALBUMIN SERPL BCG-MCNC: 4.8 G/DL (ref 3.5–5.2)
ALP SERPL-CCNC: 76 U/L (ref 40–129)
ALT SERPL W P-5'-P-CCNC: 23 U/L (ref 0–70)
AST SERPL W P-5'-P-CCNC: 32 U/L (ref 0–45)
BASO+EOS+MONOS # BLD AUTO: NORMAL 10*3/UL
BASO+EOS+MONOS NFR BLD AUTO: NORMAL %
BASOPHILS # BLD AUTO: 0 10E3/UL (ref 0–0.2)
BASOPHILS NFR BLD AUTO: 0 %
BILIRUB DIRECT SERPL-MCNC: <0.2 MG/DL (ref 0–0.3)
BILIRUB SERPL-MCNC: 0.6 MG/DL
CRP SERPL-MCNC: <3 MG/L
EOSINOPHIL # BLD AUTO: 0.1 10E3/UL (ref 0–0.7)
EOSINOPHIL NFR BLD AUTO: 1 %
ERYTHROCYTE [DISTWIDTH] IN BLOOD BY AUTOMATED COUNT: 12.7 % (ref 10–15)
ERYTHROCYTE [SEDIMENTATION RATE] IN BLOOD BY WESTERGREN METHOD: 2 MM/HR (ref 0–15)
HCT VFR BLD AUTO: 42.5 % (ref 40–53)
HGB BLD-MCNC: 14.5 G/DL (ref 13.3–17.7)
IMM GRANULOCYTES # BLD: 0 10E3/UL
IMM GRANULOCYTES NFR BLD: 0 %
LYMPHOCYTES # BLD AUTO: 1.8 10E3/UL (ref 0.8–5.3)
LYMPHOCYTES NFR BLD AUTO: 19 %
MCH RBC QN AUTO: 30.9 PG (ref 26.5–33)
MCHC RBC AUTO-ENTMCNC: 34.1 G/DL (ref 31.5–36.5)
MCV RBC AUTO: 90 FL (ref 78–100)
MONOCYTES # BLD AUTO: 0.7 10E3/UL (ref 0–1.3)
MONOCYTES NFR BLD AUTO: 7 %
NEUTROPHILS # BLD AUTO: 7.1 10E3/UL (ref 1.6–8.3)
NEUTROPHILS NFR BLD AUTO: 73 %
NRBC # BLD AUTO: 0 10E3/UL
NRBC BLD AUTO-RTO: 0 /100
PLATELET # BLD AUTO: 255 10E3/UL (ref 150–450)
PROT SERPL-MCNC: 7.8 G/DL (ref 6.4–8.3)
RBC # BLD AUTO: 4.7 10E6/UL (ref 4.4–5.9)
WBC # BLD AUTO: 9.8 10E3/UL (ref 4–11)

## 2023-10-06 PROCEDURE — 96413 CHEMO IV INFUSION 1 HR: CPT | Performed by: PHYSICIAN ASSISTANT

## 2023-10-06 PROCEDURE — 86140 C-REACTIVE PROTEIN: CPT | Performed by: PHYSICIAN ASSISTANT

## 2023-10-06 PROCEDURE — 80076 HEPATIC FUNCTION PANEL: CPT | Performed by: PHYSICIAN ASSISTANT

## 2023-10-06 PROCEDURE — 85025 COMPLETE CBC W/AUTO DIFF WBC: CPT | Performed by: PHYSICIAN ASSISTANT

## 2023-10-06 PROCEDURE — 36415 COLL VENOUS BLD VENIPUNCTURE: CPT | Performed by: PHYSICIAN ASSISTANT

## 2023-10-06 PROCEDURE — 85652 RBC SED RATE AUTOMATED: CPT | Performed by: PHYSICIAN ASSISTANT

## 2023-10-06 RX ORDER — ALBUTEROL SULFATE 90 UG/1
1-2 AEROSOL, METERED RESPIRATORY (INHALATION)
Status: CANCELLED
Start: 2023-11-03

## 2023-10-06 RX ORDER — ACETAMINOPHEN 325 MG/1
650 TABLET ORAL ONCE
Status: CANCELLED
Start: 2023-11-03 | End: 2023-11-03

## 2023-10-06 RX ORDER — EPINEPHRINE 1 MG/ML
0.3 INJECTION, SOLUTION, CONCENTRATE INTRAVENOUS EVERY 5 MIN PRN
Status: CANCELLED | OUTPATIENT
Start: 2023-11-03

## 2023-10-06 RX ORDER — DIPHENHYDRAMINE HCL 25 MG
25 CAPSULE ORAL ONCE
Status: CANCELLED
Start: 2023-11-03 | End: 2023-11-03

## 2023-10-06 RX ORDER — DIPHENHYDRAMINE HYDROCHLORIDE 50 MG/ML
50 INJECTION INTRAMUSCULAR; INTRAVENOUS
Status: CANCELLED
Start: 2023-11-03

## 2023-10-06 RX ORDER — EPINEPHRINE 1 MG/ML
0.3 INJECTION, SOLUTION INTRAMUSCULAR; SUBCUTANEOUS EVERY 5 MIN PRN
Status: CANCELLED | OUTPATIENT
Start: 2023-11-03

## 2023-10-06 RX ORDER — METHYLPREDNISOLONE SODIUM SUCCINATE 125 MG/2ML
125 INJECTION, POWDER, LYOPHILIZED, FOR SOLUTION INTRAMUSCULAR; INTRAVENOUS
Status: CANCELLED
Start: 2023-11-03

## 2023-10-06 RX ORDER — ALBUTEROL SULFATE 0.83 MG/ML
2.5 SOLUTION RESPIRATORY (INHALATION)
Status: CANCELLED | OUTPATIENT
Start: 2023-11-03

## 2023-10-06 RX ORDER — MEPERIDINE HYDROCHLORIDE 25 MG/ML
25 INJECTION INTRAMUSCULAR; INTRAVENOUS; SUBCUTANEOUS EVERY 30 MIN PRN
Status: CANCELLED | OUTPATIENT
Start: 2023-11-03

## 2023-10-06 RX ADMIN — Medication 250 ML: at 13:19

## 2023-10-06 ASSESSMENT — PAIN SCALES - GENERAL: PAINLEVEL: NO PAIN (0)

## 2023-10-06 NOTE — PROGRESS NOTES
Infusion Nursing Note:  Ventura Quiroz presents today for Inflectra.    Patient seen by provider today: No   present during visit today: Not Applicable.    Note: The patient reports feeling well today and denies any concerns at this time.      Intravenous Access:  Peripheral IV placed.    Treatment Conditions:  Biological Infusion Checklist:  ~~~ NOTE: If the patient answers yes to any of the questions below, hold the infusion and contact ordering provider or on-call provider.    Have you recently had an elevated temperature, fever, chills, productive cough, coughing for 3 weeks or longer or hemoptysis,  abnormal vital signs, night sweats,  chest pain or have you noticed a decrease in your appetite, unexplained weight loss or fatigue? No  Do you have any open wounds or new incisions? No  Do you have any upcoming hospitalizations or surgeries? Does not include esophagogastroduodenoscopy, colonoscopy, endoscopic retrograde cholangiopancreatography (ERCP), endoscopic ultrasound (EUS), dental procedures or joint aspiration/steroid injections No  Do you currently have any signs of illness or infection or are you on any antibiotics? No  Have you had any new, sudden or worsening abdominal pain? No  Have you or anyone in your household received a live vaccination in the past 4 weeks? Please note: No live vaccines while on biologic/chemotherapy until 6 months after the last treatment. Patient can receive the flu vaccine (shot only), pneumovax and the Covid vaccine. It is optimal for the patient to get these vaccines mid cycle, but they can be given at any time as long as it is not on the day of the infusion. No  Have you recently been diagnosed with any new nervous system diseases (ie. Multiple sclerosis, Guillain [place], seizures, neurological changes) or cancer diagnosis? Are you on any form of radiation or chemotherapy? No  Are you pregnant or breast feeding or do you have plans of pregnancy in the future?  No  Have you been having any signs of worsening depression or suicidal ideations?  (benlysta only) No  Have there been any other new onset medical symptoms? No  Have you had any new blood clots? (IVIG only) No      Post Infusion Assessment:  Patient tolerated infusion without incident.  Site patent and intact, free from redness, edema or discomfort.  No evidence of extravasations.  Access discontinued per protocol.  Biologic Infusion Post Education: Call the triage nurse at your clinic or seek medical attention if you have chills and/or temperature greater than or equal to 100.5, uncontrolled nausea/vomiting, diarrhea, constipation, dizziness, shortness of breath, chest pain, heart palpitations, weakness or any other new or concerning symptoms, questions or concerns.  You cannot have any live virus vaccines prior to or during treatment or up to 6 months post infusion.  If you have an upcoming surgery, medical procedure or dental procedure during treatment, this should be discussed with your ordering physician and your surgeon/dentist.  If you are having any concerning symptom, if you are unsure if you should get your next infusion or wish to speak to a provider before your next infusion, please call your care coordinator or triage nurse at your clinic to notify them so we can adequately serve you.       Discharge Plan:   AVS to patient via [employer]XposeHART.  Patient will return 11/3/23 for next appointment. Future appts have been reviewed and crosschecked with appt note and plan.   Patient discharged in stable condition accompanied by: self.  Departure Mode: Ambulatory.      Suzanne Sumner RN

## 2023-11-03 ENCOUNTER — INFUSION THERAPY VISIT (OUTPATIENT)
Dept: INFUSION THERAPY | Facility: CLINIC | Age: 23
End: 2023-11-03
Payer: COMMERCIAL

## 2023-11-03 VITALS
DIASTOLIC BLOOD PRESSURE: 72 MMHG | BODY MASS INDEX: 21.6 KG/M2 | OXYGEN SATURATION: 95 % | WEIGHT: 168.2 LBS | HEART RATE: 83 BPM | SYSTOLIC BLOOD PRESSURE: 125 MMHG | RESPIRATION RATE: 16 BRPM | TEMPERATURE: 97.7 F

## 2023-11-03 DIAGNOSIS — D50.9 IRON DEFICIENCY ANEMIA, UNSPECIFIED IRON DEFICIENCY ANEMIA TYPE: Primary | ICD-10-CM

## 2023-11-03 DIAGNOSIS — K50.014 CROHN'S DISEASE OF SMALL INTESTINE WITH ABSCESS (H): ICD-10-CM

## 2023-11-03 DIAGNOSIS — K52.9 COLITIS: ICD-10-CM

## 2023-11-03 PROCEDURE — 96413 CHEMO IV INFUSION 1 HR: CPT | Performed by: PHYSICIAN ASSISTANT

## 2023-11-03 RX ORDER — METHYLPREDNISOLONE SODIUM SUCCINATE 125 MG/2ML
125 INJECTION, POWDER, LYOPHILIZED, FOR SOLUTION INTRAMUSCULAR; INTRAVENOUS
Status: CANCELLED
Start: 2023-12-01

## 2023-11-03 RX ORDER — MEPERIDINE HYDROCHLORIDE 25 MG/ML
25 INJECTION INTRAMUSCULAR; INTRAVENOUS; SUBCUTANEOUS EVERY 30 MIN PRN
Status: CANCELLED | OUTPATIENT
Start: 2023-12-01

## 2023-11-03 RX ORDER — ACETAMINOPHEN 325 MG/1
650 TABLET ORAL ONCE
Status: CANCELLED
Start: 2023-12-01 | End: 2023-12-01

## 2023-11-03 RX ORDER — ALBUTEROL SULFATE 0.83 MG/ML
2.5 SOLUTION RESPIRATORY (INHALATION)
Status: CANCELLED | OUTPATIENT
Start: 2023-12-01

## 2023-11-03 RX ORDER — ALBUTEROL SULFATE 90 UG/1
1-2 AEROSOL, METERED RESPIRATORY (INHALATION)
Status: CANCELLED
Start: 2023-12-01

## 2023-11-03 RX ORDER — EPINEPHRINE 1 MG/ML
0.3 INJECTION, SOLUTION INTRAMUSCULAR; SUBCUTANEOUS EVERY 5 MIN PRN
Status: CANCELLED | OUTPATIENT
Start: 2023-12-01

## 2023-11-03 RX ORDER — DIPHENHYDRAMINE HCL 25 MG
25 CAPSULE ORAL ONCE
Status: CANCELLED
Start: 2023-12-01 | End: 2023-12-01

## 2023-11-03 RX ORDER — DIPHENHYDRAMINE HYDROCHLORIDE 50 MG/ML
50 INJECTION INTRAMUSCULAR; INTRAVENOUS
Status: CANCELLED
Start: 2023-12-01

## 2023-11-03 RX ADMIN — Medication 250 ML: at 13:27

## 2023-11-03 ASSESSMENT — PAIN SCALES - GENERAL: PAINLEVEL: NO PAIN (0)

## 2023-11-03 NOTE — PROGRESS NOTES
Infusion Nursing Note:  Ventura Quiroz presents today for Rapid Inflectra.    Patient seen by provider today: No   present during visit today: Not Applicable.    Note: The patient reports feeling well today and denies any concerns at this time.      Intravenous Access:  Peripheral IV placed.    Treatment Conditions:  Biological Infusion Checklist:  ~~~ NOTE: If the patient answers yes to any of the questions below, hold the infusion and contact ordering provider or on-call provider.    Have you recently had an elevated temperature, fever, chills, productive cough, coughing for 3 weeks or longer or hemoptysis,  abnormal vital signs, night sweats,  chest pain or have you noticed a decrease in your appetite, unexplained weight loss or fatigue? No  Do you have any open wounds or new incisions? No  Do you have any upcoming hospitalizations or surgeries? Does not include esophagogastroduodenoscopy, colonoscopy, endoscopic retrograde cholangiopancreatography (ERCP), endoscopic ultrasound (EUS), dental procedures or joint aspiration/steroid injections No  Do you currently have any signs of illness or infection or are you on any antibiotics? No  Have you had any new, sudden or worsening abdominal pain? No  Have you or anyone in your household received a live vaccination in the past 4 weeks? Please note: No live vaccines while on biologic/chemotherapy until 6 months after the last treatment. Patient can receive the flu vaccine (shot only), pneumovax and the Covid vaccine. It is optimal for the patient to get these vaccines mid cycle, but they can be given at any time as long as it is not on the day of the infusion. No  Have you recently been diagnosed with any new nervous system diseases (ie. Multiple sclerosis, Guillain [place], seizures, neurological changes) or cancer diagnosis? Are you on any form of radiation or chemotherapy? No  Are you pregnant or breast feeding or do you have plans of pregnancy in the future?  No  Have you been having any signs of worsening depression or suicidal ideations?  (benlysta only) No  Have there been any other new onset medical symptoms? No  Have you had any new blood clots? (IVIG only) No      Post Infusion Assessment:  Patient tolerated infusion without incident.  Site patent and intact, free from redness, edema or discomfort.  No evidence of extravasations.  Access discontinued per protocol.  Biologic Infusion Post Education: Call the triage nurse at your clinic or seek medical attention if you have chills and/or temperature greater than or equal to 100.5, uncontrolled nausea/vomiting, diarrhea, constipation, dizziness, shortness of breath, chest pain, heart palpitations, weakness or any other new or concerning symptoms, questions or concerns.  You cannot have any live virus vaccines prior to or during treatment or up to 6 months post infusion.  If you have an upcoming surgery, medical procedure or dental procedure during treatment, this should be discussed with your ordering physician and your surgeon/dentist.  If you are having any concerning symptom, if you are unsure if you should get your next infusion or wish to speak to a provider before your next infusion, please call your care coordinator or triage nurse at your clinic to notify them so we can adequately serve you.       Discharge Plan:   AVS to patient via [email or web address].  Patient will return 12/1/23 for next appointment. Future appts have been reviewed and crosschecked with appt note and plan.   Patient discharged in stable condition accompanied by: self.  Departure Mode: Ambulatory.      Suzanne Sumner RN

## 2023-12-01 ENCOUNTER — LAB (OUTPATIENT)
Dept: LAB | Facility: CLINIC | Age: 23
End: 2023-12-01
Payer: COMMERCIAL

## 2023-12-01 ENCOUNTER — INFUSION THERAPY VISIT (OUTPATIENT)
Dept: INFUSION THERAPY | Facility: CLINIC | Age: 23
End: 2023-12-01
Attending: PHYSICIAN ASSISTANT
Payer: COMMERCIAL

## 2023-12-01 VITALS
TEMPERATURE: 97.6 F | DIASTOLIC BLOOD PRESSURE: 84 MMHG | HEART RATE: 76 BPM | BODY MASS INDEX: 21.65 KG/M2 | OXYGEN SATURATION: 99 % | WEIGHT: 168.6 LBS | RESPIRATION RATE: 18 BRPM | SYSTOLIC BLOOD PRESSURE: 124 MMHG

## 2023-12-01 DIAGNOSIS — K50.014 CROHN'S DISEASE OF SMALL INTESTINE WITH ABSCESS (H): ICD-10-CM

## 2023-12-01 DIAGNOSIS — D50.9 IRON DEFICIENCY ANEMIA, UNSPECIFIED IRON DEFICIENCY ANEMIA TYPE: Primary | ICD-10-CM

## 2023-12-01 DIAGNOSIS — K52.9 COLITIS: ICD-10-CM

## 2023-12-01 LAB
ALBUMIN SERPL BCG-MCNC: 4.3 G/DL (ref 3.5–5.2)
ALP SERPL-CCNC: 76 U/L (ref 40–150)
ALT SERPL W P-5'-P-CCNC: 58 U/L (ref 0–70)
AST SERPL W P-5'-P-CCNC: 31 U/L (ref 0–45)
BASOPHILS # BLD AUTO: 0 10E3/UL (ref 0–0.2)
BASOPHILS NFR BLD AUTO: 0 %
BILIRUB DIRECT SERPL-MCNC: <0.2 MG/DL (ref 0–0.3)
BILIRUB SERPL-MCNC: 0.4 MG/DL
CRP SERPL-MCNC: 3.71 MG/L
EOSINOPHIL # BLD AUTO: 0.4 10E3/UL (ref 0–0.7)
EOSINOPHIL NFR BLD AUTO: 5 %
ERYTHROCYTE [DISTWIDTH] IN BLOOD BY AUTOMATED COUNT: 13.1 % (ref 10–15)
ERYTHROCYTE [SEDIMENTATION RATE] IN BLOOD BY WESTERGREN METHOD: 5 MM/HR (ref 0–15)
HCT VFR BLD AUTO: 41.4 % (ref 40–53)
HGB BLD-MCNC: 13.8 G/DL (ref 13.3–17.7)
IMM GRANULOCYTES # BLD: 0 10E3/UL
IMM GRANULOCYTES NFR BLD: 0 %
LYMPHOCYTES # BLD AUTO: 1.6 10E3/UL (ref 0.8–5.3)
LYMPHOCYTES NFR BLD AUTO: 21 %
MCH RBC QN AUTO: 29.9 PG (ref 26.5–33)
MCHC RBC AUTO-ENTMCNC: 33.3 G/DL (ref 31.5–36.5)
MCV RBC AUTO: 90 FL (ref 78–100)
MONOCYTES # BLD AUTO: 0.8 10E3/UL (ref 0–1.3)
MONOCYTES NFR BLD AUTO: 11 %
NEUTROPHILS # BLD AUTO: 5 10E3/UL (ref 1.6–8.3)
NEUTROPHILS NFR BLD AUTO: 63 %
NRBC # BLD AUTO: 0 10E3/UL
NRBC BLD AUTO-RTO: 0 /100
PLATELET # BLD AUTO: 235 10E3/UL (ref 150–450)
PROT SERPL-MCNC: 7.3 G/DL (ref 6.4–8.3)
RBC # BLD AUTO: 4.62 10E6/UL (ref 4.4–5.9)
WBC # BLD AUTO: 7.8 10E3/UL (ref 4–11)

## 2023-12-01 PROCEDURE — 85652 RBC SED RATE AUTOMATED: CPT | Performed by: PHYSICIAN ASSISTANT

## 2023-12-01 PROCEDURE — 96413 CHEMO IV INFUSION 1 HR: CPT | Performed by: PHYSICIAN ASSISTANT

## 2023-12-01 PROCEDURE — 80076 HEPATIC FUNCTION PANEL: CPT | Performed by: PHYSICIAN ASSISTANT

## 2023-12-01 PROCEDURE — 85025 COMPLETE CBC W/AUTO DIFF WBC: CPT | Performed by: PHYSICIAN ASSISTANT

## 2023-12-01 PROCEDURE — 36415 COLL VENOUS BLD VENIPUNCTURE: CPT | Performed by: PHYSICIAN ASSISTANT

## 2023-12-01 PROCEDURE — 86140 C-REACTIVE PROTEIN: CPT | Performed by: PHYSICIAN ASSISTANT

## 2023-12-01 RX ORDER — ALBUTEROL SULFATE 90 UG/1
1-2 AEROSOL, METERED RESPIRATORY (INHALATION)
Start: 2023-12-29

## 2023-12-01 RX ORDER — METHYLPREDNISOLONE SODIUM SUCCINATE 125 MG/2ML
125 INJECTION, POWDER, LYOPHILIZED, FOR SOLUTION INTRAMUSCULAR; INTRAVENOUS
Start: 2023-12-29

## 2023-12-01 RX ORDER — MEPERIDINE HYDROCHLORIDE 25 MG/ML
25 INJECTION INTRAMUSCULAR; INTRAVENOUS; SUBCUTANEOUS EVERY 30 MIN PRN
Status: CANCELLED | OUTPATIENT
Start: 2023-12-29

## 2023-12-01 RX ORDER — DIPHENHYDRAMINE HYDROCHLORIDE 50 MG/ML
50 INJECTION INTRAMUSCULAR; INTRAVENOUS
Start: 2023-12-29

## 2023-12-01 RX ORDER — EPINEPHRINE 1 MG/ML
0.3 INJECTION, SOLUTION INTRAMUSCULAR; SUBCUTANEOUS EVERY 5 MIN PRN
OUTPATIENT
Start: 2023-12-29

## 2023-12-01 RX ORDER — ACETAMINOPHEN 325 MG/1
650 TABLET ORAL ONCE
Status: CANCELLED
Start: 2023-12-29 | End: 2023-12-29

## 2023-12-01 RX ORDER — DIPHENHYDRAMINE HCL 25 MG
25 CAPSULE ORAL ONCE
Start: 2023-12-29 | End: 2023-12-29

## 2023-12-01 RX ORDER — ACETAMINOPHEN 325 MG/1
650 TABLET ORAL ONCE
Start: 2023-12-29 | End: 2023-12-29

## 2023-12-01 RX ORDER — MEPERIDINE HYDROCHLORIDE 25 MG/ML
25 INJECTION INTRAMUSCULAR; INTRAVENOUS; SUBCUTANEOUS EVERY 30 MIN PRN
OUTPATIENT
Start: 2023-12-29

## 2023-12-01 RX ORDER — DIPHENHYDRAMINE HYDROCHLORIDE 50 MG/ML
50 INJECTION INTRAMUSCULAR; INTRAVENOUS
Status: CANCELLED
Start: 2023-12-29

## 2023-12-01 RX ORDER — DIPHENHYDRAMINE HCL 25 MG
25 CAPSULE ORAL ONCE
Status: CANCELLED
Start: 2023-12-29 | End: 2023-12-29

## 2023-12-01 RX ORDER — EPINEPHRINE 1 MG/ML
0.3 INJECTION, SOLUTION, CONCENTRATE INTRAVENOUS EVERY 5 MIN PRN
Status: CANCELLED | OUTPATIENT
Start: 2023-12-29

## 2023-12-01 RX ORDER — ALBUTEROL SULFATE 0.83 MG/ML
2.5 SOLUTION RESPIRATORY (INHALATION)
OUTPATIENT
Start: 2023-12-29

## 2023-12-01 RX ORDER — ALBUTEROL SULFATE 90 UG/1
1-2 AEROSOL, METERED RESPIRATORY (INHALATION)
Status: CANCELLED
Start: 2023-12-29

## 2023-12-01 RX ORDER — ALBUTEROL SULFATE 0.83 MG/ML
2.5 SOLUTION RESPIRATORY (INHALATION)
Status: CANCELLED | OUTPATIENT
Start: 2023-12-29

## 2023-12-01 RX ADMIN — Medication 250 ML: at 13:21

## 2023-12-01 ASSESSMENT — PAIN SCALES - GENERAL: PAINLEVEL: NO PAIN (0)

## 2023-12-01 NOTE — PROGRESS NOTES
Infusion Nursing Note:  Ventura Quiroz presents today for Rapid Infliximab.    Patient seen by provider today: No   present during visit today: Not Applicable.    Note: Pt denies any new medical concerns. The pt reports tolerating their treatments well.       Intravenous Access:  Peripheral IV placed.    Treatment Conditions:  Biological Infusion Checklist:  ~~~ NOTE: If the patient answers yes to any of the questions below, hold the infusion and contact ordering provider or on-call provider.    Have you recently had an elevated temperature, fever, chills, productive cough, coughing for 3 weeks or longer or hemoptysis,  abnormal vital signs, night sweats,  chest pain or have you noticed a decrease in your appetite, unexplained weight loss or fatigue? No  Do you have any open wounds or new incisions? No  Do you have any upcoming hospitalizations or surgeries? Does not include esophagogastroduodenoscopy, colonoscopy, endoscopic retrograde cholangiopancreatography (ERCP), endoscopic ultrasound (EUS), dental procedures or joint aspiration/steroid injections No  Do you currently have any signs of illness or infection or are you on any antibiotics? No  Have you had any new, sudden or worsening abdominal pain? No  Have you or anyone in your household received a live vaccination in the past 4 weeks? Please note: No live vaccines while on biologic/chemotherapy until 6 months after the last treatment. Patient can receive the flu vaccine (shot only), pneumovax and the Covid vaccine. It is optimal for the patient to get these vaccines mid cycle, but they can be given at any time as long as it is not on the day of the infusion. No  Have you recently been diagnosed with any new nervous system diseases (ie. Multiple sclerosis, Guillain Snowflake, seizures, neurological changes) or cancer diagnosis? Are you on any form of radiation or chemotherapy? No  Are you pregnant or breast feeding or do you have plans of pregnancy in  the future? No  Have you been having any signs of worsening depression or suicidal ideations?  (benlysta only) No  Have there been any other new onset medical symptoms? No  Have you had any new blood clots? (IVIG only) No      Post Infusion Assessment:  Patient tolerated infusion without incident.  Blood return noted pre and post infusion.  Site patent and intact, free from redness, edema or discomfort.  No evidence of extravasations.  Access discontinued per protocol.  Biologic Infusion Post Education: Call the triage nurse at your clinic or seek medical attention if you have chills and/or temperature greater than or equal to 100.5, uncontrolled nausea/vomiting, diarrhea, constipation, dizziness, shortness of breath, chest pain, heart palpitations, weakness or any other new or concerning symptoms, questions or concerns.  You cannot have any live virus vaccines prior to or during treatment or up to 6 months post infusion.  If you have an upcoming surgery, medical procedure or dental procedure during treatment, this should be discussed with your ordering physician and your surgeon/dentist.  If you are having any concerning symptom, if you are unsure if you should get your next infusion or wish to speak to a provider before your next infusion, please call your care coordinator or triage nurse at your clinic to notify them so we can adequately serve you.       Discharge Plan:   Patient and/or family verbalized understanding of discharge instructions and all questions answered.  AVS to patient via [employer].  Patient will return 12/19 for next appointment. Future appts have been reviewed and crosschecked with appt note and plan.  Patient discharged in stable condition accompanied by: self.  Departure Mode: Ambulatory.      June Bhakta RN

## 2023-12-08 ENCOUNTER — TELEPHONE (OUTPATIENT)
Dept: GASTROENTEROLOGY | Facility: CLINIC | Age: 23
End: 2023-12-08

## 2023-12-08 NOTE — TELEPHONE ENCOUNTER
Spoke with Cami Sanchez pt's mother, discuss the need for Ventura to return our call to transition him to [facility]. Gave my contact number and Marta's # for pt to call back.

## 2023-12-08 NOTE — TELEPHONE ENCOUNTER
----- Message from Marta Grullon sent at 12/8/2023  7:56 AM CST -----  Regarding: Infusion Appointment 12.29.23  Good Morning,   I wanted to let you know that I am struggling to get a hold of Ventura to let him know about his Infusion appointment on 12.29.23 needing to be moved to [facility] due to this [facility] Transition. I have sent a my chart message as well as left 3 voice messages to return my call. Let me know if you have any other contact ideas to be able to get in touch with the patient.     Thank you for your continued support,    Marta Grullon  Pharmacy Operations [facility]  Office: [phone]  Fax: [phone]  [email or web address]

## 2023-12-11 DIAGNOSIS — K50.014 CROHN'S DISEASE OF SMALL INTESTINE WITH ABSCESS (H): Primary | ICD-10-CM

## 2023-12-14 ENCOUNTER — VIRTUAL VISIT (OUTPATIENT)
Dept: GASTROENTEROLOGY | Facility: CLINIC | Age: 23
End: 2023-12-14
Attending: PHYSICIAN ASSISTANT
Payer: COMMERCIAL

## 2023-12-14 DIAGNOSIS — K50.90 CROHN DISEASE (H): Primary | ICD-10-CM

## 2023-12-14 RX ORDER — INFLIXIMAB 100 MG/10ML
10 INJECTION, POWDER, LYOPHILIZED, FOR SOLUTION INTRAVENOUS
Start: 2023-12-14 | End: 2023-12-15

## 2023-12-14 NOTE — Clinical Note
2023         RE: Ventura Quiroz  [address]  [place] [id number]        Dear Colleague,    Thank you for referring your patient, Ventura Quiroz, to the [facility]. Please see a copy of my visit note below.    Medication Therapy Management (MTM) Encounter    ASSESSMENT:                            Medication Adherence/Access: No issues identified    Crohn's Disease:  Ventura would benefit from continued treatment with infliximab. They will transition from [place] to [facility]. They are up to date on routine maintenance labs. Their pre-biologic screening is completed. They are up to date on annual tuberculosis screening.     PLAN:                            I will coordinate with the nurse coordinators and [facility] to get your infusion plan in place. Please reach out to myself with any questions about the transition. Any questions about \[facility]\"" can be directed to Drea Morrsisey who can be reached Mon-Fri 8am-4pm at [phone].     Follow-up: Health maintenance visit with MTM in 3 months    SUBJECTIVE/OBJECTIVE:                          Ventura Quiroz is a 23 year old male called for an initial visit. He was referred to me from Jon Chavez PA-C.      Reason for visit: MG to [facility] Transition.    Allergies/ADRs: Reviewed in chart  Past Medical History: Reviewed in chart  Tobacco: He reports that he has never smoked. He uses smokeless tobacco.  Alcohol: 1-3 beverages / week      Medication Adherence/Access: no issues reported    Crohn's Disease:   Infliximab 10 mg/kg every 28 days    Last infusion: 23  Next infusion: 23  Last provider visit: 3/6/23 Dr. Wright  Next provider visit: 1/3/24 Jon Chavez  Last labs completed: 23  Lab frequency: every other infusion   - standing labs yes, hepatic, esr, crp, cbc with platelets and diff  24  Next labs due: around January-2024  Annual tuberculosis screening: negative 23  Hep B  surface antigen: nonreactive 4/9/2019  Hep B surface antibody: nonreactive 4/9/2019  Hep B core antibody: nonreactive 4/9/2019  Last IBD Health Maintenance Review: none by MTM  ----------------      I spent 7 minutes with this patient today. All changes were made via collaborative practice agreement with Jon Chavez PA-C. A copy of the visit note was provided to the patient's provider(s).    A summary of these recommendations was sent via [employer].    Yunior England, PharmD, BCPS  MTM Pharmacist   [facility]  Phone: [phone]    Telemedicine Visit Details  Type of service:  Telephone visit  Start Time:  3:30 PM  End Time: 3:37 PM     Medication Therapy Recommendations  No medication therapy recommendations to display       Medication Therapy Management (MTM) Encounter    ASSESSMENT:                            Medication Adherence/Access: No issues identified    Crohn's Disease:  Ventura would benefit from continued treatment with infliximab. They will transition from [place] to [facility]. They are up to date on routine maintenance labs. Their pre-biologic screening is completed. They are up to date on annual tuberculosis screening.     PLAN:                            I will coordinate with the nurse coordinators and [facility] to get your infusion plan in place. Please reach out to myself with any questions about the transition. Any questions about \[facility]\"" can be directed to Drea Morrissey who can be reached Mon-Fri 8am-4pm at [phone].   Regarding how your medication will get to you, you will not need to pick it up from anywhere. [facility] uses [facility] as their  service and if they are not familiar with this service, they deliver it right from our pharmacy to the patient home, signature is required however. \[facility]\"" will schedule a 2 hour delivery window with the patient to arrive typically the day prior to the infusion. Depending on  stability of the medication, it can be earlier than the day prior or it might have to be same day delivery. [employer] delivers from 8am-9pm Mon-Saturday. Usually Monday infusion would be delivered on a Friday or Saturday.     Follow-up: Health maintenance visit with MTM in 3 months    SUBJECTIVE/OBJECTIVE:                          Ventura Quiroz is a 23 year old male called for an initial visit. He was referred to me from Jon Chavez PA-C.      Reason for visit: MG to [facility] Transition.    Allergies/ADRs: Reviewed in chart  Past Medical History: Reviewed in chart  Tobacco: He reports that he has never smoked. He uses smokeless tobacco.  Alcohol: 1-3 beverages / week      Medication Adherence/Access: no issues reported    Crohn's Disease:   Infliximab 10 mg/kg every 28 days    Believes medication is effective. Denies side effects or concerns.    Last infusion: 23  Next infusion: 23  Last provider visit: 3/6/23 Dr. Wright  Next provider visit: 1/3/24 Jon Chavez  Last labs completed: 23  Lab frequency: every other infusion   - standing labs yes, hepatic, esr, crp, cbc with platelets and diff  24  Next labs due: around January-2024  Annual tuberculosis screening: negative 23  Hep B surface antigen: nonreactive 2019  Hep B surface antibody: nonreactive 2019  Hep B core antibody: nonreactive 2019  Last IBD Health Maintenance Review: none by MTM  ----------------      I spent 7 minutes with this patient today. All changes were made via collaborative practice agreement with Jon Chavez PA-C. A copy of the visit note was provided to the patient's provider(s).    A summary of these recommendations was sent via [employer].    Yunior England, PharmD, BCPS  [facility] Pharmacist   [facility]  Phone: [phone]    Telemedicine Visit Details  Type of service:  Telephone visit  Start Time:  3:30 PM  End Time: 3:37 PM     Medication Therapy Recommendations  No  medication therapy recommendations to display         Again, thank you for allowing me to participate in the care of your patient.        Sincerely,        Yunior England RPH

## 2023-12-14 NOTE — PATIENT INSTRUCTIONS
"Recommendations from today's MT visit:                                                      I will coordinate with the nurse coordinators and [facility] to get your infusion plan in place. Please reach out to myself with any questions about the transition. Any questions about I can be directed to Dera Morrissey who can be reached Mon-Fri 8am-4pm at [phone].   Regarding how your medication will get to you, you will not need to pick it up from anywhere. [facility] uses [employer] as their  service and if they are not familiar with this service, they deliver it right from our pharmacy to the patient home, signature is required however. [facility] will schedule a 2 hour delivery window with the patient to arrive typically the day prior to the infusion. Depending on stability of the medication, it can be earlier than the day prior or it might have to be same day delivery. [employer] delivers from 8am-9pm Mon-Saturday. Usually Monday infusion would be delivered on a Friday or Saturday.     Follow-up: Health maintenance visit with MTM in 3 months    It was great speaking with you today.  I value your experience and would be very thankful for your time in providing feedback in our clinic survey. In the next few days, you may receive an email or text message from [employer] with a link to a survey related to your  clinical pharmacist.\"     To schedule another MTM appointment, please call the clinic directly or you may call the MTM scheduling line at [phone] or toll-free at [phone].     My Clinical Pharmacist's contact information:                                                      Please feel free to contact me with any questions or concerns you have.      Yunior England, PharmD, BCPS  MTM Pharmacist   [facility]  Phone: [phone]   "

## 2023-12-14 NOTE — PROGRESS NOTES
Medication Therapy Management (MTM) Encounter    ASSESSMENT:                            Medication Adherence/Access: No issues identified    Crohn's Disease:  Ventura would benefit from continued treatment with infliximab. They will transition from [place] to [facility]. They are up to date on routine maintenance labs. Their pre-biologic screening is completed. They are up to date on annual tuberculosis screening.     PLAN:                            I will coordinate with the nurse coordinators and [facility] to get your infusion plan in place. Please reach out to myself with any questions about the transition. Any questions about I can be directed to Drea Morrissey who can be reached Mon-Fri 8am-4pm at [phone].   Regarding how your medication will get to you, you will not need to pick it up from anywhere. [facility] uses [employer] [facility] as their  service and if they are not familiar with this service, they deliver it right from our pharmacy to the patient home, signature is required however. [facility] will schedule a 2 hour delivery window with the patient to arrive typically the day prior to the infusion. Depending on stability of the medication, it can be earlier than the day prior or it might have to be same day delivery. [employer] delivers from 8am-9pm Mon-Saturday. Usually Monday infusion would be delivered on a Friday or Saturday.     Follow-up: Health maintenance visit with MTM in 3 months    SUBJECTIVE/OBJECTIVE:                          Ventura Quiroz is a 23 year old male called for an initial visit. He was referred to me from Jon Chavez PA-C.      Reason for visit: MG to [facility] Transition.    Allergies/ADRs: Reviewed in chart  Past Medical History: Reviewed in chart  Tobacco: He reports that he has never smoked. He uses smokeless tobacco.  Alcohol: 1-3 beverages / week      Medication Adherence/Access: no issues reported    Crohn's Disease:   Infliximab 10 mg/kg  every 28 days    Believes medication is effective. Denies side effects or concerns.    Last infusion: 23  Next infusion: 23  Last provider visit: 3/6/23 Dr. Wright  Next provider visit: 1/3/24 Jon Chavez  Last labs completed: 23  Lab frequency: every other infusion   - standing labs yes, hepatic, esr, crp, cbc with platelets and diff  24  Next labs due: around January-2024  Annual tuberculosis screening: negative 23  Hep B surface antigen: nonreactive 2019  Hep B surface antibody: nonreactive 2019  Hep B core antibody: nonreactive 2019  Last IBD Health Maintenance Review: none by MTDEMARCUS  ----------------      I spent 7 minutes with this patient today. All changes were made via collaborative practice agreement with Jon Chavez PA-C. A copy of the visit note was provided to the patient's provider(s).    A summary of these recommendations was sent via [employer].    Yunior England, PharmD, BCPS  [facility] Pharmacist   [facility]  Phone: [phone]    Telemedicine Visit Details  Type of service:  Telephone visit  Start Time:  3:30 PM  End Time: 3:37 PM     Medication Therapy Recommendations  No medication therapy recommendations to display

## 2023-12-15 RX ORDER — INFLIXIMAB-DYYB 100 MG/10ML
10 INJECTION, POWDER, LYOPHILIZED, FOR SOLUTION INTRAVENOUS
Status: SHIPPED
Start: 2023-12-15

## 2023-12-21 ENCOUNTER — CARE COORDINATION (OUTPATIENT)
Dept: PHARMACY | Facility: CLINIC | Age: 23
End: 2023-12-21
Payer: COMMERCIAL

## 2023-12-21 NOTE — PROGRESS NOTES
Referred to [facility]    Ventura Quiroz, 2000  Medication (name, frequency and route):  Rapid Inflectra q4wks   Start of Care Date: 12/30/23 (Confirmed with patient)  Infusion location: Patient Home  Skilled Nursing will be provided by: [facility]  @ [phone]    Drea Peñaloza RN

## 2023-12-30 ENCOUNTER — DOCUMENTATION ONLY (OUTPATIENT)
Dept: PHARMACY | Facility: CLINIC | Age: 23
End: 2023-12-30

## 2023-12-30 ENCOUNTER — LAB REQUISITION (OUTPATIENT)
Dept: LAB | Facility: CLINIC | Age: 23
End: 2023-12-30
Payer: COMMERCIAL

## 2023-12-30 DIAGNOSIS — K50.014 CROHN'S DISEASE OF SMALL INTESTINE WITH ABSCESS (H): ICD-10-CM

## 2023-12-30 LAB
ALBUMIN SERPL BCG-MCNC: 4.3 G/DL (ref 3.5–5.2)
ALP SERPL-CCNC: 75 U/L (ref 40–150)
ALT SERPL W P-5'-P-CCNC: 19 U/L (ref 0–70)
AST SERPL W P-5'-P-CCNC: 18 U/L (ref 0–45)
BASOPHILS # BLD AUTO: 0 10E3/UL (ref 0–0.2)
BASOPHILS NFR BLD AUTO: 0 %
BILIRUB DIRECT SERPL-MCNC: <0.2 MG/DL (ref 0–0.3)
BILIRUB SERPL-MCNC: 0.5 MG/DL
CRP SERPL-MCNC: <3 MG/L
EOSINOPHIL # BLD AUTO: 0.2 10E3/UL (ref 0–0.7)
EOSINOPHIL NFR BLD AUTO: 3 %
ERYTHROCYTE [DISTWIDTH] IN BLOOD BY AUTOMATED COUNT: 13.1 % (ref 10–15)
ERYTHROCYTE [SEDIMENTATION RATE] IN BLOOD BY WESTERGREN METHOD: 2 MM/HR (ref 0–15)
HCT VFR BLD AUTO: 42.2 % (ref 40–53)
HGB BLD-MCNC: 13.7 G/DL (ref 13.3–17.7)
IMM GRANULOCYTES # BLD: 0 10E3/UL
IMM GRANULOCYTES NFR BLD: 0 %
LYMPHOCYTES # BLD AUTO: 1.8 10E3/UL (ref 0.8–5.3)
LYMPHOCYTES NFR BLD AUTO: 23 %
MCH RBC QN AUTO: 28.7 PG (ref 26.5–33)
MCHC RBC AUTO-ENTMCNC: 32.5 G/DL (ref 31.5–36.5)
MCV RBC AUTO: 88 FL (ref 78–100)
MONOCYTES # BLD AUTO: 0.6 10E3/UL (ref 0–1.3)
MONOCYTES NFR BLD AUTO: 8 %
NEUTROPHILS # BLD AUTO: 5.1 10E3/UL (ref 1.6–8.3)
NEUTROPHILS NFR BLD AUTO: 66 %
NRBC # BLD AUTO: 0 10E3/UL
NRBC BLD AUTO-RTO: 0 /100
PLATELET # BLD AUTO: 232 10E3/UL (ref 150–450)
PROT SERPL-MCNC: 7.1 G/DL (ref 6.4–8.3)
RBC # BLD AUTO: 4.78 10E6/UL (ref 4.4–5.9)
WBC # BLD AUTO: 7.8 10E3/UL (ref 4–11)

## 2023-12-30 PROCEDURE — 86140 C-REACTIVE PROTEIN: CPT | Performed by: PHYSICIAN ASSISTANT

## 2023-12-30 PROCEDURE — 85025 COMPLETE CBC W/AUTO DIFF WBC: CPT | Performed by: PHYSICIAN ASSISTANT

## 2023-12-30 PROCEDURE — 80076 HEPATIC FUNCTION PANEL: CPT | Performed by: PHYSICIAN ASSISTANT

## 2023-12-30 PROCEDURE — 36415 COLL VENOUS BLD VENIPUNCTURE: CPT | Performed by: PHYSICIAN ASSISTANT

## 2023-12-30 PROCEDURE — 85652 RBC SED RATE AUTOMATED: CPT | Performed by: PHYSICIAN ASSISTANT

## 2023-12-30 NOTE — PROGRESS NOTES
Skilled Nurse visit in the Patient Home to administer inflectra 800mg.  No recent elevated temperature, fever, chills, productive cough, coughing for 3 weeks or longer or hemoptysis, abnormal vital signs, night sweats, chest pain. No  decrease in your appetite, unexplained weight loss or fatigue.  No other new onset medical symptoms.  Current weight 175#.  Peripheral IV left AC, 1 attempt.  Infused 250ml sodium chloride 0.9% concomitantly with inflectra. Labs drawn CBC d/p, ESR, CRP, hepatic panel. Infusion completed without complication or reaction. Pt reports therapy iseffective in managing symptoms related to therapy.    Carin Peace, RN, BSN  [facility]  [phone]  [email or web address]

## 2024-01-03 ENCOUNTER — VIRTUAL VISIT (OUTPATIENT)
Dept: GASTROENTEROLOGY | Facility: CLINIC | Age: 24
End: 2024-01-03
Payer: COMMERCIAL

## 2024-01-03 VITALS — WEIGHT: 175 LBS | HEIGHT: 74 IN | BODY MASS INDEX: 22.46 KG/M2

## 2024-01-03 DIAGNOSIS — K50.014 CROHN'S DISEASE OF SMALL INTESTINE WITH ABSCESS (H): Primary | ICD-10-CM

## 2024-01-03 PROCEDURE — 99214 OFFICE O/P EST MOD 30 MIN: CPT | Mod: VID | Performed by: PHYSICIAN ASSISTANT

## 2024-01-03 ASSESSMENT — PAIN SCALES - GENERAL: PAINLEVEL: NO PAIN (0)

## 2024-01-03 NOTE — PATIENT INSTRUCTIONS
It was a pleasure taking care of you today.  I've included a brief summary of our discussion and care plan from today's visit below.  Please review this information with your primary care provider.  ______________________________________________________________________    My recommendations are summarized as follows:    -- Continue remicade  -- Labs with infusions  -- Next endoscopic assessment: now  -- Patient with IBD we recommend supplementation vitamin D 1000 units daily and calcium 500 mg twice daily.  -- No NSAIDs (ibuprofen, or anything containing ibuprofen)    For additional resources about inflammatory bowel disease visit http://www.crohnscolitisfoundation.org/    To learn more about Diet and Nutrition in the setting of IBD, check out some of these resources:  https://www.crohnscolitisfoundation.org/diet-and-nutrition/what-should-i-eat  https://www.nimbal.org/  https://ntforibd.org/         Return to GI Clinic in 3 months to review your progress.    ______________________________________________________________________    How do I schedule labs, imaging studies, or procedures that were ordered in clinic today?     Labs: To schedule lab appointment at the [facility], use my chart or call [phone]. If you have a [place] lab closer to home where you are regularly seen you can give them a call.     Procedures: If a colonoscopy, upper endoscopy, breath test, esophageal manometry, or pH impedence was ordered today, our endoscopy team will call you to schedule this. If you have not heard from our endoscopy team within a week, please call [phone] to schedule.     Imaging Studies: If you were scheduled for a CT scan, X-ray, MRI, ultrasound, HIDA scan or other imaging study, please call [phone] to have this scheduled.     Referral: If a referral to another specialty was ordered, expect a phone call or follow instructions above. If you have not heard from anyone regarding your referral  in a week, please call our clinic to check the status.     Who do I call with any questions after my visit?  Please be in touch if there are any further questions that arise following today's visit.  There are multiple ways to contact your gastroenterology care team.      During business hours, you may reach a Gastroenterology nurse at [phone]    To schedule or reschedule an appointment, please call [phone].     You can always send a secure message through [employer].  [employer] messages are answered by your nurse or doctor typically within 24 hours.  Please allow extra time on weekends and holidays.      For urgent/emergent questions after business hours, you may reach the on-call GI Fellow by contacting the [facility]  at [phone].     How will I get the results of any tests ordered?    You will receive all of your results.  If you have signed up for [employer], any tests ordered at your visit will be available to you after your physician reviews them.  Typically this takes 1-2 weeks.  If there are urgent results that require a change in your care plan, your physician or nurse will call you to discuss the next steps.      What is [employer]Pad?  eduPad is a secure way for you to access all of your healthcare records from the [facility].  It is a web based computer program, so you can sign on to it from any location.  It also allows you to send secure messages to your care team.  I recommend signing up for [employer] access if you have not already done so and are comfortable with using a computer.         Sincerely,    Jon Chavez PA-C  [facility]  Division of Gastroenterology

## 2024-01-03 NOTE — NURSING NOTE
Is the patient currently in the state of [place]? YES    Visit mode:VIDEO    If the visit is dropped, the patient can be reconnected by: VIDEO VISIT: Text to cell phone:   Telephone Information:   Mobile [phone]       Will anyone else be joining the visit? NO  (If patient encounters technical issues they should call [phone])    How would you like to obtain your AVS? MyChart    Are changes needed to the allergy or medication list? No    Reason for visit: SHAINA RUTH

## 2024-01-03 NOTE — LETTER
1/3/2024         RE: Ventura Quiroz  [address]  [facility] [address]        Dear Colleague,    Thank you for referring your patient, Ventura Quiroz, to the [facility] [place]. Please see a copy of my visit note below.      IBD CLINIC VISIT    CC/REFERRING MD:  Referred Self  REASON FOR CONSULTATION: Crohn's disease    ASSESSMENT/PLAN:  23 year old male with Crohn's disease of the ileum initially complicated by phlegmon and abscess now status post ICR.     1. Crohn's disease:  Current CD medications:    - Infliximab 10mg/kg q28 days  Current clinical disease activity: HBI: 5 (based on number of loose stools)  Last endoscopic disease activity: Rutgeert's i2a / SES-CD: 3 (11/18/22)    It is challenging to determine if ongoing loose stools are from active disease. I would recommend we move forward with a colonoscopy and if persistent inflammation, I would suggest changing therapies.  Could retry budesonide after the scope if that is the case since he did feel better on that last year.  Infliximab concentration is adequate at 25.  I do not think he should de-escalate given his prior rapid metabolism of infliximab.  Symptoms of urgency and loose stools could be related to his ileal disease or could also be postsurgical irritable bowel.  Other possibilities include bile salt diarrhea.    -- Continue infliximab  -- Labs every 3 to 4 months on infliximab  -- Colonoscopy, if persistent active disease then change therapy + budesonide  -- If no active inflammation then trial of cholestyramine    2. IBD dysplasia surveillance:Given ileal only disease, he does not need IBD dysplasia surveillance   -- Age appropriate colon cancer screening    3. Iron deficient anemia: Suspect this is due to chronic blood loss in his ileocolonic anastomosis.  May be sequelae of mild ileal Crohn's endoscopically.  Celiac markers were negative.  He responded well after IV iron.  He may have been chronically  low prior to being in remission and just needed to have his iron stores replenished.  We will continue to trend for anemia and check annual iron labs.    4.  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  Subsequent celiac labs negative.  He does not have respiratory infections.  Will monitor.  6    Misc:  -- Avoid tobacco use  -- Avoid NSAIDs as there is potentially a 25% chance of causing an IBD flare    Return to clinic in 2-3 months    Thank you for this consultation.  It was a pleasure to participate in the care of this patient; please contact us with any further questions.     IBD HISTORY  Age at diagnosis: 18  Extent of disease: ileal  Disease phenotype: fistulizing  Nieves-anal disease: No  Prior IBD surgeries: Ileocecal resection - Rush-Meaux 8/21/20.   Prior IBD Medications:    - Humira disease progression despite weekly dosing, no ADA   - Ciprofloxacin   - Metronidazole    DRUG MONITORING  TPMT enzyme activity:     6-TGN/6-MMPN levels:  9/10/19: 97 / 548    Biologic concentration:  8/23/19: Adalimumab: 5, no antibodies, every other week, 50mg mercaptopurine   2/18/21: Inflixiamb level 1.6; antibodies: 20  4/19/21 Infliximab: 0, antibodies: 0 (IFX + 6-MP)  3/1/22: IFX: > 34, no antibodies (10mg/kg q4 + 6-MP)  5/12/22: IFX: 25.6, no antibodies.     sIBDQ:   IBDQ Score Date IBDQ - Total Score  (Higher score better)   3/6/2023   3:22 PM 60   4/25/2022   9:45 AM 59   9/10/2019   6:00 AM 62   5/28/2019   6:03 AM 56   4/24/2019   8:44 AM 48     HPI:   Here for routine follow-up.     Having 5-6 stools per day. Stools are pretty loose, applesauce, sometimes more solid. No blood. No abdominal pain. Still has urgency. Does occasionally have nighttime stools (2 times a week). No incontinence.    No upper GI symptoms. No EIM of IBD.    Extra intestinal manifestations of IBD:  No uveitis/episcleritis  No aphthous ulcers   No arthritis   No erythema nodosum/pyoderma gangrenosum.      HBI:  Overall patient well being  (prior day): 0 (Very well)  Abdominal pain (prior day): 0 (None)  Number of liquid or soft stools (prior day): 5 (1 point per stool)  Abdominal mass on exam:   Complications (1 point for each):   None    Remission <5  Mild activity 5-7  Moderate activity 8-16  Severe > 16      ROS:    Constitutional, HEENT, cardiovascular, pulmonary, GI, , musculoskeletal, neuro, skin, endocrine and psych systems are negative, except as otherwise noted.     PERTINENT PAST MEDICAL HISTORY:  Past Medical History:   Diagnosis Date    Allergic rhinitis, cause unspecified     Zyrtec helps    Crohn's Colitis 4/8/2019    Unspecified otitis media     Otitis Media       PREVIOUS SURGERIES:  Past Surgical History:   Procedure Laterality Date    COLONOSCOPY N/A 4/11/2019    Procedure: COMBINED COLONOSCOPY, SINGLE OR MULTIPLE BIOPSY/POLYPECTOMY BY BIOPSY;  Surgeon: Tobin Wright MD;  Location: [facility]    COLONOSCOPY N/A 9/29/2022    Procedure: COLONOSCOPY with  BIOPSY;  Surgeon: Tobin Wright MD;  Location: [facility] OR    COLONOSCOPY N/A 11/18/2022    Procedure: COLONOSCOPY;  Surgeon: Tobin Wright MD;  Location: [facility] OR    EXCISE LIP OR CHEEK FOLD  10/3/2003    Needle cautery frenulectomy.    INSERT PICC LINE Left 8/10/2020    Procedure: INSERTION, PICC @845;  Surgeon: Karan Farr MD;  [facility]    IR PICC PLACEMENT > 5 YRS OF AGE  8/10/2020    PE TUBES  4/25/2006    RESECTION ILEOCOLIC N/A 8/21/2020    Procedure: Exploratory laparotomy with ileocolic resection with takedown of enterovesical fistula, and low anterior resection;  Surgeon: Brittni Robles MD;  Location: [facility] OR    SIGMOIDOSCOPY FLEXIBLE N/A 8/21/2020    Procedure: Sigmoidoscopy flexible;  Surgeon: Brittni Robles MD;  Location: [facility] OR    TONSILLECTOMY & ADENOIDECTOMY  4/25/2006     ALLERGIES:     Allergies   Allergen Reactions    Gadavist [Gadobutrol] Nausea     Patient could not perform timed sequences due to nausea and  discomfort.    Glucagon Nausea and Vomiting     Pre medicate with lorazapam if able.      Amoxicillin     Penicillin [Penicillins]        PERTINENT MEDICATIONS:    Current Outpatient Medications:     inFLIXimab-dyyb (INFLECTRA) 100 MG injection, Inject 765 mg into the vein every 28 (twenty-eight) days, Disp: , Rfl:     SOCIAL HISTORY:  Social History     Socioeconomic History    Marital status: Single     Spouse name: Not on file    Number of children: Not on file    Years of education: Not on file    Highest education level: Not on file   Occupational History    Not on file   Tobacco Use    Smoking status: Never    Smokeless tobacco: Current    Tobacco comments:     Vape   Vaping Use    Vaping Use: Every day    Substances: Nicotine, Flavoring    Devices: Pre-filled or refillable cartridge   Substance and Sexual Activity    Alcohol use: Yes    Drug use: No    Sexual activity: Yes     Partners: Female     Birth control/protection: Pill   Other Topics Concern     Service Not Asked    Blood Transfusions Not Asked    Caffeine Concern Not Asked    Occupational Exposure Not Asked    Hobby Hazards Not Asked    Sleep Concern Not Asked    Stress Concern Not Asked    Weight Concern Not Asked    Special Diet Not Asked    Back Care Not Asked    Exercise Not Asked    Bike Helmet Not Asked    Seat Belt Not Asked    Self-Exams Not Asked    Parent/sibling w/ CABG, MI or angioplasty before 65F 55M? Not Asked   Social History Narrative    Not on file     Social Determinants of Health     Financial Resource Strain: Not on file   Food Insecurity: Not on file   Transportation Needs: Not on file   Physical Activity: Not on file   Stress: Not on file   Social Connections: Not on file   Interpersonal Safety: Not on file   Housing Stability: Not on file       FAMILY HISTORY:  Family History   Problem Relation Age of Onset    No Known Problems Mother     No Known Problems Father     Melanoma No family hx of     Skin Cancer No family  "hx of         No family history of IBD or colon or rectal cancer.     Past/family/social history reviewed and no changes    PHYSICAL EXAMINATION:  Vitals reviewed: Ht 1.88 m (6' 2\")   Wt 79.4 kg (175 lb)   BMI 22.47 kg/m    Wt:   Wt Readings from Last 2 Encounters:   01/03/24 79.4 kg (175 lb)   12/01/23 76.5 kg (168 lb 9.6 oz)      Constitutional - general appearance is well and in no acute distress. Body habitus normal  Eyes - No redness or discharge  Respiratory - No cough, unlabored breathing  Musculoskeletal - range of motion intact: Neck and arms  Skin - No discoloration or lesions  Neurological - No tremors, headaches  Psychiatric - No anxiety, alert & oriented       Again, thank you for allowing me to participate in the care of your patient.      Sincerely,    Jon Chavez PA-C  "

## 2024-01-03 NOTE — PROGRESS NOTES
Virtual Visit Details    Type of service:  Video Visit     Originating Location (pt. Location): Home    Distant Location (provider location):  Off-site  Platform used for Video Visit: Mary    IBD CLINIC VISIT    CC/REFERRING MD:  Referred Self  REASON FOR CONSULTATION: Crohn's disease    ASSESSMENT/PLAN:  23 year old male with Crohn's disease of the ileum initially complicated by phlegmon and abscess now status post ICR.     1. Crohn's disease:  Current CD medications:    - Infliximab 10mg/kg q28 days  Current clinical disease activity: HBI: 5 (based on number of loose stools)  Last endoscopic disease activity: Rutgeert's i2a / SES-CD: 3 (11/18/22)    It is challenging to determine if ongoing loose stools are from active disease. I would recommend we move forward with a colonoscopy and if persistent inflammation, I would suggest changing therapies.  Could retry budesonide after the scope if that is the case since he did feel better on that last year.  Infliximab concentration is adequate at 25.  I do not think he should de-escalate given his prior rapid metabolism of infliximab.  Symptoms of urgency and loose stools could be related to his ileal disease or could also be postsurgical irritable bowel.  Other possibilities include bile salt diarrhea.    -- Continue infliximab  -- Labs every 3 to 4 months on infliximab  -- Colonoscopy, if persistent active disease then change therapy + budesonide  -- If no active inflammation then trial of cholestyramine    2. IBD dysplasia surveillance:Given ileal only disease, he does not need IBD dysplasia surveillance   -- Age appropriate colon cancer screening    3. Iron deficient anemia: Suspect this is due to chronic blood loss in his ileocolonic anastomosis.  May be sequelae of mild ileal Crohn's endoscopically.  Celiac markers were negative.  He responded well after IV iron.  He may have been chronically low prior to being in remission and just needed to have his iron stores  replenished.  We will continue to trend for anemia and check annual iron labs.    4.  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  Subsequent celiac labs negative.  He does not have respiratory infections.  Will monitor.  6    Misc:  -- Avoid tobacco use  -- Avoid NSAIDs as there is potentially a 25% chance of causing an IBD flare    Return to clinic in 2-3 months    Thank you for this consultation.  It was a pleasure to participate in the care of this patient; please contact us with any further questions.     IBD HISTORY  Age at diagnosis: 18  Extent of disease: ileal  Disease phenotype: fistulizing  Nieves-anal disease: No  Prior IBD surgeries: Ileocecal resection - Rush-Meaux 8/21/20.   Prior IBD Medications:    - Humira disease progression despite weekly dosing, no ADA   - Ciprofloxacin   - Metronidazole    DRUG MONITORING  TPMT enzyme activity:     6-TGN/6-MMPN levels:  9/10/19: 97 / 548    Biologic concentration:  8/23/19: Adalimumab: 5, no antibodies, every other week, 50mg mercaptopurine   2/18/21: Inflixiamb level 1.6; antibodies: 20  4/19/21 Infliximab: 0, antibodies: 0 (IFX + 6-MP)  3/1/22: IFX: > 34, no antibodies (10mg/kg q4 + 6-MP)  5/12/22: IFX: 25.6, no antibodies.     sIBDQ:   IBDQ Score Date IBDQ - Total Score  (Higher score better)   3/6/2023   3:22 PM 60   4/25/2022   9:45 AM 59   9/10/2019   6:00 AM 62   5/28/2019   6:03 AM 56   4/24/2019   8:44 AM 48     HPI:   Here for routine follow-up.     Having 5-6 stools per day. Stools are pretty loose, applesauce, sometimes more solid. No blood. No abdominal pain. Still has urgency. Does occasionally have nighttime stools (2 times a week). No incontinence.    No upper GI symptoms. No EIM of IBD.    Extra intestinal manifestations of IBD:  No uveitis/episcleritis  No aphthous ulcers   No arthritis   No erythema nodosum/pyoderma gangrenosum.      HBI:  Overall patient well being (prior day): 0 (Very well)  Abdominal pain (prior day): 0 (None)  Number  of liquid or soft stools (prior day): 5 (1 point per stool)  Abdominal mass on exam:   Complications (1 point for each):   None    Remission <5  Mild activity 5-7  Moderate activity 8-16  Severe > 16      ROS:    Constitutional, HEENT, cardiovascular, pulmonary, GI, , musculoskeletal, neuro, skin, endocrine and psych systems are negative, except as otherwise noted.     PERTINENT PAST MEDICAL HISTORY:  Past Medical History:   Diagnosis Date    Allergic rhinitis, cause unspecified     Zyrtec helps    Crohn's Colitis 4/8/2019    Unspecified otitis media     Otitis Media       PREVIOUS SURGERIES:  Past Surgical History:   Procedure Laterality Date    COLONOSCOPY N/A 4/11/2019    Procedure: COMBINED COLONOSCOPY, SINGLE OR MULTIPLE BIOPSY/POLYPECTOMY BY BIOPSY;  Surgeon: Tobin Wright MD;  Location: [facility]    COLONOSCOPY N/A 9/29/2022    Procedure: COLONOSCOPY with  BIOPSY;  Surgeon: Tobin Wright MD;  Location: [facility] OR    COLONOSCOPY N/A 11/18/2022    Procedure: COLONOSCOPY;  Surgeon: Tobin Wright MD;  Location: [facility] OR    EXCISE LIP OR CHEEK FOLD  10/3/2003    Needle cautery frenulectomy.    INSERT PICC LINE Left 8/10/2020    Procedure: INSERTION, PICC @845;  Surgeon: Karan Farr MD;  [facility]    IR PICC PLACEMENT > 5 YRS OF AGE  8/10/2020    PE TUBES  4/25/2006    RESECTION ILEOCOLIC N/A 8/21/2020    Procedure: Exploratory laparotomy with ileocolic resection with takedown of enterovesical fistula, and low anterior resection;  Surgeon: Brittni Robles MD;  Location: [facility]    SIGMOIDOSCOPY FLEXIBLE N/A 8/21/2020    Procedure: Sigmoidoscopy flexible;  Surgeon: Brittni Robles MD;  Location: [facility] OR    TONSILLECTOMY & ADENOIDECTOMY  4/25/2006     ALLERGIES:     Allergies   Allergen Reactions    Gadavist [Gadobutrol] Nausea     Patient could not perform timed sequences due to nausea and discomfort.    Glucagon Nausea and Vomiting     Pre medicate with lorazapam if  able.      Amoxicillin     Penicillin [Penicillins]        PERTINENT MEDICATIONS:    Current Outpatient Medications:     inFLIXimab-dyyb (INFLECTRA) 100 MG injection, Inject 765 mg into the vein every 28 (twenty-eight) days, Disp: , Rfl:     SOCIAL HISTORY:  Social History     Socioeconomic History    Marital status: Single     Spouse name: Not on file    Number of children: Not on file    Years of education: Not on file    Highest education level: Not on file   Occupational History    Not on file   Tobacco Use    Smoking status: Never    Smokeless tobacco: Current    Tobacco comments:     Vape   Vaping Use    Vaping Use: Every day    Substances: Nicotine, Flavoring    Devices: Pre-filled or refillable cartridge   Substance and Sexual Activity    Alcohol use: Yes    Drug use: No    Sexual activity: Yes     Partners: Female     Birth control/protection: Pill   Other Topics Concern     Service Not Asked    Blood Transfusions Not Asked    Caffeine Concern Not Asked    Occupational Exposure Not Asked    Hobby Hazards Not Asked    Sleep Concern Not Asked    Stress Concern Not Asked    Weight Concern Not Asked    Special Diet Not Asked    Back Care Not Asked    Exercise Not Asked    Bike Helmet Not Asked    Seat Belt Not Asked    Self-Exams Not Asked    Parent/sibling w/ CABG, MI or angioplasty before 65F 55M? Not Asked   Social History Narrative    Not on file     Social Determinants of Health     Financial Resource Strain: Not on file   Food Insecurity: Not on file   Transportation Needs: Not on file   Physical Activity: Not on file   Stress: Not on file   Social Connections: Not on file   Interpersonal Safety: Not on file   Housing Stability: Not on file       FAMILY HISTORY:  Family History   Problem Relation Age of Onset    No Known Problems Mother     No Known Problems Father     Melanoma No family hx of     Skin Cancer No family hx of         No family history of IBD or colon or rectal cancer.  "    Past/family/social history reviewed and no changes    PHYSICAL EXAMINATION:  Vitals reviewed: Ht 1.88 m (6' 2\")   Wt 79.4 kg (175 lb)   BMI 22.47 kg/m    Wt:   Wt Readings from Last 2 Encounters:   01/03/24 79.4 kg (175 lb)   12/01/23 76.5 kg (168 lb 9.6 oz)      Constitutional - general appearance is well and in no acute distress. Body habitus normal  Eyes - No redness or discharge  Respiratory - No cough, unlabored breathing  Musculoskeletal - range of motion intact: Neck and arms  Skin - No discoloration or lesions  Neurological - No tremors, headaches  Psychiatric - No anxiety, alert & oriented     "

## 2024-01-27 ENCOUNTER — DOCUMENTATION ONLY (OUTPATIENT)
Dept: PHARMACY | Facility: CLINIC | Age: 24
End: 2024-01-27
Payer: COMMERCIAL

## 2024-01-27 NOTE — PROGRESS NOTES
Skilled Nurse visit in the Patient Home to administer Inflectra 800mg.  No recent elevated temperature, fever, chills, productive cough, coughing for 3 weeks or longer or hemoptysis, abnormal vital signs, night sweats, chest pain. No  decrease in your appetite, unexplained weight loss or fatigue.  No other new onset medical symptoms.  Current weight 175#.  Peripheral IV left AC, 1 attempt. 250ml NACL 0.9% concomitant fluids administered.  Infusion completed without complication or reaction. Pt reports therapy is somewhat effective in managing symptoms related to therapy; continues to have multiple loose stools daily.    Carin Peace RN, BSN  [facility]  [phone]  [email or web address]

## 2024-02-09 ENCOUNTER — TELEPHONE (OUTPATIENT)
Dept: GASTROENTEROLOGY | Facility: CLINIC | Age: 24
End: 2024-02-09
Payer: COMMERCIAL

## 2024-02-09 ENCOUNTER — PATIENT OUTREACH (OUTPATIENT)
Dept: GASTROENTEROLOGY | Facility: CLINIC | Age: 24
End: 2024-02-09
Payer: COMMERCIAL

## 2024-02-09 DIAGNOSIS — K50.014 CROHN'S DISEASE OF SMALL INTESTINE WITH ABSCESS (H): Primary | ICD-10-CM

## 2024-02-09 NOTE — TELEPHONE ENCOUNTER
"Endoscopy Scheduling Screen    Have you had a positive Covid test in the last 14 days?  No    Are you active on MyChart?   Yes    What insurance is in the chart?  Other:  [facility]    Ordering/Referring Provider: LAURA LYNN    (If ordering provider performs procedure, schedule with ordering provider unless otherwise instructed. )    BMI: Estimated body mass index is 22.47 kg/m  as calculated from the following:    Height as of 1/3/24: 1.88 m (6' 2\").    Weight as of 1/3/24: 79.4 kg (175 lb).     Sedation Ordered  moderate sedation.   If patient BMI > 50 do not schedule in ASC.    If patient [facility] > 45 [facility]    Are you taking methadone or Suboxone?  No    Have you had difficulties, pain, or discomfort during past endoscopy procedures?  No    Are you taking any prescription medications for pain 3 or more times per week?   NO - No RN review required.    Do you have a history of malignant hyperthermia or adverse reaction to anesthesia?  No    (Females) Are you currently pregnant?        Have you been diagnosed or told you have pulmonary hypertension?   No    Do you have an LVAD?  No    Have you been told you have moderate to severe sleep apnea?  No    Have you been told you have COPD, asthma, or any other lung disease?  No    Do you have any heart conditions?  No     Have you ever had an organ transplant?   No    Have you ever had or are you awaiting a heart or lung transplant?   No    Have you had a stroke or transient ischemic attack (TIA aka \"mini stroke\" in the last 6 months?   No    Have you been diagnosed with or been told you have cirrhosis of the liver?   No    Are you currently on dialysis?   No    Do you need assistance transferring?   No    BMI: Estimated body mass index is 22.47 kg/m  as calculated from the following:    Height as of 1/3/24: 1.88 m (6' 2\").    Weight as of 1/3/24: 79.4 kg (175 lb).     Is patients BMI > 40 and scheduling location [facility]?  No    Do you take an injectable " medication for weight loss or diabetes (excluding insulin)?  No    Do you take the medication Naltrexone?  No    Do you take blood thinners?  No       Prep   Are you currently on dialysis or do you have chronic kidney disease?  No    Do you have a diagnosis of diabetes?  No    Do you have a diagnosis of cystic fibrosis (CF)?  No    On a regular basis do you go 3 -5 days between bowel movements?  No    BMI > 40?  No    Preferred Pharmacy:    [facility] - [place], [place] - [address]  [address]  [facility] [address]  Phone: [phone] Fax: [phone]    Final Scheduling Details   Colonoscopy prep sent?  Standard MiraLAX    Procedure scheduled  Colonoscopy    Surgeon:  ZENY     Date of procedure:  6/24     Pre-OP / PAC:   No - Not required for this site.    Location  [facility] - ASC - Per order.    Sedation   Moderate Sedation - Per order.      Patient Reminders:   You will receive a call from a Nurse to review instructions and health history.  This assessment must be completed prior to your procedure.  Failure to complete the Nurse assessment may result in the procedure being cancelled.      On the day of your procedure, please designate an adult(s) who can drive you home stay with you for the next 24 hours. The medicines used in the exam will make you sleepy. You will not be able to drive.      You cannot take public transportation, ride share services, or non-medical taxi service without a responsible caregiver.  Medical transport services are allowed with the requirement that a responsible caregiver will receive you at your destination.  We require that drivers and caregivers are confirmed prior to your procedure.

## 2024-02-09 NOTE — PROGRESS NOTES
Ventura Quiroz   to  [facility]- (supporting Jon Chavez PA-C)       2/9/24 12:46 PM  The last couple of weeks when I need to go, I need to go NOW, there is no waiting or it s too late. I also have been experiencing some blood in my stools.  I am going every 2-3 hours  I scheduled a colonoscopy but I can t get in until June  Anything we can do for this?  -------------------------------  Order flare labs and stool study.  Sent MyC message for pt to get labs completer for treatment plan.

## 2024-02-12 ENCOUNTER — LAB (OUTPATIENT)
Dept: LAB | Facility: CLINIC | Age: 24
End: 2024-02-12
Payer: COMMERCIAL

## 2024-02-12 DIAGNOSIS — K50.014 CROHN'S DISEASE OF SMALL INTESTINE WITH ABSCESS (H): ICD-10-CM

## 2024-02-12 LAB
ALBUMIN SERPL BCG-MCNC: 4.4 G/DL (ref 3.5–5.2)
ALP SERPL-CCNC: 71 U/L (ref 40–150)
ALT SERPL W P-5'-P-CCNC: 15 U/L (ref 0–70)
AST SERPL W P-5'-P-CCNC: 19 U/L (ref 0–45)
BASOPHILS # BLD AUTO: 0 10E3/UL (ref 0–0.2)
BASOPHILS NFR BLD AUTO: 0 %
BILIRUB DIRECT SERPL-MCNC: <0.2 MG/DL (ref 0–0.3)
BILIRUB SERPL-MCNC: 0.2 MG/DL
CRP SERPL-MCNC: <3 MG/L
EOSINOPHIL # BLD AUTO: 0.1 10E3/UL (ref 0–0.7)
EOSINOPHIL NFR BLD AUTO: 1 %
ERYTHROCYTE [DISTWIDTH] IN BLOOD BY AUTOMATED COUNT: 13.2 % (ref 10–15)
HCT VFR BLD AUTO: 36.6 % (ref 40–53)
HGB BLD-MCNC: 12 G/DL (ref 13.3–17.7)
IMM GRANULOCYTES # BLD: 0 10E3/UL
IMM GRANULOCYTES NFR BLD: 0 %
LYMPHOCYTES # BLD AUTO: 2.4 10E3/UL (ref 0.8–5.3)
LYMPHOCYTES NFR BLD AUTO: 24 %
MCH RBC QN AUTO: 28.1 PG (ref 26.5–33)
MCHC RBC AUTO-ENTMCNC: 32.8 G/DL (ref 31.5–36.5)
MCV RBC AUTO: 86 FL (ref 78–100)
MONOCYTES # BLD AUTO: 0.8 10E3/UL (ref 0–1.3)
MONOCYTES NFR BLD AUTO: 9 %
NEUTROPHILS # BLD AUTO: 6.5 10E3/UL (ref 1.6–8.3)
NEUTROPHILS NFR BLD AUTO: 66 %
NRBC # BLD AUTO: 0 10E3/UL
NRBC BLD AUTO-RTO: 0 /100
PLATELET # BLD AUTO: 220 10E3/UL (ref 150–450)
PROT SERPL-MCNC: 7.1 G/DL (ref 6.4–8.3)
RBC # BLD AUTO: 4.27 10E6/UL (ref 4.4–5.9)
WBC # BLD AUTO: 9.9 10E3/UL (ref 4–11)

## 2024-02-12 PROCEDURE — 85025 COMPLETE CBC W/AUTO DIFF WBC: CPT

## 2024-02-12 PROCEDURE — 80076 HEPATIC FUNCTION PANEL: CPT

## 2024-02-12 PROCEDURE — 86140 C-REACTIVE PROTEIN: CPT

## 2024-02-12 PROCEDURE — 36415 COLL VENOUS BLD VENIPUNCTURE: CPT

## 2024-02-15 LAB — C DIFF TOX B STL QL: NEGATIVE

## 2024-02-15 PROCEDURE — 87493 C DIFF AMPLIFIED PROBE: CPT

## 2024-02-15 PROCEDURE — 83993 ASSAY FOR CALPROTECTIN FECAL: CPT

## 2024-02-15 PROCEDURE — 87507 IADNA-DNA/RNA PROBE TQ 12-25: CPT | Mod: 59

## 2024-02-16 LAB
ADV 40+41 DNA STL QL NAA+NON-PROBE: NEGATIVE
ASTRO TYP 1-8 RNA STL QL NAA+NON-PROBE: NEGATIVE
C CAYETANENSIS DNA STL QL NAA+NON-PROBE: NEGATIVE
CAMPYLOBACTER DNA SPEC NAA+PROBE: NEGATIVE
CRYPTOSP DNA STL QL NAA+NON-PROBE: NEGATIVE
E COLI O157 DNA STL QL NAA+NON-PROBE: NORMAL
E HISTOLYT DNA STL QL NAA+NON-PROBE: NEGATIVE
EAEC ASTA GENE ISLT QL NAA+PROBE: NEGATIVE
EC STX1+STX2 GENES STL QL NAA+NON-PROBE: NEGATIVE
EPEC EAE GENE STL QL NAA+NON-PROBE: NEGATIVE
ETEC LTA+ST1A+ST1B TOX ST NAA+NON-PROBE: NEGATIVE
G LAMBLIA DNA STL QL NAA+NON-PROBE: NEGATIVE
NOROVIRUS GI+II RNA STL QL NAA+NON-PROBE: NEGATIVE
P SHIGELLOIDES DNA STL QL NAA+NON-PROBE: NEGATIVE
RVA RNA STL QL NAA+NON-PROBE: NEGATIVE
SALMONELLA SP RPOD STL QL NAA+PROBE: NEGATIVE
SAPO I+II+IV+V RNA STL QL NAA+NON-PROBE: NEGATIVE
SHIGELLA SP+EIEC IPAH ST NAA+NON-PROBE: NEGATIVE
V CHOLERAE DNA SPEC QL NAA+PROBE: NEGATIVE
VIBRIO DNA SPEC NAA+PROBE: NEGATIVE
Y ENTEROCOL DNA STL QL NAA+PROBE: NEGATIVE

## 2024-02-17 LAB — CALPROTECTIN STL-MCNT: 183 MG/KG (ref 0–49.9)

## 2024-02-19 ENCOUNTER — TELEPHONE (OUTPATIENT)
Dept: GASTROENTEROLOGY | Facility: CLINIC | Age: 24
End: 2024-02-19
Payer: COMMERCIAL

## 2024-02-19 ENCOUNTER — MYC MEDICAL ADVICE (OUTPATIENT)
Dept: GASTROENTEROLOGY | Facility: CLINIC | Age: 24
End: 2024-02-19
Payer: COMMERCIAL

## 2024-02-19 NOTE — TELEPHONE ENCOUNTER
Hello Team,  Please assist in getting this pt a sooner colonoscopy appt due to new worsening symptoms.    Thank you.  Nena

## 2024-02-19 NOTE — TELEPHONE ENCOUNTER
----- Message from Jon Chavez PA-C sent at 2/19/2024  1:11 PM CST -----  Aric Barrett-- can we see if there is any chance to move up his scope?    ----- Message -----  From: Lab, Background User  Sent: 2/12/2024   4:16 PM CST  To: Jon Chavez PA-C

## 2024-02-22 ENCOUNTER — TELEPHONE (OUTPATIENT)
Dept: GASTROENTEROLOGY | Facility: CLINIC | Age: 24
End: 2024-02-22
Payer: COMMERCIAL

## 2024-02-22 NOTE — TELEPHONE ENCOUNTER
Caller: Communication via [employer].     Reason for Reschedule/Cancellation   (please be detailed, any staff messages or encounters to note?): Offer earlier opening per care team due to worsening symptoms.       Prior to reschedule please review:  Ordering Provider: Jon Chavez PA-C   Sedation Determined: Moderate  Does patient have any ASC Exclusions, please identify?: No      Notes on Cancelled Procedure:  Procedure: Lower Endoscopy [Colonoscopy]   Date: 6/24/2024  Location: [facility]; [address] [place] [address], [address] [place] [address]  Surgeon: Destinee      Rescheduled: Yes,   Procedure: Lower Endoscopy [Colonoscopy]    Date: 3/4/2024   Location: [facility]; [address] [place] [address], [address] [place] [address]   Surgeon: Destinee   Sedation Level Scheduled  Moderate,  Reason for Sedation Level Order   Instructions updated and sent: NCR [employer]Call[employer]Calls    Does patient need PAC or Pre -Op Rescheduled? : No       Did you cancel or rescheduled an EUS procedure? No.

## 2024-02-22 NOTE — TELEPHONE ENCOUNTER
Attempted to contact patient in order to complete pre assessment questions.     No answer. Left message to return call to [phone] option 4    Missed call communication sent via [employer].      Procedure details:    Patient scheduled for Colonoscopy  on 3/4/24.     Arrival time: 1015. Procedure time 1115    Pre op exam needed? N/A    [facility] [address] [place] [address], 5[address] Floor, [place] [address]    Sedation type: Conscious sedation  - verify with patient he was comfortable during last procedure. Patient had a significant dose of fentanyl and versed during last 2 colonoscopies.     Indication for procedure: crohns of small intestine with abcess      Chart review:     Electronic implanted devices? No    Recent diagnosis of diverticulitis within the last 6 weeks? No    Diabetic? No      Medication review:    Anticoagulants? No    NSAIDS? No NSAID medications per patient's medication list.  RN will verify with pre-assessment call.    Other medication HOLDING recommendations:  N/A      Prep for procedure:     Bowel prep recommendation: Standard Miralax  Due to: standard bowel prep.    Prep instructions sent via [employer].       Chikis Veronica RN  Endoscopy Procedure Pre Assessment RN

## 2024-02-24 ENCOUNTER — DOCUMENTATION ONLY (OUTPATIENT)
Dept: PHARMACY | Facility: CLINIC | Age: 24
End: 2024-02-24
Payer: COMMERCIAL

## 2024-02-24 ENCOUNTER — LAB REQUISITION (OUTPATIENT)
Dept: LAB | Facility: CLINIC | Age: 24
End: 2024-02-24
Payer: COMMERCIAL

## 2024-02-24 DIAGNOSIS — K50.014 CROHN'S DISEASE OF SMALL INTESTINE WITH ABSCESS (H): ICD-10-CM

## 2024-02-24 LAB
ALBUMIN SERPL BCG-MCNC: 4.1 G/DL (ref 3.5–5.2)
ALP SERPL-CCNC: 74 U/L (ref 40–150)
ALT SERPL W P-5'-P-CCNC: 34 U/L (ref 0–70)
AST SERPL W P-5'-P-CCNC: 68 U/L (ref 0–45)
BASOPHILS # BLD AUTO: 0 10E3/UL (ref 0–0.2)
BASOPHILS NFR BLD AUTO: 0 %
BILIRUB DIRECT SERPL-MCNC: <0.2 MG/DL (ref 0–0.3)
BILIRUB SERPL-MCNC: 0.3 MG/DL
CRP SERPL-MCNC: <3 MG/L
EOSINOPHIL # BLD AUTO: 0 10E3/UL (ref 0–0.7)
EOSINOPHIL NFR BLD AUTO: 1 %
ERYTHROCYTE [DISTWIDTH] IN BLOOD BY AUTOMATED COUNT: 12.9 % (ref 10–15)
ERYTHROCYTE [SEDIMENTATION RATE] IN BLOOD BY WESTERGREN METHOD: 9 MM/HR (ref 0–15)
HCT VFR BLD AUTO: 35.5 % (ref 40–53)
HGB BLD-MCNC: 12.2 G/DL (ref 13.3–17.7)
IMM GRANULOCYTES # BLD: 0 10E3/UL
IMM GRANULOCYTES NFR BLD: 0 %
LYMPHOCYTES # BLD AUTO: 1.4 10E3/UL (ref 0.8–5.3)
LYMPHOCYTES NFR BLD AUTO: 44 %
MCH RBC QN AUTO: 27.7 PG (ref 26.5–33)
MCHC RBC AUTO-ENTMCNC: 34.4 G/DL (ref 31.5–36.5)
MCV RBC AUTO: 81 FL (ref 78–100)
MONOCYTES # BLD AUTO: 0.3 10E3/UL (ref 0–1.3)
MONOCYTES NFR BLD AUTO: 9 %
NEUTROPHILS # BLD AUTO: 1.5 10E3/UL (ref 1.6–8.3)
NEUTROPHILS NFR BLD AUTO: 46 %
NRBC # BLD AUTO: 0 10E3/UL
NRBC BLD AUTO-RTO: 0 /100
PLATELET # BLD AUTO: 163 10E3/UL (ref 150–450)
PROT SERPL-MCNC: 6.8 G/DL (ref 6.4–8.3)
RBC # BLD AUTO: 4.41 10E6/UL (ref 4.4–5.9)
WBC # BLD AUTO: 3.3 10E3/UL (ref 4–11)

## 2024-02-24 PROCEDURE — 85652 RBC SED RATE AUTOMATED: CPT | Performed by: PHYSICIAN ASSISTANT

## 2024-02-24 PROCEDURE — 36415 COLL VENOUS BLD VENIPUNCTURE: CPT | Performed by: PHYSICIAN ASSISTANT

## 2024-02-24 PROCEDURE — 86140 C-REACTIVE PROTEIN: CPT | Performed by: PHYSICIAN ASSISTANT

## 2024-02-24 PROCEDURE — 85025 COMPLETE CBC W/AUTO DIFF WBC: CPT | Performed by: PHYSICIAN ASSISTANT

## 2024-02-24 PROCEDURE — 80076 HEPATIC FUNCTION PANEL: CPT | Performed by: PHYSICIAN ASSISTANT

## 2024-02-24 NOTE — PROGRESS NOTES
Skilled Nurse visit in the Patient Home to administer Inflectra 800mg.  No recent elevated temperature, fever, chills, productive cough, coughing for 3 weeks or longer or hemoptysis, abnormal vital signs, night sweats, chest pain. No  decrease in your appetite, unexplained weight loss or fatigue.  Pt has been having urgency and bleeding with bowel movements.  Current weight 175#.  Peripheral IV left AC, 1 attempt.  Labs drawn CBC d/p, ESR, CRPI, hepatic panel.  Infusion completed without complication or reaction. Pt reports therapy is somewhat effective in managing symptoms related to therapy.    Carin Peace RN, BSN  [facility]  [phone]  [email or web address]

## 2024-02-26 NOTE — TELEPHONE ENCOUNTER
Second call attempt to complete pre assessment.     Patient scheduled for Colonoscopy  on 3/4/24.     No answer.  Left message to return call to [phone] #[phone] by next business day prior to 4PM or procedure will be sent to cancel.     Missed call communication sent via [employer].      Cami Tinsley, LEXY  Endoscopy Procedure Pre Assessment RN

## 2024-02-27 DIAGNOSIS — K50.014 CROHN'S DISEASE OF SMALL INTESTINE WITH ABSCESS (H): Primary | ICD-10-CM

## 2024-02-28 NOTE — TELEPHONE ENCOUNTER
Pre assessment completed for upcoming procedure.   (Please see previous telephone encounter notes for complete details)    Family member Indigo hidalgo (consent to communicate on file) returned call.       Procedure details:    Arrival time and facility location reviewed.    Pre op exam needed? N/A    Designated  policy reviewed. Instructed to have someone stay 6  hours post procedure.       Medication review:    Medications reviewed. Please see supporting documentation below. Holding recommendations discussed (if applicable).       Prep for procedure:     Procedure prep instructions reviewed.        Any additional information needed:  Mother said that he has had several colonoscopies and has tolerated the CS in the past. OK to proceed with CS.     Indigo Hidalgo- verbalized understanding and had no questions or concerns at this time.      Marta Raya RN  Endoscopy Procedure Pre Assessment RN  [phone] option 4

## 2024-03-04 ENCOUNTER — HOSPITAL ENCOUNTER (OUTPATIENT)
Facility: AMBULATORY SURGERY CENTER | Age: 24
Discharge: HOME | End: 2024-03-04
Attending: INTERNAL MEDICINE | Admitting: INTERNAL MEDICINE
Payer: COMMERCIAL

## 2024-03-04 VITALS
HEIGHT: 74 IN | BODY MASS INDEX: 21.82 KG/M2 | TEMPERATURE: 97.6 F | OXYGEN SATURATION: 100 % | DIASTOLIC BLOOD PRESSURE: 64 MMHG | WEIGHT: 170 LBS | SYSTOLIC BLOOD PRESSURE: 118 MMHG | HEART RATE: 67 BPM | RESPIRATION RATE: 16 BRPM

## 2024-03-04 LAB — COLONOSCOPY: NORMAL

## 2024-03-04 PROCEDURE — 45378 DIAGNOSTIC COLONOSCOPY: CPT | Performed by: INTERNAL MEDICINE

## 2024-03-04 RX ORDER — FENTANYL CITRATE 50 UG/ML
INJECTION, SOLUTION INTRAMUSCULAR; INTRAVENOUS DAILY PRN
Status: DISCONTINUED | OUTPATIENT
Start: 2024-03-04 | End: 2024-03-04 | Stop reason: HOSPADM

## 2024-03-04 RX ORDER — NALOXONE HYDROCHLORIDE 0.4 MG/ML
0.2 INJECTION, SOLUTION INTRAMUSCULAR; INTRAVENOUS; SUBCUTANEOUS
Status: DISCONTINUED | OUTPATIENT
Start: 2024-03-04 | End: 2024-03-05 | Stop reason: HOSPADM

## 2024-03-04 RX ORDER — ONDANSETRON 4 MG/1
4 TABLET, ORALLY DISINTEGRATING ORAL EVERY 6 HOURS PRN
Status: DISCONTINUED | OUTPATIENT
Start: 2024-03-04 | End: 2024-03-05 | Stop reason: HOSPADM

## 2024-03-04 RX ORDER — FLUMAZENIL 0.1 MG/ML
0.2 INJECTION, SOLUTION INTRAVENOUS
Status: DISCONTINUED | OUTPATIENT
Start: 2024-03-04 | End: 2024-03-05 | Stop reason: HOSPADM

## 2024-03-04 RX ORDER — LIDOCAINE 40 MG/G
CREAM TOPICAL
Status: DISCONTINUED | OUTPATIENT
Start: 2024-03-04 | End: 2024-03-04 | Stop reason: HOSPADM

## 2024-03-04 RX ORDER — ONDANSETRON 2 MG/ML
4 INJECTION INTRAMUSCULAR; INTRAVENOUS
Status: DISCONTINUED | OUTPATIENT
Start: 2024-03-04 | End: 2024-03-04 | Stop reason: HOSPADM

## 2024-03-04 RX ORDER — NALOXONE HYDROCHLORIDE 0.4 MG/ML
0.4 INJECTION, SOLUTION INTRAMUSCULAR; INTRAVENOUS; SUBCUTANEOUS
Status: DISCONTINUED | OUTPATIENT
Start: 2024-03-04 | End: 2024-03-05 | Stop reason: HOSPADM

## 2024-03-04 RX ORDER — SODIUM CHLORIDE, SODIUM LACTATE, POTASSIUM CHLORIDE, CALCIUM CHLORIDE 600; 310; 30; 20 MG/100ML; MG/100ML; MG/100ML; MG/100ML
INJECTION, SOLUTION INTRAVENOUS CONTINUOUS
Status: DISCONTINUED | OUTPATIENT
Start: 2024-03-04 | End: 2024-03-04 | Stop reason: HOSPADM

## 2024-03-04 RX ORDER — PROCHLORPERAZINE MALEATE 10 MG
10 TABLET ORAL EVERY 6 HOURS PRN
Status: DISCONTINUED | OUTPATIENT
Start: 2024-03-04 | End: 2024-03-05 | Stop reason: HOSPADM

## 2024-03-04 RX ORDER — ONDANSETRON 2 MG/ML
4 INJECTION INTRAMUSCULAR; INTRAVENOUS EVERY 6 HOURS PRN
Status: DISCONTINUED | OUTPATIENT
Start: 2024-03-04 | End: 2024-03-05 | Stop reason: HOSPADM

## 2024-03-04 RX ADMIN — SODIUM CHLORIDE, SODIUM LACTATE, POTASSIUM CHLORIDE, CALCIUM CHLORIDE: 600; 310; 30; 20 INJECTION, SOLUTION INTRAVENOUS at 10:58

## 2024-03-04 NOTE — H&P
Ventura Quiroz  [id number]  male  23 year old      Reason for procedure/surgery: Crohn's disease    Patient Active Problem List   Diagnosis    Crohn's Colitis    Anemia, iron deficiency    Crohn's disease of small intestine with abscess (H)    Crohn disease (H)       Past Surgical History:    Past Surgical History:   Procedure Laterality Date    COLONOSCOPY N/A 4/11/2019    Procedure: COMBINED COLONOSCOPY, SINGLE OR MULTIPLE BIOPSY/POLYPECTOMY BY BIOPSY;  Surgeon: Tobin Wright MD;  Location: [facility]    COLONOSCOPY N/A 9/29/2022    Procedure: COLONOSCOPY with  BIOPSY;  Surgeon: Tobin Wright MD;  Location: [facility] OR    COLONOSCOPY N/A 11/18/2022    Procedure: COLONOSCOPY;  Surgeon: Tobin Wright MD;  Location: [facility] OR    EXCISE LIP OR CHEEK FOLD  10/3/2003    Needle cautery frenulectomy.    INSERT PICC LINE Left 8/10/2020    Procedure: INSERTION, PICC @845;  Surgeon: Karan Farr MD;  [facility]    IR PICC PLACEMENT > 5 YRS OF AGE  8/10/2020    PE TUBES  4/25/2006    RESECTION ILEOCOLIC N/A 8/21/2020    Procedure: Exploratory laparotomy with ileocolic resection with takedown of enterovesical fistula, and low anterior resection;  Surgeon: Brittni Robles MD;  Location: [facility]    SIGMOIDOSCOPY FLEXIBLE N/A 8/21/2020    Procedure: Sigmoidoscopy flexible;  Surgeon: Brittni Robles MD;  Location: [facility]    TONSILLECTOMY & ADENOIDECTOMY  4/25/2006       Past Medical History:   Past Medical History:   Diagnosis Date    Allergic rhinitis, cause unspecified     Zyrtec helps    Crohn's Colitis 4/8/2019    Unspecified otitis media     Otitis Media       Social History:   Social History     Tobacco Use    Smoking status: Never    Smokeless tobacco: Current    Tobacco comments:     Vape   Substance Use Topics    Alcohol use: Yes       Family History:   Family History   Problem Relation Age of Onset    No Known Problems Mother     No Known Problems Father     Melanoma No family  "hx of     Skin Cancer No family hx of        Allergies:   Allergies   Allergen Reactions    Gadavist [Gadobutrol] Nausea     Patient could not perform timed sequences due to nausea and discomfort.    Glucagon Nausea and Vomiting     Pre medicate with lorazapam if able.      Amoxicillin     Penicillin [Penicillins]        Active Medications:   Current Outpatient Medications   Medication Sig Dispense Refill    inFLIXimab-dyyb (INFLECTRA) 100 MG injection Inject 765 mg into the vein every 28 (twenty-eight) days         Systemic Review:   CONSTITUTIONAL: NEGATIVE for fever, chills, change in weight  ENT/MOUTH: NEGATIVE for ear, mouth and throat problems  RESP: NEGATIVE for significant cough or SOB  CV: NEGATIVE for chest pain, palpitations or peripheral edema    Physical Examination:   Vital Signs: /72   Pulse 67   Temp 98  F (36.7  C) (Temporal)   Resp 16   Ht 1.88 m (6' 2\")   Wt 77.1 kg (170 lb)   SpO2 99%   BMI 21.83 kg/m    GENERAL: healthy, alert and no distress  NECK: no adenopathy, no asymmetry, masses, or scars  RESP: lungs clear to auscultation - no rales, rhonchi or wheezes  CV: regular rate and rhythm, normal S1 S2, no S3 or S4, no murmur, click or rub, no peripheral edema and peripheral pulses strong  ABDOMEN: soft, nontender, no hepatosplenomegaly, no masses and bowel sounds normal  MS: no gross musculoskeletal defects noted, no edema    Plan: Appropriate to proceed as scheduled.      Carissa Felipe MD  3/4/2024    PCP:  Chuy Moss"

## 2024-03-05 DIAGNOSIS — K50.014 CROHN'S DISEASE OF SMALL INTESTINE WITH ABSCESS (H): Primary | ICD-10-CM

## 2024-03-05 NOTE — TELEPHONE ENCOUNTER
Updated therapy plan to add predicrtr pK lab.    Order lab and entocrot x30days.    March 5, 2024    Called Mary Whitehead message and contact info to return call regarding: recommendation for entocort and predictr pK lab

## 2024-03-05 NOTE — TELEPHONE ENCOUNTER
----- Message -----  From: Jon Chavez PA-C  Sent: 3/4/2024   4:30 PM CST  To: LEXY Bustamante! Just spoke with E and here is what she suggested:    1) predict PK  2) trial of entocort x 30 day    Report back his response from entocort    Based on level and if we NEED to switch, which we are going to reserve for if he responds really well to budesonide:    Skyrizi (if level was high)   If level was low, he would just clear biologic clears quickly then do rinvoq    Do you mind arranging the predict PK level and the Entocort?    Thank you!!  Jon

## 2024-03-06 RX ORDER — BUDESONIDE 3 MG/1
9 CAPSULE, COATED PELLETS ORAL EVERY MORNING
Qty: 90 CAPSULE | Refills: 0 | Status: SHIPPED | OUTPATIENT
Start: 2024-03-06 | End: 2024-03-07

## 2024-03-06 NOTE — TELEPHONE ENCOUNTER
Hello,    Can you please fill out and scan into the patients chart a predictr pK prometheus lab order form for this patient.    Induction/Maintenance: Maintenance    Medication:  Inflectra  Dose: 10mg/kg  Total Dose: 800 mg  Frequency: every 4weeks  Provider: CHELA Chavez  Last infusion: 2/24/24  Next infusion: 3/23/24  When is lab due: 3/23/24  Does the patient need a lab appointment: no, will get the lab prior to next infusion with [facility].    Thank you

## 2024-03-06 NOTE — TELEPHONE ENCOUNTER
Hello Team,  Please see predictr pk Lab ordered for Ventura.    Please draw prior to infusion on 3/23/24.    Thank you.  Nena

## 2024-03-07 RX ORDER — BUDESONIDE 3 MG/1
9 CAPSULE, COATED PELLETS ORAL EVERY MORNING
Qty: 90 CAPSULE | Refills: 0 | Status: SHIPPED | OUTPATIENT
Start: 2024-03-07 | End: 2024-07-02

## 2024-03-07 NOTE — TELEPHONE ENCOUNTER
Spoke with Ventura and relayed PA message/recommendations to start entocort and get predictr pK. Pt ask the rx be sent to different pharmacy. Reorder entocort per pharmacy as requested. Patient acknowledged understanding and agree with plan. Answer all questions offered contact information for pt to call with any questions or concerns.

## 2024-03-23 ENCOUNTER — TRANSFERRED RECORDS (OUTPATIENT)
Dept: HEALTH INFORMATION MANAGEMENT | Facility: CLINIC | Age: 24
End: 2024-03-23

## 2024-03-23 ENCOUNTER — LAB REQUISITION (OUTPATIENT)
Dept: LAB | Facility: CLINIC | Age: 24
End: 2024-03-23
Payer: COMMERCIAL

## 2024-03-23 ENCOUNTER — DOCUMENTATION ONLY (OUTPATIENT)
Dept: PHARMACY | Facility: CLINIC | Age: 24
End: 2024-03-23

## 2024-03-23 PROCEDURE — 80230 DRUG ASSAY INFLIXIMAB: CPT | Performed by: PHYSICIAN ASSISTANT

## 2024-03-23 PROCEDURE — 82542 COL CHROMOTOGRAPHY QUAL/QUAN: CPT | Performed by: PHYSICIAN ASSISTANT

## 2024-03-23 NOTE — PROGRESS NOTES
Skilled Nurse visit in the Patient Home to administer Inflectra 800mg.  No recent elevated temperature, fever, chills, productive cough, coughing for 3 weeks or longer or hemoptysis, abnormal vital signs, night sweats, chest pain. No  decrease in your appetite, unexplained weight loss or fatigue.  Pt reports some improvement with urgency and bleeding with bowel movements since starting the Entocort.  Current weight 170#.  Peripheral IV left AC, 1 attempt.  Labs drawn Prom[facility] Predictr PK.  Infusion completed without complication or reaction. Pt reports therapy is somewhat effective in managing symptoms related to therapy.     Carin Peace, RN, BSN  [facility]  [phone]  [email or web address]

## 2024-03-28 LAB — SCANNED LAB RESULT: NORMAL

## 2024-03-28 NOTE — RESULT ENCOUNTER NOTE
Aric Whitehead,    Your level was excellent (34) no antibodies identified. This is a great level. Please send a note with how you are doing on the Entocort an dif you are seeing any improvement in your symptoms.       Jon Chavez PA-C  Division of Gastroenterology, Hepatology and Nutrition  [facility]

## 2024-04-20 ENCOUNTER — LAB REQUISITION (OUTPATIENT)
Dept: LAB | Facility: CLINIC | Age: 24
End: 2024-04-20
Payer: COMMERCIAL

## 2024-04-20 ENCOUNTER — DOCUMENTATION ONLY (OUTPATIENT)
Dept: PHARMACY | Facility: CLINIC | Age: 24
End: 2024-04-20

## 2024-04-20 DIAGNOSIS — K50.014 CROHN'S DISEASE OF SMALL INTESTINE WITH ABSCESS (H): ICD-10-CM

## 2024-04-20 LAB
ALBUMIN SERPL BCG-MCNC: 4.6 G/DL (ref 3.5–5.2)
ALP SERPL-CCNC: 80 U/L (ref 40–150)
ALT SERPL W P-5'-P-CCNC: 15 U/L (ref 0–70)
AST SERPL W P-5'-P-CCNC: 17 U/L (ref 0–45)
BASOPHILS # BLD AUTO: 0 10E3/UL (ref 0–0.2)
BASOPHILS NFR BLD AUTO: 1 %
BILIRUB DIRECT SERPL-MCNC: <0.2 MG/DL (ref 0–0.3)
BILIRUB SERPL-MCNC: 0.7 MG/DL
CRP SERPL-MCNC: <3 MG/L
EOSINOPHIL # BLD AUTO: 0.1 10E3/UL (ref 0–0.7)
EOSINOPHIL NFR BLD AUTO: 1 %
ERYTHROCYTE [DISTWIDTH] IN BLOOD BY AUTOMATED COUNT: 14.6 % (ref 10–15)
ERYTHROCYTE [SEDIMENTATION RATE] IN BLOOD BY WESTERGREN METHOD: 7 MM/HR (ref 0–15)
HCT VFR BLD AUTO: 39.6 % (ref 40–53)
HGB BLD-MCNC: 12.7 G/DL (ref 13.3–17.7)
IMM GRANULOCYTES # BLD: 0 10E3/UL
IMM GRANULOCYTES NFR BLD: 0 %
LYMPHOCYTES # BLD AUTO: 1.9 10E3/UL (ref 0.8–5.3)
LYMPHOCYTES NFR BLD AUTO: 25 %
MCH RBC QN AUTO: 25.8 PG (ref 26.5–33)
MCHC RBC AUTO-ENTMCNC: 32.1 G/DL (ref 31.5–36.5)
MCV RBC AUTO: 81 FL (ref 78–100)
MONOCYTES # BLD AUTO: 0.8 10E3/UL (ref 0–1.3)
MONOCYTES NFR BLD AUTO: 11 %
NEUTROPHILS # BLD AUTO: 4.6 10E3/UL (ref 1.6–8.3)
NEUTROPHILS NFR BLD AUTO: 62 %
NRBC # BLD AUTO: 0 10E3/UL
NRBC BLD AUTO-RTO: 0 /100
PLATELET # BLD AUTO: 273 10E3/UL (ref 150–450)
PROT SERPL-MCNC: 8.1 G/DL (ref 6.4–8.3)
RBC # BLD AUTO: 4.92 10E6/UL (ref 4.4–5.9)
WBC # BLD AUTO: 7.4 10E3/UL (ref 4–11)

## 2024-04-20 PROCEDURE — 85025 COMPLETE CBC W/AUTO DIFF WBC: CPT | Performed by: PHYSICIAN ASSISTANT

## 2024-04-20 PROCEDURE — 80076 HEPATIC FUNCTION PANEL: CPT | Performed by: PHYSICIAN ASSISTANT

## 2024-04-20 PROCEDURE — 84450 TRANSFERASE (AST) (SGOT): CPT | Performed by: PHYSICIAN ASSISTANT

## 2024-04-20 PROCEDURE — 86140 C-REACTIVE PROTEIN: CPT | Performed by: PHYSICIAN ASSISTANT

## 2024-04-20 PROCEDURE — 85652 RBC SED RATE AUTOMATED: CPT | Performed by: PHYSICIAN ASSISTANT

## 2024-04-20 NOTE — PROGRESS NOTES
Skilled Nurse visit in the Patient Home to administer Inflectra 800mg.  No recent elevated temperature, fever, chills, productive cough, coughing for 3 weeks or longer or hemoptysis, abnormal vital signs, night sweats, chest pain. No  decrease in your appetite, unexplained weight loss or fatigue.  Pt reports some improvement in GI sx with Entocort use.  Current weight 170#.  Peripheral IV left AC, 1 attempt.  Labs drawn CBC d/p, ESR, CRPI, hepatic panel.  Infusion completed without complication or reaction. Pt reports therapy is somewhat effective in managing symptoms related to therapy.     Carin Peace, RN, BSN  [facility]  [phone]  [email or web address]

## 2024-04-23 NOTE — PROGRESS NOTES
This is a recent snapshot of the patient's [facility] medical record.  For current drug dose and complete information and questions, call [phone] or In Basket pool, [facility] home infusion ([id number])  CSN Number:  [id number]      
Yes

## 2024-05-08 ENCOUNTER — VIRTUAL VISIT (OUTPATIENT)
Dept: GASTROENTEROLOGY | Facility: CLINIC | Age: 24
End: 2024-05-08
Attending: PHYSICIAN ASSISTANT
Payer: COMMERCIAL

## 2024-05-08 VITALS — BODY MASS INDEX: 21.82 KG/M2 | HEIGHT: 74 IN | WEIGHT: 170 LBS

## 2024-05-08 DIAGNOSIS — K50.014 CROHN'S DISEASE OF SMALL INTESTINE WITH ABSCESS (H): Primary | ICD-10-CM

## 2024-05-08 PROCEDURE — 99214 OFFICE O/P EST MOD 30 MIN: CPT | Mod: 95 | Performed by: PHYSICIAN ASSISTANT

## 2024-05-08 RX ORDER — CHOLESTYRAMINE 4 G/9G
4 POWDER, FOR SUSPENSION ORAL
Qty: 360 G | Refills: 0 | Status: SHIPPED | OUTPATIENT
Start: 2024-05-08 | End: 2024-07-12 | Stop reason: ALTCHOICE

## 2024-05-08 ASSESSMENT — PAIN SCALES - GENERAL: PAINLEVEL: NO PAIN (0)

## 2024-05-08 NOTE — NURSING NOTE
Is the patient currently in the state of [place]? YES    Visit mode:VIDEO    If the visit is dropped, the patient can be reconnected by: VIDEO VISIT: Send to e-mail at: [email or web address]    Will anyone else be joining the visit? NO  (If patient encounters technical issues they should call [phone])    How would you like to obtain your AVS? MyChart    Are changes needed to the allergy or medication list? No    Are refills needed on medications prescribed by this physician? NO    Reason for visit: SHAINA RUTH

## 2024-05-08 NOTE — PROGRESS NOTES
Virtual Visit Details    Type of service:  Video Visit     Originating Location (pt. Location): Home    Distant Location (provider location):  Off-site  Platform used for Video Visit: Well    IBD CLINIC VISIT    CC/REFERRING MD:  Referred Self  REASON FOR CONSULTATION: Crohn's disease    ASSESSMENT/PLAN:  23 year old male with Crohn's disease of the ileum initially complicated by phlegmon and abscess now status post ICR.     1. Crohn's disease:  Current CD medications:    - Infliximab 10mg/kg q28 days  Current clinical disease activity: HBI: 5 (based on number of loose stools)  Last endoscopic disease activity: Rutgeert's i2a / SES-CD: 3 (3/2024)    Colonoscopy was largely unchanged from 2022.  He had a mild anastomotic stricture in the ileocolonic anastomosis that was not traversable with an adult scope, he has no obstructive symptoms.  After the colonoscopy we obtained an infliximab level which was adequate (greater than 30) and a trial of Entocort for 30 days.  He felt marginally better with Entocort as a pertains to urgency and frequency.  Without a substantial improvement we elected to continue the course.  Should this his symptoms become more pronounced, I think it would be reasonable to make a change in therapy if necessary to Skyrizi.  In addition, symptoms of urgency and loose stools could be related to his ileal disease or could also be postsurgical irritable bowel.  Other possibilities include bile salt diarrhea.  We will try cholestyramine to see if this allows for improvement in urgency.    -- Continue infliximab  -- Labs every 3 to 4 months on infliximab  --Start cholestyramine 4 g two to three 3 times a day with meals  --We could also consider an MR enterography or repeat colonoscopy with dilation should symptoms persist or worsen.    2. IBD dysplasia surveillance:Given ileal only disease, he does not need IBD dysplasia surveillance   -- Age appropriate colon cancer screening    3. Iron deficient  anemia: Suspect this is due to chronic blood loss in his ileocolonic anastomosis.  May be sequelae of mild ileal Crohn's endoscopically.  Celiac markers were negative.  He responded well after IV iron.  He may have been chronically low prior to being in remission and just needed to have his iron stores replenished.  We will continue to trend for anemia and check annual iron labs.    4.  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  Subsequent celiac labs negative.  He does not have respiratory infections.  Will monitor.      Misc:  -- Avoid tobacco use  -- Avoid NSAIDs as there is potentially a 25% chance of causing an IBD flare    Return to clinic in 6 months    Thank you for this consultation.  It was a pleasure to participate in the care of this patient; please contact us with any further questions.     IBD HISTORY  Age at diagnosis: 18  Extent of disease: ileal  Disease phenotype: fistulizing  Nieves-anal disease: No  Prior IBD surgeries: Ileocecal resection - Rush-Meaux 8/21/20.   Prior IBD Medications:    - Humira disease progression despite weekly dosing, no ADA   - Ciprofloxacin   - Metronidazole    DRUG MONITORING  TPMT enzyme activity:     6-TGN/6-MMPN levels:  9/10/19: 97 / 548    Biologic concentration:  8/23/19: Adalimumab: 5, no antibodies, every other week, 50mg mercaptopurine   2/18/21: Inflixiamb level 1.6; antibodies: 20  4/19/21 Infliximab: 0, antibodies: 0 (IFX + 6-MP)  3/1/22: IFX: > 34, no antibodies (10mg/kg q4 + 6-MP)  5/12/22: IFX: 25.6, no antibodies.     sIBDQ:   IBDQ Score Date IBDQ - Total Score  (Higher score better)   3/6/2023   3:22 PM 60   4/25/2022   9:45 AM 59   9/10/2019   6:00 AM 62   5/28/2019   6:03 AM 56   4/24/2019   8:44 AM 48     HPI:   Here for routine follow-up.     Having 4-5 stools per day. Stools are loose, applesauce, sometimes more solid. No blood. No abdominal pain. No nausea.  Still has urgency. Does occasionally have nighttime stools (1 times a week). No  incontinence.    He was started on entocort in early March (post colonoscopy) and he reports and he had less urgency and less frequency (maybe less in a week rather than the amount daily).    No upper GI symptoms. No EIM of IBD.    Extra intestinal manifestations of IBD:  No uveitis/episcleritis  No aphthous ulcers   No arthritis   No erythema nodosum/pyoderma gangrenosum.      HBI:  Overall patient well being (prior day): 0 (Very well)  Abdominal pain (prior day): 0 (None)  Number of liquid or soft stools (prior day): 5 (1 point per stool)  Abdominal mass on exam:   Complications (1 point for each):   None    Remission <5  Mild activity 5-7  Moderate activity 8-16  Severe > 16      ROS:    Constitutional, HEENT, cardiovascular, pulmonary, GI, , musculoskeletal, neuro, skin, endocrine and psych systems are negative, except as otherwise noted.     PERTINENT PAST MEDICAL HISTORY:  Past Medical History:   Diagnosis Date    Allergic rhinitis, cause unspecified     Zyrtec helps    Crohn's Colitis 4/8/2019    Unspecified otitis media     Otitis Media       PREVIOUS SURGERIES:  Past Surgical History:   Procedure Laterality Date    COLONOSCOPY N/A 4/11/2019    Procedure: COMBINED COLONOSCOPY, SINGLE OR MULTIPLE BIOPSY/POLYPECTOMY BY BIOPSY;  Surgeon: Tobin Wright MD;  Location:  GI    COLONOSCOPY N/A 9/29/2022    Procedure: COLONOSCOPY with  BIOPSY;  Surgeon: Tobin Wright MD;  Location[facility]    COLONOSCOPY N/A 11/18/2022    Procedure: COLONOSCOPY;  Surgeon: Tobin Wright MD;  Location: [facility] OR    COLONOSCOPY N/A 3/4/2024    Procedure: Colonoscopy;  Surgeon: Carissa Felipe MD;  Location: [facility] OR    EXCISE LIP OR CHEEK FOLD  10/3/2003    Needle cautery frenulectomy.    INSERT PICC LINE Left 8/10/2020    Procedure: INSERTION, PICC @845;  Surgeon: Karan Farr MD;  [facility]    IR PICC PLACEMENT > 5 YRS OF AGE  8/10/2020    PE TUBES  4/25/2006    RESECTION ILEOCOLIC N/A 8/21/2020     Procedure: Exploratory laparotomy with ileocolic resection with takedown of enterovesical fistula, and low anterior resection;  Surgeon: Brittni Robles MD;  [facility]    SIGMOIDOSCOPY FLEXIBLE N/A 8/21/2020    Procedure: Sigmoidoscopy flexible;  Surgeon: Brittni Robles MD;  [facility]: [facility]    TONSILLECTOMY & ADENOIDECTOMY  4/25/2006     ALLERGIES:     Allergies   Allergen Reactions    Gadavist [Gadobutrol] Nausea     Patient could not perform timed sequences due to nausea and discomfort.    Glucagon Nausea and Vomiting     Pre medicate with lorazapam if able.      Amoxicillin     Penicillin [Penicillins]        PERTINENT MEDICATIONS:    Current Outpatient Medications:     budesonide (ENTOCORT EC) 3 MG EC capsule, Take 3 capsules (9 mg) by mouth every morning, Disp: 90 capsule, Rfl: 0    inFLIXimab-dyyb (INFLECTRA) 100 MG injection, Inject 765 mg into the vein every 28 (twenty-eight) days, Disp: , Rfl:     SOCIAL HISTORY:  Social History     Socioeconomic History    Marital status: Single     Spouse name: Not on file    Number of children: Not on file    Years of education: Not on file    Highest education level: Not on file   Occupational History    Not on file   Tobacco Use    Smoking status: Never    Smokeless tobacco: Current    Tobacco comments:     Vape   Vaping Use    Vaping status: Every Day    Substances: Nicotine, Flavoring    Devices: Pre-filled or refillable cartridge   Substance and Sexual Activity    Alcohol use: Yes    Drug use: No    Sexual activity: Yes     Partners: Female     Birth control/protection: Pill   Other Topics Concern     Service Not Asked    Blood Transfusions Not Asked    Caffeine Concern Not Asked    Occupational Exposure Not Asked    Hobby Hazards Not Asked    Sleep Concern Not Asked    Stress Concern Not Asked    Weight Concern Not Asked    Special Diet Not Asked    Back Care Not Asked    Exercise Not Asked    Bike Helmet Not Asked    Seat  "Belt Not Asked    Self-Exams Not Asked    Parent/sibling w/ CABG, MI or angioplasty before 65F 55M? Not Asked   Social History Narrative    Not on file     Social Determinants of Health     Financial Resource Strain: Not on file   Food Insecurity: Not on file   Transportation Needs: Not on file   Physical Activity: Not on file   Stress: Not on file   Social Connections: Not on file   Interpersonal Safety: Not on file   Housing Stability: Not on file       FAMILY HISTORY:  Family History   Problem Relation Age of Onset    No Known Problems Mother     No Known Problems Father     Melanoma No family hx of     Skin Cancer No family hx of         No family history of IBD or colon or rectal cancer.     Past/family/social history reviewed and no changes    PHYSICAL EXAMINATION:  Vitals reviewed: Ht 1.88 m (6' 2\")   Wt 77.1 kg (170 lb)   BMI 21.83 kg/m    Wt:   Wt Readings from Last 2 Encounters:   05/08/24 77.1 kg (170 lb)   03/04/24 77.1 kg (170 lb)      Constitutional - general appearance is well and in no acute distress. Body habitus normal  Eyes - No redness or discharge  Respiratory - No cough, unlabored breathing  Musculoskeletal - range of motion intact: Neck and arms  Skin - No discoloration or lesions  Neurological - No tremors, headaches  Psychiatric - No anxiety, alert & oriented       "

## 2024-05-08 NOTE — LETTER
5/8/2024         RE: Ventura Quiroz  [address]  [facility] [address]        Dear Colleague,    Thank you for referring your patient, Ventura Quiroz, to the [facility] [place]. Please see a copy of my visit note below.    IBD CLINIC VISIT    CC/REFERRING MD:  Referred Self  REASON FOR CONSULTATION: Crohn's disease    ASSESSMENT/PLAN:  23 year old male with Crohn's disease of the ileum initially complicated by phlegmon and abscess now status post ICR.     1. Crohn's disease:  Current CD medications:    - Infliximab 10mg/kg q28 days  Current clinical disease activity: HBI: 5 (based on number of loose stools)  Last endoscopic disease activity: Rutgeert's i2a / SES-CD: 3 (3/2024)    Colonoscopy was largely unchanged from 2022.  He had a mild anastomotic stricture in the ileocolonic anastomosis that was not traversable with an adult scope, he has no obstructive symptoms.  After the colonoscopy we obtained an infliximab level which was adequate (greater than 30) and a trial of Entocort for 30 days.  He felt marginally better with Entocort as a pertains to urgency and frequency.  Without a substantial improvement we elected to continue the course.  Should this his symptoms become more pronounced, I think it would be reasonable to make a change in therapy if necessary to Skyrizi.  In addition, symptoms of urgency and loose stools could be related to his ileal disease or could also be postsurgical irritable bowel.  Other possibilities include bile salt diarrhea.  We will try cholestyramine to see if this allows for improvement in urgency.    -- Continue infliximab  -- Labs every 3 to 4 months on infliximab  --Start cholestyramine 4 g two to three 3 times a day with meals  --We could also consider an MR enterography or repeat colonoscopy with dilation should symptoms persist or worsen.    2. IBD dysplasia surveillance:Given ileal only disease, he does not need IBD dysplasia  surveillance   -- Age appropriate colon cancer screening    3. Iron deficient anemia: Suspect this is due to chronic blood loss in his ileocolonic anastomosis.  May be sequelae of mild ileal Crohn's endoscopically.  Celiac markers were negative.  He responded well after IV iron.  He may have been chronically low prior to being in remission and just needed to have his iron stores replenished.  We will continue to trend for anemia and check annual iron labs.    4.  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  Subsequent celiac labs negative.  He does not have respiratory infections.  Will monitor.      Misc:  -- Avoid tobacco use  -- Avoid NSAIDs as there is potentially a 25% chance of causing an IBD flare    Return to clinic in 6 months    Thank you for this consultation.  It was a pleasure to participate in the care of this patient; please contact us with any further questions.     IBD HISTORY  Age at diagnosis: 18  Extent of disease: ileal  Disease phenotype: fistulizing  Nieves-anal disease: No  Prior IBD surgeries: Ileocecal resection - Rush-Meaux 8/21/20.   Prior IBD Medications:    - Humira disease progression despite weekly dosing, no ADA   - Ciprofloxacin   - Metronidazole    DRUG MONITORING  TPMT enzyme activity:     6-TGN/6-MMPN levels:  9/10/19: 97 / 548    Biologic concentration:  8/23/19: Adalimumab: 5, no antibodies, every other week, 50mg mercaptopurine   2/18/21: Inflixiamb level 1.6; antibodies: 20  4/19/21 Infliximab: 0, antibodies: 0 (IFX + 6-MP)  3/1/22: IFX: > 34, no antibodies (10mg/kg q4 + 6-MP)  5/12/22: IFX: 25.6, no antibodies.     sIBDQ:   IBDQ Score Date IBDQ - Total Score  (Higher score better)   3/6/2023   3:22 PM 60   4/25/2022   9:45 AM 59   9/10/2019   6:00 AM 62   5/28/2019   6:03 AM 56   4/24/2019   8:44 AM 48     HPI:   Here for routine follow-up.     Having 4-5 stools per day. Stools are loose, applesauce, sometimes more solid. No blood. No abdominal pain. No nausea.  Still has  urgency. Does occasionally have nighttime stools (1 times a week). No incontinence.    He was started on entocort in early March (post colonoscopy) and he reports and he had less urgency and less frequency (maybe less in a week rather than the amount daily).    No upper GI symptoms. No EIM of IBD.    Extra intestinal manifestations of IBD:  No uveitis/episcleritis  No aphthous ulcers   No arthritis   No erythema nodosum/pyoderma gangrenosum.      HBI:  Overall patient well being (prior day): 0 (Very well)  Abdominal pain (prior day): 0 (None)  Number of liquid or soft stools (prior day): 5 (1 point per stool)  Abdominal mass on exam:   Complications (1 point for each):   None    Remission <5  Mild activity 5-7  Moderate activity 8-16  Severe > 16      ROS:    Constitutional, HEENT, cardiovascular, pulmonary, GI, , musculoskeletal, neuro, skin, endocrine and psych systems are negative, except as otherwise noted.     PERTINENT PAST MEDICAL HISTORY:  Past Medical History:   Diagnosis Date    Allergic rhinitis, cause unspecified     Zyrtec helps    Crohn's Colitis 4/8/2019    Unspecified otitis media     Otitis Media       PREVIOUS SURGERIES:  Past Surgical History:   Procedure Laterality Date    COLONOSCOPY N/A 4/11/2019    Procedure: COMBINED COLONOSCOPY, SINGLE OR MULTIPLE BIOPSY/POLYPECTOMY BY BIOPSY;  Surgeon: Tobin Wright MD;  Location:  GI    COLONOSCOPY N/A 9/29/2022    Procedure: COLONOSCOPY with  BIOPSY;  Surgeon: Tobin Wright MD;  Location[facility]    COLONOSCOPY N/A 11/18/2022    Procedure: COLONOSCOPY;  Surgeon: Tobin Wright MD;  Location: [facility] OR    COLONOSCOPY N/A 3/4/2024    Procedure: Colonoscopy;  Surgeon: Carissa Felipe MD;  Location: [facility] [place]    EXCISE LIP OR CHEEK FOLD  10/3/2003    Needle cautery frenulectomy.    INSERT PICC LINE Left 8/10/2020    Procedure: INSERTION, PICC @845;  Surgeon: Karan Farr MD;  Location: [facility] OR    IR PICC PLACEMENT > 5 YRS OF AGE   8/10/2020    PE TUBES  4/25/2006    RESECTION ILEOCOLIC N/A 8/21/2020    Procedure: Exploratory laparotomy with ileocolic resection with takedown of enterovesical fistula, and low anterior resection;  Surgeon: Brittni Robles MD;  Location: [facility] OR    SIGMOIDOSCOPY FLEXIBLE N/A 8/21/2020    Procedure: Sigmoidoscopy flexible;  Surgeon: Brittni Robles MD;  Location: [facility] OR    TONSILLECTOMY & ADENOIDECTOMY  4/25/2006     ALLERGIES:     Allergies   Allergen Reactions    Gadavist [Gadobutrol] Nausea     Patient could not perform timed sequences due to nausea and discomfort.    Glucagon Nausea and Vomiting     Pre medicate with lorazapam if able.      Amoxicillin     Penicillin [Penicillins]        PERTINENT MEDICATIONS:    Current Outpatient Medications:     budesonide (ENTOCORT EC) 3 MG EC capsule, Take 3 capsules (9 mg) by mouth every morning, Disp: 90 capsule, Rfl: 0    inFLIXimab-dyyb (INFLECTRA) 100 MG injection, Inject 765 mg into the vein every 28 (twenty-eight) days, Disp: , Rfl:     SOCIAL HISTORY:  Social History     Socioeconomic History    Marital status: Single     Spouse name: Not on file    Number of children: Not on file    Years of education: Not on file    Highest education level: Not on file   Occupational History    Not on file   Tobacco Use    Smoking status: Never    Smokeless tobacco: Current    Tobacco comments:     Vape   Vaping Use    Vaping status: Every Day    Substances: Nicotine, Flavoring    Devices: Pre-filled or refillable cartridge   Substance and Sexual Activity    Alcohol use: Yes    Drug use: No    Sexual activity: Yes     Partners: Female     Birth control/protection: Pill   Other Topics Concern     Service Not Asked    Blood Transfusions Not Asked    Caffeine Concern Not Asked    Occupational Exposure Not Asked    Hobby Hazards Not Asked    Sleep Concern Not Asked    Stress Concern Not Asked    Weight Concern Not Asked    Special Diet Not Asked    Back  "Care Not Asked    Exercise Not Asked    Bike Helmet Not Asked    Seat Belt Not Asked    Self-Exams Not Asked    Parent/sibling w/ CABG, MI or angioplasty before 65F 55M? Not Asked   Social History Narrative    Not on file     Social Determinants of Health     Financial Resource Strain: Not on file   Food Insecurity: Not on file   Transportation Needs: Not on file   Physical Activity: Not on file   Stress: Not on file   Social Connections: Not on file   Interpersonal Safety: Not on file   Housing Stability: Not on file       FAMILY HISTORY:  Family History   Problem Relation Age of Onset    No Known Problems Mother     No Known Problems Father     Melanoma No family hx of     Skin Cancer No family hx of         No family history of IBD or colon or rectal cancer.     Past/family/social history reviewed and no changes    PHYSICAL EXAMINATION:  Vitals reviewed: Ht 1.88 m (6' 2\")   Wt 77.1 kg (170 lb)   BMI 21.83 kg/m    Wt:   Wt Readings from Last 2 Encounters:   05/08/24 77.1 kg (170 lb)   03/04/24 77.1 kg (170 lb)      Constitutional - general appearance is well and in no acute distress. Body habitus normal  Eyes - No redness or discharge  Respiratory - No cough, unlabored breathing  Musculoskeletal - range of motion intact: Neck and arms  Skin - No discoloration or lesions  Neurological - No tremors, headaches  Psychiatric - No anxiety, alert & oriented       Again, thank you for allowing me to participate in the care of your patient.      Sincerely,    Jon Chavez PA-C  "

## 2024-05-08 NOTE — PATIENT INSTRUCTIONS
It was a pleasure taking care of you today.  I've included a brief summary of our discussion and care plan from today's visit below.  Please review this information with your primary care provider.  ______________________________________________________________________    My recommendations are summarized as follows:    -- Continue remicade  -- Labs with infusions  -- Start cholestyramine 2-3 times per day with meals   -- Yearly skin check  -- Patient with IBD we recommend supplementation vitamin D 1000 units daily and calcium 500 mg twice daily.  -- No NSAIDs (ibuprofen, or anything containing ibuprofen)    For additional resources about inflammatory bowel disease visit http://www.crohnscolitisfoundation.org/    To learn more about Diet and Nutrition in the setting of IBD, check out some of these resources:  https://www.crohnscolitisfoundation.org/diet-and-nutrition/what-should-i-eat  https://www.nimbal.org/  https://ntforibd.org/    Return to GI Clinic in 6 months to review your progress.    ______________________________________________________________________    How do I schedule labs, imaging studies, or procedures that were ordered in clinic today?     Labs: To schedule lab appointment at the [facility], use my chart or call [phone]. If you have a [facility] lab closer to home where you are regularly seen you can give them a call.     Procedures: If a colonoscopy, upper endoscopy, breath test, esophageal manometry, or pH impedence was ordered today, our endoscopy team will call you to schedule this. If you have not heard from our endoscopy team within a week, please call [phone] to schedule.     Imaging Studies: If you were scheduled for a CT scan, X-ray, MRI, ultrasound, HIDA scan or other imaging study, please call [phone] to have this scheduled.     Referral: If a referral to another specialty was ordered, expect a phone call or follow instructions above. If you have not heard  from anyone regarding your referral in a week, please call our clinic to check the status.     Who do I call with any questions after my visit?  Please be in touch if there are any further questions that arise following today's visit.  There are multiple ways to contact your gastroenterology care team.      During business hours, you may reach a Gastroenterology nurse at [phone]    To schedule or reschedule an appointment, please call [phone].     You can always send a secure message through "[employer].  "[employer] messages are answered by your nurse or doctor typically within 24 hours.  Please allow extra time on weekends and holidays.      For urgent/emergent questions after business hours, you may reach the on-call GI Fellow by contacting the [facility]  at [phone].     How will I get the results of any tests ordered?    You will receive all of your results.  If you have signed up for [employer], any tests ordered at your visit will be available to you after your physician reviews them.  Typically this takes 1-2 weeks.  If there are urgent results that require a change in your care plan, your physician or nurse will call you to discuss the next steps.      What is "[employer]?  "[employer] is a secure way for you to access all of your healthcare records from the [facility].  It is a web based computer program, so you can sign on to it from any location.  It also allows you to send secure messages to your care team.  I recommend signing up for "[employer] access if you have not already done so and are comfortable with using a computer.         Sincerely,    Jon Chavez PA-C  [facility]  Division of Gastroenterology

## 2024-05-09 ENCOUNTER — TELEPHONE (OUTPATIENT)
Dept: GASTROENTEROLOGY | Facility: CLINIC | Age: 24
End: 2024-05-09
Payer: COMMERCIAL

## 2024-05-09 NOTE — TELEPHONE ENCOUNTER
Left Voicemail (1st Attempt) and Sent Mychart (1st Attempt) for the patient to call back and schedule the following:    Appointment type: RTN IBD  Provider: Jon Chavez   Return date: Nov 2024  Specialty phone number: [phone]

## 2024-05-16 ENCOUNTER — TELEPHONE (OUTPATIENT)
Dept: GASTROENTEROLOGY | Facility: CLINIC | Age: 24
End: 2024-05-16
Payer: COMMERCIAL

## 2024-05-16 NOTE — TELEPHONE ENCOUNTER
Attempted to contact eVntura to schedule MTM follow-up visit. [place] previously sent and read. Unable to reach, left message with call back number. Asked to respond to [employer] message.    Yassine England, PharmD, BCPS  [place] Pharmacist   [facility]  Phone: [phone]

## 2024-05-18 ENCOUNTER — DOCUMENTATION ONLY (OUTPATIENT)
Dept: PHARMACY | Facility: CLINIC | Age: 24
End: 2024-05-18
Payer: COMMERCIAL

## 2024-05-18 NOTE — PROGRESS NOTES
Skilled Nurse visit in the Patient Home to administer Inflectra 800mg.  No recent elevated temperature, fever, chills, productive cough, coughing for 3 weeks or longer or hemoptysis, abnormal vital signs, night sweats, chest pain. No  decrease in your appetite, unexplained weight loss or fatigue.  Current weight 175#.  Peripheral IV left AC, 2 attempts.  No labs this visit.  Infusion completed without complication or reaction. Pt reports therapy is effective in managing symptoms related to therapy.     Carin Peace RN, BSN  [facility]  [phone]  [email or web address]

## 2024-05-25 ENCOUNTER — HEALTH MAINTENANCE LETTER (OUTPATIENT)
Age: 24
End: 2024-05-25

## 2024-06-15 ENCOUNTER — DOCUMENTATION ONLY (OUTPATIENT)
Dept: PHARMACY | Facility: CLINIC | Age: 24
End: 2024-06-15

## 2024-06-15 ENCOUNTER — LAB REQUISITION (OUTPATIENT)
Dept: LAB | Facility: CLINIC | Age: 24
End: 2024-06-15
Payer: COMMERCIAL

## 2024-06-15 DIAGNOSIS — K50.014 CROHN'S DISEASE OF SMALL INTESTINE WITH ABSCESS (H): ICD-10-CM

## 2024-06-15 LAB
ALBUMIN SERPL BCG-MCNC: 4.7 G/DL (ref 3.5–5.2)
ALP SERPL-CCNC: 94 U/L (ref 40–150)
ALT SERPL W P-5'-P-CCNC: 25 U/L (ref 0–70)
AST SERPL W P-5'-P-CCNC: 20 U/L (ref 0–45)
BASOPHILS # BLD AUTO: 0 10E3/UL (ref 0–0.2)
BASOPHILS NFR BLD AUTO: 1 %
BILIRUB DIRECT SERPL-MCNC: <0.2 MG/DL (ref 0–0.3)
BILIRUB SERPL-MCNC: 0.8 MG/DL
CRP SERPL-MCNC: <3 MG/L
EOSINOPHIL # BLD AUTO: 0.2 10E3/UL (ref 0–0.7)
EOSINOPHIL NFR BLD AUTO: 4 %
ERYTHROCYTE [DISTWIDTH] IN BLOOD BY AUTOMATED COUNT: 16.7 % (ref 10–15)
ERYTHROCYTE [SEDIMENTATION RATE] IN BLOOD BY WESTERGREN METHOD: 4 MM/HR (ref 0–15)
HCT VFR BLD AUTO: 41.9 % (ref 40–53)
HGB BLD-MCNC: 13.5 G/DL (ref 13.3–17.7)
IMM GRANULOCYTES # BLD: 0 10E3/UL
IMM GRANULOCYTES NFR BLD: 0 %
LYMPHOCYTES # BLD AUTO: 1.9 10E3/UL (ref 0.8–5.3)
LYMPHOCYTES NFR BLD AUTO: 29 %
MCH RBC QN AUTO: 25.2 PG (ref 26.5–33)
MCHC RBC AUTO-ENTMCNC: 32.2 G/DL (ref 31.5–36.5)
MCV RBC AUTO: 78 FL (ref 78–100)
MONOCYTES # BLD AUTO: 0.7 10E3/UL (ref 0–1.3)
MONOCYTES NFR BLD AUTO: 11 %
NEUTROPHILS # BLD AUTO: 3.7 10E3/UL (ref 1.6–8.3)
NEUTROPHILS NFR BLD AUTO: 56 %
NRBC # BLD AUTO: 0 10E3/UL
NRBC BLD AUTO-RTO: 0 /100
PLATELET # BLD AUTO: 271 10E3/UL (ref 150–450)
PROT SERPL-MCNC: 8 G/DL (ref 6.4–8.3)
RBC # BLD AUTO: 5.36 10E6/UL (ref 4.4–5.9)
WBC # BLD AUTO: 6.6 10E3/UL (ref 4–11)

## 2024-06-15 PROCEDURE — 85004 AUTOMATED DIFF WBC COUNT: CPT | Performed by: PHYSICIAN ASSISTANT

## 2024-06-15 PROCEDURE — 85652 RBC SED RATE AUTOMATED: CPT | Performed by: PHYSICIAN ASSISTANT

## 2024-06-15 PROCEDURE — 80076 HEPATIC FUNCTION PANEL: CPT | Performed by: PHYSICIAN ASSISTANT

## 2024-06-15 PROCEDURE — 86140 C-REACTIVE PROTEIN: CPT | Performed by: PHYSICIAN ASSISTANT

## 2024-06-16 NOTE — PROGRESS NOTES
Skilled Nurse visit in the Patient Home to administer Inflectra 800mg and NACL 0.9% 250ml concomitant fluids.  No recent elevated temperature, fever, chills, productive cough, coughing for 3 weeks or longer or hemoptysis, abnormal vital signs, night sweats, chest pain. No  decrease in your appetite, unexplained weight loss or fatigue.  No other new onset medical symptoms.  Current weight 175#.  Peripheral IV left AC, 1 attempt. Labs drawn CBC d/p, ESR, CRP, hepatic panel. Infusion completed without complication or reaction. Pt reports therapy is effective in managing symptoms related to therapy.    Carin Peace, RN, BSN  [facility]  [phone]  [email or web address]

## 2024-07-02 ENCOUNTER — VIRTUAL VISIT (OUTPATIENT)
Dept: GASTROENTEROLOGY | Facility: CLINIC | Age: 24
End: 2024-07-02
Attending: PHYSICIAN ASSISTANT
Payer: COMMERCIAL

## 2024-07-02 DIAGNOSIS — K50.014 CROHN'S DISEASE OF SMALL INTESTINE WITH ABSCESS (H): Primary | ICD-10-CM

## 2024-07-02 RX ORDER — PNEUMOCOCCAL 20-VALENT CONJUGATE VACCINE 2.2; 2.2; 2.2; 2.2; 2.2; 2.2; 2.2; 2.2; 2.2; 2.2; 2.2; 2.2; 2.2; 2.2; 2.2; 2.2; 4.4; 2.2; 2.2; 2.2 UG/.5ML; UG/.5ML; UG/.5ML; UG/.5ML; UG/.5ML; UG/.5ML; UG/.5ML; UG/.5ML; UG/.5ML; UG/.5ML; UG/.5ML; UG/.5ML; UG/.5ML; UG/.5ML; UG/.5ML; UG/.5ML; UG/.5ML; UG/.5ML; UG/.5ML; UG/.5ML
0.5 INJECTION, SUSPENSION INTRAMUSCULAR ONCE
Qty: 0.5 ML | Refills: 0 | Status: SHIPPED | OUTPATIENT
Start: 2024-07-02 | End: 2024-07-02

## 2024-07-02 RX ORDER — ZOSTER VACCINE RECOMBINANT, ADJUVANTED 50 MCG/0.5
1 KIT INTRAMUSCULAR ONCE
Qty: 0.5 ML | Refills: 1 | Status: SHIPPED | OUTPATIENT
Start: 2024-07-02 | End: 2024-07-02

## 2024-07-02 NOTE — PATIENT INSTRUCTIONS
"Recommendations from today's MT visit:                                                      Ventura will consider the following vaccines:  Pneumococcal pneumonia (Prevnar-20)- Prescription sent to [facility] in [facility] \"What everyone should know\" https://www.cdc.gov/vaccines/vpd/pneumo/public/index.html  Shingles (Shingrix 2-dose series)- Prescription sent to [facility] in [facility] \"What everyone should know\" https://www.cdc.gov/vaccines/vpd/shingles/public/shingrix/index.html  You are due for annual tuberculosis screening, this cannot be drawn with [facility] and will need to be drawn at any [facility] lab. I've also ordered a routine one-time hepatitis C screening and a hepatitis B surface antibody to check your immunity to Hepatitis B.   Start Vitamin D 1000 units once daily.  I recommend a calcium goal of 1000 mg/day between diet and supplementation. Please review the foods you eat and see if you are getting at least 1000 mg/day of calcium from your diet. If you are not getting adequate calcium from your diet then consider supplementation. https://www.dietaryguidelines.gov/food-sources-calcium   I've placed a referral to dermatology for a routine annual skin check. If you don't hear from a  in 2-3 business days, you can call [phone] to schedule an appointment.  I'll reach out to Jon Chavez PA-C about finding you a tablet alternative to the cholestyramine. In the mean time continue to try the cholestyramine as it may help you with symptoms.     Follow-up: 1/14/2025 3:00 PM (telephone)    It was great speaking with you today.  I value your experience and would be very thankful for your time in providing feedback in our clinic survey. In the next few days, you may receive an email or text message from [employer] with a link to a survey related to your  clinical pharmacist.\"     To schedule another [facility] appointment, please call the clinic directly or you may " call the [facility] scheduling line at [phone] or toll-free at [phone].     My Clinical Pharmacist's contact information:                                                      Please feel free to contact me with any questions or concerns you have.      Yadi CarranzaD, BCPS  [facility] Pharmacist   [facility]  Phone: [phone]

## 2024-07-02 NOTE — PROGRESS NOTES
"Medication Therapy Management (MTM) Encounter    ASSESSMENT:                            Medication Adherence/Access: See below for considerations    Crohn's Disease:  Ventura would benefit from continued treatment with Infliximab 10 mg/kg every 4 weeks. They are up to date on routine maintenance labs. They are due for annual tuberculosis screening. They are indicated for additional lab work including hepatitis B surface antibody, routine hepatitis C screening. No access issues for their advanced therapy are present. They are indicated for a few vaccinations which were recommended to them. They are not getting adequate calcium in their diet and supplementation was recommended. Reminders for routine cancer screening were provided and a referral to dermatology was placed for routine skin checks.     Per shared clinical decision making will check hepatitis B surface antibody and complete vaccine booster series if not immune.     Regarding cholestyramine, he is unable to take as directed due to inconvenience of powder formulation. I will check coverage of other bile acid sequestrants and reach out to Jon Chavez PA-C with recommendation for a tablet regimen.     Addendum 7/12/2024: Per discussion with Jon Chavez PA-C, replace cholestyramine with colestipol 1250 mg three times daily with meals. Prescription sent with this encounter.    PLAN:                            Ventura will consider the following vaccines:  Pneumococcal pneumonia (Prevnar-20)- Prescription sent to [facility] in [employer] \"What everyone should know\" https://www.cdc.gov/vaccines/vpd/pneumo/public/index.html  Shingles (Shingrix 2-dose series)- Prescription sent to [facility] in [employer] \"What everyone should know\" https://www.cdc.gov/vaccines/vpd/shingles/public/shingrix/index.html  You are due for annual tuberculosis screening, this cannot be drawn with [facility] and will need to be drawn at any [facility] lab. I've also " ordered a routine one-time hepatitis C screening and a hepatitis B surface antibody to check your immunity to Hepatitis B.   Start Vitamin D 1000 units once daily.  I recommend a calcium goal of 1000 mg/day between diet and supplementation. Please review the foods you eat and see if you are getting at least 1000 mg/day of calcium from your diet. If you are not getting adequate calcium from your diet then consider supplementation. https://www.dietaryguidelines.gov/food-sources-calcium   I've placed a referral to dermatology for a routine annual skin check. If you don't hear from a  in 2-3 business days, you can call [phone] to schedule an appointment.  I'll reach out to Jon Chavez PA-C about finding you a tablet alternative to the cholestyramine. In the mean time continue to try the cholestyramine as it may help you with symptoms.     Follow-up: 1/14/2025 3:00 PM (telephone)    SUBJECTIVE/OBJECTIVE:                          Ventura Quiroz is a 23 year old male seen for a follow-up visit.       Reason for visit: IFX + IBD health maintenance follow-up visit.    Allergies/ADRs: Reviewed in chart  Past Medical History: Reviewed in chart  Tobacco: He reports that he has never smoked. He uses smokeless tobacco.  Alcohol: 1-3 beverages / week      Medication Adherence/Access: not able to take cholestyramine as directed    Crohn's Disease:   Inflectra 10 mg/kg every 28 days  Cholestyramine 4 g three times daily with meals - not taking, hard to remember and take when he is not at home, would prefer a pill    Denies side effects or concerns with Inflectra infusions.     In clinical trials an appropriate regimen for bile acid diarrhea is Colesevelam 625 mg 2 tablets 3 times daily (PMID: 28500588, PMID: 47957597). Colestipol is also an option.     We discussed his hepatitis B vaccination status and that normally I recommend completing the three-dose booster series, however he could reasonably choose to  "repeat hepatitis B surface antibody and vaccinate if not immune. Per shared clinical decision making will check hepatitis B surface antibody and vaccinate if not immune.     Prior authorization status: approved ([facility])  Original start date:   Last dose: 6/15/2024  Next dose: 2024    Therapeutic drug monitoring:  3/23/2024 Infliximab 34 micrograms/mL no antibodies (Infliximab 10 mg/kg every 28 days)- Pr Dr. Wright 3/6/2023, \"I do not think he should de-escalate given his prior rapid metabolism of infliximab.\"    Last provider visit: 2024 JUNAID Chavez  Next provider visit: 2024 JUNAID Chavez  Last labs completed: 6/15/2024  Lab frequency: every other infusion   - standing labs hepatic panel, ESR, CRP, CBC with platelets & diff  2024  Next labs due: now (quantiferon-Tb, hepatitis B surface antibody, hepatitis C screening)    IBD Health Maintenance    Vaccinations:  All patients on biologics should avoid live vaccines unless specifically indicated.    -- Influenza (every year)- last   -- TdaP (every 10 years)- last   -- Pneumococcal Pneumonia    Prevnar-13: 2019   Pneumovax-23: 2019   Prevnar-20: not on file  -- COVID-19- not on file, decline    One time confirmation of immunity or serologies:  -- Hepatitis A (serologies or immunizations)  -- Hepatitis B (serologies or immunizations)- not immune by serology  followed by boosters 2019, 2019  -- Varicella/Zoster    Varicella- report history of disease 2004   Zoster- not on file  -- MMR- 2001, 2005  -- HPV (all aged 18-26)- 2019, 2019, 2019  -- Meningococcal meningitis (all patients at risk for meningitis)-- MenACWY 2019    Due to the immunosuppression in this patient, I would not advise administration of live vaccines such as varicella/VZV, intranasal influenza, MMR, or yellow fever vaccine (if traveling).      Immunosuppressive Screening:  -- Hep B Surface Antibody not immune by serology 2019; childhood " "vaccine 12/2000, 8/2001; negative antibody check followed by vaccination 4/25/2019, 6/25/2019  -- Hep B Surface Antigen non-reactive 4/9/2019  -- Hep B Core Antibody non-reactive 4/9/2019  -- Hep C Antibody not done   -- Yearly assessment of TB not done; 6/9/2023    Lab Results   Component Value Date    AUSAB 1.81 04/09/2019    HEPBANG Nonreactive 04/09/2019    HBCAB Nonreactive 04/09/2019    TBRES Negative 06/09/2023       Bone mineral density screening   -- Recommend all patients supplement with calcium and vitamin D  -- Patient reports 2 glasses of milk/day     Cancer Screening:  Colon cancer screening:  Per Jon Chavez 5/8/24, \"Given ileal only disease, he does not need IBD dysplasia surveillance   -- Age appropriate colon cancer screening\"    Skin cancer screening: Annual visual exam of skin by dermatologist since patient is immunocompromised- last 2019    Depression Screening:    PHQ-2 Score:         1/3/2024     1:39 PM 3/6/2023     3:38 PM   PHQ-2 ( 1999 [employer])   Q1: Little interest or pleasure in doing things 0 0   Q2: Feeling down, depressed or hopeless 0 0   PHQ-2 Score 0 0       Research:  Are you interested in being contacted about enrollment in clinical research studies? Yes    Would you like to receive a quarterly newsletter on research via email. YES sarah      Misc:  -- Avoid tobacco use  -- Avoid NSAIDs as there is potentially a 25% chance of causing an IBD flare      ----------------      I spent 34 minutes with this patient today. All changes were made via collaborative practice agreement with Jon Chavez PA-C. A copy of the visit note was provided to the patient's provider(s).    A summary of these recommendations was sent via [employer].    Yunior England, PharmD, BCPS  MTM Pharmacist   [facility]  Phone: [phone]    Telemedicine Visit Details  Type of service:  Telephone visit  Start Time:  3:00 PM  End Time:  3:34 PM     Medication Therapy Recommendations  No " medication therapy recommendations to display

## 2024-07-02 NOTE — Clinical Note
"7/2/2024      Ventura Quiroz  [address]  [place] [id number]      Dear Colleague,    Thank you for referring your patient, Ventura Quiroz, to the [facility]. Please see a copy of my visit note below.    Medication Therapy Management (MTM) Encounter    ASSESSMENT:                            Medication Adherence/Access: {adherencechoices:931635}    Crohn's Disease:  ***      PLAN:                            *** will consider the following vaccines:  Annual flu shot  Pneumococcal pneumonia (Prevnar-20)- Prescription sent to [facility] in [facility] \"What everyone should know\" https://www.cdc.gov/vaccines/vpd/pneumo/public/index.html  Shingles (Shingrix 2-dose series)- Prescription sent to [facility] in [facility] \"What everyone should know\" https://www.cdc.gov/vaccines/vpd/shingles/public/shingrix/index.html  You are due for annual tuberculosis screening, this cannot be drawn with [facility] and will need to be drawn at any [facility] lab. I've also ordered a routine one-time hepatitis C screening and a hepatitis B surface antibody to check your immunity to Hepatitis B.   Start Vitamin D 1000 units   I recommend a calcium goal of 1000 mg/day between diet and supplementation. Please review the foods you eat and see if you are getting at least 1000 mg/day of calcium from your diet. If you are not getting adequate calcium from your diet then consider supplementation. https://www.dietaryguidelines.gov/food-sources-calcium   I've placed a referral to dermatology for a routine annual skin check. If you don't hear from a  in 2-3 business days, you can call [phone] to schedule an appointment.  I'll reach out to Jon Chavez PA-C about finding you a tablet alternative to the cholestyramine. In the mean time continue to try the cholestyramine as it may help you with symptoms.     Follow-up: 1/14/2025 3:00 PM " (telephone)    SUBJECTIVE/OBJECTIVE:                          Ventura Quiroz is a 23 year old male seen for a follow-up visit.       Reason for visit: IFX + IBD health maintenance follow-up visit.    Allergies/ADRs: Reviewed in chart  Past Medical History: Reviewed in chart  Tobacco: He reports that he has never smoked. He uses smokeless tobacco.  Alcohol: 1-3 beverages / week      Medication Adherence/Access: no issues reported    Crohn's Disease:   Inflectra 10 mg/kg every 28 days  Cholestyramine 4 g three times daily with meals - not taking, hard to remember and take when he is not at home, would prefer a pill    Denies side effects or concerns with infusions.     In clinical trials an appropriate regimen for bile acid diarrhea is Colesevelam 625 mg 2 tablets 3 times daily (PMID: 37383647, PMID: 81283602).     We discussed his hepatitis B vaccination status and that normally I recommend completing the three-dose booster series, however he could reasonably choose to repeat hepatitis B surface antibody and vaccinate if not immune. Per shared clinical decision making will check hepatitis B surface antibody and vaccinate if not immune.     Prior authorization status: approved ([facility])  Original start date:   Last dose: 6/15/2024  Next dose: 2024    Therapeutic drug monitoring:  3/23/2024 Infliximab 34 micrograms/mL no antibodies (Infliximab 10 mg/kg every 28 days)    Last provider visit: 2024 JUNAID Chavez  Next provider visit: 2024 JUNAID Chavez  Last labs completed: 6/15/2024  Lab frequency: every other infusion   - standing labs hepatic panel, ESR, CRP, CBC with platelets & diff  2024  Next labs due:     Last IBD Health Maintenance Review:  PDC:    IBD Health Maintenance    Vaccinations:  All patients on biologics should avoid live vaccines unless specifically indicated.    -- Influenza (every year)- last   -- TdaP (every 10 years)- last   -- Pneumococcal Pneumonia    Prevnar-13:  "4/2019   Pneumovax-23: 6/2019   Prevnar-20: not on file  -- COVID-19- not on file, decline    One time confirmation of immunity or serologies:  -- Hepatitis A (serologies or immunizations)  -- Hepatitis B (serologies or immunizations)- not immune by serology 2019 followed by boosters 4/2019, 6/2019  -- Varicella/Zoster    Varicella- report history of disease 1/1/2004   Zoster- not on file  -- MMR- 11/2001, 8/2005  -- HPV (all aged 18-26)- 4/2019, 6/2019, 12/2019  -- Meningococcal meningitis (all patients at risk for meningitis)-- MenACWY 4/2019    Due to the immunosuppression in this patient, I would not advise administration of live vaccines such as varicella/VZV, intranasal influenza, MMR, or yellow fever vaccine (if traveling).      Immunosuppressive Screening:  -- Hep B Surface Antibody not immune by serology 4/9/2019; childhood vaccine 12/2000, 8/2001; negative antibody check followed by vaccination 4/25/2019, 6/25/2019  -- Hep B Surface Antigen non-reactive 4/9/2019  -- Hep B Core Antibody non-reactive 4/9/2019  -- Hep C Antibody not done   -- Yearly assessment of TB not done; 6/9/2023    Lab Results   Component Value Date    AUSAB 1.81 04/09/2019    HEPBANG Nonreactive 04/09/2019    HBCAB Nonreactive 04/09/2019    TBRES Negative 06/09/2023       Bone mineral density screening   -- Recommend all patients supplement with calcium and vitamin D  -- Patient reports 2 glasses of milk/day     Cancer Screening:  Colon cancer screening:  Per Jon Chavez 5/8/24, \"Given ileal only disease, he does not need IBD dysplasia surveillance   -- Age appropriate colon cancer screening\"    Skin cancer screening: Annual visual exam of skin by dermatologist since patient is immunocompromised- last 2019    Depression Screening:    PHQ-2 Score:         1/3/2024     1:39 PM 3/6/2023     3:38 PM   PHQ-2 ( 1999 Pfizer)   Q1: Little interest or pleasure in doing things 0 0   Q2: Feeling down, depressed or hopeless 0 0   PHQ-2 Score 0 0 "       Research:  Are you interested in being contacted about enrollment in clinical research studies? Yes    Would you like to receive a quarterly newsletter on research via email. YES sarah      Misc:  -- Avoid tobacco use  -- Avoid NSAIDs as there is potentially a 25% chance of causing an IBD flare      ----------------      I spent 34 minutes with this patient today. All changes were made via collaborative practice agreement with Jon Chavez PA-C. A copy of the visit note was provided to the patient's provider(s).    A summary of these recommendations was sent via [employer].    Yunior England, PharmD, BCPS  MTM Pharmacist   [facility]  Phone: [phone]    Telemedicine Visit Details  Type of service:  Telephone visit  Start Time:  3:00 PM  End Time:  3:34 PM     Medication Therapy Recommendations  No medication therapy recommendations to display       Medication Therapy Management (MTM) Encounter    ASSESSMENT:                            Medication Adherence/Access: See below for considerations    Crohn's Disease:  Sarah would benefit from continued treatment with Infliximab 10 mg/kg every 4 weeks. They are up to date on routine maintenance labs. They are due for annual tuberculosis screening. They are indicated for additional lab work including hepatitis B surface antibody, routine hepatitis C screening. No access issues for their advanced therapy are present. They are indicated for a few vaccinations which were recommended to them. They are not getting adequate calcium in their diet and supplementation was recommended. Reminders for routine cancer screening were provided and a referral to dermatology was placed for routine skin checks.     Per shared clinical decision making will check hepatitis B surface antibody and vaccinate if not immune.     Regarding cholestyramine, he is unable to take as directed due to inconvenience of powder formulation. I will check coverage of other bile  "acid sequestrants and reach out to Jon Chavez PA-C with recommendation for a tablet regimen.     PLAN:                            Ventura will consider the following vaccines:  Pneumococcal pneumonia (Prevnar-20)- Prescription sent to [facility] in [facility] \"What everyone should know\" https://www.cdc.gov/vaccines/vpd/pneumo/public/index.html  Shingles (Shingrix 2-dose series)- [facility] in [facility] \"What everyone should know\" https://www.cdc.gov/vaccines/vpd/shingles/public/shingrix/index.html  You are due for annual tuberculosis screening, this cannot be drawn with [facility] and will need to be drawn at any [facility] lab. I've also ordered a routine one-time hepatitis C screening and a hepatitis B surface antibody to check your immunity to Hepatitis B.   Start Vitamin D 1000 units once daily.  I recommend a calcium goal of 1000 mg/day between diet and supplementation. Please review the foods you eat and see if you are getting at least 1000 mg/day of calcium from your diet. If you are not getting adequate calcium from your diet then consider supplementation. https://www.dietaryguidelines.gov/food-sources-calcium   I've placed a referral to dermatology for a routine annual skin check. If you don't hear from a  in 2-3 business days, you can call [phone] to schedule an appointment.  I'll reach out to Jon Chavez PA-C about finding you a tablet alternative to the cholestyramine. In the mean time continue to try the cholestyramine as it may help you with symptoms.     Follow-up: 1/14/2025 3:00 PM (telephone)    SUBJECTIVE/OBJECTIVE:                          Ventura Quiroz is a 23 year old male seen for a follow-up visit.       Reason for visit: IFX + IBD health maintenance follow-up visit.    Allergies/ADRs: Reviewed in chart  Past Medical History: Reviewed in chart  Tobacco: He reports that he has never smoked. He uses smokeless " "tobacco.  Alcohol: 1-3 beverages / week      Medication Adherence/Access: not able to take cholestyramine as directed    Crohn's Disease:   Inflectra 10 mg/kg every 28 days  Cholestyramine 4 g three times daily with meals - not taking, hard to remember and take when he is not at home, would prefer a pill    Denies side effects or concerns with Inflectra infusions.     In clinical trials an appropriate regimen for bile acid diarrhea is Colesevelam 625 mg 2 tablets 3 times daily (PMID: 39040454, PMID: 20919823). Colestipol is also an option.     We discussed his hepatitis B vaccination status and that normally I recommend completing the three-dose booster series, however he could reasonably choose to repeat hepatitis B surface antibody and vaccinate if not immune. Per shared clinical decision making will check hepatitis B surface antibody and vaccinate if not immune.     Prior authorization status: approved ([facility])  Original start date:   Last dose: 6/15/2024  Next dose: 2024    Therapeutic drug monitoring:  3/23/2024 Infliximab 34 micrograms/mL no antibodies (Infliximab 10 mg/kg every 28 days)- Pr Dr. Wright 3/6/2023, \"I do not think he should de-escalate given his prior rapid metabolism of infliximab.\"    Last provider visit: 2024 JUNAID Chavez  Next provider visit: 2024 JUNAID Chavez  Last labs completed: 6/15/2024  Lab frequency: every other infusion   - standing labs hepatic panel, ESR, CRP, CBC with platelets & diff  2024  Next labs due:     Last IBD Health Maintenance Review:  PDC:    IBD Health Maintenance    Vaccinations:  All patients on biologics should avoid live vaccines unless specifically indicated.    -- Influenza (every year)- last   -- TdaP (every 10 years)- last   -- Pneumococcal Pneumonia    Prevnar-13: 2019   Pneumovax-23: 2019   Prevnar-20: not on file  -- COVID-19- not on file, decline    One time confirmation of immunity or serologies:  -- Hepatitis A " "(serologies or immunizations)  -- Hepatitis B (serologies or immunizations)- not immune by serology 2019 followed by boosters 4/2019, 6/2019  -- Varicella/Zoster    Varicella- report history of disease 1/1/2004   Zoster- not on file  -- MMR- 11/2001, 8/2005  -- HPV (all aged 18-26)- 4/2019, 6/2019, 12/2019  -- Meningococcal meningitis (all patients at risk for meningitis)-- MenACWY 4/2019    Due to the immunosuppression in this patient, I would not advise administration of live vaccines such as varicella/VZV, intranasal influenza, MMR, or yellow fever vaccine (if traveling).      Immunosuppressive Screening:  -- Hep B Surface Antibody not immune by serology 4/9/2019; childhood vaccine 12/2000, 8/2001; negative antibody check followed by vaccination 4/25/2019, 6/25/2019  -- Hep B Surface Antigen non-reactive 4/9/2019  -- Hep B Core Antibody non-reactive 4/9/2019  -- Hep C Antibody not done   -- Yearly assessment of TB not done; 6/9/2023    Lab Results   Component Value Date    AUSAB 1.81 04/09/2019    HEPBANG Nonreactive 04/09/2019    HBCAB Nonreactive 04/09/2019    TBRES Negative 06/09/2023       Bone mineral density screening   -- Recommend all patients supplement with calcium and vitamin D  -- Patient reports 2 glasses of milk/day     Cancer Screening:  Colon cancer screening:  Per Jon Kathy 5/8/24, \"Given ileal only disease, he does not need IBD dysplasia surveillance   -- Age appropriate colon cancer screening\"    Skin cancer screening: Annual visual exam of skin by dermatologist since patient is immunocompromised- last 2019    Depression Screening:    PHQ-2 Score:         1/3/2024     1:39 PM 3/6/2023     3:38 PM   PHQ-2 ( 1999 Pf[employer])   Q1: Little interest or pleasure in doing things 0 0   Q2: Feeling down, depressed or hopeless 0 0   PHQ-2 Score 0 0       Research:  Are you interested in being contacted about enrollment in clinical research studies? Yes    Would you like to receive a quarterly newsletter " on research via email. YES sarah      Misc:  -- Avoid tobacco use  -- Avoid NSAIDs as there is potentially a 25% chance of causing an IBD flare      ----------------      I spent 34 minutes with this patient today. All changes were made via collaborative practice agreement with Jon Chavez PA-C. A copy of the visit note was provided to the patient's provider(s).    A summary of these recommendations was sent via [employer].    Yunior England PharmD, BCPS  MT Pharmacist   [facility]  Phone: [phone]    Telemedicine Visit Details  Type of service:  Telephone visit  Start Time:  3:00 PM  End Time:  3:34 PM     Medication Therapy Recommendations  No medication therapy recommendations to display         Again, thank you for allowing me to participate in the care of your patient.        Sincerely,        Yunior England RPH

## 2024-07-12 ENCOUNTER — LAB (OUTPATIENT)
Dept: LAB | Facility: CLINIC | Age: 24
End: 2024-07-12
Payer: COMMERCIAL

## 2024-07-12 ENCOUNTER — TRANSFERRED RECORDS (OUTPATIENT)
Dept: HEALTH INFORMATION MANAGEMENT | Facility: CLINIC | Age: 24
End: 2024-07-12

## 2024-07-12 DIAGNOSIS — K50.014 CROHN'S DISEASE OF SMALL INTESTINE WITH ABSCESS (H): ICD-10-CM

## 2024-07-12 LAB
ALBUMIN SERPL BCG-MCNC: 4.7 G/DL (ref 3.5–5.2)
ALP SERPL-CCNC: 87 U/L (ref 40–150)
ALT SERPL W P-5'-P-CCNC: 18 U/L (ref 0–70)
AST SERPL W P-5'-P-CCNC: 15 U/L (ref 0–45)
BILIRUB DIRECT SERPL-MCNC: <0.2 MG/DL (ref 0–0.3)
BILIRUB SERPL-MCNC: 0.4 MG/DL
PROT SERPL-MCNC: 7.8 G/DL (ref 6.4–8.3)

## 2024-07-12 PROCEDURE — 99000 SPECIMEN HANDLING OFFICE-LAB: CPT

## 2024-07-12 PROCEDURE — 86481 TB AG RESPONSE T-CELL SUSP: CPT

## 2024-07-12 PROCEDURE — 36415 COLL VENOUS BLD VENIPUNCTURE: CPT

## 2024-07-12 PROCEDURE — 86706 HEP B SURFACE ANTIBODY: CPT

## 2024-07-12 PROCEDURE — 80076 HEPATIC FUNCTION PANEL: CPT

## 2024-07-12 PROCEDURE — 86803 HEPATITIS C AB TEST: CPT

## 2024-07-12 PROCEDURE — 80230 DRUG ASSAY INFLIXIMAB: CPT | Mod: 90

## 2024-07-12 PROCEDURE — 82542 COL CHROMOTOGRAPHY QUAL/QUAN: CPT

## 2024-07-12 RX ORDER — COLESEVELAM 180 1/1
1250 TABLET ORAL
Qty: 180 TABLET | Refills: 5 | Status: SHIPPED | OUTPATIENT
Start: 2024-07-12

## 2024-07-13 LAB
HBV SURFACE AB SERPL IA-ACNC: <3.5 M[IU]/ML
HBV SURFACE AB SERPL IA-ACNC: NONREACTIVE M[IU]/ML
HCV AB SERPL QL IA: NONREACTIVE

## 2024-07-14 ENCOUNTER — DOCUMENTATION ONLY (OUTPATIENT)
Dept: PHARMACY | Facility: CLINIC | Age: 24
End: 2024-07-14
Payer: COMMERCIAL

## 2024-07-14 NOTE — PROGRESS NOTES
Infusion completed 7/13/24    Skilled Nurse visit in the Patient Home to administer Inflectra 800mg and NACL 0.9% 250ml concomitant fluids.  No recent elevated temperature, fever, chills, productive cough, coughing for 3 weeks or longer or hemoptysis, abnormal vital signs, night sweats, chest pain. No  decrease in your appetite, unexplained weight loss or fatigue.  No other new onset medical symptoms.  Current weight 175#.  Peripheral IV left AC, 2 attempts. Labs drawn: none. Infusion completed without complication or reaction. Pt reports therapy is effective in managing symptoms related to therapy.     Carin Peace, RN, BSN  [facility]  [phone]  [email or web address]

## 2024-07-15 LAB
GAMMA INTERFERON BACKGROUND BLD IA-ACNC: 0.01 IU/ML
M TB IFN-G BLD-IMP: NEGATIVE
M TB IFN-G CD4+ BCKGRND COR BLD-ACNC: 9.99 IU/ML
MITOGEN IGNF BCKGRD COR BLD-ACNC: 0.01 IU/ML
MITOGEN IGNF BCKGRD COR BLD-ACNC: 0.01 IU/ML
QUANTIFERON MITOGEN: 10 IU/ML
QUANTIFERON NIL TUBE: 0.01 IU/ML
QUANTIFERON TB1 TUBE: 0.02 IU/ML
QUANTIFERON TB2 TUBE: 0.02

## 2024-07-19 LAB — SCANNED LAB RESULT: NORMAL

## 2024-07-19 NOTE — RESULT ENCOUNTER NOTE
Aric Whitehead,    Your remicade level is excellent. Please let us know if you are having any new GI symptoms.      Jon Chavez PA-C  Division of Gastroenterology, Hepatology and Nutrition  [facility]

## 2024-08-11 ENCOUNTER — DOCUMENTATION ONLY (OUTPATIENT)
Dept: PHARMACY | Facility: CLINIC | Age: 24
End: 2024-08-11
Payer: COMMERCIAL

## 2024-08-11 ENCOUNTER — LAB REQUISITION (OUTPATIENT)
Dept: LAB | Facility: CLINIC | Age: 24
End: 2024-08-11
Payer: COMMERCIAL

## 2024-08-11 DIAGNOSIS — K50.014 CROHN'S DISEASE OF SMALL INTESTINE WITH ABSCESS (H): ICD-10-CM

## 2024-08-11 LAB
ALBUMIN SERPL BCG-MCNC: 4.3 G/DL (ref 3.5–5.2)
ALP SERPL-CCNC: 77 U/L (ref 40–150)
ALT SERPL W P-5'-P-CCNC: 16 U/L (ref 0–70)
AST SERPL W P-5'-P-CCNC: 16 U/L (ref 0–45)
BASOPHILS # BLD AUTO: 0 10E3/UL (ref 0–0.2)
BASOPHILS NFR BLD AUTO: 0 %
BILIRUB DIRECT SERPL-MCNC: <0.2 MG/DL (ref 0–0.3)
BILIRUB SERPL-MCNC: 0.5 MG/DL
CRP SERPL-MCNC: <3 MG/L
EOSINOPHIL # BLD AUTO: 0.1 10E3/UL (ref 0–0.7)
EOSINOPHIL NFR BLD AUTO: 2 %
ERYTHROCYTE [DISTWIDTH] IN BLOOD BY AUTOMATED COUNT: 18.5 % (ref 10–15)
ERYTHROCYTE [SEDIMENTATION RATE] IN BLOOD BY WESTERGREN METHOD: 2 MM/HR (ref 0–15)
HCT VFR BLD AUTO: 42.1 % (ref 40–53)
HGB BLD-MCNC: 13.2 G/DL (ref 13.3–17.7)
IMM GRANULOCYTES # BLD: 0 10E3/UL
IMM GRANULOCYTES NFR BLD: 0 %
LYMPHOCYTES # BLD AUTO: 1.9 10E3/UL (ref 0.8–5.3)
LYMPHOCYTES NFR BLD AUTO: 23 %
MCH RBC QN AUTO: 25.6 PG (ref 26.5–33)
MCHC RBC AUTO-ENTMCNC: 31.4 G/DL (ref 31.5–36.5)
MCV RBC AUTO: 82 FL (ref 78–100)
MONOCYTES # BLD AUTO: 0.7 10E3/UL (ref 0–1.3)
MONOCYTES NFR BLD AUTO: 8 %
NEUTROPHILS # BLD AUTO: 5.4 10E3/UL (ref 1.6–8.3)
NEUTROPHILS NFR BLD AUTO: 67 %
NRBC # BLD AUTO: 0 10E3/UL
NRBC BLD AUTO-RTO: 0 /100
PLATELET # BLD AUTO: 205 10E3/UL (ref 150–450)
PROT SERPL-MCNC: 7.2 G/DL (ref 6.4–8.3)
RBC # BLD AUTO: 5.15 10E6/UL (ref 4.4–5.9)
WBC # BLD AUTO: 8.1 10E3/UL (ref 4–11)

## 2024-08-11 PROCEDURE — 85025 COMPLETE CBC W/AUTO DIFF WBC: CPT | Performed by: PHYSICIAN ASSISTANT

## 2024-08-11 PROCEDURE — 36415 COLL VENOUS BLD VENIPUNCTURE: CPT | Performed by: PHYSICIAN ASSISTANT

## 2024-08-11 PROCEDURE — 85652 RBC SED RATE AUTOMATED: CPT | Performed by: PHYSICIAN ASSISTANT

## 2024-08-11 PROCEDURE — 80076 HEPATIC FUNCTION PANEL: CPT | Performed by: PHYSICIAN ASSISTANT

## 2024-08-11 PROCEDURE — 86140 C-REACTIVE PROTEIN: CPT | Performed by: PHYSICIAN ASSISTANT

## 2024-08-11 NOTE — PROGRESS NOTES
..Skilled Nurse visit in the Patient Home to administer Inflectra.  No recent elevated temperature, fever, chills, productive cough, coughing for 3 weeks or longer or hemoptysis, abnormal vital signs, night sweats, chest pain. No  decrease in your appetite, unexplained weight loss or fatigue.  No other new onset medical symptoms.  Current weight 175lbs.  Peripheral IVleft Lower Forearm, 1attempt Pre medicated with N/A. Labs drawn CBC with platelets differential, ESR, CRP, Hepatic panel. Infusion completed without complication or reaction. Pt reports therapy iseffective in managing symptoms related to therapy.

## 2024-09-03 ENCOUNTER — MYC MEDICAL ADVICE (OUTPATIENT)
Dept: GASTROENTEROLOGY | Facility: CLINIC | Age: 24
End: 2024-09-03
Payer: COMMERCIAL

## 2024-09-03 DIAGNOSIS — K50.014 CROHN'S DISEASE OF SMALL INTESTINE WITH ABSCESS (H): ICD-10-CM

## 2024-09-03 NOTE — TELEPHONE ENCOUNTER
Therapy plan orders ; plan has been updated. Patient remains on the same medication regimen of Infliximab 10 mg/kg every 28 days. Standing lab orders placed. Hepatitis B completed 2024. Quant Gold TB test negative 2024. Last office visit was 2024 with Jon Chavez. Next office visit is scheduled for 2024 with Jon Chaevz. Patient receives infusions with [facility]. MTM established with Yassine.

## 2024-09-05 ENCOUNTER — ENROLLMENT (OUTPATIENT)
Dept: HOME HEALTH SERVICES | Facility: HOME HEALTH | Age: 24
End: 2024-09-05
Payer: COMMERCIAL

## 2024-09-07 ENCOUNTER — DOCUMENTATION ONLY (OUTPATIENT)
Dept: PHARMACY | Facility: CLINIC | Age: 24
End: 2024-09-07
Payer: COMMERCIAL

## 2024-09-07 NOTE — PROGRESS NOTES
Skilled Nurse visit in the Patient Home to administer Inflectra 800mg and NACL 0.9% 250ml concomitant fluids.  No recent elevated temperature, fever, chills, productive cough, coughing for 3 weeks or longer or hemoptysis, abnormal vital signs, night sweats, chest pain. No  decrease in your appetite, unexplained weight loss or fatigue.  No other new onset medical symptoms.  Current weight 175#.  Peripheral IV left AC, 1 attempt. Labs drawn: none. Infusion completed without complication or reaction. Pt reports therapy is effective in managing symptoms related to therapy.     Carin Peace, RN, BSN  [facility]  [phone]  [email or web address]

## 2024-10-05 ENCOUNTER — DOCUMENTATION ONLY (OUTPATIENT)
Dept: HOME HEALTH SERVICES | Facility: HOME HEALTH | Age: 24
End: 2024-10-05

## 2024-10-05 ENCOUNTER — LAB REQUISITION (OUTPATIENT)
Dept: LAB | Facility: CLINIC | Age: 24
End: 2024-10-05
Payer: COMMERCIAL

## 2024-10-05 DIAGNOSIS — K50.014 CROHN'S DISEASE OF SMALL INTESTINE WITH ABSCESS (H): ICD-10-CM

## 2024-10-05 LAB
ALBUMIN SERPL BCG-MCNC: 4.3 G/DL (ref 3.5–5.2)
ALP SERPL-CCNC: 83 U/L (ref 40–150)
ALT SERPL W P-5'-P-CCNC: 19 U/L (ref 0–70)
AST SERPL W P-5'-P-CCNC: 19 U/L (ref 0–45)
BASOPHILS # BLD AUTO: 0 10E3/UL (ref 0–0.2)
BASOPHILS NFR BLD AUTO: 0 %
BILIRUB DIRECT SERPL-MCNC: <0.2 MG/DL (ref 0–0.3)
BILIRUB SERPL-MCNC: 0.7 MG/DL
CRP SERPL-MCNC: <3 MG/L
EOSINOPHIL # BLD AUTO: 0.2 10E3/UL (ref 0–0.7)
EOSINOPHIL NFR BLD AUTO: 3 %
ERYTHROCYTE [DISTWIDTH] IN BLOOD BY AUTOMATED COUNT: 17.5 % (ref 10–15)
HCT VFR BLD AUTO: 40.6 % (ref 40–53)
HGB BLD-MCNC: 13.4 G/DL (ref 13.3–17.7)
IMM GRANULOCYTES # BLD: 0 10E3/UL
IMM GRANULOCYTES NFR BLD: 0 %
LYMPHOCYTES # BLD AUTO: 2.4 10E3/UL (ref 0.8–5.3)
LYMPHOCYTES NFR BLD AUTO: 31 %
MCH RBC QN AUTO: 27.2 PG (ref 26.5–33)
MCHC RBC AUTO-ENTMCNC: 33 G/DL (ref 31.5–36.5)
MCV RBC AUTO: 82 FL (ref 78–100)
MONOCYTES # BLD AUTO: 0.6 10E3/UL (ref 0–1.3)
MONOCYTES NFR BLD AUTO: 8 %
NEUTROPHILS # BLD AUTO: 4.3 10E3/UL (ref 1.6–8.3)
NEUTROPHILS NFR BLD AUTO: 57 %
NRBC # BLD AUTO: 0 10E3/UL
NRBC BLD AUTO-RTO: 0 /100
PLATELET # BLD AUTO: 236 10E3/UL (ref 150–450)
PROT SERPL-MCNC: 7.2 G/DL (ref 6.4–8.3)
RBC # BLD AUTO: 4.93 10E6/UL (ref 4.4–5.9)
WBC # BLD AUTO: 7.6 10E3/UL (ref 4–11)

## 2024-10-05 PROCEDURE — 80076 HEPATIC FUNCTION PANEL: CPT | Performed by: PHYSICIAN ASSISTANT

## 2024-10-05 PROCEDURE — 85025 COMPLETE CBC W/AUTO DIFF WBC: CPT | Performed by: PHYSICIAN ASSISTANT

## 2024-10-05 PROCEDURE — 86140 C-REACTIVE PROTEIN: CPT | Performed by: PHYSICIAN ASSISTANT

## 2024-10-05 NOTE — PROGRESS NOTES
Skilled Nurse visit in the Patient Home to administer Inflectra 800mg and NACL 0.9% 250ml concomitant fluids.  No recent elevated temperature, fever, chills, productive cough, coughing for 3 weeks or longer or hemoptysis, abnormal vital signs, night sweats, chest pain. No  decrease in your appetite, unexplained weight loss or fatigue.  No other new onset medical symptoms.  Current weight 175#.  Peripheral IV left AC, 1 attempt. Labs drawn: CBC d/p, CRP, Hepatic panel. Infusion completed without complication or reaction. Pt reports therapy is effective in managing symptoms related to therapy.     Carin Peace, RN, BSN  [facility]  [phone]  [email or web address]

## 2024-10-08 ENCOUNTER — HOME INFUSION (OUTPATIENT)
Dept: HOME HEALTH SERVICES | Facility: HOME HEALTH | Age: 24
End: 2024-10-08
Payer: COMMERCIAL

## 2024-10-08 VITALS — BODY MASS INDEX: 22.47 KG/M2 | WEIGHT: 175 LBS

## 2024-10-08 DIAGNOSIS — K50.014 CROHN'S DISEASE OF SMALL INTESTINE WITH ABSCESS (H): Primary | ICD-10-CM

## 2024-10-08 RX ORDER — EPINEPHRINE 0.3 MG/.3ML
0.3 INJECTION SUBCUTANEOUS PRN
Qty: 999999 ML | Refills: 0 | Status: ACTIVE | OUTPATIENT
Start: 2024-10-23 | End: 2025-09-03

## 2024-10-08 RX ORDER — DIPHENHYDRAMINE HYDROCHLORIDE 50 MG/ML
50 INJECTION INTRAMUSCULAR; INTRAVENOUS PRN
Qty: 99999 ML | Refills: 0 | Status: ACTIVE | OUTPATIENT
Start: 2024-10-23 | End: 2025-09-03

## 2024-10-08 RX ORDER — SODIUM CHLORIDE 9 MG/ML
500 INJECTION, SOLUTION INTRAVENOUS PRN
Qty: 999999 ML | Refills: 0 | Status: ACTIVE | OUTPATIENT
Start: 2024-10-23 | End: 2025-09-03

## 2024-10-08 RX ORDER — WATER 10 ML/10ML
80 INJECTION INTRAMUSCULAR; INTRAVENOUS; SUBCUTANEOUS
Qty: 880 ML | Refills: 0 | Status: DISPENSED | OUTPATIENT
Start: 2024-10-23 | End: 2025-09-03

## 2024-10-30 ENCOUNTER — HOME INFUSION (OUTPATIENT)
Dept: HOME HEALTH SERVICES | Facility: HOME HEALTH | Age: 24
End: 2024-10-30
Payer: COMMERCIAL

## 2024-11-01 ENCOUNTER — HOME INFUSION BILLING (OUTPATIENT)
Dept: HOME HEALTH SERVICES | Facility: HOME HEALTH | Age: 24
End: 2024-11-01
Payer: COMMERCIAL

## 2024-11-01 PROCEDURE — A4215 STERILE NEEDLE: HCPCS

## 2024-11-01 PROCEDURE — A4213 20+ CC SYRINGE ONLY: HCPCS

## 2024-11-01 PROCEDURE — A4930 STERILE, GLOVES PER PAIR: HCPCS

## 2024-11-02 ENCOUNTER — HOME CARE VISIT (OUTPATIENT)
Dept: HOME HEALTH SERVICES | Facility: HOME HEALTH | Age: 24
End: 2024-11-02
Payer: COMMERCIAL

## 2024-11-02 VITALS
DIASTOLIC BLOOD PRESSURE: 68 MMHG | HEART RATE: 72 BPM | WEIGHT: 175 LBS | BODY MASS INDEX: 22.47 KG/M2 | RESPIRATION RATE: 16 BRPM | OXYGEN SATURATION: 100 % | SYSTOLIC BLOOD PRESSURE: 122 MMHG | TEMPERATURE: 97.6 F

## 2024-11-02 PROCEDURE — A4217 STERILE WATER/SALINE, 500 ML: HCPCS

## 2024-11-02 PROCEDURE — S9338 HIT IMMUNOTHERAPY DIEM: HCPCS

## 2024-11-02 NOTE — PROGRESS NOTES
"Infusion Nursing Note:  Skilled nurse visit today for Inflectra 800mg administration  for Ventura Quiroz.   present during visit today: Not Applicable.    Pre-infusion Checklist:   Pre-infusion Checklist    Have you had any delayed reaction since last infusion?   No    Have you recently had an elevated temperature, fever, chills productive cough, coughing for 3 weeks or longer or hemoptysis, abnormal vital signs, night sweats, chest pain, or have you noticed a decrease in your appetite, or noted unexplained weight loss or fatigue?   No    Do you have any open wounds or new incisions?  No    Do you have any recent or upcoming hospitalizations, surgeries, or dental procedures?   No    Do you currently have or recently have had any signs of illness or infection or are you on any antibiotics?   No    Have you had any new, sudden , or worsening abdominal pain?   No    Have you or anyone in your household received a live vaccination in the past 4 weeks?   No    Have you recently been diagnosed with any new nervous system diseases or cancer diagnosis? (i.e., Multiple Sclerosis, Guillain Alpha, sezures, neurological changes) Are you receiving any form of radiation or chemotherapy?   No    Are you pregnant or breastfeeding, or do you have plans of pregnancy in the future?   No    Have you been having any signs of worsening depression or suicidal ideation?  No    Have there been any other new onset medical symptoms?  No    Did the patient answer \"YES\" to any of the questions above?  No    Will the patient receive a medication that has an order for infusion reaction management?  Yes, and all drugs and supplies are available and none have .    If ordered, has the patient taken pre-medications?  No    Plan:   Therapy is appropriate, will proceed with treatment.     Chemotherapy Treatment Conditions:   Not Applicable    Intravenous Access:  Peripheral IV placed.    Post Infusion Assessment:  Patient tolerated " infusion without incident.  Blood return noted pre and post infusion.  Site patent and intact, free from redness, edema or discomfort.  No evidence of extravasations.  Access discontinued per protocol.         Note: Pt denied any changes since last infusion.   A total of 40ml NS flushes was utilized throughout infusion.    Next Visit Plan:   Next inflectra scheduled for 11/30/24.      Carin Peace RN 11/2/2024

## 2024-11-12 ENCOUNTER — VIRTUAL VISIT (OUTPATIENT)
Dept: GASTROENTEROLOGY | Facility: CLINIC | Age: 24
End: 2024-11-12
Attending: PHYSICIAN ASSISTANT
Payer: COMMERCIAL

## 2024-11-12 DIAGNOSIS — K50.014 CROHN'S DISEASE OF SMALL INTESTINE WITH ABSCESS (H): Primary | ICD-10-CM

## 2024-11-12 NOTE — PROGRESS NOTES
Virtual Visit Details    Type of service:  Telephone Visit   Phone call duration: 10 minutes   Originating Location (pt. Location): Home    Distant Location (provider location):  Off-site

## 2024-11-12 NOTE — PATIENT INSTRUCTIONS
It was a pleasure taking care of you today.  I've included a brief summary of our discussion and care plan from today's visit below.  Please review this information with your primary care provider.  ______________________________________________________________________    My recommendations are summarized as follows:    -- Continue remicade  -- Labs with infusions  -- Start cholestyramine 2-3 times per day with meals   -- Yearly skin check  Referral for dermatology has been placed. for a routine annual skin check. If you don't hear from a  in 2-3 business days, you can call [phone] to schedule an appointment.   -- Patient with IBD we recommend supplementation vitamin D 1000 units daily and calcium 500 mg twice daily.  -- No NSAIDs (ibuprofen, or anything containing ibuprofen)    For additional resources about inflammatory bowel disease visit http://www.crohnscolitisfoundation.org/    To learn more about Diet and Nutrition in the setting of IBD, check out some of these resources:  https://www.crohnscolitisfoundation.org/diet-and-nutrition/what-should-i-eat  https://www.nimbal.org/  https://ntforibd.org/    Return to GI Clinic in 6 months to review your progress.    ______________________________________________________________________    How do I schedule labs, imaging studies, or procedures that were ordered in clinic today?     Labs: To schedule lab appointment at the [facility], use my chart or call [phone]. If you have a [facility] lab closer to home where you are regularly seen you can give them a call.     Procedures: If a colonoscopy, upper endoscopy, breath test, esophageal manometry, or pH impedence was ordered today, our endoscopy team will call you to schedule this. If you have not heard from our endoscopy team within a week, please call [phone] to schedule.     Imaging Studies: If you were scheduled for a CT scan, X-ray, MRI, ultrasound, HIDA scan or  other imaging study, please call [phone] to have this scheduled.     Referral: If a referral to another specialty was ordered, expect a phone call or follow instructions above. If you have not heard from anyone regarding your referral in a week, please call our clinic to check the status.     Who do I call with any questions after my visit?  Please be in touch if there are any further questions that arise following today's visit.  There are multiple ways to contact your gastroenterology care team.      During business hours, you may reach a Gastroenterology nurse at [phone]    To schedule or reschedule an appointment, please call [phone].     You can always send a secure message through [email or web address].  [email or web address] messages are answered by your nurse or doctor typically within 24 hours.  Please allow extra time on weekends and holidays.      For urgent/emergent questions after business hours, you may reach the on-call GI Fellow by contacting the [facility]  at [phone].     How will I get the results of any tests ordered?    You will receive all of your results.  If you have signed up for One On One [facility], any tests ordered at your visit will be available to you after your physician reviews them.  Typically this takes 1-2 weeks.  If there are urgent results that require a change in your care plan, your physician or nurse will call you to discuss the next steps.      What is [email or web address]?  [email or web address] is a secure way for you to access all of your healthcare records from the [facility].  It is a web based computer program, so you can sign on to it from any location.  It also allows you to send secure messages to your care team.  I recommend signing up for [email or web address] access if you have not already done so and are comfortable with using a computer.         Sincerely,    Jon Chavez PA-C  [facility]  Division of Gastroenterology

## 2024-11-12 NOTE — PROGRESS NOTES
Telephone visit  Start 220  Stop 230    IBD CLINIC VISIT    CC/REFERRING MD:  Referred Self  REASON FOR CONSULTATION: Crohn's disease    ASSESSMENT/PLAN:  24 year old male with Crohn's disease of the ileum initially complicated by phlegmon and abscess now status post ICR.     1. Crohn's disease:  Current CD medications:    - Infliximab 10mg/kg q28 days, level was 31 (predict PK)  Current clinical disease activity: HBI: 5 (based on number of loose stools)  Last endoscopic disease activity: Rutgeert's i2a / SES-CD: 3 (3/2024)    Colonoscopy was largely unchanged from 2022.  He had a mild anastomotic stricture in the ileocolonic anastomosis that was not traversable with an adult scope, he has no obstructive symptoms.  After the colonoscopy we obtained an infliximab level which was adequate (greater than 30) and a trial of Entocort for 30 days.  He felt marginally better with Entocort as a pertains to urgency and frequency.  Without a substantial improvement we elected to continue the course.  Should this his symptoms become more pronounced, I think it would be reasonable to make a change in therapy if necessary to Skyrizi.  In addition, symptoms of urgency and loose stools could be related to his ileal disease or could also be postsurgical irritable bowel.  Other possibilities include bile salt diarrhea.  He has yet to try any of the bile acid sequestrant prescribed, but will try colesevelam.      -- Continue infliximab  -- Labs every 3 to 4 months on infliximab  -- Start colesevelam (Welchol) 2 tabs three times per day with meals.   -- We could also consider an MR enterography or repeat colonoscopy with dilation should symptoms persist or worsen.    2. IBD dysplasia surveillance: Given ileal only disease, he does not need IBD dysplasia surveillance   -- Age appropriate colon cancer screening    3. Iron deficient anemia: Suspect this is due to chronic blood loss in his ileocolonic anastomosis.  May be sequelae of mild  ileal Crohn's endoscopically.  Celiac markers were negative.  He responded well after IV iron.  He may have been chronically low prior to being in remission and just needed to have his iron stores replenished.  We will continue to trend for anemia and check annual iron labs.    4.  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  Subsequent celiac labs negative.  He does not have respiratory infections.  Will monitor.      Misc:  -- Avoid tobacco use  -- Avoid NSAIDs as there is potentially a 25% chance of causing an IBD flare    Return to clinic in 6 months    Thank you for this consultation.  It was a pleasure to participate in the care of this patient; please contact us with any further questions.     IBD HISTORY  Age at diagnosis: 18  Extent of disease: ileal  Disease phenotype: fistulizing  Nieves-anal disease: No  Prior IBD surgeries: Ileocecal resection - Rush-Meaux 8/21/20.   Prior IBD Medications:    - Humira disease progression despite weekly dosing, no ADA   - Ciprofloxacin   - Metronidazole    DRUG MONITORING  TPMT enzyme activity:     6-TGN/6-MMPN levels:  9/10/19: 97 / 548    Biologic concentration:  8/23/19: Adalimumab: 5, no antibodies, every other week, 50mg mercaptopurine   2/18/21: Inflixiamb level 1.6; antibodies: 20  4/19/21 Infliximab: 0, antibodies: 0 (IFX + 6-MP)  3/1/22: IFX: > 34, no antibodies (10mg/kg q4 + 6-MP)  5/12/22: IFX: 25.6, no antibodies.     sIBDQ:   IBDQ Score Date IBDQ - Total Score  (Higher score better)   3/6/2023   3:22 PM 60   4/25/2022   9:45 AM 59   9/10/2019   6:00 AM 62   5/28/2019   6:03 AM 56   4/24/2019   8:44 AM 48     HPI:   Here for routine follow-up.     Having 4-5 stools per day. Stools are loose, applesauce, sometimes more solid. No blood. No abdominal pain. No nausea.  Still has urgency. Does occasionally have nighttime stools (1 times a week). No incontinence.    No upper GI symptoms. No EIM of IBD.    Extra intestinal manifestations of IBD:  No  uveitis/episcleritis  No aphthous ulcers   No arthritis   No erythema nodosum/pyoderma gangrenosum.      HBI:  Overall patient well being (prior day): 0 (Very well)  Abdominal pain (prior day): 0 (None)  Number of liquid or soft stools (prior day): 5 (1 point per stool)  Abdominal mass on exam:   Complications (1 point for each):   None    Remission <5  Mild activity 5-7  Moderate activity 8-16  Severe > 16      ROS:    Constitutional, HEENT, cardiovascular, pulmonary, GI, , musculoskeletal, neuro, skin, endocrine and psych systems are negative, except as otherwise noted.     PERTINENT PAST MEDICAL HISTORY:  Past Medical History:   Diagnosis Date    Allergic rhinitis, cause unspecified     Zyrtec helps    Crohn's Colitis 4/8/2019    Unspecified otitis media     Otitis Media       PREVIOUS SURGERIES:  Past Surgical History:   Procedure Laterality Date    COLONOSCOPY N/A 4/11/2019    Procedure: COMBINED COLONOSCOPY, SINGLE OR MULTIPLE BIOPSY/POLYPECTOMY BY BIOPSY;  Surgeon: Tobin Wright MD;  Location: [facility]    COLONOSCOPY N/A 9/29/2022    Procedure: COLONOSCOPY with  BIOPSY;  Surgeon: Tobin Wright MD;  Location: [facility] OR    COLONOSCOPY N/A 11/18/2022    Procedure: COLONOSCOPY;  Surgeon: Tobin Wright MD;  Location: [facility] OR    COLONOSCOPY N/A 3/4/2024    Procedure: Colonoscopy;  Surgeon: Carissa Felipe MD;  [place] [facility] [place]    EXCISE LIP OR CHEEK FOLD  10/3/2003    Needle cautery frenulectomy.    INSERT PICC LINE Left 8/10/2020    Procedure: INSERTION, PICC @845;  Surgeon: Karan Farr MD;  [facility]    IR PICC PLACEMENT > 5 YRS OF AGE  8/10/2020    PE TUBES  4/25/2006    RESECTION ILEOCOLIC N/A 8/21/2020    Procedure: Exploratory laparotomy with ileocolic resection with takedown of enterovesical fistula, and low anterior resection;  Surgeon: Brittni Robles MD;  Location: [facility] OR    SIGMOIDOSCOPY FLEXIBLE N/A 8/21/2020    Procedure: Sigmoidoscopy flexible;  Surgeon:  Brittni Robles MD;  Location: [facility] OR    TONSILLECTOMY & ADENOIDECTOMY  4/25/2006     ALLERGIES:     Allergies   Allergen Reactions    Gadavist [Gadobutrol] Nausea     Patient could not perform timed sequences due to nausea and discomfort.    Glucagon Nausea and Vomiting     Pre medicate with lorazapam if able.      Amoxicillin     Penicillin [Penicillins]        PERTINENT MEDICATIONS:    Current Outpatient Medications:     colesevelam (WELCHOL) 625 MG tablet, Take 2 tablets (1,250 mg) by mouth 3 times daily (with meals), Disp: 180 tablet, Rfl: 5    diphenhydrAMINE (BENADRYL) 50 MG/ML injection, Inject 1 mL (50 mg) over 3-5 minutes into the vein via push as needed for other (infusion reaction). For RN use only.  Draw up in a syringe and administer IV push.  Discard remainder of vial., Disp: 64648 mL, Rfl: 0    EPINEPHrine (ANY BX GENERIC EQUIV) 0.3 MG/0.3ML injection 2-pack, Inject 0.3 mLs (0.3 mg) into the muscle as needed for anaphylaxis (infusion reaction). Administer into the mid-thigh in case of severe anaphylaxis (wheezing, throat tightening, mouth swelling, difficulty breathing). May repeat dose one time in 5-15 minutes if symptoms persist., Disp: 844415 mL, Rfl: 0    inFLIXimab-dyyb (INFLECTRA) 100 MG injection, Inject 765 mg into the vein every 28 (twenty-eight) days, Disp: , Rfl:     inFLIXimab-dyyb (INFLECTRA) injection, Add to infusion 80 mLs (800 mg) over 1 hours every 4 weeks. Reconstitute inFLIXimab-dyyb vial(s). Draw up inFLIXimab-dyyb 10 mg/mL in syringe with 21 G needle and add to NaCl 0.9% bag immediately prior to infusing. MIX GENTLY BY INVERSION, DO NOT SHAKE. Discard remainder of vial(s)., Disp: 88 each, Rfl: 0    methylPREDNISolone Na Suc, PF, (SOLU-MEDROL) 125 mg/2 mL injection, Inject 2 mLs (125 mg) over 3-5 minutes into the vein via push as needed (infusion reaction). For RN use only.  Reconstitute vial. Draw up methylPREDNISolone in a syringe and administer.  Discard remainder of  vial., Disp: 327705 mL, Rfl: 0    sodium chloride 0.9% bag, Infuse 250 mLs over 1 hours into the vein every 4 weeks. Add 80 mL (800 mg) of infliximab 10 mg/mL to bag immediately prior to infusing via gravity infusion. When complete, flush bag with 20 mL NS to flush tubing., Disp: 955943 mL, Rfl: 0    sodium chloride 0.9% infusion, Infuse 500 mLs into the vein as needed for other (infusion reaction). In case of mild reaction, administer via gravity at 20 mL/hr to keep vein open. In case of severe reaction, administer via gravity wide open on prime setting., Disp: 820189 mL, Rfl: 0    sodium chloride, PF, 0.9% PF flush, Inject 10 mLs into the vein as needed for other (infusion reaction). For RN use only as needed for infusion reaction, Disp: 798856 mL, Rfl: 0    sodium chloride, PF, 0.9% PF flush, Inject 10 mLs into the vein as needed for line flush. Flush IV before and after medication administration as directed and/or at least every 12 hours., Disp: 881411 mL, Rfl: 0    sterile water, preservative free, injection, Use 80 mLs for reconstitution every 4 weeks. 1. Reconstitute each vial of inFLIXimab-dyyb (INFLECTRA) with 10 mL of sterile water for injection by slowly injecting down the inside wall of vial w/21 G needle. DO NOT SHAKE. Foaming of the solution on reconstitution is not unusual. Roll and tilt each vial gently. 2. Let drug stand for 5 minutes. 3. Inspect vials for particules and/or discoloration prior to continuing. The solution should be colorless to light yellow and opalescent, and may develop a few translucent particles. DO NOT USE IF DRUG HAS NOT FULLY DISSOLVED OR IF OPAQUE PARTICLES, DISCOLORATION OR OTHER FOREIGN PARTICULES ARE PRESENT. 4. Draw up appropriate dose w/ 21 G needle from vial., Disp: 880 mL, Rfl: 0    SOCIAL HISTORY:  Social History     Socioeconomic History    Marital status: Single     Spouse name: Not on file    Number of children: Not on file    Years of education: Not on file     Highest education level: Not on file   Occupational History    Not on file   Tobacco Use    Smoking status: Never    Smokeless tobacco: Current    Tobacco comments:     Vape   Vaping Use    Vaping status: Every Day    Substances: Nicotine, Flavoring    Devices: Pre-filled or refillable cartridge   Substance and Sexual Activity    Alcohol use: Yes    Drug use: No    Sexual activity: Yes     Partners: Female     Birth control/protection: Pill   Other Topics Concern     Service Not Asked    Blood Transfusions Not Asked    Caffeine Concern Not Asked    Occupational Exposure Not Asked    Hobby Hazards Not Asked    Sleep Concern Not Asked    Stress Concern Not Asked    Weight Concern Not Asked    Special Diet Not Asked    Back Care Not Asked    Exercise Not Asked    Bike Helmet Not Asked    Seat Belt Not Asked    Self-Exams Not Asked    Parent/sibling w/ CABG, MI or angioplasty before 65F 55M? Not Asked   Social History Narrative    Not on file     Social Drivers of Health     Financial Resource Strain: Not on file   Food Insecurity: Not on file   Transportation Needs: Not on file   Physical Activity: Not on file   Stress: Not on file   Social Connections: Not on file   Interpersonal Safety: Not on file   Housing Stability: Not on file       FAMILY HISTORY:  Family History   Problem Relation Age of Onset    No Known Problems Mother     No Known Problems Father     Melanoma No family hx of     Skin Cancer No family hx of         No family history of IBD or colon or rectal cancer.     Past/family/social history reviewed and no changes    PHYSICAL EXAMINATION:  Vitals reviewed: There were no vitals taken for this visit.  Wt:   Wt Readings from Last 2 Encounters:   11/02/24 79.4 kg (175 lb)   10/08/24 79.4 kg (175 lb)      Constitutional - general appearance is well and in no acute distress. Body habitus normal  Eyes - No redness or discharge  Respiratory - No cough, unlabored breathing  Musculoskeletal - range of  motion intact: Neck and arms  Skin - No discoloration or lesions  Neurological - No tremors, headaches  Psychiatric - No anxiety, alert & oriented

## 2024-11-12 NOTE — LETTER
11/12/2024      Ventura Quiroz  [address]  [facility] [address]      Dear Colleague,    Thank you for referring your patient, Ventura Quiroz, to the [facility] [place]. Please see a copy of my visit note below.    Telephone visit  Start 220  Stop 230    IBD CLINIC VISIT    CC/REFERRING MD:  Referred Self  REASON FOR CONSULTATION: Crohn's disease    ASSESSMENT/PLAN:  24 year old male with Crohn's disease of the ileum initially complicated by phlegmon and abscess now status post ICR.     1. Crohn's disease:  Current CD medications:    - Infliximab 10mg/kg q28 days, level was 31 (predict PK)  Current clinical disease activity: HBI: 5 (based on number of loose stools)  Last endoscopic disease activity: Rutgeert's i2a / SES-CD: 3 (3/2024)    Colonoscopy was largely unchanged from 2022.  He had a mild anastomotic stricture in the ileocolonic anastomosis that was not traversable with an adult scope, he has no obstructive symptoms.  After the colonoscopy we obtained an infliximab level which was adequate (greater than 30) and a trial of Entocort for 30 days.  He felt marginally better with Entocort as a pertains to urgency and frequency.  Without a substantial improvement we elected to continue the course.  Should this his symptoms become more pronounced, I think it would be reasonable to make a change in therapy if necessary to Skyrizi.  In addition, symptoms of urgency and loose stools could be related to his ileal disease or could also be postsurgical irritable bowel.  Other possibilities include bile salt diarrhea.  He has yet to try any of the bile acid sequestrant prescribed, but will try colesevelam.      -- Continue infliximab  -- Labs every 3 to 4 months on infliximab  -- Start colesevelam (Welchol) 2 tabs three times per day with meals.   -- We could also consider an MR enterography or repeat colonoscopy with dilation should symptoms persist or worsen.    2. IBD dysplasia  surveillance: Given ileal only disease, he does not need IBD dysplasia surveillance   -- Age appropriate colon cancer screening    3. Iron deficient anemia: Suspect this is due to chronic blood loss in his ileocolonic anastomosis.  May be sequelae of mild ileal Crohn's endoscopically.  Celiac markers were negative.  He responded well after IV iron.  He may have been chronically low prior to being in remission and just needed to have his iron stores replenished.  We will continue to trend for anemia and check annual iron labs.    4.  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  Subsequent celiac labs negative.  He does not have respiratory infections.  Will monitor.      Misc:  -- Avoid tobacco use  -- Avoid NSAIDs as there is potentially a 25% chance of causing an IBD flare    Return to clinic in 6 months    Thank you for this consultation.  It was a pleasure to participate in the care of this patient; please contact us with any further questions.     IBD HISTORY  Age at diagnosis: 18  Extent of disease: ileal  Disease phenotype: fistulizing  Nieves-anal disease: No  Prior IBD surgeries: Ileocecal resection - Rush-Meaux 8/21/20.   Prior IBD Medications:    - Humira disease progression despite weekly dosing, no ADA   - Ciprofloxacin   - Metronidazole    DRUG MONITORING  TPMT enzyme activity:     6-TGN/6-MMPN levels:  9/10/19: 97 / 548    Biologic concentration:  8/23/19: Adalimumab: 5, no antibodies, every other week, 50mg mercaptopurine   2/18/21: Inflixiamb level 1.6; antibodies: 20  4/19/21 Infliximab: 0, antibodies: 0 (IFX + 6-MP)  3/1/22: IFX: > 34, no antibodies (10mg/kg q4 + 6-MP)  5/12/22: IFX: 25.6, no antibodies.     sIBDQ:   IBDQ Score Date IBDQ - Total Score  (Higher score better)   3/6/2023   3:22 PM 60   4/25/2022   9:45 AM 59   9/10/2019   6:00 AM 62   5/28/2019   6:03 AM 56   4/24/2019   8:44 AM 48     HPI:   Here for routine follow-up.     Having 4-5 stools per day. Stools are loose, applesauce,  sometimes more solid. No blood. No abdominal pain. No nausea.  Still has urgency. Does occasionally have nighttime stools (1 times a week). No incontinence.    No upper GI symptoms. No EIM of IBD.    Extra intestinal manifestations of IBD:  No uveitis/episcleritis  No aphthous ulcers   No arthritis   No erythema nodosum/pyoderma gangrenosum.      HBI:  Overall patient well being (prior day): 0 (Very well)  Abdominal pain (prior day): 0 (None)  Number of liquid or soft stools (prior day): 5 (1 point per stool)  Abdominal mass on exam:   Complications (1 point for each):   None    Remission <5  Mild activity 5-7  Moderate activity 8-16  Severe > 16      ROS:    Constitutional, HEENT, cardiovascular, pulmonary, GI, , musculoskeletal, neuro, skin, endocrine and psych systems are negative, except as otherwise noted.     PERTINENT PAST MEDICAL HISTORY:  Past Medical History:   Diagnosis Date     Allergic rhinitis, cause unspecified     Zyrtec helps     Crohn's Colitis 4/8/2019     Unspecified otitis media     Otitis Media       PREVIOUS SURGERIES:  Past Surgical History:   Procedure Laterality Date     COLONOSCOPY N/A 4/11/2019    Procedure: COMBINED COLONOSCOPY, SINGLE OR MULTIPLE BIOPSY/POLYPECTOMY BY BIOPSY;  Surgeon: Tobin Wright MD;  Location:  GI     COLONOSCOPY N/A 9/29/2022    Procedure: COLONOSCOPY with  BIOPSY;  Surgeon: Tobin Wright MD;  [place] [facility] [place]     COLONOSCOPY N/A 11/18/2022    Procedure: COLONOSCOPY;  Surgeon: Tobin Wright MD;  [place] [facility]     COLONOSCOPY N/A 3/4/2024    Procedure: Colonoscopy;  Surgeon: Carissa Felipe MD;  [place] [facility] [place]     EXCISE LIP OR CHEEK FOLD  10/3/2003    Needle cautery frenulectomy.     INSERT PICC LINE Left 8/10/2020    Procedure: INSERTION, PICC @845;  Surgeon: Karan Farr MD;  Location: [facility] OR     IR PICC PLACEMENT > 5 YRS OF AGE  8/10/2020     PE TUBES  4/25/2006     RESECTION ILEOCOLIC N/A 8/21/2020    Procedure:  Exploratory laparotomy with ileocolic resection with takedown of enterovesical fistula, and low anterior resection;  Surgeon: Brittni Robles MD;  Location: [facility] OR     SIGMOIDOSCOPY FLEXIBLE N/A 8/21/2020    Procedure: Sigmoidoscopy flexible;  Surgeon: Brittni Robles MD;  Location: [facility] OR     TONSILLECTOMY & ADENOIDECTOMY  4/25/2006     ALLERGIES:     Allergies   Allergen Reactions     Gadavist [Gadobutrol] Nausea     Patient could not perform timed sequences due to nausea and discomfort.     Glucagon Nausea and Vomiting     Pre medicate with lorazapam if able.       Amoxicillin      Penicillin [Penicillins]        PERTINENT MEDICATIONS:    Current Outpatient Medications:      colesevelam (WELCHOL) 625 MG tablet, Take 2 tablets (1,250 mg) by mouth 3 times daily (with meals), Disp: 180 tablet, Rfl: 5     diphenhydrAMINE (BENADRYL) 50 MG/ML injection, Inject 1 mL (50 mg) over 3-5 minutes into the vein via push as needed for other (infusion reaction). For RN use only.  Draw up in a syringe and administer IV push.  Discard remainder of vial., Disp: 08159 mL, Rfl: 0     EPINEPHrine (ANY BX GENERIC EQUIV) 0.3 MG/0.3ML injection 2-pack, Inject 0.3 mLs (0.3 mg) into the muscle as needed for anaphylaxis (infusion reaction). Administer into the mid-thigh in case of severe anaphylaxis (wheezing, throat tightening, mouth swelling, difficulty breathing). May repeat dose one time in 5-15 minutes if symptoms persist., Disp: 662287 mL, Rfl: 0     inFLIXimab-dyyb (INFLECTRA) 100 MG injection, Inject 765 mg into the vein every 28 (twenty-eight) days, Disp: , Rfl:      inFLIXimab-dyyb (INFLECTRA) injection, Add to infusion 80 mLs (800 mg) over 1 hours every 4 weeks. Reconstitute inFLIXimab-dyyb vial(s). Draw up inFLIXimab-dyyb 10 mg/mL in syringe with 21 G needle and add to NaCl 0.9% bag immediately prior to infusing. MIX GENTLY BY INVERSION, DO NOT SHAKE. Discard remainder of vial(s)., Disp: 88 each, Rfl: 0      methylPREDNISolone Na Suc, PF, (SOLU-MEDROL) 125 mg/2 mL injection, Inject 2 mLs (125 mg) over 3-5 minutes into the vein via push as needed (infusion reaction). For RN use only.  Reconstitute vial. Draw up methylPREDNISolone in a syringe and administer.  Discard remainder of vial., Disp: 387123 mL, Rfl: 0     sodium chloride 0.9% bag, Infuse 250 mLs over 1 hours into the vein every 4 weeks. Add 80 mL (800 mg) of infliximab 10 mg/mL to bag immediately prior to infusing via gravity infusion. When complete, flush bag with 20 mL NS to flush tubing., Disp: 060808 mL, Rfl: 0     sodium chloride 0.9% infusion, Infuse 500 mLs into the vein as needed for other (infusion reaction). In case of mild reaction, administer via gravity at 20 mL/hr to keep vein open. In case of severe reaction, administer via gravity wide open on prime setting., Disp: 269998 mL, Rfl: 0     sodium chloride, PF, 0.9% PF flush, Inject 10 mLs into the vein as needed for other (infusion reaction). For RN use only as needed for infusion reaction, Disp: 979841 mL, Rfl: 0     sodium chloride, PF, 0.9% PF flush, Inject 10 mLs into the vein as needed for line flush. Flush IV before and after medication administration as directed and/or at least every 12 hours., Disp: 137997 mL, Rfl: 0     sterile water, preservative free, injection, Use 80 mLs for reconstitution every 4 weeks. 1. Reconstitute each vial of inFLIXimab-dyyb (INFLECTRA) with 10 mL of sterile water for injection by slowly injecting down the inside wall of vial w/21 G needle. DO NOT SHAKE. Foaming of the solution on reconstitution is not unusual. Roll and tilt each vial gently. 2. Let drug stand for 5 minutes. 3. Inspect vials for particules and/or discoloration prior to continuing. The solution should be colorless to light yellow and opalescent, and may develop a few translucent particles. DO NOT USE IF DRUG HAS NOT FULLY DISSOLVED OR IF OPAQUE PARTICLES, DISCOLORATION OR OTHER FOREIGN PARTICULES  ARE PRESENT. 4. Draw up appropriate dose w/ 21 G needle from vial., Disp: 880 mL, Rfl: 0    SOCIAL HISTORY:  Social History     Socioeconomic History     Marital status: Single     Spouse name: Not on file     Number of children: Not on file     Years of education: Not on file     Highest education level: Not on file   Occupational History     Not on file   Tobacco Use     Smoking status: Never     Smokeless tobacco: Current     Tobacco comments:     Vape   Vaping Use     Vaping status: Every Day     Substances: Nicotine, Flavoring     Devices: Pre-filled or refillable cartridge   Substance and Sexual Activity     Alcohol use: Yes     Drug use: No     Sexual activity: Yes     Partners: Female     Birth control/protection: Pill   Other Topics Concern      Service Not Asked     Blood Transfusions Not Asked     Caffeine Concern Not Asked     Occupational Exposure Not Asked     Hobby Hazards Not Asked     Sleep Concern Not Asked     Stress Concern Not Asked     Weight Concern Not Asked     Special Diet Not Asked     Back Care Not Asked     Exercise Not Asked     Bike Helmet Not Asked     Seat Belt Not Asked     Self-Exams Not Asked     Parent/sibling w/ CABG, MI or angioplasty before 65F 55M? Not Asked   Social History Narrative     Not on file     Social Drivers of Health     Financial Resource Strain: Not on file   Food Insecurity: Not on file   Transportation Needs: Not on file   Physical Activity: Not on file   Stress: Not on file   Social Connections: Not on file   Interpersonal Safety: Not on file   Housing Stability: Not on file       FAMILY HISTORY:  Family History   Problem Relation Age of Onset     No Known Problems Mother      No Known Problems Father      Melanoma No family hx of      Skin Cancer No family hx of         No family history of IBD or colon or rectal cancer.     Past/family/social history reviewed and no changes    PHYSICAL EXAMINATION:  Vitals reviewed: There were no vitals taken for  this visit.  Wt:   Wt Readings from Last 2 Encounters:   11/02/24 79.4 kg (175 lb)   10/08/24 79.4 kg (175 lb)      Constitutional - general appearance is well and in no acute distress. Body habitus normal  Eyes - No redness or discharge  Respiratory - No cough, unlabored breathing  Musculoskeletal - range of motion intact: Neck and arms  Skin - No discoloration or lesions  Neurological - No tremors, headaches  Psychiatric - No anxiety, alert & oriented         Virtual Visit Details    Type of service:  Telephone Visit   Phone call duration: 10 minutes   Originating Location (pt. Location): Home    Distant Location (provider location):  Off-site      Again, thank you for allowing me to participate in the care of your patient.        Sincerely,        Jon Chavez PA-C

## 2024-11-28 PROCEDURE — 99285 EMERGENCY DEPT VISIT HI MDM: CPT | Mod: 25 | Performed by: FAMILY MEDICINE

## 2024-11-28 PROCEDURE — 99285 EMERGENCY DEPT VISIT HI MDM: CPT | Performed by: FAMILY MEDICINE

## 2024-11-29 ENCOUNTER — HOSPITAL ENCOUNTER (INPATIENT)
Facility: CLINIC | Age: 24
LOS: 2 days | Discharge: HOME | DRG: 386 | End: 2024-12-01
Attending: FAMILY MEDICINE | Admitting: INTERNAL MEDICINE
Payer: COMMERCIAL

## 2024-11-29 ENCOUNTER — APPOINTMENT (OUTPATIENT)
Dept: GENERAL RADIOLOGY | Facility: CLINIC | Age: 24
DRG: 386 | End: 2024-11-29
Attending: FAMILY MEDICINE
Payer: COMMERCIAL

## 2024-11-29 ENCOUNTER — APPOINTMENT (OUTPATIENT)
Dept: GENERAL RADIOLOGY | Facility: CLINIC | Age: 24
DRG: 386 | End: 2024-11-29
Attending: HOSPITALIST
Payer: COMMERCIAL

## 2024-11-29 ENCOUNTER — APPOINTMENT (OUTPATIENT)
Dept: GENERAL RADIOLOGY | Facility: CLINIC | Age: 24
DRG: 386 | End: 2024-11-29
Attending: SPECIALIST
Payer: COMMERCIAL

## 2024-11-29 ENCOUNTER — HOME INFUSION BILLING (OUTPATIENT)
Dept: HOME HEALTH SERVICES | Facility: HOME HEALTH | Age: 24
End: 2024-11-29
Payer: COMMERCIAL

## 2024-11-29 ENCOUNTER — APPOINTMENT (OUTPATIENT)
Dept: CT IMAGING | Facility: CLINIC | Age: 24
DRG: 386 | End: 2024-11-29
Attending: FAMILY MEDICINE
Payer: COMMERCIAL

## 2024-11-29 DIAGNOSIS — K50.912 CROHN'S DISEASE WITH INTESTINAL OBSTRUCTION, UNSPECIFIED GASTROINTESTINAL TRACT LOCATION (H): ICD-10-CM

## 2024-11-29 DIAGNOSIS — G47.00 INSOMNIA, UNSPECIFIED TYPE: Primary | ICD-10-CM

## 2024-11-29 DIAGNOSIS — K56.609 SBO (SMALL BOWEL OBSTRUCTION) (H): ICD-10-CM

## 2024-11-29 DIAGNOSIS — K50.012 CROHN'S DISEASE OF SMALL INTESTINE WITH INTESTINAL OBSTRUCTION (H): ICD-10-CM

## 2024-11-29 DIAGNOSIS — K50.014 CROHN'S DISEASE OF SMALL INTESTINE WITH ABSCESS (H): ICD-10-CM

## 2024-11-29 PROBLEM — D72.829 LEUKOCYTOSIS, UNSPECIFIED TYPE: Status: ACTIVE | Noted: 2024-11-29

## 2024-11-29 LAB
ALBUMIN SERPL BCG-MCNC: 4.8 G/DL (ref 3.5–5.2)
ALP SERPL-CCNC: 88 U/L (ref 40–150)
ALT SERPL W P-5'-P-CCNC: 17 U/L (ref 0–70)
ANION GAP SERPL CALCULATED.3IONS-SCNC: 10 MMOL/L (ref 7–15)
ANION GAP SERPL CALCULATED.3IONS-SCNC: 14 MMOL/L (ref 7–15)
AST SERPL W P-5'-P-CCNC: 29 U/L (ref 0–45)
BASOPHILS # BLD AUTO: 0.1 10E3/UL (ref 0–0.2)
BASOPHILS NFR BLD AUTO: 0 %
BILIRUB SERPL-MCNC: 0.5 MG/DL
BUN SERPL-MCNC: 14 MG/DL (ref 6–20)
BUN SERPL-MCNC: 15.2 MG/DL (ref 6–20)
C DIFF TOX B STL QL: NEGATIVE
CALCIUM SERPL-MCNC: 9 MG/DL (ref 8.8–10.4)
CALCIUM SERPL-MCNC: 9.6 MG/DL (ref 8.8–10.4)
CHLORIDE SERPL-SCNC: 102 MMOL/L (ref 98–107)
CHLORIDE SERPL-SCNC: 106 MMOL/L (ref 98–107)
CREAT SERPL-MCNC: 0.8 MG/DL (ref 0.67–1.17)
CREAT SERPL-MCNC: 0.89 MG/DL (ref 0.67–1.17)
EGFRCR SERPLBLD CKD-EPI 2021: >90 ML/MIN/1.73M2
EGFRCR SERPLBLD CKD-EPI 2021: >90 ML/MIN/1.73M2
EOSINOPHIL # BLD AUTO: 0.2 10E3/UL (ref 0–0.7)
EOSINOPHIL NFR BLD AUTO: 1 %
ERYTHROCYTE [DISTWIDTH] IN BLOOD BY AUTOMATED COUNT: 16 % (ref 10–15)
ERYTHROCYTE [DISTWIDTH] IN BLOOD BY AUTOMATED COUNT: 16.2 % (ref 10–15)
GLUCOSE SERPL-MCNC: 118 MG/DL (ref 70–99)
GLUCOSE SERPL-MCNC: 137 MG/DL (ref 70–99)
HCO3 SERPL-SCNC: 22 MMOL/L (ref 22–29)
HCO3 SERPL-SCNC: 23 MMOL/L (ref 22–29)
HCT VFR BLD AUTO: 43 % (ref 40–53)
HCT VFR BLD AUTO: 46.9 % (ref 40–53)
HGB BLD-MCNC: 14.7 G/DL (ref 13.3–17.7)
HGB BLD-MCNC: 15.9 G/DL (ref 13.3–17.7)
HOLD SPECIMEN: NORMAL
HOLD SPECIMEN: NORMAL
IMM GRANULOCYTES # BLD: 0.1 10E3/UL
IMM GRANULOCYTES NFR BLD: 0 %
LIPASE SERPL-CCNC: 18 U/L (ref 13–60)
LYMPHOCYTES # BLD AUTO: 3.9 10E3/UL (ref 0.8–5.3)
LYMPHOCYTES NFR BLD AUTO: 28 %
MAGNESIUM SERPL-MCNC: 2 MG/DL (ref 1.7–2.3)
MCH RBC QN AUTO: 28.4 PG (ref 26.5–33)
MCH RBC QN AUTO: 28.5 PG (ref 26.5–33)
MCHC RBC AUTO-ENTMCNC: 33.9 G/DL (ref 31.5–36.5)
MCHC RBC AUTO-ENTMCNC: 34.2 G/DL (ref 31.5–36.5)
MCV RBC AUTO: 84 FL (ref 78–100)
MCV RBC AUTO: 84 FL (ref 78–100)
MONOCYTES # BLD AUTO: 0.9 10E3/UL (ref 0–1.3)
MONOCYTES NFR BLD AUTO: 6 %
NEUTROPHILS # BLD AUTO: 9 10E3/UL (ref 1.6–8.3)
NEUTROPHILS NFR BLD AUTO: 65 %
NRBC # BLD AUTO: 0 10E3/UL
NRBC BLD AUTO-RTO: 0 /100
PHOSPHATE SERPL-MCNC: 3.1 MG/DL (ref 2.5–4.5)
PLATELET # BLD AUTO: 218 10E3/UL (ref 150–450)
PLATELET # BLD AUTO: 271 10E3/UL (ref 150–450)
POTASSIUM SERPL-SCNC: 4.3 MMOL/L (ref 3.4–5.3)
POTASSIUM SERPL-SCNC: 4.7 MMOL/L (ref 3.4–5.3)
PROT SERPL-MCNC: 8.2 G/DL (ref 6.4–8.3)
RBC # BLD AUTO: 5.15 10E6/UL (ref 4.4–5.9)
RBC # BLD AUTO: 5.59 10E6/UL (ref 4.4–5.9)
SODIUM SERPL-SCNC: 138 MMOL/L (ref 135–145)
SODIUM SERPL-SCNC: 139 MMOL/L (ref 135–145)
WBC # BLD AUTO: 10.8 10E3/UL (ref 4–11)
WBC # BLD AUTO: 14 10E3/UL (ref 4–11)

## 2024-11-29 PROCEDURE — 85025 COMPLETE CBC W/AUTO DIFF WBC: CPT | Performed by: FAMILY MEDICINE

## 2024-11-29 PROCEDURE — 999N000065 XR ABDOMEN PORT 1 VIEW

## 2024-11-29 PROCEDURE — 120N000001 HC R&B MED SURG/OB

## 2024-11-29 PROCEDURE — G0378 HOSPITAL OBSERVATION PER HR: HCPCS

## 2024-11-29 PROCEDURE — 250N000013 HC RX MED GY IP 250 OP 250 PS 637: Performed by: INTERNAL MEDICINE

## 2024-11-29 PROCEDURE — 250N000011 HC RX IP 250 OP 636: Performed by: INTERNAL MEDICINE

## 2024-11-29 PROCEDURE — 80053 COMPREHEN METABOLIC PANEL: CPT | Performed by: FAMILY MEDICINE

## 2024-11-29 PROCEDURE — 99223 1ST HOSP IP/OBS HIGH 75: CPT | Mod: 95 | Performed by: INTERNAL MEDICINE

## 2024-11-29 PROCEDURE — 99222 1ST HOSP IP/OBS MODERATE 55: CPT | Performed by: SPECIALIST

## 2024-11-29 PROCEDURE — 36415 COLL VENOUS BLD VENIPUNCTURE: CPT | Performed by: FAMILY MEDICINE

## 2024-11-29 PROCEDURE — A4930 STERILE, GLOVES PER PAIR: HCPCS

## 2024-11-29 PROCEDURE — 258N000003 HC RX IP 258 OP 636: Performed by: FAMILY MEDICINE

## 2024-11-29 PROCEDURE — 96375 TX/PRO/DX INJ NEW DRUG ADDON: CPT | Performed by: FAMILY MEDICINE

## 2024-11-29 PROCEDURE — 85041 AUTOMATED RBC COUNT: CPT | Performed by: INTERNAL MEDICINE

## 2024-11-29 PROCEDURE — 83735 ASSAY OF MAGNESIUM: CPT | Performed by: INTERNAL MEDICINE

## 2024-11-29 PROCEDURE — 250N000009 HC RX 250: Performed by: SPECIALIST

## 2024-11-29 PROCEDURE — 250N000009 HC RX 250: Performed by: INTERNAL MEDICINE

## 2024-11-29 PROCEDURE — 74177 CT ABD & PELVIS W/CONTRAST: CPT

## 2024-11-29 PROCEDURE — 84100 ASSAY OF PHOSPHORUS: CPT | Performed by: INTERNAL MEDICINE

## 2024-11-29 PROCEDURE — 87493 C DIFF AMPLIFIED PROBE: CPT | Performed by: INTERNAL MEDICINE

## 2024-11-29 PROCEDURE — 85014 HEMATOCRIT: CPT | Performed by: INTERNAL MEDICINE

## 2024-11-29 PROCEDURE — S1015 IV TUBING EXTENSION SET: HCPCS

## 2024-11-29 PROCEDURE — 250N000012 HC RX MED GY IP 250 OP 636 PS 637: Performed by: INTERNAL MEDICINE

## 2024-11-29 PROCEDURE — 250N000011 HC RX IP 250 OP 636: Performed by: FAMILY MEDICINE

## 2024-11-29 PROCEDURE — 96375 TX/PRO/DX INJ NEW DRUG ADDON: CPT

## 2024-11-29 PROCEDURE — 96374 THER/PROPH/DIAG INJ IV PUSH: CPT | Mod: 59 | Performed by: FAMILY MEDICINE

## 2024-11-29 PROCEDURE — 96361 HYDRATE IV INFUSION ADD-ON: CPT | Performed by: FAMILY MEDICINE

## 2024-11-29 PROCEDURE — A4215 STERILE NEEDLE: HCPCS

## 2024-11-29 PROCEDURE — 74018 RADEX ABDOMEN 1 VIEW: CPT

## 2024-11-29 PROCEDURE — 82310 ASSAY OF CALCIUM: CPT | Performed by: INTERNAL MEDICINE

## 2024-11-29 PROCEDURE — A4213 20+ CC SYRINGE ONLY: HCPCS

## 2024-11-29 PROCEDURE — 99207 PR APP CREDIT; MD BILLING SHARED VISIT: CPT | Performed by: INTERNAL MEDICINE

## 2024-11-29 PROCEDURE — 250N000009 HC RX 250: Performed by: FAMILY MEDICINE

## 2024-11-29 PROCEDURE — 999N000065 XR CHEST PORT 1 VIEW

## 2024-11-29 PROCEDURE — 96376 TX/PRO/DX INJ SAME DRUG ADON: CPT

## 2024-11-29 PROCEDURE — 36415 COLL VENOUS BLD VENIPUNCTURE: CPT | Performed by: INTERNAL MEDICINE

## 2024-11-29 PROCEDURE — 255N000002 HC RX 255 OP 636: Performed by: SPECIALIST

## 2024-11-29 PROCEDURE — 250N000013 HC RX MED GY IP 250 OP 250 PS 637: Performed by: HOSPITALIST

## 2024-11-29 PROCEDURE — 83690 ASSAY OF LIPASE: CPT | Performed by: FAMILY MEDICINE

## 2024-11-29 RX ORDER — NALOXONE HYDROCHLORIDE 0.4 MG/ML
0.2 INJECTION, SOLUTION INTRAMUSCULAR; INTRAVENOUS; SUBCUTANEOUS
Status: DISCONTINUED | OUTPATIENT
Start: 2024-11-29 | End: 2024-12-01 | Stop reason: HOSPADM

## 2024-11-29 RX ORDER — FENTANYL CITRATE 50 UG/ML
50 INJECTION, SOLUTION INTRAMUSCULAR; INTRAVENOUS ONCE
Status: COMPLETED | OUTPATIENT
Start: 2024-11-29 | End: 2024-11-29

## 2024-11-29 RX ORDER — NALOXONE HYDROCHLORIDE 0.4 MG/ML
0.4 INJECTION, SOLUTION INTRAMUSCULAR; INTRAVENOUS; SUBCUTANEOUS
Status: DISCONTINUED | OUTPATIENT
Start: 2024-11-29 | End: 2024-12-01 | Stop reason: HOSPADM

## 2024-11-29 RX ORDER — PROCHLORPERAZINE MALEATE 5 MG/1
10 TABLET ORAL EVERY 6 HOURS PRN
Status: DISCONTINUED | OUTPATIENT
Start: 2024-11-29 | End: 2024-12-01 | Stop reason: HOSPADM

## 2024-11-29 RX ORDER — ONDANSETRON 2 MG/ML
4 INJECTION INTRAMUSCULAR; INTRAVENOUS EVERY 30 MIN PRN
Status: DISCONTINUED | OUTPATIENT
Start: 2024-11-29 | End: 2024-11-29

## 2024-11-29 RX ORDER — METHYLPREDNISOLONE SODIUM SUCCINATE 40 MG/ML
30 INJECTION INTRAMUSCULAR; INTRAVENOUS ONCE
Status: COMPLETED | OUTPATIENT
Start: 2024-11-29 | End: 2024-11-29

## 2024-11-29 RX ORDER — IOPAMIDOL 755 MG/ML
500 INJECTION, SOLUTION INTRAVASCULAR ONCE
Status: COMPLETED | OUTPATIENT
Start: 2024-11-29 | End: 2024-11-29

## 2024-11-29 RX ORDER — HYDROMORPHONE HYDROCHLORIDE 1 MG/ML
0.5 INJECTION, SOLUTION INTRAMUSCULAR; INTRAVENOUS; SUBCUTANEOUS
Status: DISCONTINUED | OUTPATIENT
Start: 2024-11-29 | End: 2024-12-01

## 2024-11-29 RX ORDER — AMOXICILLIN 250 MG
1 CAPSULE ORAL 2 TIMES DAILY PRN
Status: DISCONTINUED | OUTPATIENT
Start: 2024-11-29 | End: 2024-11-29

## 2024-11-29 RX ORDER — HYDROMORPHONE HCL IN WATER/PF 6 MG/30 ML
0.2 PATIENT CONTROLLED ANALGESIA SYRINGE INTRAVENOUS
Status: DISCONTINUED | OUTPATIENT
Start: 2024-11-29 | End: 2024-12-01

## 2024-11-29 RX ORDER — ONDANSETRON 4 MG/1
4 TABLET, ORALLY DISINTEGRATING ORAL EVERY 6 HOURS PRN
Status: DISCONTINUED | OUTPATIENT
Start: 2024-11-29 | End: 2024-12-01 | Stop reason: HOSPADM

## 2024-11-29 RX ORDER — ONDANSETRON 2 MG/ML
4 INJECTION INTRAMUSCULAR; INTRAVENOUS EVERY 6 HOURS PRN
Status: DISCONTINUED | OUTPATIENT
Start: 2024-11-29 | End: 2024-12-01 | Stop reason: HOSPADM

## 2024-11-29 RX ORDER — METHYLPREDNISOLONE SODIUM SUCCINATE 40 MG/ML
30 INJECTION INTRAMUSCULAR; INTRAVENOUS EVERY 12 HOURS
Status: DISCONTINUED | OUTPATIENT
Start: 2024-11-29 | End: 2024-11-30

## 2024-11-29 RX ORDER — COLESEVELAM 180 1/1
1250 TABLET ORAL
Status: DISCONTINUED | OUTPATIENT
Start: 2024-11-29 | End: 2024-12-01 | Stop reason: HOSPADM

## 2024-11-29 RX ORDER — AMOXICILLIN 250 MG
2 CAPSULE ORAL 2 TIMES DAILY PRN
Status: DISCONTINUED | OUTPATIENT
Start: 2024-11-29 | End: 2024-11-29

## 2024-11-29 RX ORDER — KETOROLAC TROMETHAMINE 15 MG/ML
15 INJECTION, SOLUTION INTRAMUSCULAR; INTRAVENOUS EVERY 6 HOURS PRN
Status: DISCONTINUED | OUTPATIENT
Start: 2024-11-29 | End: 2024-12-01 | Stop reason: HOSPADM

## 2024-11-29 RX ORDER — DOCUSATE SODIUM 100 MG/1
100 CAPSULE, LIQUID FILLED ORAL 2 TIMES DAILY
Status: DISCONTINUED | OUTPATIENT
Start: 2024-11-29 | End: 2024-12-01 | Stop reason: HOSPADM

## 2024-11-29 RX ADMIN — FENTANYL CITRATE 50 MCG: 50 INJECTION, SOLUTION INTRAMUSCULAR; INTRAVENOUS at 02:57

## 2024-11-29 RX ADMIN — Medication 1 LOZENGE: at 22:02

## 2024-11-29 RX ADMIN — MIDAZOLAM 1 MG: 1 INJECTION INTRAMUSCULAR; INTRAVENOUS at 02:59

## 2024-11-29 RX ADMIN — KETOROLAC TROMETHAMINE 15 MG: 15 INJECTION, SOLUTION INTRAMUSCULAR; INTRAVENOUS at 22:02

## 2024-11-29 RX ADMIN — SODIUM CHLORIDE 60 ML: 9 INJECTION, SOLUTION INTRAVENOUS at 01:26

## 2024-11-29 RX ADMIN — KETOROLAC TROMETHAMINE 15 MG: 15 INJECTION, SOLUTION INTRAMUSCULAR; INTRAVENOUS at 09:50

## 2024-11-29 RX ADMIN — ONDANSETRON 4 MG: 2 INJECTION, SOLUTION INTRAMUSCULAR; INTRAVENOUS at 00:41

## 2024-11-29 RX ADMIN — WATER 150 ML: 100 IRRIGANT IRRIGATION at 09:35

## 2024-11-29 RX ADMIN — KETOROLAC TROMETHAMINE 15 MG: 15 INJECTION, SOLUTION INTRAMUSCULAR; INTRAVENOUS at 15:52

## 2024-11-29 RX ADMIN — HYDROMORPHONE HYDROCHLORIDE 0.2 MG: 0.2 INJECTION, SOLUTION INTRAMUSCULAR; INTRAVENOUS; SUBCUTANEOUS at 14:09

## 2024-11-29 RX ADMIN — SODIUM CHLORIDE 1000 ML: 9 INJECTION, SOLUTION INTRAVENOUS at 00:41

## 2024-11-29 RX ADMIN — DOCUSATE SODIUM 100 MG: 100 CAPSULE, LIQUID FILLED ORAL at 09:35

## 2024-11-29 RX ADMIN — HYDROMORPHONE HYDROCHLORIDE 0.2 MG: 0.2 INJECTION, SOLUTION INTRAMUSCULAR; INTRAVENOUS; SUBCUTANEOUS at 23:51

## 2024-11-29 RX ADMIN — PREDNISONE 30 MG: 5 TABLET ORAL at 09:35

## 2024-11-29 RX ADMIN — KETOROLAC TROMETHAMINE 15 MG: 15 INJECTION, SOLUTION INTRAMUSCULAR; INTRAVENOUS at 09:46

## 2024-11-29 RX ADMIN — HYDROMORPHONE HYDROCHLORIDE 0.2 MG: 0.2 INJECTION, SOLUTION INTRAMUSCULAR; INTRAVENOUS; SUBCUTANEOUS at 20:27

## 2024-11-29 RX ADMIN — KETOROLAC TROMETHAMINE 15 MG: 15 INJECTION, SOLUTION INTRAMUSCULAR; INTRAVENOUS at 00:42

## 2024-11-29 RX ADMIN — PANTOPRAZOLE SODIUM 40 MG: 40 INJECTION, POWDER, FOR SOLUTION INTRAVENOUS at 15:52

## 2024-11-29 RX ADMIN — METHYLPREDNISOLONE SODIUM SUCCINATE 30 MG: 40 INJECTION, POWDER, FOR SOLUTION INTRAMUSCULAR; INTRAVENOUS at 03:06

## 2024-11-29 RX ADMIN — METHYLPREDNISOLONE SODIUM SUCCINATE 30 MG: 40 INJECTION, POWDER, FOR SOLUTION INTRAMUSCULAR; INTRAVENOUS at 14:08

## 2024-11-29 RX ADMIN — IOPAMIDOL 85 ML: 755 INJECTION, SOLUTION INTRAVENOUS at 01:26

## 2024-11-29 ASSESSMENT — ACTIVITIES OF DAILY LIVING (ADL)
ADLS_ACUITY_SCORE: 22
DOING_ERRANDS_INDEPENDENTLY_DIFFICULTY: NO
ADLS_ACUITY_SCORE: 22
ADLS_ACUITY_SCORE: 44
DRESSING/BATHING_DIFFICULTY: NO
ADLS_ACUITY_SCORE: 22
ADLS_ACUITY_SCORE: 18
ADLS_ACUITY_SCORE: 22
ADLS_ACUITY_SCORE: 22
DIFFICULTY_COMMUNICATING: NO
DIFFICULTY_EATING/SWALLOWING: NO
CONCENTRATING,_REMEMBERING_OR_MAKING_DECISIONS_DIFFICULTY: NO
ADLS_ACUITY_SCORE: 22
ADLS_ACUITY_SCORE: 18
ADLS_ACUITY_SCORE: 22
ADLS_ACUITY_SCORE: 44
ADLS_ACUITY_SCORE: 18
FALL_HISTORY_WITHIN_LAST_SIX_MONTHS: NO
ADLS_ACUITY_SCORE: 44
ADLS_ACUITY_SCORE: 22
TOILETING_ISSUES: NO
ADLS_ACUITY_SCORE: 22
ADLS_ACUITY_SCORE: 22
ADLS_ACUITY_SCORE: 18
ADLS_ACUITY_SCORE: 22
ADLS_ACUITY_SCORE: 18
ADLS_ACUITY_SCORE: 44
HEARING_DIFFICULTY_OR_DEAF: NO
WEAR_GLASSES_OR_BLIND: NO
WALKING_OR_CLIMBING_STAIRS_DIFFICULTY: NO
CHANGE_IN_FUNCTIONAL_STATUS_SINCE_ONSET_OF_CURRENT_ILLNESS/INJURY: NO
ADLS_ACUITY_SCORE: 22
ADLS_ACUITY_SCORE: 18
ADLS_ACUITY_SCORE: 22

## 2024-11-29 ASSESSMENT — COLUMBIA-SUICIDE SEVERITY RATING SCALE - C-SSRS
6. HAVE YOU EVER DONE ANYTHING, STARTED TO DO ANYTHING, OR PREPARED TO DO ANYTHING TO END YOUR LIFE?: NO
1. IN THE PAST MONTH, HAVE YOU WISHED YOU WERE DEAD OR WISHED YOU COULD GO TO SLEEP AND NOT WAKE UP?: NO
2. HAVE YOU ACTUALLY HAD ANY THOUGHTS OF KILLING YOURSELF IN THE PAST MONTH?: NO

## 2024-11-29 NOTE — ED PROVIDER NOTES
History     Chief Complaint   Patient presents with    Abdominal Pain    Vomiting     HPI  Ventura Quiroz is a 24 year old male with a history of Crohn's disease diagnosed at age 18 presents to the ED tonight with severe abdominal pain nausea and vomit.  Started around 7 PM tonight.  Multiple episodes of emesis.  His usual diarrhea without blood.  No history of bowel obstructions.    Had some sweats and chills tonight as well.  No cough.  No one else at home is sick.  Had a virtual visit with GI on 11/12/2024 and that is it was reviewed.          Allergies:  Allergies   Allergen Reactions    Gadavist [Gadobutrol] Nausea     Patient could not perform timed sequences due to nausea and discomfort.    Glucagon Nausea and Vomiting     Pre medicate with lorazapam if able.      Amoxicillin     Penicillin [Penicillins]        Problem List:    Patient Active Problem List    Diagnosis Date Noted    SBO (small bowel obstruction) (H) 11/29/2024     Priority: Medium    Crohn's disease of small intestine with intestinal obstruction (H) 11/29/2024     Priority: Medium    Crohn disease (H) 08/21/2020     Priority: Medium    Anemia, iron deficiency 08/19/2019     Priority: Medium    Crohn's disease of small intestine with abscess (H) 08/19/2019     Priority: Medium    Crohn's Colitis 04/08/2019     Priority: Medium        Past Medical History:    Past Medical History:   Diagnosis Date    Allergic rhinitis, cause unspecified     Crohn's Colitis 4/8/2019    Unspecified otitis media        Past Surgical History:    Past Surgical History:   Procedure Laterality Date    COLONOSCOPY N/A 4/11/2019    Procedure: COMBINED COLONOSCOPY, SINGLE OR MULTIPLE BIOPSY/POLYPECTOMY BY BIOPSY;  Surgeon: Tobin Wright MD;  Location:  GI    COLONOSCOPY N/A 9/29/2022    Procedure: COLONOSCOPY with  BIOPSY;  Surgeon: Tobin Wright MD;  Location: [facility] OR    COLONOSCOPY N/A 11/18/2022    Procedure: COLONOSCOPY;  Surgeon: Tobin Wright  MD Bebo;  Location: [facility] OR    COLONOSCOPY N/A 3/4/2024    Procedure: Colonoscopy;  Surgeon: Carissa Felipe MD;  Location: [facility] OR    EXCISE LIP OR CHEEK FOLD  10/3/2003    Needle cautery frenulectomy.    INSERT PICC LINE Left 8/10/2020    Procedure: INSERTION, PICC @845;  Surgeon: Karan Farr MD;  [facility]: [facility]    IR PICC PLACEMENT > 5 YRS OF AGE  8/10/2020    PE TUBES  4/25/2006    RESECTION ILEOCOLIC N/A 8/21/2020    Procedure: Exploratory laparotomy with ileocolic resection with takedown of enterovesical fistula, and low anterior resection;  Surgeon: Brittni Robles MD;  Location: [facility] OR    SIGMOIDOSCOPY FLEXIBLE N/A 8/21/2020    Procedure: Sigmoidoscopy flexible;  Surgeon: Brittni Robles MD;  Location: [facility] OR    TONSILLECTOMY & ADENOIDECTOMY  4/25/2006       Family History:    Family History   Problem Relation Age of Onset    No Known Problems Mother     No Known Problems Father     Melanoma No family hx of     Skin Cancer No family hx of        Social History:  Marital Status:  Single [1]  Social History     Tobacco Use    Smoking status: Never    Smokeless tobacco: Current    Tobacco comments:     Vape   Vaping Use    Vaping status: Every Day    Substances: Nicotine, Flavoring    Devices: Pre-filled or refillable cartridge   Substance Use Topics    Alcohol use: Yes    Drug use: No        Medications:    colesevelam (WELCHOL) 625 MG tablet  diphenhydrAMINE (BENADRYL) 50 MG/ML injection  EPINEPHrine (ANY BX GENERIC EQUIV) 0.3 MG/0.3ML injection 2-pack  inFLIXimab-dyyb (INFLECTRA) 100 MG injection  inFLIXimab-dyyb (INFLECTRA) injection  methylPREDNISolone Na Suc, PF, (SOLU-MEDROL) 125 mg/2 mL injection  sodium chloride 0.9% bag  sodium chloride 0.9% infusion  sodium chloride, PF, 0.9% PF flush  sodium chloride, PF, 0.9% PF flush  sterile water, preservative free, injection          Review of Systems    Physical Exam   BP: 132/77  Pulse: 70  Resp: 18  SpO2: 100 %      Physical  Exam  Constitutional:       General: He is in acute distress (mod/severe).      Appearance: He is not toxic-appearing.      Comments: Lying on his side with his head covered by a blanket in significant distress   HENT:      Mouth/Throat:      Comments: Mucosa tacky  Cardiovascular:      Rate and Rhythm: Normal rate and regular rhythm.   Pulmonary:      Effort: Pulmonary effort is normal.      Breath sounds: Normal breath sounds.   Abdominal:      General: There is no distension.      Tenderness: There is generalized abdominal tenderness.         ED Course        Procedures              Critical Care time:  none              Results for orders placed or performed during the hospital encounter of 11/29/24 (from the past 24 hours)   Extra Tube ([place] Draw)    Narrative    The following orders were created for panel order Extra Tube ([place] Draw).  Procedure                               Abnormality         Status                     ---------                               -----------         ------                     Extra Green Top (Lithium...[[id number]]                      Final result               Extra Purple Top Tube[[id number]]                            Final result                 Please view results for these tests on the individual orders.   Extra Green Top (Lithium Heparin) Tube   Result Value Ref Range    Hold Specimen JIC    Extra Purple Top Tube   Result Value Ref Range    Hold Specimen JIC    CBC with platelets differential    Narrative    The following orders were created for panel order CBC with platelets differential.  Procedure                               Abnormality         Status                     ---------                               -----------         ------                     CBC with platelets and d...[[id number]]  Abnormal            Final result                 Please view results for these tests on the individual orders.   Comprehensive metabolic panel   Result Value Ref Range     Sodium 138 135 - 145 mmol/L    Potassium 4.7 3.4 - 5.3 mmol/L    Carbon Dioxide (CO2) 22 22 - 29 mmol/L    Anion Gap 14 7 - 15 mmol/L    Urea Nitrogen 15.2 6.0 - 20.0 mg/dL    Creatinine 0.89 0.67 - 1.17 mg/dL    GFR Estimate >90 >60 mL/min/1.73m2    Calcium 9.6 8.8 - 10.4 mg/dL    Chloride 102 98 - 107 mmol/L    Glucose 118 (H) 70 - 99 mg/dL    Alkaline Phosphatase 88 40 - 150 U/L    AST 29 0 - 45 U/L    ALT 17 0 - 70 U/L    Protein Total 8.2 6.4 - 8.3 g/dL    Albumin 4.8 3.5 - 5.2 g/dL    Bilirubin Total 0.5 <=1.2 mg/dL   Lipase   Result Value Ref Range    Lipase 18 13 - 60 U/L   CBC with platelets and differential   Result Value Ref Range    WBC Count 14.0 (H) 4.0 - 11.0 10e3/uL    RBC Count 5.59 4.40 - 5.90 10e6/uL    Hemoglobin 15.9 13.3 - 17.7 g/dL    Hematocrit 46.9 40.0 - 53.0 %    MCV 84 78 - 100 fL    MCH 28.4 26.5 - 33.0 pg    MCHC 33.9 31.5 - 36.5 g/dL    RDW 16.2 (H) 10.0 - 15.0 %    Platelet Count 271 150 - 450 10e3/uL    % Neutrophils 65 %    % Lymphocytes 28 %    % Monocytes 6 %    % Eosinophils 1 %    % Basophils 0 %    % Immature Granulocytes 0 %    NRBCs per 100 WBC 0 <1 /100    Absolute Neutrophils 9.0 (H) 1.6 - 8.3 10e3/uL    Absolute Lymphocytes 3.9 0.8 - 5.3 10e3/uL    Absolute Monocytes 0.9 0.0 - 1.3 10e3/uL    Absolute Eosinophils 0.2 0.0 - 0.7 10e3/uL    Absolute Basophils 0.1 0.0 - 0.2 10e3/uL    Absolute Immature Granulocytes 0.1 <=0.4 10e3/uL    Absolute NRBCs 0.0 10e3/uL   CT Abdomen Pelvis w Contrast    Narrative    EXAM: CT ABDOMEN AND PELVIS WITH CONTRAST  LOCATION: [facility]  DATE/TIME: 11/29/2024 1:41 AM CST    INDICATION: Crohn's disease. Elevated white blood cell count. Nausea and vomiting. Severe abdominal pain.  COMPARISON: 4/24/2019.    TECHNIQUE: CT scan of the abdomen and pelvis was performed following injection of IV contrast. Multiplanar reformats were obtained. Dose reduction techniques were used.  CONTRAST: 85 mL  Isovue-370.    FINDINGS:    LOWER CHEST: Unremarkable.    HEPATOBILIARY: Unremarkable.    SPLEEN: Unremarkable.    PANCREAS: Unremarkable.    ADRENAL GLANDS: Unremarkable.    KIDNEYS/BLADDER: Unremarkable.    BOWEL: Apparent prior bowel surgery in the ileocecal region and sigmoid colon region with reanastomosis. There is a long segment of mildly dilated fluid-filled small bowel in the pelvis. The bowel appears dilated all the way up to the ileocolic   anastomosis. The colon is relatively nondistended. No bowel wall thickening, pneumatosis or free intraperitoneal gas.    LYMPH NODES: Unremarkable.    PELVIC ORGANS: No acute findings.    MUSCULOSKELETAL: No acute findings.    OTHER: A trace amount of free fluid in the pelvis.      Impression    IMPRESSION:   1.  Distal small bowel obstruction: There is a long segment of mildly dilated fluid-filled small bowel in the pelvis and the more distal bowel is relatively decompressed. The site of the obstruction appears to be at an ileocolic anastomosis in the right   lower quadrant.   2.  A trace amount of free fluid in the pelvis.  3.  No other acute abnormality identified in the abdomen or pelvis.       Medications   ondansetron (ZOFRAN) injection 4 mg (4 mg Intravenous $Given 11/29/24 0041)   ketorolac (TORADOL) injection 15 mg (15 mg Intravenous $Given 11/29/24 0042)   sodium chloride 0.9% BOLUS 1,000 mL (0 mLs Intravenous Stopped 11/29/24 0308)   iopamidol (ISOVUE-370) solution 500 mL (85 mLs Intravenous $Given 11/29/24 0126)   new 100 ml saline bag (60 mLs Intravenous $Given 11/29/24 0126)   fentaNYL (PF) (SUBLIMAZE) injection 50 mcg (50 mcg Intravenous $Given 11/29/24 0257)   midazolam (VERSED) injection 1 mg (1 mg Intravenous $Given 11/29/24 0259)   methylPREDNISolone sodium succinate (SOLU-MEDROL) injection 30 mg (30 mg Intravenous $Given 11/29/24 0306)       Assessments & Plan (with Medical Decision Making)  24-year-old with Crohn's disease since age 18 again with  acute onset of upper abdominal pain nausea and vomiting.  No history of bowel obstructions.  Usual diarrhea without blood.  Some chills and sweats tonight.  On exam he has diffuse mild tenderness.  No rebound or guarding.  Vitals are stable.  1 L normal saline, Toradol and Zofran.  Will decide on imaging once his labs are back.  White count up at 14.0.  Abdominal CT ordered.  After a small dose of Toradol and some Zofran, he feels much improved and looks like a new person.  Able to lie on his back, smiling and interactive.  Abdominal CT shows a distal small bowel obstruction.  There is a long segment of mildly dilated fluid-filled small bowel in the pelvis and the more distal bowel is relatively decompressed.  Send of obstruction appears to be at the ileocolic anastomosis in the right lower quadrant.  This is the site where they have had difficulty getting an adult colonoscope through in the past.  I spoke with Dr. Watts from general surgery.  He feels this is likely due to his Crohn's disease and recommended speaking with GI.  I then spoke with Dr. Lancaster from GI down at [facility].  We reviewed his case.  He recommended that we treat this just like a typical small bowel obstruction with NG decompression, n.p.o., IV fluids but also send stool for C. difficile and start him on methylprednisolone 30 mg twice daily.  Hopefully he is still able to get his Remicade on Saturday.  I then spoke with Dr. Watts once again and reviewed the plan with him.  He is in agreement.  If on the small chance that he would need surgery, he would likely need to be transferred to high-level care but he will most likely turnaround with conservative management and we can do that locally.  He was given a small dose of fentanyl and Versed prior to NG placement.  3:09 AM : I spoke with Dr. Keys, hospitalist on-call.  She has assumed his care and will write orders.       I have reviewed the nursing notes.    I  have reviewed the findings, diagnosis, plan and need for follow up with the patient.       ED to Inpatient Handoff:    Discussed with Dr Keys at 3:09 AM   Patient accepted for Observation Stay  Pending studies include: C diff studies per GI  Code Status: Full Code             Medical Decision Making  The patient's presentation was of high complexity (a chronic illness severe exacerbation, progression, or side effect of treatment).    The patient's evaluation involved:  ordering and/or review of 3+ test(s) in this encounter (see separate area of note for details)  discussion of management or test interpretation with another health professional (see separate area of note for details)    The patient's management involved high risk (a parenteral controlled substance) and high risk (a decision regarding hospitalization).          New Prescriptions    No medications on file       Final diagnoses:   SBO (small bowel obstruction) (H)   Crohn's disease of small intestine with intestinal obstruction (H)       11/28/2024   [facility] EMERGENCY DEPT       Pankaj Aguirre MD  11/29/24 0313

## 2024-11-29 NOTE — PROGRESS NOTES
[facility]    After midnight - Hospitalist Service    Assessment and Plan    Active Problems:    Crohn's Colitis    SBO (small bowel obstruction) (H)    Crohn's disease of small intestine with intestinal obstruction (H)    Leukocytosis, unspecified type      Patient admitted after midnight, this is follow up.    Ventura Quiroz is a 24 year old old male with h/o Crohn's disease,  presenting to ED that started around 7p this evening. He had associated nonbloody diarrhea, nausea and vomiting. He denied fever or chills.  2 yrs ago he had  and ileocecal resection due to Crohn's.  ED provider discussed patient with both general surgery and GI,  conservative management was recommended.     SBO with h/o Crohns  - NGT LIS  - NPO  - Surgery seen and SBFT performed pending results still no BM or flatus, if does not resolve likely needs transfer to [facility] where his CRS is  - changed to inpatient given having significant abd pain and no results   - IV PPI   - Anti-emetics  - GI discussion with ED provider recommendation IV solumedrol 30mg BID continue for now  - Remicade on Saturday (10mg/kg every 28 days) pending SBO and if needs transfer will hold for now  - Correcting any electrolyte abnormalities to stabilize this condition.   - very minimal output from NG   - hold on IVF for now unless high out from NG     Leukocytosis   - recheck resolved    VTE prophylaxis:  Pneumatic Compression Devices  DIET: Orders Placed This Encounter      NPO for Medical/Clinical Reasons Except for: Ice Chips    Drains/Lines: NG  Weight bearing status: WBAT    Code Status:Full Code  Clinically Significant Risk Factors Present on Admission                                        Medically Ready for Discharge: Anticipated in 2-4 Days      Disposition/Barriers to discharge:      Expected Discharge Date: 12/01/2024            Subjective:  Patient uncomfortable and no flatus or BM, minimally output from NG    B/P: 122/77, T: 97.7,  P: 67, R: 20     PERTINENT LABS  Imaging results reviewed over the past 24 hrs:   Recent Results (from the past 24 hours)   CT Abdomen Pelvis w Contrast    Narrative    EXAM: CT ABDOMEN AND PELVIS WITH CONTRAST  LOCATION: [facility]  DATE/TIME: 11/29/2024 1:41 AM CST    INDICATION: Crohn's disease. Elevated white blood cell count. Nausea and vomiting. Severe abdominal pain.  COMPARISON: 4/24/2019.    TECHNIQUE: CT scan of the abdomen and pelvis was performed following injection of IV contrast. Multiplanar reformats were obtained. Dose reduction techniques were used.  CONTRAST: 85 mL Isovue-370.    FINDINGS:    LOWER CHEST: Unremarkable.    HEPATOBILIARY: Unremarkable.    SPLEEN: Unremarkable.    PANCREAS: Unremarkable.    ADRENAL GLANDS: Unremarkable.    KIDNEYS/BLADDER: Unremarkable.    BOWEL: Apparent prior bowel surgery in the ileocecal region and sigmoid colon region with reanastomosis. There is a long segment of mildly dilated fluid-filled small bowel in the pelvis. The bowel appears dilated all the way up to the ileocolic   anastomosis. The colon is relatively nondistended. No bowel wall thickening, pneumatosis or free intraperitoneal gas.    LYMPH NODES: Unremarkable.    PELVIC ORGANS: No acute findings.    MUSCULOSKELETAL: No acute findings.    OTHER: A trace amount of free fluid in the pelvis.      Impression    IMPRESSION:   1.  Distal small bowel obstruction: There is a long segment of mildly dilated fluid-filled small bowel in the pelvis and the more distal bowel is relatively decompressed. The site of the obstruction appears to be at an ileocolic anastomosis in the right   lower quadrant.   2.  A trace amount of free fluid in the pelvis.  3.  No other acute abnormality identified in the abdomen or pelvis.   XR Chest Port 1 View    Narrative    EXAM: CHEST SINGLE VIEW PORTABLE  LOCATION: [facility]  DATE/TIME: 11/29/2024 3:16 AM  CST    INDICATION: Tube placement.  COMPARISON: 11/29/2024 at 0130 hours - CT abdomen and pelvis.      Impression    IMPRESSION: Interval placement of an enteric drainage tube with its distal tip in the gastric cardia region and just past the gastroesophageal junction and its side-port in the distal esophagus.      Recent Labs   Lab 11/29/24  1046 11/29/24  0034   WBC 10.8 14.0*   HGB 14.7 15.9   MCV 84 84    271    138   POTASSIUM 4.3 4.7   CHLORIDE 106 102   CO2 23 22   BUN 14.0 15.2   CR 0.80 0.89   ANIONGAP 10 14   SHANNA 9.0 9.6   * 118*   ALBUMIN  --  4.8   PROTTOTAL  --  8.2   BILITOTAL  --  0.5   ALKPHOS  --  88   ALT  --  17   AST  --  29   LIPASE  --  18       Maribel De Leon MD  [facility] Medicine Service

## 2024-11-29 NOTE — PROGRESS NOTES
PRIMARY DIAGNOSIS: ACUTE PAIN  OUTPATIENT/OBSERVATION GOALS TO BE MET BEFORE DISCHARGE:  1. Pain Status: Improved but still requiring IV narcotics.    2. Return to near baseline physical activity: Yes    3. Cleared for discharge by consultants (if involved): No    Discharge Planner Nurse   Safe discharge environment identified: Yes  Barriers to discharge: Yes       Entered by: Lyndon Michaels RN 11/29/2024 4:12 PM   Toradol x1 this am prn for pain 3/10. Gastrografin tolerated. Up independently in room. No BM. Fiance at bedside.    Please review provider order for any additional goals.   Nurse to notify provider when observation goals have been met and patient is ready for discharge.

## 2024-11-29 NOTE — PROGRESS NOTES
S-(situation): Patient changed to inpatient status    B-(background): Patient status change due to observation goals not being met. Pain management.    A-(assessment): 24 year old male, A&Ox4. VSS on RA. Up in room independently. NG to low intermittent suction. Toradol PRN x1 and dilaudid PRN x1 for abdominal pain. NPO. Fiance at bedside.     R-(recommendations):  Pain management. Waiting BM for C-diff rule out. Will monitor patient per MD orders.       Inpatient nursing criteria listed below were met:    Adult Profile completedYes  Health care directives status obtained and documented: Yes  VTE prophylaxis orders: Ambulation  (FYI: Asprin is not an approved anticoagulation for DVT prophylaxis)  SCD's Documented: Yes  Vaccine assessment done and vaccine ordered if needed. Yes  My Chart patient sign up addressed and documented: Yes  Care Plan initiated: Yes  Discharge planning review completed (admission navigator) Yes

## 2024-11-29 NOTE — H&P
HOSPITALIST TELEMED ADMISSION H&P    Service Date : 11/29/2024  Dr. Mireya EVANS am located in [place]  Ventura Quiroz is located in [place] at [facility]     LEXY Anguiano, on Med/Surg unit is assisting me today with this patient.    chief complaint: Abdominal pain    History of Present Illness:  Ventura Quiroz is a 24 year old male with Crohn's disease,  presenting to ED that started around 7p this evening. He had associated nonbloody diarrhea, nausea and vomiting. He denied fever or chills.  2 yrs ago he had  and ileocecal resection due to Crohn's.  ED provider discussed patient with both general surgery and GI,  conservative management was recommended.       Emergency Room Course - in the emergency room, serologies for CMP, CBC, lipase,     Radiological diagnostics included:      Narrative & Impression  EXAM: CHEST SINGLE VIEW PORTABLE  LOCATION: [facility]  DATE/TIME: 11/29/2024 3:16 AM CST     INDICATION: Tube placement.  COMPARISON: 11/29/2024 at 0130 hours - CT abdomen and pelvis.                                                                      IMPRESSION: Interval placement of an enteric drainage tube with its distal tip in the gastric cardia region and just past the gastroesophageal junction and its side-port in the distal esophagus.      Narrative & Impression  EXAM: CT ABDOMEN AND PELVIS WITH CONTRAST  LOCATION: [facility]  DATE/TIME: 11/29/2024 1:41 AM CST     INDICATION: Crohn's disease. Elevated white blood cell count. Nausea and vomiting. Severe abdominal pain.  COMPARISON: 4/24/2019.     TECHNIQUE: CT scan of the abdomen and pelvis was performed following injection of IV contrast. Multiplanar reformats were obtained. Dose reduction techniques were used.  CONTRAST: 85 mL Isovue-370.     FINDINGS:     LOWER CHEST: Unremarkable.     HEPATOBILIARY: Unremarkable.     SPLEEN: Unremarkable.     PANCREAS:  Unremarkable.     ADRENAL GLANDS: Unremarkable.     KIDNEYS/BLADDER: Unremarkable.     BOWEL: Apparent prior bowel surgery in the ileocecal region and sigmoid colon region with reanastomosis. There is a long segment of mildly dilated fluid-filled small bowel in the pelvis. The bowel appears dilated all the way up to the ileocolic   anastomosis. The colon is relatively nondistended. No bowel wall thickening, pneumatosis or free intraperitoneal gas.     LYMPH NODES: Unremarkable.     PELVIC ORGANS: No acute findings.     MUSCULOSKELETAL: No acute findings.     OTHER: A trace amount of free fluid in the pelvis.                                                                      IMPRESSION:   1.  Distal small bowel obstruction: There is a long segment of mildly dilated fluid-filled small bowel in the pelvis and the more distal bowel is relatively decompressed. The site of the obstruction appears to be at an ileocolic anastomosis in the right   lower quadrant.   2.  A trace amount of free fluid in the pelvis.  3.  No other acute abnormality identified in the abdomen or pelvis.       Past Medical History  Past Medical History:   Diagnosis Date    Allergic rhinitis, cause unspecified     Zyrtec helps    Crohn's Colitis 4/8/2019    Unspecified otitis media     Otitis Media      Patient Active Problem List   Diagnosis    Crohn's Colitis    Anemia, iron deficiency    Crohn's disease of small intestine with abscess (H)    Crohn disease (H)    SBO (small bowel obstruction) (H)    Crohn's disease of small intestine with intestinal obstruction (H)         Past Surgical History  Past Surgical History:   Procedure Laterality Date    COLONOSCOPY N/A 4/11/2019    Procedure: COMBINED COLONOSCOPY, SINGLE OR MULTIPLE BIOPSY/POLYPECTOMY BY BIOPSY;  Surgeon: Tobin Wright MD;  Location:  GI    COLONOSCOPY N/A 9/29/2022    Procedure: COLONOSCOPY with  BIOPSY;  Surgeon: Tobin Wright MD;  [facility]    COLONOSCOPY  N/A 11/18/2022    Procedure: COLONOSCOPY;  Surgeon: Tobin Wright MD;  Location: [facility] OR    COLONOSCOPY N/A 3/4/2024    Procedure: Colonoscopy;  Surgeon: Carissa Felipe MD;  Location: [facility] OR    EXCISE LIP OR CHEEK FOLD  10/3/2003    Needle cautery frenulectomy.    INSERT PICC LINE Left 8/10/2020    Procedure: INSERTION, PICC @845;  Surgeon: Karan Farr MD;  [facility]    IR PICC PLACEMENT > 5 YRS OF AGE  8/10/2020    PE TUBES  4/25/2006    RESECTION ILEOCOLIC N/A 8/21/2020    Procedure: Exploratory laparotomy with ileocolic resection with takedown of enterovesical fistula, and low anterior resection;  Surgeon: Brittni Robles MD;  Location: [facility] OR    SIGMOIDOSCOPY FLEXIBLE N/A 8/21/2020    Procedure: Sigmoidoscopy flexible;  Surgeon: Brittni Robles MD;  Location: [facility] OR    TONSILLECTOMY & ADENOIDECTOMY  4/25/2006      Social History  Ventura  reports that he has never smoked. He uses smokeless tobacco. He reports current alcohol use. He reports that he does not use drugs.    Family History  Ventura's family history includes No Known Problems in his father and mother.    Home Medications  Current Outpatient Medications   Medication Instructions    colesevelam (WELCHOL) 1,250 mg, Oral, 3 TIMES DAILY WITH MEALS    diphenhydrAMINE (BENADRYL) 50 mg, Intravenous Push, PRN, For RN use only.  Draw up in a syringe and administer IV push.  Discard remainder of vial.    EPINEPHrine (ANY BX GENERIC EQUIV) 0.3 mg, Intramuscular, PRN, Administer into the mid-thigh in case of severe anaphylaxis (wheezing, throat tightening, mouth swelling, difficulty breathing). May repeat dose one time in 5-15 minutes if symptoms persist.    Inflectra 10 mg/kg, Intravenous, Every 28 days    Inflectra 800 mg, add to infusion, EVERY 4 WEEKS, Reconstitute inFLIXimab-dyyb vial(s). Draw up inFLIXimab-dyyb 10 mg/mL in syringe with 21 G needle and add to NaCl 0.9% bag immediately prior to infusing. MIX GENTLY BY  INVERSION, DO NOT SHAKE. Discard remainder of vial(s).    methylPREDNISolone Na Suc (PF) (SOLU-MEDROL) 125 mg, Intravenous Push, PRN, For RN use only.  Reconstitute vial. Draw up methylPREDNISolone in a syringe and administer.  Discard remainder of vial.    sodium chloride 0.9% bag 250 mLs, Infuse, EVERY 4 WEEKS, Add 80 mL (800 mg) of infliximab 10 mg/mL to bag immediately prior to infusing via gravity infusion. When complete, flush bag with 20 mL NS to flush tubing.    sodium chloride 0.9% infusion 500 mLs, Infuse, PRN, In case of mild reaction, administer via gravity at 20 mL/hr to keep vein open. In case of severe reaction, administer via gravity wide open on prime setting.    sodium chloride, PF, 0.9% PF flush 10 mLs, Intravenous, PRN, For RN use only as needed for infusion reaction    sodium chloride, PF, 0.9% PF flush 10 mLs, Intravenous, PRN, Flush IV before and after medication administration as directed and/or at least every 12 hours.    sterile water, preservative free, injection 80 mLs, for reconstitution, EVERY 4 WEEKS, 1. Reconstitute each vial of inFLIXimab-dyyb (INFLECTRA) with 10 mL of sterile water for injection by slowly injecting down the inside wall of vial w/21 G needle. DO NOT SHAKE. Foaming of the solution on reconstitution is not unusual. Roll and tilt each vial gently.  2. Let drug stand for 5 minutes.  3. Inspect vials for particules and/or discoloration prior to continuing. The solution should be colorless to light yellow and opalescent, and may develop a few translucent particles. DO NOT USE IF DRUG HAS NOT FULLY DISSOLVED OR IF OPAQUE PARTICLES, DISCOLORATION OR OTHER FOREIGN PARTICULES ARE PRESENT.  4. Draw up appropriate dose w/ 21 G needle from vial.       Allergies  Allergies   Allergen Reactions    Gadavist [Gadobutrol] Nausea     Patient could not perform timed sequences due to nausea and discomfort.    Glucagon Nausea and Vomiting     Pre medicate with lorazapam if able.       Amoxicillin     Penicillin [Penicillins]         10 pt ROS neg except as noted in HPI    Physical Exam:  I performed all aspects of the physical examination via Telemedicine associated with two way audio and video communication.    Vital Signs: Blood pressure 139/76, pulse 68, temperature 98.4  F (36.9  C), temperature source Temporal, resp. rate 18, SpO2 97%.    Physical Exam    Gen:  Well-developed, well-nourished, in no acute distress, lying semi-supine in his  hospital bed.  HEENT:  NC/AT,  EOMI,  hearing intact to voice  Resp:  No accessory muscle use, breath sounds clear; no wheezes no rales no rhonchi  Card:  No murmur, normal S1, S2   Abd:  Soft per RN exam, non-distended,  decreased BS  Skin: No rashes or skin breakdown.   Ext: No clubbing, cyanosis or edema was noted  Psych:  Not anxious, not agitated      DATA:    I&O: No intake/output data recorded.     Labs:  I have personally reviewed the following studies:  Recent Labs   Lab 11/29/24  0034   POTASSIUM 4.7   CHLORIDE 102   CO2 22   BUN 15.2   ALBUMIN 4.8   ALKPHOS 88   AST 29   ALT 17   LIPASE 18      Recent Labs   Lab 11/29/24 0034   WBC 14.0*   HGB 15.9   HCT 46.9             Imaging: ER imaging reviewed      Misc  DVT prophylaxis:  pneumatic compression device  Code Status: Full code   Cardiac Monitoring: No active telemetry  Gavin: No   Lines:  Peripheral IV  Diet: NPO except Ice chips      ASSESSMENT/PLAN:     23y/o male with SBO  will be placed in observation for further management.       This patient's current admission to an acute care setting is essential for the treatment of  SBO.       The patient's recovery is dependent on the following treatments of  - NGT LIS  - Surgical Consultation  - Anti-emetics  - GI Consultation  - Pain management  - NPO  - Remicade on Saturday (10mg/kg every 28 days)   - Correcting any electrolyte abnormalities to stabilize this condition.     The patient is at risk of developing further complications of  megacolon, sepsis if not treated in an acute care setting.     I expect the patient's hospital stay to require       Our patient will be admitted under the hospitalist services and further management to be based on patient's clinical progress.       #ACTIVE PROBLEM LIST:    #Crohn's Disease  - Colesevelam 2 tabs tid with meals  - Remicade 10mg /kg every 28 days.  This Saturday    #Iron Def Anemia  - patient gets IV iron, will monitor    Clinically Significant Risk Factors Present on Admission                          As the provider for the telehealth service, I attest that I introduced myself to the patient and provided my credentials. Based on review of the patient s chart and/or a discussion with members of the patient s treatment team, I determined that telemedicine via a real-time, two-way, interactive audio and video platform is an appropriate means of providing this service.     The encounter was approximately 10 minutes. The nurse was present for the duration of the encounter.    Chart reviewed,labs and available imaging reviewed,consultant notes reviewed. Previous available medical records have been reviewed  Care plan discussed with care team    I have seen and examined the patient today. Documentation for this visit was completed using a template. Everything documented was personally performed today with the necessary additions, deletions and changes made as appropriate with the diagnoses being listed in no particular order of clinical relevance.    Mireya Keys MD, FACP  Securely message with the Vocera Web Console (learn more here)  Text page via [employer] Paging/Directory

## 2024-11-29 NOTE — ED NOTES
ED Nursing criteria listed below was addressed during verbal handoff:     Abnormal vitals: No  Abnormal results: Yes, Small bowel obstruction.   Med Reconciliation completed: Yes  Meds given in ED: Yes, Toradol, fentanyl and versed.  Any Overdue Meds: N/A  Core Measures: N/A  Isolation: Yes  Special needs: N/A  Skin assessment: Yes, no breakdown.    Observation Patient  Education provided: N/A

## 2024-11-29 NOTE — CONSULTS
[facility] Emergency Department Surgery Consult    Ventura Quiroz MRN# [id number]   Age: 24 year old YOB: 2000     Date of Admission:  11/29/2024    Reason for consult: Small bowel obstruction       Requesting physician: Dr. Moody       Level of consult: Consult, follow and place orders           Impression and Plan:   Impression:   Small bowel obstruction secondary to either Crohn's disease or adhesions from previous procedures.  No signs of sepsis or peritoneal signs at this time.      Plan:   We continue n.p.o., IV fluids, NG tube.  Will order Gastrografin challenge.  If reveals full obstruction would refer back to [facility] as that is where he has had all of his previous procedures.  Continue management of Crohn's as per hospitalist.           Chief Complaint:   Abdominal pain     History is obtained from the patient         History of Present Illness:   This 24 year old male with a history of Crohn's disease with fistulization and status post low anterior resection/ileocecal resection.  He returns with abdominal pain that started yesterday.  There is associated nausea and vomiting.  He has not passed gas.  This is similar to his previous Crohn's exacerbations though more severe this time.  He is due for his Remicade tomorrow.  He is managed by gastroenterology at the [facility].  He presented to the ER late last night and CT revealed a small bowel obstruction at what appears to be his ilio cecal anastomosis.  It is unclear whether this is due to adhesions versus his Crohn's disease.  He is feeling better this morning but tired.  States he is not passing gas.  Currently resting comfortably in bed.  He was started on steroids by the ER last night.       Past Medical History:     Past Medical History:   Diagnosis Date    Allergic rhinitis, cause unspecified     Zyrtec helps    Crohn's Colitis 4/8/2019    Unspecified otitis media     Otitis Media             Past Surgical History:     Past  Surgical History:   Procedure Laterality Date    COLONOSCOPY N/A 4/11/2019    Procedure: COMBINED COLONOSCOPY, SINGLE OR MULTIPLE BIOPSY/POLYPECTOMY BY BIOPSY;  Surgeon: Tobin Wright MD;  Location: [facility]    COLONOSCOPY N/A 9/29/2022    Procedure: COLONOSCOPY with  BIOPSY;  Surgeon: Tobin Wright MD;  Location: [facility] OR    COLONOSCOPY N/A 11/18/2022    Procedure: COLONOSCOPY;  Surgeon: Tobin Wright MD;  Location: [facility] OR    COLONOSCOPY N/A 3/4/2024    Procedure: Colonoscopy;  Surgeon: Carissa Felipe MD;  Location: [facility] OR    EXCISE LIP OR CHEEK FOLD  10/3/2003    Needle cautery frenulectomy.    INSERT PICC LINE Left 8/10/2020    Procedure: INSERTION, PICC @845;  Surgeon: Karan Farr MD;  [facility]    IR PICC PLACEMENT > 5 YRS OF AGE  8/10/2020    PE TUBES  4/25/2006    RESECTION ILEOCOLIC N/A 8/21/2020    Procedure: Exploratory laparotomy with ileocolic resection with takedown of enterovesical fistula, and low anterior resection;  Surgeon: Brittni Robles MD;  Location: [facility]    SIGMOIDOSCOPY FLEXIBLE N/A 8/21/2020    Procedure: Sigmoidoscopy flexible;  Surgeon: Brittni Robles MD;  Location: [facility]    TONSILLECTOMY & ADENOIDECTOMY  4/25/2006             Social History:     Social History     Tobacco Use    Smoking status: Never    Smokeless tobacco: Current    Tobacco comments:     Vape   Substance Use Topics    Alcohol use: Yes             Family History:     Family History   Problem Relation Age of Onset    No Known Problems Mother     No Known Problems Father     Melanoma No family hx of     Skin Cancer No family hx of               Allergies:     Allergies   Allergen Reactions    Gadavist [Gadobutrol] Nausea     Patient could not perform timed sequences due to nausea and discomfort.    Glucagon Nausea and Vomiting     Pre medicate with lorazapam if able.      Amoxicillin     Penicillin [Penicillins]              Medications:     Current  Facility-Administered Medications   Medication Dose Route Frequency Provider Last Rate Last Admin    colesevelam (WELCHOL) tablet 1,250 mg  1,250 mg Oral TID w/meals Mireya Keys MD        diatrizoate meglumine-sodium (GASTROGRAFIN) 120 mL in sterile water (bottle) 150 mL  150 mL Nasogastric Once Anton Watts MD        docusate sodium (COLACE) capsule 100 mg  100 mg Oral BID Mireya Keys MD        HYDROmorphone (DILAUDID) injection 1 mg  1 mg Intravenous Q4H PRN Mireya Keys MD        ketorolac (TORADOL) injection 15 mg  15 mg Intravenous Q6H PRN Mireya Keys MD   15 mg at 11/29/24 0042    naloxone (NARCAN) injection 0.2 mg  0.2 mg Intravenous Q2 Min PRN Mireya Keys MD        Or    naloxone (NARCAN) injection 0.4 mg  0.4 mg Intravenous Q2 Min PRN Mireya Keys MD        Or    naloxone (NARCAN) injection 0.2 mg  0.2 mg Intramuscular Q2 Min PRN Mireya Keys MD        Or    naloxone (NARCAN) injection 0.4 mg  0.4 mg Intramuscular Q2 Min PRN Mireya Keys MD        ondansetron (ZOFRAN ODT) ODT tab 4 mg  4 mg Oral Q6H PRN Mireya Keys MD        Or    ondansetron (ZOFRAN) injection 4 mg  4 mg Intravenous Q6H PRN Mireya Keys MD        predniSONE (DELTASONE) tablet 30 mg  30 mg Oral BID w/meals Mireya Keys MD        prochlorperazine (COMPAZINE) injection 10 mg  10 mg Intravenous Q6H PRN Mireya Keys MD        Or    prochlorperazine (COMPAZINE) tablet 10 mg  10 mg Oral Q6H PRN Mireya Keys MD        senna-docusate (SENOKOT-S/PERICOLACE) 8.6-50 MG per tablet 1 tablet  1 tablet Oral BID PRN Mireya Keys MD        Or    senna-docusate (SENOKOT-S/PERICOLACE) 8.6-50 MG per tablet 2 tablet  2 tablet Oral BID Mireya Abraham MD                 Review of Systems:   The review of systems was positive for the following findings.  None.  The remainder of the review of systems was unremarkable.          Physical  Exam:   PE:  B/P: 128/70, T: 97.6, P: 63, R: 18  General: well developed, well nourished WM who appears their stated age  HEENT: NC/AT, EOMI, (-)icterus, (-)injection  Neck: Supple, No JVD  Chest: CTA  Heart: S1, S2, (-)m/r/g  Abd: Soft, mild diffuse tenderness, no peritoneal signs, non distended, non tender, no masses  Ext; Warm, no edema  Psych: AAOx3  Neuro: No focal deficits            Data:   All laboratory data reviewed  Results for orders placed or performed during the hospital encounter of 11/29/24 (from the past 24 hours)   Extra Tube ([place] Draw)    Narrative    The following orders were created for panel order Extra Tube ([place] Draw).  Procedure                               Abnormality         Status                     ---------                               -----------         ------                     Extra Green Top (Lithium...[[id number]]                      Final result               Extra Purple Top Tube[[id number]]                            Final result                 Please view results for these tests on the individual orders.   Extra Green Top (Lithium Heparin) Tube   Result Value Ref Range    Hold Specimen JIC    Extra Purple Top Tube   Result Value Ref Range    Hold Specimen JIC    CBC with platelets differential    Narrative    The following orders were created for panel order CBC with platelets differential.  Procedure                               Abnormality         Status                     ---------                               -----------         ------                     CBC with platelets and d...[[id number]]  Abnormal            Final result                 Please view results for these tests on the individual orders.   Comprehensive metabolic panel   Result Value Ref Range    Sodium 138 135 - 145 mmol/L    Potassium 4.7 3.4 - 5.3 mmol/L    Carbon Dioxide (CO2) 22 22 - 29 mmol/L    Anion Gap 14 7 - 15 mmol/L    Urea Nitrogen 15.2 6.0 - 20.0 mg/dL    Creatinine 0.89 0.67 -  1.17 mg/dL    GFR Estimate >90 >60 mL/min/1.73m2    Calcium 9.6 8.8 - 10.4 mg/dL    Chloride 102 98 - 107 mmol/L    Glucose 118 (H) 70 - 99 mg/dL    Alkaline Phosphatase 88 40 - 150 U/L    AST 29 0 - 45 U/L    ALT 17 0 - 70 U/L    Protein Total 8.2 6.4 - 8.3 g/dL    Albumin 4.8 3.5 - 5.2 g/dL    Bilirubin Total 0.5 <=1.2 mg/dL   Lipase   Result Value Ref Range    Lipase 18 13 - 60 U/L   CBC with platelets and differential   Result Value Ref Range    WBC Count 14.0 (H) 4.0 - 11.0 10e3/uL    RBC Count 5.59 4.40 - 5.90 10e6/uL    Hemoglobin 15.9 13.3 - 17.7 g/dL    Hematocrit 46.9 40.0 - 53.0 %    MCV 84 78 - 100 fL    MCH 28.4 26.5 - 33.0 pg    MCHC 33.9 31.5 - 36.5 g/dL    RDW 16.2 (H) 10.0 - 15.0 %    Platelet Count 271 150 - 450 10e3/uL    % Neutrophils 65 %    % Lymphocytes 28 %    % Monocytes 6 %    % Eosinophils 1 %    % Basophils 0 %    % Immature Granulocytes 0 %    NRBCs per 100 WBC 0 <1 /100    Absolute Neutrophils 9.0 (H) 1.6 - 8.3 10e3/uL    Absolute Lymphocytes 3.9 0.8 - 5.3 10e3/uL    Absolute Monocytes 0.9 0.0 - 1.3 10e3/uL    Absolute Eosinophils 0.2 0.0 - 0.7 10e3/uL    Absolute Basophils 0.1 0.0 - 0.2 10e3/uL    Absolute Immature Granulocytes 0.1 <=0.4 10e3/uL    Absolute NRBCs 0.0 10e3/uL   CT Abdomen Pelvis w Contrast    Narrative    EXAM: CT ABDOMEN AND PELVIS WITH CONTRAST  LOCATION: [facility]  DATE/TIME: 11/29/2024 1:41 AM CST    INDICATION: Crohn's disease. Elevated white blood cell count. Nausea and vomiting. Severe abdominal pain.  COMPARISON: 4/24/2019.    TECHNIQUE: CT scan of the abdomen and pelvis was performed following injection of IV contrast. Multiplanar reformats were obtained. Dose reduction techniques were used.  CONTRAST: 85 mL Isovue-370.    FINDINGS:    LOWER CHEST: Unremarkable.    HEPATOBILIARY: Unremarkable.    SPLEEN: Unremarkable.    PANCREAS: Unremarkable.    ADRENAL GLANDS: Unremarkable.    KIDNEYS/BLADDER: Unremarkable.    BOWEL: Apparent  prior bowel surgery in the ileocecal region and sigmoid colon region with reanastomosis. There is a long segment of mildly dilated fluid-filled small bowel in the pelvis. The bowel appears dilated all the way up to the ileocolic   anastomosis. The colon is relatively nondistended. No bowel wall thickening, pneumatosis or free intraperitoneal gas.    LYMPH NODES: Unremarkable.    PELVIC ORGANS: No acute findings.    MUSCULOSKELETAL: No acute findings.    OTHER: A trace amount of free fluid in the pelvis.      Impression    IMPRESSION:   1.  Distal small bowel obstruction: There is a long segment of mildly dilated fluid-filled small bowel in the pelvis and the more distal bowel is relatively decompressed. The site of the obstruction appears to be at an ileocolic anastomosis in the right   lower quadrant.   2.  A trace amount of free fluid in the pelvis.  3.  No other acute abnormality identified in the abdomen or pelvis.   XR Chest Port 1 View    Narrative    EXAM: CHEST SINGLE VIEW PORTABLE  [facility]  DATE/TIME: 11/29/2024 3:16 AM CST    INDICATION: Tube placement.  COMPARISON: 11/29/2024 at 0130 hours - CT abdomen and pelvis.      Impression    IMPRESSION: Interval placement of an enteric drainage tube with its distal tip in the gastric cardia region and just past the gastroesophageal junction and its side-port in the distal esophagus.      All imaging studies reviewed by me.     Anton Watts MD, FACS

## 2024-11-29 NOTE — PROGRESS NOTES
S-(situation): Patient registered to Observation. Patient arrived to room 273 via wheelchair from ED.    B-(background): Patient with history of Crohn disease, abdominal pain, nausea, diarrhea was positive for SBO.    A-(assessment): Patient alert and oriented, VSS on room air. Up independent in room. NG to LIS, landmark 55. NPO except ice, patient refusing ice. Reports pain 3/10.     R-(recommendations): Orders and observation goals reviewed with patient.    Nursing Observation criteria listed below was met:    Skin issues/needs documented:NA  Isolation needs addressed and Signage up: Yes  Fall Prevention: Education given and documented: Yes  Education Assessment documented:Yes  Admission Education Documented: Yes  New medication patient education completed and documented (Possible Side Effects of Common Medications handout): Yes  OBS video/handout Reviewed & Documented: Yes  Allergies Reviewed: Yes  Medication Reconciliation Complete: Yes  Home medications if not able to send immediately home with family stored here: NA  Reminder note placed in discharge instructions of home meds: NA  Patient has discharge needs (If yes, please explain): Yes  Patient discharge preferences addressed and charted on white board:  Yes  Provider notified that patient has arrived to the unit: Yes

## 2024-11-29 NOTE — ED TRIAGE NOTES
ABD pain beginning tonight. Nausea and vomiting. Pain 10/10. Recent admission for pain, dx Crohn's     Triage Assessment (Adult)       Row Name 11/29/24 0014          Triage Assessment    Airway WDL WDL        Respiratory WDL    Respiratory WDL WDL        Skin Circulation/Temperature WDL    Skin Circulation/Temperature WDL WDL        Cardiac WDL    Cardiac WDL WDL        Peripheral/Neurovascular WDL    Peripheral Neurovascular WDL WDL        Cognitive/Neuro/Behavioral WDL    Cognitive/Neuro/Behavioral WDL WDL

## 2024-11-30 LAB
ANION GAP SERPL CALCULATED.3IONS-SCNC: 9 MMOL/L (ref 7–15)
BUN SERPL-MCNC: 18.9 MG/DL (ref 6–20)
CALCIUM SERPL-MCNC: 9.2 MG/DL (ref 8.8–10.4)
CHLORIDE SERPL-SCNC: 104 MMOL/L (ref 98–107)
CREAT SERPL-MCNC: 0.91 MG/DL (ref 0.67–1.17)
CRP SERPL-MCNC: <3 MG/L
EGFRCR SERPLBLD CKD-EPI 2021: >90 ML/MIN/1.73M2
GLUCOSE SERPL-MCNC: 119 MG/DL (ref 70–99)
HCO3 SERPL-SCNC: 24 MMOL/L (ref 22–29)
MAGNESIUM SERPL-MCNC: 2.1 MG/DL (ref 1.7–2.3)
PHOSPHATE SERPL-MCNC: 2.1 MG/DL (ref 2.5–4.5)
POTASSIUM SERPL-SCNC: 4.2 MMOL/L (ref 3.4–5.3)
SODIUM SERPL-SCNC: 137 MMOL/L (ref 135–145)

## 2024-11-30 PROCEDURE — 250N000012 HC RX MED GY IP 250 OP 636 PS 637: Performed by: FAMILY MEDICINE

## 2024-11-30 PROCEDURE — 80048 BASIC METABOLIC PNL TOTAL CA: CPT | Performed by: INTERNAL MEDICINE

## 2024-11-30 PROCEDURE — 250N000011 HC RX IP 250 OP 636: Performed by: INTERNAL MEDICINE

## 2024-11-30 PROCEDURE — 36415 COLL VENOUS BLD VENIPUNCTURE: CPT | Performed by: INTERNAL MEDICINE

## 2024-11-30 PROCEDURE — 250N000009 HC RX 250: Performed by: FAMILY MEDICINE

## 2024-11-30 PROCEDURE — 120N000001 HC R&B MED SURG/OB

## 2024-11-30 PROCEDURE — 82565 ASSAY OF CREATININE: CPT | Performed by: INTERNAL MEDICINE

## 2024-11-30 PROCEDURE — S9338 HIT IMMUNOTHERAPY DIEM: HCPCS

## 2024-11-30 PROCEDURE — 86140 C-REACTIVE PROTEIN: CPT | Performed by: FAMILY MEDICINE

## 2024-11-30 PROCEDURE — 84100 ASSAY OF PHOSPHORUS: CPT | Performed by: INTERNAL MEDICINE

## 2024-11-30 PROCEDURE — 258N000003 HC RX IP 258 OP 636: Performed by: FAMILY MEDICINE

## 2024-11-30 PROCEDURE — 83735 ASSAY OF MAGNESIUM: CPT | Performed by: INTERNAL MEDICINE

## 2024-11-30 PROCEDURE — 99231 SBSQ HOSP IP/OBS SF/LOW 25: CPT | Performed by: SPECIALIST

## 2024-11-30 PROCEDURE — 250N000009 HC RX 250: Performed by: INTERNAL MEDICINE

## 2024-11-30 PROCEDURE — A4217 STERILE WATER/SALINE, 500 ML: HCPCS

## 2024-11-30 PROCEDURE — 99232 SBSQ HOSP IP/OBS MODERATE 35: CPT | Performed by: FAMILY MEDICINE

## 2024-11-30 RX ADMIN — PREDNISONE 30 MG: 5 TABLET ORAL at 13:31

## 2024-11-30 RX ADMIN — KETOROLAC TROMETHAMINE 15 MG: 15 INJECTION, SOLUTION INTRAMUSCULAR; INTRAVENOUS at 13:33

## 2024-11-30 RX ADMIN — HYDROMORPHONE HYDROCHLORIDE 0.2 MG: 0.2 INJECTION, SOLUTION INTRAMUSCULAR; INTRAVENOUS; SUBCUTANEOUS at 08:19

## 2024-11-30 RX ADMIN — SODIUM PHOSPHATE, MONOBASIC, MONOHYDRATE AND SODIUM PHOSPHATE, DIBASIC, ANHYDROUS 9 MMOL: 276; 142 INJECTION, SOLUTION INTRAVENOUS at 09:26

## 2024-11-30 RX ADMIN — KETOROLAC TROMETHAMINE 15 MG: 15 INJECTION, SOLUTION INTRAMUSCULAR; INTRAVENOUS at 06:00

## 2024-11-30 RX ADMIN — HYDROMORPHONE HYDROCHLORIDE 0.2 MG: 0.2 INJECTION, SOLUTION INTRAMUSCULAR; INTRAVENOUS; SUBCUTANEOUS at 03:30

## 2024-11-30 RX ADMIN — METHYLPREDNISOLONE SODIUM SUCCINATE 30 MG: 40 INJECTION, POWDER, FOR SOLUTION INTRAMUSCULAR; INTRAVENOUS at 02:07

## 2024-11-30 RX ADMIN — KETOROLAC TROMETHAMINE 15 MG: 15 INJECTION, SOLUTION INTRAMUSCULAR; INTRAVENOUS at 21:51

## 2024-11-30 RX ADMIN — HYDROMORPHONE HYDROCHLORIDE 0.2 MG: 0.2 INJECTION, SOLUTION INTRAMUSCULAR; INTRAVENOUS; SUBCUTANEOUS at 20:32

## 2024-11-30 RX ADMIN — PANTOPRAZOLE SODIUM 40 MG: 40 INJECTION, POWDER, FOR SOLUTION INTRAVENOUS at 08:18

## 2024-11-30 ASSESSMENT — ACTIVITIES OF DAILY LIVING (ADL)
ADLS_ACUITY_SCORE: 22

## 2024-11-30 NOTE — PROGRESS NOTES
Discussed patient's case with Dr. Jacky Harrison, [facility] GI provider on call, and will transition IV solumedrol to PO prednisone 30 mg daily and discharge at that ongoing dose, with patient supposed to call his IBD clinic early next weeks to discuss taper needed (which will be based on his ongoing symptoms following discharge).      Electronically Signed:  Ines Massey MD

## 2024-11-30 NOTE — PROGRESS NOTES
Surgery      Subjective:  Pt feels better today.  (+)BM/Flatus.  Malika NGT clamping.  Not take much clears yet.      Objective:  Vitals:    11/29/24 2351 11/30/24 0731 11/30/24 1135 11/30/24 1525   BP: 129/72 131/73 123/67 126/74   BP Location: Right arm Right arm Right arm Left arm   Pulse:  71 55 73   Resp: 18 19 18 18   Temp: 98.2  F (36.8  C) 98  F (36.7  C) 98  F (36.7  C) 98.2  F (36.8  C)   TempSrc: Oral Oral Oral Oral   SpO2: 99% 97% 98% 99%   Weight:       Height:         Abd; Soft, non tender, non distended    Results for orders placed or performed during the hospital encounter of 11/29/24 (from the past 24 hours)   C. difficile Toxin B PCR with reflex to C. difficile Antigen and Toxins A/B EIA    Specimen: Per Rectum; Stool   Result Value Ref Range    C Difficile Toxin B by PCR Negative Negative    Narrative    The [employer] Xpert C. difficile Assay, performed on the [employer], is a qualitative in vitro diagnostic test for rapid detection of toxin B gene sequences from unformed (liquid or soft) stool specimens collected from patients suspected of having Clostridioides difficile infection (CDI). The test utilizes automated real-time polymerase chain reaction (PCR) to detect toxin gene sequences associated with toxin producing C. difficile. The Xpert C. difficile Assay is intended as an aid in the diagnosis of CDI.   XR Gastrografin Challenge    Narrative    EXAM: XR GASTROGRAFIN CHALLENGE  LOCATION: [facility]  DATE: 11/29/2024    INDICATION: Small Bowel Obstruction  COMPARISON: Same date CT  TECHNIQUE: Routine water soluble contrast follow-through challenge.      Impression    IMPRESSION:  1.  Water soluble contrast material IS identified within the colon 8 hours post administration.  2.  Likely decreased small bowel distention in the pelvis in comparison to prior CT.  3.  Nasogastric tube with tip overlying the stomach, sidehole likely at or just  proximal to the gastroesophageal junction, consider advancement by 3 to 4 cm.   X-ray Abdomen flat port    Narrative    EXAM: XR ABDOMEN PORT 1 VIEW  LOCATION: [facility]  DATE: 11/29/2024    INDICATION: ng tube placement after advancement  COMPARISON: Portable chest 11/29/2024      Impression    IMPRESSION: NG tube has been advanced and is in good position below the EG junction.   Basic metabolic panel   Result Value Ref Range    Sodium 137 135 - 145 mmol/L    Potassium 4.2 3.4 - 5.3 mmol/L    Chloride 104 98 - 107 mmol/L    Carbon Dioxide (CO2) 24 22 - 29 mmol/L    Anion Gap 9 7 - 15 mmol/L    Urea Nitrogen 18.9 6.0 - 20.0 mg/dL    Creatinine 0.91 0.67 - 1.17 mg/dL    GFR Estimate >90 >60 mL/min/1.73m2    Calcium 9.2 8.8 - 10.4 mg/dL    Glucose 119 (H) 70 - 99 mg/dL   Magnesium   Result Value Ref Range    Magnesium 2.1 1.7 - 2.3 mg/dL   Phosphorus   Result Value Ref Range    Phosphorus 2.1 (L) 2.5 - 4.5 mg/dL   CRP inflammation   Result Value Ref Range    CRP Inflammation <3.00 <5.00 mg/L     Assessment/Plan;  Pt with exacerbation of Crohn's dz and resultant SBO.  Appears to be resolving.  Start clears.  If nicole clears NGT can be removed.  Cont steroids as per hospitalist.    Anton Watts MD, FACS

## 2024-11-30 NOTE — PLAN OF CARE
Goal Outcome Evaluation:      Plan of Care Reviewed With: patient    Overall Patient Progress: improvingOverall Patient Progress: improving    Outcome Evaluation: 4 hour shift note: Vss. Pt is alert, oriented x4. Zak Parrish at bedside. See Provider Notification note regarding NG tube location and orders to advance. Pain to abdomen and throat is rated 3-4/10. Dilaudid given prior to NG tube advancement, toradol given later and has been effective. New order also for Cepacol lozenge for sore throat pain and was given. NG output 100mL past 4 hours, clear/gold colroed with specs of brown at times. Passing flatus. Pt has had two watery bowl movements, pt stated with chrons/colitis normally has watery stools about 5x/day. Bowel sounds are active.

## 2024-11-30 NOTE — PLAN OF CARE
Problem: Adult Inpatient Plan of Care  Goal: Plan of Care Review  Description: The Plan of Care Review/Shift note should be completed every shift.  The Outcome Evaluation is a brief statement about your assessment that the patient is improving, declining, or no change.  This information will be displayed automatically on your shift  note.  Outcome: Progressing  Flowsheets (Taken 11/30/2024 1339)  Outcome Evaluation: Pt. A&Ox4. VSS on RM air. PRN dilaudid 0.2 mg x1 this am and PRN toradol this afternoon bringing pain down to 1/10. NC clamped with no drainage prior to clamping. Tolerating clear liquids with slow increase starting with 1 once and doubling hourly. Ambulating independently in room. Refusing to ambulate in hallway or take a shower until he gets the NG out. Pt. sleeping throughout shift between cares. Fiance at bedside throughout shift.  Plan of Care Reviewed With: patient  Overall Patient Progress: improving

## 2024-11-30 NOTE — PROGRESS NOTES
[facility]    Medicine Progress Note - Hospitalist Service    Date of Admission:  11/29/2024    Assessment & Plan    Ventura Quiroz is a 24 year old old male with h/o Crohn's disease s/p ileocecal resection 2 years ago presenting to ED with sudden onset of severe abdominal pain with associated nonbloody diarrhea, nausea and vomiting. CT showed SBO and ED provider discussed patient with both general surgery and GI,  conservative management was recommended but per GI recommendations patient was started on IV steroids in case Crohn's flare contributed. Patient has undergone a gastrografin challenge with good results and 3 bowel movements since last evening, resolution of nausea and significant improvement in abdominal pain.     SBO with h/o Crohns  Patient was given gastrografin challenge last evening with good results and bowel movement x2, x-ray showing contrast in the colon.  Patient has continued on NPO status with NG tube on low intermittent suction overnight but abdominal pain minimal - more bothered by the throat pain from NG tube than abdominal discomfort.    - Discussed with Dr. Watts, general surgeon on call today, and will proceed with clamping of NG tube and slow clears introduction with hopeful NG removal later today  - Continue IV PPI   - Continue prn anti-emetics if needed  - Will continue with Solumedrol 30 mg IV BID for now but have a call out to [facility] GI provider on call to discuss plan for steroids going forward.  Will add CRP inflammation on to morning labs.    - Remicade due today (10mg/kg every 28 days) which cannot be performed during his hospitalization at this locations and will need to be set up as soon as possible in outpatient setting.       Hypophosphatemia  Phosphorous is 2.1 this morning, decreased from normal yesterday  -will start standard phosphorous replacement protocol and monitor for resolution back to normal range     Leukocytosis   Patient's WBC was  14.0 on initial presentation, likely stress response for acute obstruction.  Has now normalized with medical management of SBO as above without signs/symptoms of SIRS  - monitor for ongoing resolution - next check tomorrow morning    Iron deficiency Anemia  Patient has known history of iron deficiency anemia but hemoglobin has been well within normal range so far during this stay.  Patient does get IV iron infusions in outpatient setting, with obvious good results.   -continue intermittent monitoring of hemoglobin while hospitalized  -further outpatient IV iron as previously planned           Diet: NPO for Medical/Clinical Reasons Except for: Ice Chips    DVT Prophylaxis: Pneumatic Compression Devices  Gavin Catheter: Not present  Lines: None     Cardiac Monitoring: None  Code Status: Full Code      Clinically Significant Risk Factors Present on Admission                                        [employer]    Tobacco Use: High Risk (11/12/2024)    Patient History     Smoking Tobacco Use: Never     Smokeless Tobacco Use: Current   Alcohol Use: Unknown (4/24/2019)    AUDIT-C     Frequency of Alcohol Consumption: Monthly or less     Average Number of Drinks: 1 or 2          Disposition Plan     Medically Ready for Discharge: Anticipated Tomorrow as long as dietary advancement goes well.        Ines Massey MD  Hospitalist Service  [facility]  Securely message with [employer] (more info)  Text page via [facility] Paging/Directory   ______________________________________________________________________    Interval History   Patient reports having two bowel movements overnight and another one this morning and pain in abdomen has significantly improved.  No output from NG tube from 11pm on and patient's nausea has resolved.  Throat pain is his biggest discomfort right now and patient has hopeful for NG removal.  No new symptoms.  Vitals stable.  No new nursing concerns.      Physical Exam   Vital Signs: Temp: 98.2  F (36.8  C) Temp src: Oral BP: 129/72 Pulse: 65   Resp: 18 SpO2: 99 % O2 Device: None (Room air)    Weight: 170 lbs 11.2 oz    Constitutional: awake, alert, cooperative, no apparent distress, and appears stated age  Respiratory: No increased work of breathing, good air exchange, clear to auscultation bilaterally, no crackles or wheezing  Cardiovascular: RRR  GI: bowel sounds present, abdomen soft, mild tenderness on palpation most in the suprapubic region but no rebound tenderness or guarding noted  Skin: no redness, warmth, or swelling and no rashes  Musculoskeletal: no lower extremity pitting edema present    Medical Decision Making       47 MINUTES SPENT BY ME on the date of service doing chart review, history, exam, documentation & further activities per the note.      Data     I have personally reviewed the following data over the past 24 hrs:    N/A  \   N/A   / N/A     137 104 18.9 /  119 (H)   4.2 24 0.91 \     Procal: N/A CRP: <3.00 Lactic Acid: N/A

## 2024-11-30 NOTE — PROVIDER NOTIFICATION
Notified Dr. Lino of pt's abdominal xray post gastrograffin and that it was recommended  to advance NG tube 3-4cm.   New order to advance tube 4cm and repeat abdominal xray. NG tube was advanced 4cm and repeat xray showed correct placement, reviewed by Dr. Lino and ordered ok to use.

## 2024-11-30 NOTE — PLAN OF CARE
Problem: Adult Inpatient Plan of Care  Goal: Plan of Care Review  Description: The Plan of Care Review/Shift note should be completed every shift.  The Outcome Evaluation is a brief statement about your assessment that the patient is improving, declining, or no change.  This information will be displayed automatically on your shift  note.  Outcome: Progressing  Flowsheets (Taken 11/29/2024 1946)  Outcome Evaluation: Pt. A&Ox4. VSS on RA. Pain managed with PRN toradol x2 and dilaudid x1. Pt. reports that pain is managed much better with toradol. BS normoactive. Large watery BM this late afternoon. Dr. Massey updated. Pending C-diff. SL. NPO. NG to low intermittent suction. 100 out of clear yellow. Majority prior to shift change. Gastrografin given this AM with x-ray this evening, pending results. Switched from observation to inpatient at 1530. Fiance at bedside throughout shift.  Plan of Care Reviewed With: patient  Overall Patient Progress: no change

## 2024-11-30 NOTE — PLAN OF CARE
Goal Outcome Evaluation:           Overall Patient Progress: improvingOverall Patient Progress: improving    Outcome Evaluation: A&O. VSS on rm air. PRN dilaudid and toradol given for pain. Fiance at bedside. NG tube in place with low-intermittent suction running. Ambulating independently.

## 2024-11-30 NOTE — MEDICATION SCRIBE - ADMISSION MEDICATION HISTORY
Medication Scribe Admission Medication History    Admission medication history is complete. The information provided in this note is only as accurate as the sources available at the time of the update.    Information Source(s): Patient, Family member, Clinic records, and CareEverywhere/SureScripts via in-person    Pertinent Information: S/O Shivani very involved with patient's care and quite helpful. Verified no use of pain pump, inhalers or prescription eye drops currently.    Changes made to PTA medication list:  Added: None  Deleted: Welchol  Changed: None    Allergies reviewed with patient and updates made in EHR: yes    Medication History Completed By: ZEENAT PITT 11/30/2024 1:34 PM    PTA Med List   Medication Sig Note Last Dose/Taking    diphenhydrAMINE (BENADRYL) 50 MG/ML injection Inject 1 mL (50 mg) over 3-5 minutes into the vein via push as needed for other (infusion reaction). For RN use only.  Draw up in a syringe and administer IV push.  Discard remainder of vial. 11/30/2024: On hand, never used Taking As Needed    EPINEPHrine (ANY BX GENERIC EQUIV) 0.3 MG/0.3ML injection 2-pack Inject 0.3 mLs (0.3 mg) into the muscle as needed for anaphylaxis (infusion reaction). Administer into the mid-thigh in case of severe anaphylaxis (wheezing, throat tightening, mouth swelling, difficulty breathing). May repeat dose one time in 5-15 minutes if symptoms persist. 11/30/2024: On hand, never used Taking As Needed    inFLIXimab-dyyb (INFLECTRA) 100 MG injection Inject 765 mg into the vein every 28 (twenty-eight) days 11/30/2024: Every fourth Saturday 11/2/2024    inFLIXimab-dyyb (INFLECTRA) injection Add to infusion 80 mLs (800 mg) over 1 hours every 4 weeks. Reconstitute inFLIXimab-dyyb vial(s). Draw up inFLIXimab-dyyb 10 mg/mL in syringe with 21 G needle and add to NaCl 0.9% bag immediately prior to infusing. MIX GENTLY BY INVERSION, DO NOT SHAKE. Discard remainder of vial(s).  Past Month    methylPREDNISolone Na  Suc, PF, (SOLU-MEDROL) 125 mg/2 mL injection Inject 2 mLs (125 mg) over 3-5 minutes into the vein via push as needed (infusion reaction). For RN use only.  Reconstitute vial. Draw up methylPREDNISolone in a syringe and administer.  Discard remainder of vial. 11/30/2024: On hand, never used Taking As Needed    sodium chloride 0.9% bag Infuse 250 mLs over 1 hours into the vein every 4 weeks. Add 80 mL (800 mg) of infliximab 10 mg/mL to bag immediately prior to infusing via gravity infusion. When complete, flush bag with 20 mL NS to flush tubing.  11/2/2024    sodium chloride 0.9% infusion Infuse 500 mLs into the vein as needed for other (infusion reaction). In case of mild reaction, administer via gravity at 20 mL/hr to keep vein open. In case of severe reaction, administer via gravity wide open on prime setting.  11/2/2024    sodium chloride, PF, 0.9% PF flush Inject 10 mLs into the vein as needed for other (infusion reaction). For RN use only as needed for infusion reaction  Taking As Needed    sodium chloride, PF, 0.9% PF flush Inject 10 mLs into the vein as needed for line flush. Flush IV before and after medication administration as directed and/or at least every 12 hours.  Taking As Needed    sterile water, preservative free, injection Use 80 mLs for reconstitution every 4 weeks. 1. Reconstitute each vial of inFLIXimab-dyyb (INFLECTRA) with 10 mL of sterile water for injection by slowly injecting down the inside wall of vial w/21 G needle. DO NOT SHAKE. Foaming of the solution on reconstitution is not unusual. Roll and tilt each vial gently.  2. Let drug stand for 5 minutes.  3. Inspect vials for particules and/or discoloration prior to continuing. The solution should be colorless to light yellow and opalescent, and may develop a few translucent particles. DO NOT USE IF DRUG HAS NOT FULLY DISSOLVED OR IF OPAQUE PARTICLES, DISCOLORATION OR OTHER FOREIGN PARTICULES ARE PRESENT.  4. Draw up appropriate dose w/ 21 G  needle from vial.  11/2/2024

## 2024-12-01 VITALS
DIASTOLIC BLOOD PRESSURE: 77 MMHG | SYSTOLIC BLOOD PRESSURE: 128 MMHG | HEIGHT: 74 IN | HEART RATE: 65 BPM | BODY MASS INDEX: 21.91 KG/M2 | OXYGEN SATURATION: 98 % | WEIGHT: 170.7 LBS | TEMPERATURE: 98.6 F | RESPIRATION RATE: 18 BRPM

## 2024-12-01 PROBLEM — G47.00 INSOMNIA: Status: ACTIVE | Noted: 2024-12-01

## 2024-12-01 LAB
ANION GAP SERPL CALCULATED.3IONS-SCNC: 11 MMOL/L (ref 7–15)
BUN SERPL-MCNC: 19.4 MG/DL (ref 6–20)
CALCIUM SERPL-MCNC: 9.1 MG/DL (ref 8.8–10.4)
CHLORIDE SERPL-SCNC: 103 MMOL/L (ref 98–107)
CREAT SERPL-MCNC: 0.89 MG/DL (ref 0.67–1.17)
CRP SERPL-MCNC: <3 MG/L
EGFRCR SERPLBLD CKD-EPI 2021: >90 ML/MIN/1.73M2
ERYTHROCYTE [DISTWIDTH] IN BLOOD BY AUTOMATED COUNT: 16.2 % (ref 10–15)
GLUCOSE SERPL-MCNC: 101 MG/DL (ref 70–99)
HCO3 SERPL-SCNC: 25 MMOL/L (ref 22–29)
HCT VFR BLD AUTO: 40.6 % (ref 40–53)
HGB BLD-MCNC: 13.7 G/DL (ref 13.3–17.7)
MCH RBC QN AUTO: 28.5 PG (ref 26.5–33)
MCHC RBC AUTO-ENTMCNC: 33.7 G/DL (ref 31.5–36.5)
MCV RBC AUTO: 85 FL (ref 78–100)
PHOSPHATE SERPL-MCNC: 3.5 MG/DL (ref 2.5–4.5)
PLATELET # BLD AUTO: 201 10E3/UL (ref 150–450)
POTASSIUM SERPL-SCNC: 3.7 MMOL/L (ref 3.4–5.3)
RBC # BLD AUTO: 4.8 10E6/UL (ref 4.4–5.9)
SODIUM SERPL-SCNC: 139 MMOL/L (ref 135–145)
WBC # BLD AUTO: 12.2 10E3/UL (ref 4–11)

## 2024-12-01 PROCEDURE — 250N000009 HC RX 250: Performed by: INTERNAL MEDICINE

## 2024-12-01 PROCEDURE — 85014 HEMATOCRIT: CPT | Performed by: FAMILY MEDICINE

## 2024-12-01 PROCEDURE — 82310 ASSAY OF CALCIUM: CPT | Performed by: FAMILY MEDICINE

## 2024-12-01 PROCEDURE — 250N000012 HC RX MED GY IP 250 OP 636 PS 637: Performed by: FAMILY MEDICINE

## 2024-12-01 PROCEDURE — 250N000011 HC RX IP 250 OP 636: Performed by: HOSPITALIST

## 2024-12-01 PROCEDURE — 80048 BASIC METABOLIC PNL TOTAL CA: CPT | Performed by: FAMILY MEDICINE

## 2024-12-01 PROCEDURE — 86140 C-REACTIVE PROTEIN: CPT | Performed by: FAMILY MEDICINE

## 2024-12-01 PROCEDURE — 250N000013 HC RX MED GY IP 250 OP 250 PS 637: Performed by: HOSPITALIST

## 2024-12-01 PROCEDURE — 84100 ASSAY OF PHOSPHORUS: CPT | Performed by: FAMILY MEDICINE

## 2024-12-01 PROCEDURE — 250N000013 HC RX MED GY IP 250 OP 250 PS 637: Performed by: FAMILY MEDICINE

## 2024-12-01 PROCEDURE — 36415 COLL VENOUS BLD VENIPUNCTURE: CPT | Performed by: FAMILY MEDICINE

## 2024-12-01 PROCEDURE — 99239 HOSP IP/OBS DSCHRG MGMT >30: CPT | Performed by: FAMILY MEDICINE

## 2024-12-01 RX ORDER — PREDNISONE 10 MG/1
30 TABLET ORAL DAILY
Qty: 15 TABLET | Refills: 0 | Status: SHIPPED | OUTPATIENT
Start: 2024-12-02

## 2024-12-01 RX ORDER — HYDROMORPHONE HCL IN WATER/PF 6 MG/30 ML
0.2 PATIENT CONTROLLED ANALGESIA SYRINGE INTRAVENOUS
Status: DISCONTINUED | OUTPATIENT
Start: 2024-12-01 | End: 2024-12-01 | Stop reason: HOSPADM

## 2024-12-01 RX ORDER — LORAZEPAM 2 MG/ML
0.5 INJECTION INTRAMUSCULAR ONCE
Status: COMPLETED | OUTPATIENT
Start: 2024-12-01 | End: 2024-12-01

## 2024-12-01 RX ORDER — ACETAMINOPHEN 325 MG/1
650 TABLET ORAL EVERY 4 HOURS PRN
Status: DISCONTINUED | OUTPATIENT
Start: 2024-12-01 | End: 2024-12-01 | Stop reason: HOSPADM

## 2024-12-01 RX ORDER — COLESEVELAM 180 1/1
1250 TABLET ORAL
DISCHARGE
Start: 2024-12-01

## 2024-12-01 RX ORDER — TRAZODONE HYDROCHLORIDE 50 MG/1
50 TABLET, FILM COATED ORAL ONCE
Status: COMPLETED | OUTPATIENT
Start: 2024-12-01 | End: 2024-12-01

## 2024-12-01 RX ORDER — ACETAMINOPHEN 325 MG/1
650 TABLET ORAL EVERY 4 HOURS PRN
DISCHARGE
Start: 2024-12-01

## 2024-12-01 RX ORDER — OXYCODONE HYDROCHLORIDE 5 MG/1
5 TABLET ORAL EVERY 4 HOURS PRN
Status: DISCONTINUED | OUTPATIENT
Start: 2024-12-01 | End: 2024-12-01 | Stop reason: HOSPADM

## 2024-12-01 RX ADMIN — LORAZEPAM 0.5 MG: 2 INJECTION INTRAMUSCULAR; INTRAVENOUS at 00:59

## 2024-12-01 RX ADMIN — TRAZODONE HYDROCHLORIDE 50 MG: 50 TABLET ORAL at 02:56

## 2024-12-01 RX ADMIN — PANTOPRAZOLE SODIUM 40 MG: 40 INJECTION, POWDER, FOR SOLUTION INTRAVENOUS at 08:41

## 2024-12-01 RX ADMIN — PREDNISONE 30 MG: 5 TABLET ORAL at 08:40

## 2024-12-01 RX ADMIN — ACETAMINOPHEN 650 MG: 325 TABLET, FILM COATED ORAL at 08:40

## 2024-12-01 ASSESSMENT — ACTIVITIES OF DAILY LIVING (ADL)
ADLS_ACUITY_SCORE: 22
ADLS_ACUITY_SCORE: 22
ADLS_ACUITY_SCORE: 24
ADLS_ACUITY_SCORE: 22
ADLS_ACUITY_SCORE: 24
ADLS_ACUITY_SCORE: 22
ADLS_ACUITY_SCORE: 22
ADLS_ACUITY_SCORE: 24
ADLS_ACUITY_SCORE: 22
ADLS_ACUITY_SCORE: 24

## 2024-12-01 NOTE — PLAN OF CARE
Goal Outcome Evaluation:      Plan of Care Reviewed With: patient    Overall Patient Progress: improvingOverall Patient Progress: improving    Outcome Evaluation: A&O. VSS on rm air. NG clamped with 100ml output. Ambulating independently. Tolerating clear liquids. Requesting dilaudid with minimal pain, did ask patient where his pain was and he stated he was feeling tightness in his stomach with a pain score of 2/10 and having difficulty sleeping. Ativan ordered to help with sleep instead. Ativan given at 1am and pt called out at 2am and 230am for more sleeping meds, MD notified. Trazodone ordered and given.

## 2024-12-01 NOTE — PLAN OF CARE
Goal Outcome Evaluation:      Plan of Care Reviewed With: patient    Overall Patient Progress: no changeOverall Patient Progress: no change    Outcome Evaluation: patient A&O, vss, clamped and tolerating liquids well until around 2030, requested PRN dilaudid and an hour later called complaining of pain, started NG to low-intermittent suction, patient also requested toradol, voiding well

## 2024-12-01 NOTE — PROGRESS NOTES
S-(situation): Patient discharged to Home via ambulation with Shivani chandler.    B-(background): 24 year old male with SBO.    A-(assessment): Pt. A&Ox2. VSS on RA. Tolerating regular diet. NG removed this AM and resolved pain. Denies abdominal pain throughout shift. Showered independently this AM. Ambulating in halls independently. PIV removed this AM. Fiance at bedside throughout shift.     R-(recommendations): Discharge instructions reviewed with patient and Shivani chandler. Listed belongings gathered and returned to patient. Phone numbers provided for GI provider and [facility].          Discharge Nursing Criteria:   Patient Education given and documented: (STROKE, CHF, Diabetes):  {Yes   Care Plan and Patient education resolved: Yes    New Medications- pt has been educated about purpose and side effects: Yes    Vaccines  Influenza status verified at discharge:  Yes    Intentionally Retained Items (examples: Drains, Wound packing, ureteral stents, hussein, PICC line or IV)  Patient was sent home with nothing left in place.   Follow up appointment made for removal:  NA    MISC  Prescriptions if needed, hard copies sent with patient  Yes  Home medications returned to patient: NA  Medication Bin checked and emptied on discharge Yes  Patient reports post-discharge pain management plan is effective: Yes

## 2024-12-01 NOTE — PROGRESS NOTES
Patient tolerated clear liquids last night and this morning.  Discussed with Dr. Watts and will remove NG tube this morning and attempt full liquids now and ADAT with hopeful discharge this afternoon/evening.  Patient and family informed and in agreement    Electronically Signed:  Ines Massey MD

## 2024-12-01 NOTE — DISCHARGE SUMMARY
[facility]  Hospitalist Discharge Summary      Date of Admission:  11/29/2024  Date of Discharge:  12/1/2024  Discharging Provider: Ines Massey MD  Discharge Service: Hospitalist Service    Discharge Diagnoses   Active Problems:    Crohn's Colitis    SBO (small bowel obstruction) (H)    Crohn's disease of small intestine with intestinal obstruction (H)    Leukocytosis, unspecified type    Insomnia    Clinically Significant Risk Factors          Follow-ups Needed After Discharge   Follow-up Appointments       Follow-up and recommended labs and tests       Please call you IBD team tomorrow to discuss steroid plan going forward  Follow up with primary care provider, Jc Tubbs, or one of his partners within 7-14 days for hospital follow- up.              Discharge Disposition   Discharged to home  Condition at discharge: Stable    Hospital Course    Ventura Quiroz is a 24 year old old male with h/o Crohn's disease s/p ileocecal resection 2 years ago presenting to ED with sudden onset of severe abdominal pain with associated nonbloody diarrhea, nausea and vomiting. CT showed SBO and ED provider discussed patient with both general surgery and GI,  conservative management was recommended but per GI recommendations patient was started on IV steroids in case Crohn's flare contributed. Patient has undergone a gastrografin challenge with good results return of bowel function and has now tolerated advancement of diet without difficulty.  He will discharge home with PO prednisone and will contact his IBD team tomorrow to discuss plan for ongoing steroids (as per discussion with GI provider during this stay as below).      SBO with h/o Crohns  Patient was given gastrografin challenge with good results and x-ray showing contrast in the colon.NG tube has been removed and diet advanced and patient has continued to have good bowel movements without return of pain even after eating  regular diet.    - patient will discharge home with ongoing advancement of diet as tolerated  -will discharge on prednisone 30 mg daily as per GI advance and further titration downward as per patient's IBD team in outpatient setting.   - Remicade was due yesterday (10mg/kg every 28 days) which could not be performed during his hospitalization at this locations and will need to be set up as soon as possible in outpatient setting.  Home infusion referral resumed with instructions to try and get it out to patient tomorrow.      Hypophosphatemia  Phosphorous is 2.1 earlier this stay while NPO, now normalized post replacement   -no further supplementation needed at discharge     Leukocytosis   Patient's WBC was 14.0 on initial presentation, likely stress response for acute obstruction.  Did normalized with medical management of SBO as above without signs/symptoms of SIRS but has started to trend upward again following steroid administration   - No further monitoring needed at time of discharge     Iron deficiency Anemia  Patient has known history of iron deficiency anemia but hemoglobin has been well within normal range so far during this stay.  Patient does get IV iron infusions in outpatient setting, with obvious good results.   -further outpatient IV iron as previously planned     Consultations This Hospital Stay   SURGERY GENERAL IP CONSULT  GASTROENTEROLOGY IP CONSULT    Code Status   Full Code    Time Spent on this Encounter   I, Ines Massey MD, personally saw the patient today and spent greater than 30 minutes discharging this patient.       Ines Massey MD  [facility] 2A MEDICAL SURGICAL  [address] [place] [address]  Phone: [phone]  ______________________________________________________________________    Physical Exam   Vital Signs: Temp: 98.6  F (37  C) Temp src: Oral BP: 128/77 Pulse: 65   Resp: 18 SpO2: 98 % O2 Device: None (Room air)    Weight:  170 lbs 11.2 oz  Constitutional: awake, alert, cooperative, no apparent distress, and appears stated age  Respiratory: No increased work of breathing, good air exchange, clear to auscultation bilaterally, no crackles or wheezing  Cardiovascular: RRR  GI: bowel sounds present and normal, abdomen soft and non-tender   Musculoskeletal: no lower extremity pitting edema present  Neurologic: Awake, alert, oriented to name, place and wituation        Primary Care Physician   Jc Tubbs    Discharge Orders      Home Infusion referral      Reason for your hospital stay    Small bowel obstruction which has improved following the gastrografin challenge and steroid initiation     Follow-up and recommended labs and tests     Please call you IBD team tomorrow to discuss steroid plan going forward  Follow up with primary care provider, Jc Tubbs, or one of his partners within 7-14 days for hospital follow- up.     Activity    Your activity upon discharge: activity as tolerated     Diet    Follow this diet upon discharge: Advance to a regular diet as tolerated       Significant Results and Procedures   Results for orders placed or performed during the hospital encounter of 11/29/24   CT Abdomen Pelvis w Contrast    Narrative    EXAM: CT ABDOMEN AND PELVIS WITH CONTRAST  LOCATION: [facility]  DATE/TIME: 11/29/2024 1:41 AM CST    INDICATION: Crohn's disease. Elevated white blood cell count. Nausea and vomiting. Severe abdominal pain.  COMPARISON: 4/24/2019.    TECHNIQUE: CT scan of the abdomen and pelvis was performed following injection of IV contrast. Multiplanar reformats were obtained. Dose reduction techniques were used.  CONTRAST: 85 mL Isovue-370.    FINDINGS:    LOWER CHEST: Unremarkable.    HEPATOBILIARY: Unremarkable.    SPLEEN: Unremarkable.    PANCREAS: Unremarkable.    ADRENAL GLANDS: Unremarkable.    KIDNEYS/BLADDER: Unremarkable.    BOWEL: Apparent prior bowel surgery in the  ileocecal region and sigmoid colon region with reanastomosis. There is a long segment of mildly dilated fluid-filled small bowel in the pelvis. The bowel appears dilated all the way up to the ileocolic   anastomosis. The colon is relatively nondistended. No bowel wall thickening, pneumatosis or free intraperitoneal gas.    LYMPH NODES: Unremarkable.    PELVIC ORGANS: No acute findings.    MUSCULOSKELETAL: No acute findings.    OTHER: A trace amount of free fluid in the pelvis.      Impression    IMPRESSION:   1.  Distal small bowel obstruction: There is a long segment of mildly dilated fluid-filled small bowel in the pelvis and the more distal bowel is relatively decompressed. The site of the obstruction appears to be at an ileocolic anastomosis in the right   lower quadrant.   2.  A trace amount of free fluid in the pelvis.  3.  No other acute abnormality identified in the abdomen or pelvis.   XR Chest Port 1 View    Narrative    EXAM: CHEST SINGLE VIEW PORTABLE  LOCATION: [facility]  DATE/TIME: 11/29/2024 3:16 AM CST    INDICATION: Tube placement.  COMPARISON: 11/29/2024 at 0130 hours - CT abdomen and pelvis.      Impression    IMPRESSION: Interval placement of an enteric drainage tube with its distal tip in the gastric cardia region and just past the gastroesophageal junction and its side-port in the distal esophagus.    XR Gastrografin Challenge    Narrative    EXAM: XR GASTROGRAFIN CHALLENGE  LOCATION: [facility]  DATE: 11/29/2024    INDICATION: Small Bowel Obstruction  COMPARISON: Same date CT  TECHNIQUE: Routine water soluble contrast follow-through challenge.      Impression    IMPRESSION:  1.  Water soluble contrast material IS identified within the colon 8 hours post administration.  2.  Likely decreased small bowel distention in the pelvis in comparison to prior CT.  3.  Nasogastric tube with tip overlying the stomach, sidehole likely at or  just proximal to the gastroesophageal junction, consider advancement by 3 to 4 cm.   X-ray Abdomen flat port    Narrative    EXAM: XR ABDOMEN PORT 1 VIEW  LOC[facility]  DATE: 11/29/2024    INDICATION: ng tube placement after advancement  COMPARISON: Portable chest 11/29/2024      Impression    IMPRESSION: NG tube has been advanced and is in good position below the EG junction.       Discharge Medications   Current Discharge Medication List        START taking these medications    Details   acetaminophen (TYLENOL) 325 MG tablet Take 2 tablets (650 mg) by mouth every 4 hours as needed for other (pain).    Associated Diagnoses: SBO (small bowel obstruction) (H)      predniSONE (DELTASONE) 10 MG tablet Take 3 tablets (30 mg) by mouth daily. For 5 days or as directed by your IBD team.  Qty: 15 tablet, Refills: 0    Associated Diagnoses: SBO (small bowel obstruction) (H); Crohn's disease with intestinal obstruction, unspecified gastrointestinal tract location (H)           CONTINUE these medications which have CHANGED    Details   colesevelam (WELCHOL) 625 MG tablet Take 2 tablets (1,250 mg) by mouth 3 times daily (with meals).    Associated Diagnoses: Crohn's disease of small intestine with abscess (H)           CONTINUE these medications which have NOT CHANGED    Details   diphenhydrAMINE (BENADRYL) 50 MG/ML injection Inject 1 mL (50 mg) over 3-5 minutes into the vein via push as needed for other (infusion reaction). For RN use only.  Draw up in a syringe and administer IV push.  Discard remainder of vial.  Qty: 88467 mL, Refills: 0    Associated Diagnoses: Crohn's disease of small intestine with abscess (H)      EPINEPHrine (ANY BX GENERIC EQUIV) 0.3 MG/0.3ML injection 2-pack Inject 0.3 mLs (0.3 mg) into the muscle as needed for anaphylaxis (infusion reaction). Administer into the mid-thigh in case of severe anaphylaxis (wheezing, throat tightening, mouth swelling, difficulty  breathing). May repeat dose one time in 5-15 minutes if symptoms persist.  Qty: 757577 mL, Refills: 0    Associated Diagnoses: Crohn's disease of small intestine with abscess (H)      !! inFLIXimab-dyyb (INFLECTRA) 100 MG injection Inject 765 mg into the vein every 28 (twenty-eight) days    Associated Diagnoses: Crohn disease (H)      !! inFLIXimab-dyyb (INFLECTRA) injection Add to infusion 80 mLs (800 mg) over 1 hours every 4 weeks. Reconstitute inFLIXimab-dyyb vial(s). Draw up inFLIXimab-dyyb 10 mg/mL in syringe with 21 G needle and add to NaCl 0.9% bag immediately prior to infusing. MIX GENTLY BY INVERSION, DO NOT SHAKE. Discard remainder of vial(s).  Qty: 88 each, Refills: 0    Associated Diagnoses: Crohn's disease of small intestine with abscess (H)      methylPREDNISolone Na Suc, PF, (SOLU-MEDROL) 125 mg/2 mL injection Inject 2 mLs (125 mg) over 3-5 minutes into the vein via push as needed (infusion reaction). For RN use only.  Reconstitute vial. Draw up methylPREDNISolone in a syringe and administer.  Discard remainder of vial.  Qty: 751446 mL, Refills: 0    Associated Diagnoses: Crohn's disease of small intestine with abscess (H)      sodium chloride 0.9% bag Infuse 250 mLs over 1 hours into the vein every 4 weeks. Add 80 mL (800 mg) of infliximab 10 mg/mL to bag immediately prior to infusing via gravity infusion. When complete, flush bag with 20 mL NS to flush tubing.  Qty: 303173 mL, Refills: 0    Associated Diagnoses: Crohn's disease of small intestine with abscess (H)      !! sodium chloride 0.9% infusion Infuse 500 mLs into the vein as needed for other (infusion reaction). In case of mild reaction, administer via gravity at 20 mL/hr to keep vein open. In case of severe reaction, administer via gravity wide open on prime setting.  Qty: 890666 mL, Refills: 0    Associated Diagnoses: Crohn's disease of small intestine with abscess (H)      !! sodium chloride, PF, 0.9% PF flush Inject 10 mLs into the vein  as needed for other (infusion reaction). For RN use only as needed for infusion reaction  Qty: 245147 mL, Refills: 0    Associated Diagnoses: Crohn's disease of small intestine with abscess (H)      !! sodium chloride, PF, 0.9% PF flush Inject 10 mLs into the vein as needed for line flush. Flush IV before and after medication administration as directed and/or at least every 12 hours.  Qty: 964248 mL, Refills: 0    Associated Diagnoses: Crohn's disease of small intestine with abscess (H)      sterile water, preservative free, injection Use 80 mLs for reconstitution every 4 weeks. 1. Reconstitute each vial of inFLIXimab-dyyb (INFLECTRA) with 10 mL of sterile water for injection by slowly injecting down the inside wall of vial w/21 G needle. DO NOT SHAKE. Foaming of the solution on reconstitution is not unusual. Roll and tilt each vial gently.  2. Let drug stand for 5 minutes.  3. Inspect vials for particules and/or discoloration prior to continuing. The solution should be colorless to light yellow and opalescent, and may develop a few translucent particles. DO NOT USE IF DRUG HAS NOT FULLY DISSOLVED OR IF OPAQUE PARTICLES, DISCOLORATION OR OTHER FOREIGN PARTICULES ARE PRESENT.  4. Draw up appropriate dose w/ 21 G needle from vial.  Qty: 880 mL, Refills: 0    Associated Diagnoses: Crohn's disease of small intestine with abscess (H)       !! - Potential duplicate medications found. Please discuss with provider.        Allergies   Allergies   Allergen Reactions    Gadavist [Gadobutrol] Nausea     Patient could not perform timed sequences due to nausea and discomfort.    Glucagon Nausea and Vomiting     Pre medicate with lorazapam if able.      Amoxicillin     Penicillin [Penicillins]

## 2024-12-02 ENCOUNTER — HOME INFUSION BILLING (OUTPATIENT)
Dept: HOME HEALTH SERVICES | Facility: HOME HEALTH | Age: 24
End: 2024-12-02
Payer: COMMERCIAL

## 2024-12-02 ENCOUNTER — PATIENT OUTREACH (OUTPATIENT)
Dept: FAMILY MEDICINE | Facility: OTHER | Age: 24
End: 2024-12-02
Payer: COMMERCIAL

## 2024-12-02 ENCOUNTER — TELEPHONE (OUTPATIENT)
Dept: GASTROENTEROLOGY | Facility: CLINIC | Age: 24
End: 2024-12-02
Payer: COMMERCIAL

## 2024-12-02 ENCOUNTER — EPISODE UPDATE (OUTPATIENT)
Dept: HOME HEALTH SERVICES | Facility: HOME HEALTH | Age: 24
End: 2024-12-02
Payer: COMMERCIAL

## 2024-12-02 DIAGNOSIS — K50.014 CROHN'S DISEASE OF SMALL INTESTINE WITH ABSCESS (H): Primary | ICD-10-CM

## 2024-12-02 NOTE — TELEPHONE ENCOUNTER
Per Dr. Wright, order placed for urgent C-scope with dilation. Placed call to patient. Instructed patient to not take prednisone.

## 2024-12-02 NOTE — TELEPHONE ENCOUNTER
[facility] Call Center    Phone Message    May a detailed message be left on voicemail: yes     Reason for Call: Other: Patient is stating that his other team is asking if he should be on the predniSONE (DELTASONE) 10 MG tablet. He is asking his IBD team if he should discontinue this medication or stay on it. Please call him back. Also, the patient is wondering if from his research is the paper has been released or not.      Action Taken: Message routed to:  [facility] GI    Travel Screening: Not Applicable     Date of Service:

## 2024-12-02 NOTE — TELEPHONE ENCOUNTER
"Placed call to patient to follow up on previous message. \"On 11/29/40 patient presented to ED with sudden onset of severe abdominal pain with associated nonbloody diarrhea, nausea and vomiting. CT showed SBO. GI recommended patient IV steroids in case Crohn's flare contributed. Patient's bowel function returned,  tolerated advancement of diet without difficulty.  He was discharged home with PO prednisone. Patient questioning if he should take as prescribed (prednisone 10 MG tablet - 3 tablets (30 mg) by mouth daily for 5 days).    Patient reports feeling good, no pain, no issues with bowel movements. Tolerating PO intake.       "

## 2024-12-02 NOTE — TELEPHONE ENCOUNTER
Transitions of Care Outreach  Chief Complaint   Patient presents with    Hospital F/U       Most Recent Admission Date: 11/29/2024   Most Recent Admission Diagnosis: SBO (small bowel obstruction) (H) - K56.609  Crohn's disease of small intestine with intestinal obstruction (H) - K50.012     Most Recent Discharge Date: 12/1/2024   Most Recent Discharge Diagnosis: SBO (small bowel obstruction) (H) - K56.609  Crohn's disease of small intestine with intestinal obstruction (H) - K50.012  Insomnia, unspecified type - G47.00  Crohn's disease with intestinal obstruction, unspecified gastrointestinal tract location (H) - K50.912  Crohn's disease of small intestine with abscess (H) - K50.014     Transitions of Care Assessment    Discharge Assessment  How are you doing now that you are home?: Good, I feel good, no pain.  How are your symptoms? (Red Flag symptoms escalate to triage hotline per guidelines): Improved  Do you know how to contact your clinic care team if you have future questions or changes to your health status? : Yes  Does the patient have their discharge instructions? : Yes  Does the patient have questions regarding their discharge instructions? : No  Were you started on any new medications or were there changes to any of your previous medications? : Yes (on steroid, has to call GI team for guidance on weaning off. He has number.)  Does the patient have all of their medications?: Yes  Do you have questions regarding any of your medications? : No  Do you have all of your needed medical supplies or equipment (DME)?  (i.e. oxygen tank, CPAP, cane, etc.): Yes    Follow up Plan     Discharge Follow-Up  Discharge follow up appointment scheduled in alignment with recommended follow up timeframe or Transitions of Risk Category? (Low = within 30 days; Moderate= within 14 days; High= within 7 days): Yes  Discharge Follow Up Appointment Date: 12/09/24  Discharge Follow Up Appointment Scheduled with?: Primary Care  Provider    Future Appointments   Date Time Provider Department Center   12/9/2024 10:00 AM Jc Tubbs PA-C [facility]   1/14/2025  3:00 PM Yunior England [facility]       Outpatient Plan as outlined on AVS reviewed with patient.    For any urgent concerns, please contact our 24 hour nurse triage line: [phone] ([phone])       Kellie Pérez RN

## 2024-12-03 ENCOUNTER — TELEPHONE (OUTPATIENT)
Dept: GASTROENTEROLOGY | Facility: CLINIC | Age: 24
End: 2024-12-03
Payer: COMMERCIAL

## 2024-12-03 ENCOUNTER — LAB REQUISITION (OUTPATIENT)
Dept: LAB | Facility: CLINIC | Age: 24
End: 2024-12-03
Payer: COMMERCIAL

## 2024-12-03 ENCOUNTER — HOME CARE VISIT (OUTPATIENT)
Dept: HOME HEALTH SERVICES | Facility: HOME HEALTH | Age: 24
End: 2024-12-03
Payer: COMMERCIAL

## 2024-12-03 VITALS
TEMPERATURE: 98.2 F | HEART RATE: 84 BPM | OXYGEN SATURATION: 98 % | SYSTOLIC BLOOD PRESSURE: 102 MMHG | WEIGHT: 170 LBS | RESPIRATION RATE: 18 BRPM | DIASTOLIC BLOOD PRESSURE: 60 MMHG | BODY MASS INDEX: 21.83 KG/M2

## 2024-12-03 DIAGNOSIS — K50.014 CROHN'S DISEASE OF SMALL INTESTINE WITH ABSCESS (H): Primary | ICD-10-CM

## 2024-12-03 DIAGNOSIS — K50.014 CROHN'S DISEASE OF SMALL INTESTINE WITH ABSCESS (H): ICD-10-CM

## 2024-12-03 LAB
ALBUMIN SERPL BCG-MCNC: 4.2 G/DL (ref 3.5–5.2)
ALP SERPL-CCNC: 79 U/L (ref 40–150)
ALT SERPL W P-5'-P-CCNC: 57 U/L (ref 0–70)
AST SERPL W P-5'-P-CCNC: 20 U/L (ref 0–45)
BASOPHILS # BLD AUTO: 0 10E3/UL (ref 0–0.2)
BASOPHILS NFR BLD AUTO: 0 %
BILIRUB DIRECT SERPL-MCNC: <0.2 MG/DL (ref 0–0.3)
BILIRUB SERPL-MCNC: 0.2 MG/DL
CRP SERPL-MCNC: <3 MG/L
EOSINOPHIL # BLD AUTO: 0.1 10E3/UL (ref 0–0.7)
EOSINOPHIL NFR BLD AUTO: 1 %
ERYTHROCYTE [DISTWIDTH] IN BLOOD BY AUTOMATED COUNT: 15.9 % (ref 10–15)
HCT VFR BLD AUTO: 39 % (ref 40–53)
HGB BLD-MCNC: 13.2 G/DL (ref 13.3–17.7)
IMM GRANULOCYTES # BLD: 0.1 10E3/UL
IMM GRANULOCYTES NFR BLD: 1 %
LYMPHOCYTES # BLD AUTO: 2.8 10E3/UL (ref 0.8–5.3)
LYMPHOCYTES NFR BLD AUTO: 26 %
MCH RBC QN AUTO: 28.8 PG (ref 26.5–33)
MCHC RBC AUTO-ENTMCNC: 33.8 G/DL (ref 31.5–36.5)
MCV RBC AUTO: 85 FL (ref 78–100)
MONOCYTES # BLD AUTO: 0.9 10E3/UL (ref 0–1.3)
MONOCYTES NFR BLD AUTO: 8 %
NEUTROPHILS # BLD AUTO: 6.9 10E3/UL (ref 1.6–8.3)
NEUTROPHILS NFR BLD AUTO: 64 %
NRBC # BLD AUTO: 0 10E3/UL
NRBC BLD AUTO-RTO: 0 /100
PLATELET # BLD AUTO: 234 10E3/UL (ref 150–450)
PROT SERPL-MCNC: 6.8 G/DL (ref 6.4–8.3)
RBC # BLD AUTO: 4.58 10E6/UL (ref 4.4–5.9)
WBC # BLD AUTO: 10.7 10E3/UL (ref 4–11)

## 2024-12-03 PROCEDURE — 85048 AUTOMATED LEUKOCYTE COUNT: CPT | Performed by: PHYSICIAN ASSISTANT

## 2024-12-03 PROCEDURE — 82040 ASSAY OF SERUM ALBUMIN: CPT | Performed by: PHYSICIAN ASSISTANT

## 2024-12-03 PROCEDURE — 86140 C-REACTIVE PROTEIN: CPT | Performed by: PHYSICIAN ASSISTANT

## 2024-12-03 PROCEDURE — 36415 COLL VENOUS BLD VENIPUNCTURE: CPT | Performed by: PHYSICIAN ASSISTANT

## 2024-12-03 PROCEDURE — 84155 ASSAY OF PROTEIN SERUM: CPT | Performed by: PHYSICIAN ASSISTANT

## 2024-12-03 PROCEDURE — 82247 BILIRUBIN TOTAL: CPT | Performed by: PHYSICIAN ASSISTANT

## 2024-12-03 PROCEDURE — 85004 AUTOMATED DIFF WBC COUNT: CPT | Performed by: PHYSICIAN ASSISTANT

## 2024-12-03 NOTE — TELEPHONE ENCOUNTER
Endoscopy Scheduling Screen    Final Scheduling Details     Procedure scheduled  Colonoscopy    Surgeon:  Evan     Date of procedure:  12/23/2024     Pre-OP / PAC:   No - Not required for this site.    Location  [facility] - [facility] - Patient preference.    Sedation   Moderate Sedation - Per order.      Patient Reminders:   You will receive a call from a Nurse to review instructions and health history.  This assessment must be completed prior to your procedure.  Failure to complete the Nurse assessment may result in the procedure being cancelled.      On the day of your procedure, please designate an adult(s) who can drive you home stay with you for the next 24 hours. The medicines used in the exam will make you sleepy. You will not be able to drive.      You cannot take public transportation, ride share services, or non-medical taxi service without a responsible caregiver.  Medical transport services are allowed with the requirement that a responsible caregiver will receive you at your destination.  We require that drivers and caregivers are confirmed prior to your procedure.

## 2024-12-03 NOTE — TELEPHONE ENCOUNTER
"Pt not scheduled. Pt wants MAC sedation. MAC Appt times are not available. Pt stated they would wait to schedule and writer statedthey would continue to look for an appropriate appt.     Endoscopy Scheduling Screen    Have you had any respiratory illness or flu-like symptoms in the last 10 days?  No    What is your communication preference for Instructions and/or Bowel Prep?   MyChart    What insurance is in the chart?  Other:  [facility]    Ordering/Referring Provider: Tobin Wright MD   (If ordering provider performs procedure, schedule with ordering provider unless otherwise instructed. )    BMI: Estimated body mass index is 21.92 kg/m  as calculated from the following:    Height as of 11/29/24: 1.88 m (6' 2\").    Weight as of 11/29/24: 77.4 kg (170 lb 11.2 oz).     Sedation Ordered  moderate sedation.   If patient BMI > 50 do not schedule in ASC.    [facility] > 45 [facility]    Are you taking methadone or Suboxone?  NO, No RN review required.    Have you been diagnosed and are being treated for severe PTSD or severe anxiety?  NO, No RN review required.    Are you taking any prescription medications for pain 3 or more times per week?   NO, No RN review required.    Do you have a history of malignant hyperthermia?  No    (Females) Are you currently pregnant?   No     Have you been diagnosed or told you have pulmonary hypertension?   No    Do you have an LVAD?  No    Have you been told you have moderate to severe sleep apnea?  No.    Have you been told you have COPD, asthma, or any other lung disease?  No    Do you have any heart conditions?  No     Have you ever had or are you waiting for an organ transplant?  No. Continue scheduling, no site restrictions.    Have you had a stroke or transient ischemic attack (TIA aka \"mini stroke\" in the last 6 months?   No    Have you been diagnosed with or been told you have cirrhosis of the liver?   No.    Are you currently on dialysis?   No    Do you need " "assistance transferring?   No    BMI: Estimated body mass index is 21.92 kg/m  as calculated from the following:    Height as of 11/29/24: 1.88 m (6' 2\").    Weight as of 11/29/24: 77.4 kg (170 lb 11.2 oz).     Is patients BMI > 40 and scheduling location [facility]?  No    Do you take an injectable or oral medication for weight loss or diabetes (excluding insulin)?  No    Do you take the medication Naltrexone?  No    Do you take blood thinners?  Yes, you must contact your prescribing provider for directions on holding or bridging with a different medication.       Prep   Are you currently on dialysis or do you have chronic kidney disease?  No    Do you have a diagnosis of diabetes?  No    Do you have a diagnosis of cystic fibrosis (CF)?  No    On a regular basis do you go 3 -5 days between bowel movements?  No    BMI > 40?  No    Preferred Pharmacy:      [id number] - [place], [place] - [address]  [address]  [place] [address]  Phone: [phone] Fax: [phone]      Final Scheduling Details     Procedure scheduled  Colonoscopy      Patient Reminders:   You will receive a call from a Nurse to review instructions and health history.  This assessment must be completed prior to your procedure.  Failure to complete the Nurse assessment may result in the procedure being cancelled.      On the day of your procedure, please designate an adult(s) who can drive you home stay with you for the next 24 hours. The medicines used in the exam will make you sleepy. You will not be able to drive.      You cannot take public transportation, ride share services, or non-medical taxi service without a responsible caregiver.  Medical transport services are allowed with the requirement that a responsible caregiver will receive you at your destination.  We require that drivers and caregivers are confirmed prior to your procedure.  "

## 2024-12-03 NOTE — PROGRESS NOTES
"Infusion Nursing Note:  Skilled nurse visit today for inflectra infusion for Ventura Quiroz.   present during visit today: Not Applicable.    Pre-infusion Checklist:   Pre-infusion Checklist    Have you had any delayed reaction since last infusion?   No    Have you recently had an elevated temperature, fever, chills productive cough, coughing for 3 weeks or longer or hemoptysis, abnormal vital signs, night sweats, chest pain, or have you noticed a decrease in your appetite, or noted unexplained weight loss or fatigue?   No    Do you have any open wounds or new incisions?  No    Do you have any recent or upcoming hospitalizations, surgeries, or dental procedures?   Yes, pt recently hospitalized with bowel obstruction. Ordering MD aware.     Do you currently have or recently have had any signs of illness or infection or are you on any antibiotics?   No    Have you had any new, sudden , or worsening abdominal pain?   No    Have you or anyone in your household received a live vaccination in the past 4 weeks?   No    Have you recently been diagnosed with any new nervous system diseases or cancer diagnosis? (i.e., Multiple Sclerosis, Guillain [place], sezures, neurological changes) Are you receiving any form of radiation or chemotherapy?   No    Are you pregnant or breastfeeding, or do you have plans of pregnancy in the future?   No    Have you been having any signs of worsening depression or suicidal ideation?  No    Have there been any other new onset medical symptoms?  No    Did the patient answer \"YES\" to any of the questions above?  No    Will the patient receive a medication that has an order for infusion reaction management?  Yes, and all drugs and supplies are available and none have .    If ordered, has the patient taken pre-medications?  N/A    Plan:   Therapy is appropriate, will proceed with treatment.     Chemotherapy Treatment Conditions:   Not Applicable    Intravenous Access:  Labs drawn " without difficulty. and Peripheral IV placed.    Post Infusion Assessment:  Patient tolerated infusion without incident.     Note: Patient Aox4, VSS. PIV placed per [facility] protocol, labs drawn today. Pt recently hospitalized with bowel obstruction, inflectra infusion this month is delayed a few days due to that. Patient denies pain today.     Saline administered (in mLs): 50cc    Next Visit Plan:   4 weeks, unsure of date, waiting to hear back from billing and pharmacy team.       Carmen Fitzpatrick RN 12/3/2024

## 2024-12-04 RX ORDER — BISACODYL 5 MG/1
TABLET, DELAYED RELEASE ORAL
Qty: 4 TABLET | Refills: 0 | Status: SHIPPED | OUTPATIENT
Start: 2024-12-04

## 2024-12-04 NOTE — TELEPHONE ENCOUNTER
Extended Golytely Bowel Prep  recommended due to poor prep/inadequate bowel prep in the past. Instructions were sent via [employer]. Bowel prep was sent 12/4/2024 to [id number] - [place], [place] - [address]       Lori Hatfield LPN  Endoscopy Procedure Pre Assessment

## 2024-12-09 ENCOUNTER — OFFICE VISIT (OUTPATIENT)
Dept: FAMILY MEDICINE | Facility: OTHER | Age: 24
End: 2024-12-09
Payer: COMMERCIAL

## 2024-12-09 VITALS
RESPIRATION RATE: 18 BRPM | OXYGEN SATURATION: 98 % | HEIGHT: 74 IN | TEMPERATURE: 97 F | BODY MASS INDEX: 21.94 KG/M2 | HEART RATE: 72 BPM | DIASTOLIC BLOOD PRESSURE: 80 MMHG | SYSTOLIC BLOOD PRESSURE: 136 MMHG | WEIGHT: 171 LBS

## 2024-12-09 DIAGNOSIS — K50.00 CROHN'S DISEASE OF SMALL INTESTINE WITHOUT COMPLICATION (H): Primary | ICD-10-CM

## 2024-12-09 DIAGNOSIS — Z87.19 HX SBO: ICD-10-CM

## 2024-12-09 PROCEDURE — 99495 TRANSJ CARE MGMT MOD F2F 14D: CPT | Performed by: PHYSICIAN ASSISTANT

## 2024-12-09 ASSESSMENT — PAIN SCALES - GENERAL: PAINLEVEL_OUTOF10: NO PAIN (0)

## 2024-12-09 NOTE — PROGRESS NOTES
Assessment & Plan       ICD-10-CM    1. Crohn's disease of small intestine without complication (H)  K50.00       2. Hx SBO  Z87.19           1-2. History of Crohn's with recent SBO that led to hospitalization. He is doing much better now with resolution of his symptoms. He is eating normally again and back to his normal bowel schedule. He has an upcoming colonoscopy in a few weeks and follows closely with his GI provider. If he has any new or recurrent symptoms, he should be seen right away.       MED REC REQUIRED{  Post Medication Reconciliation Status: discharge medications reconciled, continue medications without change      Subjective   Ventura is a 24 year old, presenting for the following health issues:  Hospital follow up     HPI        Hospital Follow-up Visit:    Hospital/Nursing Home/ Rehab Facility: [facility]  Date of Admission: 11/29/2024  Date of Discharge: 12/01/2024  Reason(s) for Admission: Clogged intestine   Was the patient in the ICU or did the patient experience delirium during hospitalization?  No  Do you have any other stressors you would like to discuss with your provider? No    Problems taking medications regularly:  None  Medication changes since discharge: None  Problems adhering to non-medication therapy:  None    Summary of hospitalization:  Hospital Course  Ventura Quiroz is a 24 year old old male with h/o Crohn's disease s/p ileocecal resection 2 years ago presenting to ED with sudden onset of severe abdominal pain with associated nonbloody diarrhea, nausea and vomiting. CT showed SBO and ED provider discussed patient with both general surgery and GI,  conservative management was recommended but per GI recommendations patient was started on IV steroids in case Crohn's flare contributed. Patient has undergone a gastrografin challenge with good results return of bowel function and has now tolerated advancement of diet without difficulty.  He will discharge home  with PO prednisone and will contact his IBD team tomorrow to discuss plan for ongoing steroids (as per discussion with GI provider during this stay as below).       SBO with h/o Crohns  Patient was given gastrografin challenge with good results and x-ray showing contrast in the colon.NG tube has been removed and diet advanced and patient has continued to have good bowel movements without return of pain even after eating regular diet.    - patient will discharge home with ongoing advancement of diet as tolerated  -will discharge on prednisone 30 mg daily as per GI advance and further titration downward as per patient's IBD team in outpatient setting.   - Remicade was due yesterday (10mg/kg every 28 days) which could not be performed during his hospitalization at this locations and will need to be set up as soon as possible in outpatient setting.  Home infusion referral resumed with instructions to try and get it out to patient tomorrow.      Hypophosphatemia  Phosphorous is 2.1 earlier this stay while NPO, now normalized post replacement   -no further supplementation needed at discharge      Leukocytosis   Patient's WBC was 14.0 on initial presentation, likely stress response for acute obstruction.  Did normalized with medical management of SBO as above without signs/symptoms of SIRS but has started to trend upward again following steroid administration   - No further monitoring needed at time of discharge      Iron deficiency Anemia  Patient has known history of iron deficiency anemia but hemoglobin has been well within normal range so far during this stay.  Patient does get IV iron infusions in outpatient setting, with obvious good results.   -further outpatient IV iron as previously planned   Diagnostic Tests/Treatments reviewed.  Follow up needed: GI  Other Healthcare Providers Involved in Patient s Care:         GI  Update since discharge: improved. He is feeling back to normal, is eating normally and bowel  "movements are back to his normal (6-7 per day).     Plan of care communicated with patient         Review of Systems  Constitutional, HEENT, cardiovascular, pulmonary, gi and gu systems are negative, except as otherwise noted.      Objective    /80   Pulse 72   Temp 97  F (36.1  C) (Temporal)   Resp 18   Ht 1.88 m (6' 2\")   Wt 77.6 kg (171 lb)   SpO2 98%   BMI 21.96 kg/m    Body mass index is 21.96 kg/m .  Physical Exam   GENERAL: alert and no distress  RESP: lungs clear to auscultation - no rales, rhonchi or wheezes  CV: regular rate and rhythm, normal S1 S2, no S3 or S4, no murmur, click or rub, no peripheral edema  ABDOMEN: soft, nontender, no hepatosplenomegaly, no masses and bowel sounds normal  NEURO: Normal strength and tone, mentation intact and speech normal. Gait is stable.   PSYCH: mentation appears normal, affect normal/bright         Signed Electronically by: Jc Tubbs PA-C    "

## 2024-12-12 ENCOUNTER — TELEPHONE (OUTPATIENT)
Dept: GASTROENTEROLOGY | Facility: CLINIC | Age: 24
End: 2024-12-12
Payer: COMMERCIAL

## 2024-12-12 NOTE — TELEPHONE ENCOUNTER
Pre visit planning completed.      Procedure details:    Patient scheduled for Colonoscopy on 12/23/24.     Approximate arrival time: 1305. Procedure time 1350.   *Ensure patient is aware that endoscopy team will be calling about 2 days prior to procedure date to confirm arrival time as this may change.     Facility location: [facility]; [address], [place] [address]. Check in location: 2nd Floor at Surgery desk.  *Disclaimer: Drivers are to check in with patient and stay on campus during procedure.     Sedation type: Conscious sedation Discuss sedation- previously tolerated CS, however, in scheduling note pt made reference for wanting MAC    Pre op exam needed? No.    Indication for procedure:   Diagnosis   Crohn's disease of small intestine with abscess (H)         Chart review:     Electronic implanted devices? No    Recent diagnosis of diverticulitis within the last 6 weeks? No      Medication review:    Diabetic? No    Anticoagulants? No- Pt reported yes to this with scheduling, please verify    Weight loss medication/injectable? No GLP-1 medication per patient's medication list. Nursing to verify with pre-assessment call.    Other medication HOLDING recommendations:  N/A      Prep for procedure:     Bowel prep recommendation: Extended Golytely. Bowel prep sent to    [id number] - [place], [place] - [address]  Due to: poor prep/inadequate bowel prep in the past    Procedure information and instructions sent via jose Prather RN  Endoscopy Procedure Pre Assessment   [phone] option 2

## 2024-12-12 NOTE — TELEPHONE ENCOUNTER
Pre assessment completed for upcoming procedure.   (Please see previous telephone encounter notes for complete details)    Family member returned call. C2C on file with [facility]      Procedure details:    Approximate time and facility location reviewed.   Family member is aware that endoscopy team will be calling about 2 days prior to confirm arrival time.    Designated  policy reviewed and that  requests drivers to check in and stay on campus.   *Disclaimer - please notify the  RN GI staff with any  issues/concerns.     Instructed to have someone stay 6  hours post procedure.     Medication review:    Medications reviewed. Please see supporting documentation below. Holding recommendations discussed (if applicable).       Prep for procedure:     Procedure prep instructions reviewed.        Any additional information needed:  Mom is going to discuss with pt as he was requesting procedure to be done with MAC. Mom is aware that this procedure is scheduled with CS. They will call back if needs to reschedule.       Family member verbalized understanding and had no questions or concerns at this time.      Ann-Marie Prather RN  Endoscopy Procedure Pre Assessment   [phone] option 2

## 2024-12-19 ENCOUNTER — TELEPHONE (OUTPATIENT)
Dept: GASTROENTEROLOGY | Facility: CLINIC | Age: 24
End: 2024-12-19
Payer: COMMERCIAL

## 2024-12-23 ENCOUNTER — HOSPITAL ENCOUNTER (OUTPATIENT)
Facility: AMBULATORY SURGERY CENTER | Age: 24
Discharge: HOME | End: 2024-12-23
Attending: INTERNAL MEDICINE
Payer: COMMERCIAL

## 2024-12-23 VITALS
TEMPERATURE: 97.8 F | RESPIRATION RATE: 18 BRPM | DIASTOLIC BLOOD PRESSURE: 75 MMHG | SYSTOLIC BLOOD PRESSURE: 112 MMHG | OXYGEN SATURATION: 100 % | HEART RATE: 69 BPM

## 2024-12-23 LAB — COLONOSCOPY: NORMAL

## 2024-12-23 PROCEDURE — 88305 TISSUE EXAM BY PATHOLOGIST: CPT | Performed by: PATHOLOGY

## 2024-12-23 RX ORDER — FENTANYL CITRATE 50 UG/ML
INJECTION, SOLUTION INTRAMUSCULAR; INTRAVENOUS PRN
Status: DISCONTINUED | OUTPATIENT
Start: 2024-12-23 | End: 2024-12-23 | Stop reason: HOSPADM

## 2024-12-23 RX ORDER — ONDANSETRON 4 MG/1
4 TABLET, ORALLY DISINTEGRATING ORAL EVERY 6 HOURS PRN
Status: DISCONTINUED | OUTPATIENT
Start: 2024-12-23 | End: 2024-12-24 | Stop reason: HOSPADM

## 2024-12-23 RX ORDER — NALOXONE HYDROCHLORIDE 0.4 MG/ML
0.2 INJECTION, SOLUTION INTRAMUSCULAR; INTRAVENOUS; SUBCUTANEOUS
Status: DISCONTINUED | OUTPATIENT
Start: 2024-12-23 | End: 2024-12-24 | Stop reason: HOSPADM

## 2024-12-23 RX ORDER — ONDANSETRON 2 MG/ML
4 INJECTION INTRAMUSCULAR; INTRAVENOUS EVERY 6 HOURS PRN
Status: DISCONTINUED | OUTPATIENT
Start: 2024-12-23 | End: 2024-12-24 | Stop reason: HOSPADM

## 2024-12-23 RX ORDER — NALOXONE HYDROCHLORIDE 0.4 MG/ML
0.4 INJECTION, SOLUTION INTRAMUSCULAR; INTRAVENOUS; SUBCUTANEOUS
Status: DISCONTINUED | OUTPATIENT
Start: 2024-12-23 | End: 2024-12-24 | Stop reason: HOSPADM

## 2024-12-23 RX ORDER — LIDOCAINE 40 MG/G
CREAM TOPICAL
Status: DISCONTINUED | OUTPATIENT
Start: 2024-12-23 | End: 2024-12-24 | Stop reason: HOSPADM

## 2024-12-23 RX ORDER — PROCHLORPERAZINE MALEATE 10 MG
10 TABLET ORAL EVERY 6 HOURS PRN
Status: DISCONTINUED | OUTPATIENT
Start: 2024-12-23 | End: 2024-12-24 | Stop reason: HOSPADM

## 2024-12-23 RX ORDER — ONDANSETRON 2 MG/ML
4 INJECTION INTRAMUSCULAR; INTRAVENOUS
Status: DISCONTINUED | OUTPATIENT
Start: 2024-12-23 | End: 2024-12-24 | Stop reason: HOSPADM

## 2024-12-23 RX ORDER — FLUMAZENIL 0.1 MG/ML
0.2 INJECTION, SOLUTION INTRAVENOUS
Status: DISCONTINUED | OUTPATIENT
Start: 2024-12-23 | End: 2024-12-24 | Stop reason: HOSPADM

## 2024-12-23 NOTE — H&P
ENDOSCOPY PRE-SEDATION H&P FOR OUTPATIENT PROCEDURES    Ventura Quiroz  [id number]  2000    Procedure: colonoscopy    Pre-procedure diagnosis: crohns, hx anastomotic stricture    Past medical history:   Past Medical History:   Diagnosis Date    Allergic rhinitis, cause unspecified     Zyrtec helps    Crohn's Colitis 4/8/2019    Unspecified otitis media     Otitis Media       Past surgical history:   Past Surgical History:   Procedure Laterality Date    COLONOSCOPY N/A 4/11/2019    Procedure: COMBINED COLONOSCOPY, SINGLE OR MULTIPLE BIOPSY/POLYPECTOMY BY BIOPSY;  Surgeon: Tobin Wright MD;  Location: [facility]    COLONOSCOPY N/A 9/29/2022    Procedure: COLONOSCOPY with  BIOPSY;  Surgeon: Tobin Wright MD;  Location: [facility] OR    COLONOSCOPY N/A 11/18/2022    Procedure: COLONOSCOPY;  Surgeon: Tobin Wright MD;  Location: [facility] OR    COLONOSCOPY N/A 3/4/2024    Procedure: Colonoscopy;  Surgeon: Carissa Felipe MD;  Location: [facility] OR    EXCISE LIP OR CHEEK FOLD  10/3/2003    Needle cautery frenulectomy.    INSERT PICC LINE Left 8/10/2020    Procedure: INSERTION, PICC @845;  Surgeon: Karan Farr MD;  [facility]    IR PICC PLACEMENT > 5 YRS OF AGE  8/10/2020    PE TUBES  4/25/2006    RESECTION ILEOCOLIC N/A 8/21/2020    Procedure: Exploratory laparotomy with ileocolic resection with takedown of enterovesical fistula, and low anterior resection;  Surgeon: Brittni Robles MD;  Location: [facility] OR    SIGMOIDOSCOPY FLEXIBLE N/A 8/21/2020    Procedure: Sigmoidoscopy flexible;  Surgeon: Brittni Robles MD;  Location: [facility] OR    TONSILLECTOMY & ADENOIDECTOMY  4/25/2006       Current Outpatient Medications   Medication Sig Dispense Refill    inFLIXimab-dyyb (INFLECTRA) injection Add to infusion 80 mLs (800 mg) over 1 hours every 4 weeks. Reconstitute inFLIXimab-dyyb vial(s). Draw up inFLIXimab-dyyb 10 mg/mL in syringe with 21 G needle and add to NaCl 0.9% bag immediately prior to  infusing. MIX GENTLY BY INVERSION, DO NOT SHAKE. Discard remainder of vial(s). 88 each 0    acetaminophen (TYLENOL) 325 MG tablet Take 2 tablets (650 mg) by mouth every 4 hours as needed for other (pain).      bisacodyl (DULCOLAX) 5 MG EC tablet Two days prior to exam take two (2) tablets at 4pm. One day prior to exam take two (2) tablets at 4pm 4 tablet 0    colesevelam (WELCHOL) 625 MG tablet Take 2 tablets (1,250 mg) by mouth 3 times daily (with meals).      diphenhydrAMINE (BENADRYL) 50 MG/ML injection Inject 1 mL (50 mg) over 3-5 minutes into the vein via push as needed for other (infusion reaction). For RN use only.  Draw up in a syringe and administer IV push.  Discard remainder of vial. 44855 mL 0    EPINEPHrine (ANY BX GENERIC EQUIV) 0.3 MG/0.3ML injection 2-pack Inject 0.3 mLs (0.3 mg) into the muscle as needed for anaphylaxis (infusion reaction). Administer into the mid-thigh in case of severe anaphylaxis (wheezing, throat tightening, mouth swelling, difficulty breathing). May repeat dose one time in 5-15 minutes if symptoms persist. 365419 mL 0    inFLIXimab-dyyb (INFLECTRA) 100 MG injection Inject 765 mg into the vein every 28 (twenty-eight) days      methylPREDNISolone Na Suc, PF, (SOLU-MEDROL) 125 mg/2 mL injection Inject 2 mLs (125 mg) over 3-5 minutes into the vein via push as needed (infusion reaction). For RN use only.  Reconstitute vial. Draw up methylPREDNISolone in a syringe and administer.  Discard remainder of vial. 288088 mL 0    polyethylene glycol (GOLYTELY) 236 g suspension Two days before procedure at 5PM fill first container with water. Mix and drink an 8 oz glass every 15 minutes until HALF of the container is gone. Place the remainder in the refrigerator. One day before procedure at 5PM drink second half of bowel prep. Drink an 8 oz glass every 15 minutes until it is gone. Day of procedure 6 hours before arrival time fill the 2nd container with water. Mix and drink an 8 oz glass every  15 minutes until HALF of the container is gone. Discard the remaining solution. 8000 mL 0    sodium chloride 0.9% bag Infuse 250 mLs over 1 hours into the vein every 4 weeks. Add 80 mL (800 mg) of infliximab 10 mg/mL to bag immediately prior to infusing via gravity infusion. When complete, flush bag with 20 mL NS to flush tubing. 817669 mL 0    sodium chloride 0.9% infusion Infuse 500 mLs into the vein as needed for other (infusion reaction). In case of mild reaction, administer via gravity at 20 mL/hr to keep vein open. In case of severe reaction, administer via gravity wide open on prime setting. 014114 mL 0    sodium chloride, PF, 0.9% PF flush Inject 10 mLs into the vein as needed for other (infusion reaction). For RN use only as needed for infusion reaction 413280 mL 0    sodium chloride, PF, 0.9% PF flush Inject 10 mLs into the vein as needed for line flush. Flush IV before and after medication administration as directed and/or at least every 12 hours. 958479 mL 0    sterile water, preservative free, injection Use 80 mLs for reconstitution every 4 weeks. 1. Reconstitute each vial of inFLIXimab-dyyb (INFLECTRA) with 10 mL of sterile water for injection by slowly injecting down the inside wall of vial w/21 G needle. DO NOT SHAKE. Foaming of the solution on reconstitution is not unusual. Roll and tilt each vial gently.  2. Let drug stand for 5 minutes.  3. Inspect vials for particules and/or discoloration prior to continuing. The solution should be colorless to light yellow and opalescent, and may develop a few translucent particles. DO NOT USE IF DRUG HAS NOT FULLY DISSOLVED OR IF OPAQUE PARTICLES, DISCOLORATION OR OTHER FOREIGN PARTICULES ARE PRESENT.  4. Draw up appropriate dose w/ 21 G needle from vial. 880 mL 0     Current Facility-Administered Medications   Medication Dose Route Frequency Provider Last Rate Last Admin    lidocaine (LMX4) kit   Topical Q1H PRN Maribel Gordon,         lidocaine 1 % 0.1-1  mL  0.1-1 mL Other Q1H PRN McBeath, Maribel, DO        ondansetron (ZOFRAN) injection 4 mg  4 mg Intravenous Once PRN McBeath, Maribel, DO        sodium chloride (PF) 0.9% PF flush 3 mL  3 mL Intracatheter Q8H McBeath, Maribel, DO        sodium chloride (PF) 0.9% PF flush 3 mL  3 mL Intracatheter q1 min prn McBeath, Maribel, DO           Allergies   Allergen Reactions    Gadavist [Gadobutrol] Nausea     Patient could not perform timed sequences due to nausea and discomfort.    Glucagon Nausea and Vomiting     Pre medicate with lorazapam if able.      Amoxicillin     Penicillin [Penicillins]        History of Anesthesia/Sedation Problems: no    PHYSICAL EXAMINATION:  Constitutional: aaox3, cooperative, pleasant  Vitals reviewed: BP 98/66   Pulse 85   Temp 97.8  F (36.6  C) (Temporal)   Resp 18   SpO2 99%   Wt:   Wt Readings from Last 2 Encounters:   12/09/24 77.6 kg (171 lb)   12/03/24 77.1 kg (170 lb)      Eyes: Sclera anicteric/injected  Ears/nose/mouth/throat: Normal oropharynx without ulcers or exudate, mucus membranes moist, hearing intact  Neck: supple, thyroid normal size  CV: No edema  Respiratory: Unlabored breathing  Lymph: No submandibular, supraclavicular or inguinal lymphadenopathy  Abd: Nondistended, no masses, nontender  Skin: warm, perfused, no jaundice  Psych: Normal affect  MSK: normal movement on limited exam.    ASA Score: See Provation note    Assessment/Plan:     The patient is an appropriate candidate to receive sedation.    Informed consent was discussed with the patient/family, including the risks, benefits, potential complications and any alternative options associated with sedation.    Patient assessment completed just prior to sedation and while under constant observation by the provider. Condition determined to be adequate for proceeding with sedation.    The specific risks for the procedure were discussed with the patient at the time of informed consent and include but are not  limited to perforation which could require surgery, missing significant neoplasm or lesion, hemorrhage and adverse sedative complication.      Montana Crowell MD

## 2024-12-26 ENCOUNTER — HOME INFUSION (OUTPATIENT)
Dept: HOME HEALTH SERVICES | Facility: HOME HEALTH | Age: 24
End: 2024-12-26
Payer: COMMERCIAL

## 2024-12-27 ENCOUNTER — HOME INFUSION BILLING (OUTPATIENT)
Dept: HOME HEALTH SERVICES | Facility: HOME HEALTH | Age: 24
End: 2024-12-27
Payer: COMMERCIAL

## 2024-12-27 PROCEDURE — A4215 STERILE NEEDLE: HCPCS

## 2024-12-27 PROCEDURE — A4213 20+ CC SYRINGE ONLY: HCPCS

## 2024-12-27 PROCEDURE — A4930 STERILE, GLOVES PER PAIR: HCPCS

## 2024-12-27 PROCEDURE — S1015 IV TUBING EXTENSION SET: HCPCS

## 2024-12-28 ENCOUNTER — HOME CARE VISIT (OUTPATIENT)
Dept: HOME HEALTH SERVICES | Facility: HOME HEALTH | Age: 24
End: 2024-12-28
Payer: COMMERCIAL

## 2024-12-28 VITALS
TEMPERATURE: 98 F | RESPIRATION RATE: 18 BRPM | SYSTOLIC BLOOD PRESSURE: 110 MMHG | OXYGEN SATURATION: 98 % | DIASTOLIC BLOOD PRESSURE: 68 MMHG | WEIGHT: 175 LBS | HEART RATE: 81 BPM | BODY MASS INDEX: 22.47 KG/M2

## 2024-12-28 PROCEDURE — S9338 HIT IMMUNOTHERAPY DIEM: HCPCS

## 2024-12-28 PROCEDURE — 99601 HOME NFS VISIT <2 HRS: CPT

## 2024-12-28 PROCEDURE — A4217 STERILE WATER/SALINE, 500 ML: HCPCS | Mod: JZ

## 2024-12-28 NOTE — PROGRESS NOTES
"Infusion Nursing Note:  Skilled nurse visit today for Inflectra  for Ventura Quiroz.   present during visit today: Not Applicable.    Pre-infusion Checklist:   Pre-infusion Checklist    Have you had any delayed reaction since last infusion?   No    Have you recently had an elevated temperature, fever, chills productive cough, coughing for 3 weeks or longer or hemoptysis, abnormal vital signs, night sweats, chest pain, or have you noticed a decrease in your appetite, or noted unexplained weight loss or fatigue?   No    Do you have any open wounds or new incisions?  No    Do you have any recent or upcoming hospitalizations, surgeries, or dental procedures?   Yes, pt had colonoscopy last week, MD aware and okay to proceed     Do you currently have or recently have had any signs of illness or infection or are you on any antibiotics?   No    Have you had any new, sudden , or worsening abdominal pain?   No    Have you or anyone in your household received a live vaccination in the past 4 weeks?   No    Have you recently been diagnosed with any new nervous system diseases or cancer diagnosis? (i.e., Multiple Sclerosis, Guillain [place], sezures, neurological changes) Are you receiving any form of radiation or chemotherapy?   No    Are you pregnant or breastfeeding, or do you have plans of pregnancy in the future?   No    Have you been having any signs of worsening depression or suicidal ideation?  No    Have there been any other new onset medical symptoms?  No    Did the patient answer \"YES\" to any of the questions above?  No    Will the patient receive a medication that has an order for infusion reaction management?  Yes, and all drugs and supplies are available and none have .    If ordered, has the patient taken pre-medications?  N/A    Plan:   Therapy is appropriate, will proceed with treatment.     Chemotherapy Treatment Conditions:   Not Applicable    Intravenous Access:  Peripheral IV placed.    Post " Infusion Assessment:  Patient tolerated infusion without incident.  Blood return noted pre and post infusion.  No evidence of extravasations.  Access discontinued per protocol.  Biologic Infusion Post Education: Call the triage nurse at your clinic or seek medical attention if you have chills and/or temperature greater than or equal to 100.5, uncontrolled nausea/vomiting, diarrhea, constipation, dizziness, shortness of breath, chest pain, heart palpitations, weakness or any other new or concerning symptoms, questions or concerns.  You cannot have any live virus vaccines prior to or during treatment or up to 6 months post infusion.  If you have an upcoming surgery, medical procedure or dental procedure during treatment, this should be discussed with your ordering physician and your surgeon/dentist.  If you are having any concerning symptom, if you are unsure if you should get your next infusion or wish to speak to a provider before your next infusion, please call your care coordinator or triage nurse at your clinic to notify them so we can adequately serve you.     Note: pt feeling well today, no new issues or concerns to report. Had colonoscopy last week and states everything went well, provider aware and okay proceeding with infusion today.     Saline administered (in mLs): 40    Next Visit Plan:   January 25      Tahira Anaya RN 12/28/2024

## 2025-01-04 ENCOUNTER — HOME INFUSION (OUTPATIENT)
Dept: HOME HEALTH SERVICES | Facility: HOME HEALTH | Age: 25
End: 2025-01-04
Payer: COMMERCIAL

## 2025-01-08 ENCOUNTER — TELEPHONE (OUTPATIENT)
Dept: GASTROENTEROLOGY | Facility: CLINIC | Age: 25
End: 2025-01-08
Payer: COMMERCIAL

## 2025-01-08 NOTE — TELEPHONE ENCOUNTER
Writer received a message from LEXY TRACY to help get Pt scheduled for a follow up visit with Dr. Wright to discuss recent colonoscopy results and plan. Writer called Pt and spoke with Pt's mom. Per Pt's mom, Pt was at work and Pt's mom could assist with scheduling. Pt's mom accepted a virtual visit with Dr. Wright on 1/13/2025 at 11:30 AM.

## 2025-01-13 ENCOUNTER — VIRTUAL VISIT (OUTPATIENT)
Dept: GASTROENTEROLOGY | Facility: CLINIC | Age: 25
End: 2025-01-13
Payer: COMMERCIAL

## 2025-01-13 DIAGNOSIS — K50.014 CROHN'S DISEASE OF SMALL INTESTINE WITH ABSCESS (H): ICD-10-CM

## 2025-01-13 DIAGNOSIS — K50.012 CROHN'S DISEASE OF SMALL INTESTINE WITH INTESTINAL OBSTRUCTION (H): ICD-10-CM

## 2025-01-13 DIAGNOSIS — D50.9 IRON DEFICIENCY ANEMIA, UNSPECIFIED IRON DEFICIENCY ANEMIA TYPE: Primary | ICD-10-CM

## 2025-01-13 PROCEDURE — 98006 SYNCH AUDIO-VIDEO EST MOD 30: CPT | Mod: GC | Performed by: INTERNAL MEDICINE

## 2025-01-13 NOTE — PATIENT INSTRUCTIONS
It was a pleasure meeting with you today and discussing your healthcare plan. Below is a summary of what we covered:    - Plan for colonoscopy in February/March for dilation   - Continue infliximab      Please see below for any additional questions and scheduling guidelines.    Sign up for Netology: Netology patient portal serves as a secure platform for accessing your medical records from the [facility]. Additionally, Netology facilitates easy, timely, and secure messaging with your care team. If you have not signed up, you may do so by using the provided code or calling [phone].    Coordinating your care after your visit:  There are multiple options for scheduling your follow-up care based on your provider's recommendation.    How do I schedule a follow-up clinic appointment:   After your appointment, you may receive scheduling assistance with the Clinic Coordinators by having a seat in the waiting room and a Clinic Coordinator will call you up to schedule.  Virtual visits or after you leave the clinic:  Your provider has placed a follow-up order in the Net[employer] portal for scheduling your return appointment. A member of the scheduling team will contact you to schedule.  Netology Scheduling: Timely scheduling through Netology is advised to ensure appointment availability.   Call to schedule: You may schedule your follow-up appointment(s) by calling [phone], option 1.    How do I schedule my endoscopy or colonoscopy procedure:  If a procedure, such as a colonoscopy or upper endoscopy was ordered by your provider, the scheduling team will contact you to schedule this procedure. Or you may choose to call to schedule at   [phone], option 2.  Please allow 20-30 minutes when scheduling a procedure.    How do I get my blood work done? To get your blood work done, you need to schedule a lab appointment at an [facility]. There are multiple ways to schedule:   At the clinic: The Clinic  Coordinator you meet after your visit can help you schedule a lab appointment.   [facility] scheduling: [facility] offers online lab scheduling at all [facility] laboratory locations.   Call to schedule: You can call [phone] to schedule your lab appointment.    How do I schedule my imaging study: To schedule imaging studies, such as CT scans, ultrasounds, MRIs, or X-rays, contact Imaging Services at [phone].    How do I schedule a referral to another doctor: If your provider recommended a referral to another specialist(s), the referral order was placed by your provider. You will receive a phone call to schedule this referral, or you may choose to call the number attached to the referral to self-schedule.    For Post-Visit Question(s):  For any inquiries following today's visit:  Please utilize [facility] messaging and allow 48 hours for reply or contact the Call Center during normal business hours at [phone], option 3.  For Emergent After-hours questions, contact the On-Call GI Fellow through the [facility]  at [phone].  In addition, you may contact your Nurse directly using the provided contact information.    Test Results:  Test results will be accessible via [employer] in compliance with the 21st Century Cures Act. This means that your results will be available to you at the same time as your provider. Often you may see your results before your provider does. Results are reviewed by staff within two weeks with communication follow-up. Results may be released in the patient portal prior to your care team review.    Prescription Refill(s):  Medication prescribed by your provider will be addressed during your visit. For future refills, please coordinate with your pharmacy. If you have not had a recent clinic visit or routine labs, for your safety, your provider may not be able to refill your prescription.

## 2025-01-13 NOTE — NURSING NOTE
Current patient location: Patient declined to provide     Is the patient currently in the state of [place]? YES    Visit mode: VIDEO    If the visit is dropped, the patient can be reconnected by:VIDEO VISIT: Text to cell phone:   Telephone Information:   Mobile [phone]       Will anyone else be joining the visit? NO  (If patient encounters technical issues they should call [phone]    Are changes needed to the allergy or medication list? No    Are refills needed on medications prescribed by this physician? NO    Rooming Documentation:  Patient will complete questionnaire(s) in [facility]    Reason for visit: RECHECK    Maritza SPICERF

## 2025-01-13 NOTE — PROGRESS NOTES
Virtual Visit Details    Type of service:  Video Visit     Originating Location (pt. Location): Home    Distant Location (provider location):  On-site  Platform used for Video Visit: Mary    Start time: 11:35AM  End time: 12:08 PM          IBD CLINIC VISIT    CC/REFERRING MD:  Referred Self  REASON FOR CONSULTATION: Crohn's disease    ASSESSMENT/PLAN:  24 year old male with Crohn's disease of the ileum initially complicated by phlegmon and abscess now status post ICR.     1. Crohn's disease:  Current CD medications:    - Infliximab 10mg/kg q28 days, level was 31 (predict PK) 7/2024  Current clinical disease activity: HBI: 4-5 (based on number of loose stools)  Last endoscopic disease activity: Rutgeert's i2 / SES-CD: 5 (12/2024)      Recent admission in November 2024 with small bowel obstruction.  Treated with steroids for possible Crohn's flare and managed conservatively with return of bowel function.  He then had colonoscopy in December 2024 which showed severe stenosis at his ileocolonic anastomosis which was dilated.  He had endoscopic ileitis but interestingly no inflammation on biopsies. Hopeful the ileitis may improve as we manage the stricture with dilation. If not, we could consider switching to Rinvoq or Skyrizi in future.     He continues to have 4-5 loose stools per day which has been his recent baseline. Tried colesevelam after last clinic visit for possible bile acid diarrhea but did not notice benefit.       -- Colonoscopy in next 1-2 months for repeat dilation   ---------if ongoing ileitis after adequate dilation or worsened symptoms of active Crohn's may consider switch to Skyrizi or Rinvoq   -- Continue infliximab every 4 weeks  -- Labs every 3 to 4 months on infliximab       2. IBD dysplasia surveillance: Given ileal only disease, he does not need IBD dysplasia surveillance   -- Age appropriate colon cancer screening    3. Iron deficient anemia: Suspect this is due to chronic blood loss in his  ileocolonic anastomosis and mild ileitis seen on recent endoscopic exam.   He responded well after IV iron in the past.  Hgb has been normal over the past year, now mildly low at 13.2 (MCV normal), no recent iron studies.   - check iron studies with next labs     4.  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  Subsequent celiac labs negative.  He does not have respiratory infections.  Will monitor.      Misc:  -- Avoid tobacco use  -- Avoid NSAIDs as there is potentially a 25% chance of causing an IBD flare    Return to clinic in 6 months    Patient seen with Dr. Wright.     Allen De La Rosa MD  GI IBD fellow        IBD HISTORY  Age at diagnosis: 18  Extent of disease: ileal  Disease phenotype: fistulizing  Nieves-anal disease: No  Prior IBD surgeries: Ileocecal resection - Rush-Meaux 8/21/20.   Prior IBD Medications:    - Humira disease progression despite weekly dosing, no ADA   - Ciprofloxacin   - Metronidazole    DRUG MONITORING  TPMT enzyme activity:     6-TGN/6-MMPN levels:  9/10/19: 97 / 548    Biologic concentration:  8/23/19: Adalimumab: 5, no antibodies, every other week, 50mg mercaptopurine   2/18/21: Inflixiamb level 1.6; antibodies: 20  4/19/21 Infliximab: 0, antibodies: 0 (IFX + 6-MP)  3/1/22: IFX: > 34, no antibodies (10mg/kg q4 + 6-MP)  5/12/22: IFX: 25.6, no antibodies.   7/12/2024: IFX Predict PK 31 trough, no antibodies     sIBDQ:   IBDQ Score Date IBDQ - Total Score  (Higher score better)   3/6/2023   3:22 PM 60   4/25/2022   9:45 AM 59   9/10/2019   6:00 AM 62   5/28/2019   6:03 AM 56   4/24/2019   8:44 AM 48     HPI:   Here for routine follow-up. Last saw Jon Chavez 11/2024.     Admitted at the end of November with abdominal pain and found to have small bowel obstruction.  SBO was managed conservatively and he was started on IV steroids.  Discharged with prednisone taper.  He then had colonoscopy 12/23/2024 with Dr. Crowell which found severe stenosis at ileocolonic anastomosis.  This was  serially dilated up to 13.5 mm.  Able to be traversed after dilation and noted to have ileitis (Rutgeerts score i2b). SES-CD 5.  Normal biopsies of neoterminal ileum and right and left colon biopsies.    He notices less abdominal discomfort since the dilation. Having his baseline 4-5 bowel movements per day. No longer taking Welchol as he didn't notice any improvement. Denies abdominal pain or obstructive symptoms. Does not limit his diet at all but notes he does not eat much for vegetables at baseline. No perianal symptoms.     Extra intestinal manifestations of IBD:  No uveitis/episcleritis  No aphthous ulcers   No arthritis   No erythema nodosum/pyoderma gangrenosum.      HBI:  Overall patient well being (prior day): 0 (Very well)  Abdominal pain (prior day): 0 (None)  Number of liquid or soft stools (prior day): 4-5 (1 point per stool)  Abdominal mass on exam: virtual visit   Complications (1 point for each):   None    Remission <5  Mild activity 5-7  Moderate activity 8-16  Severe > 16      ROS:    Constitutional, HEENT, cardiovascular, pulmonary, GI, , musculoskeletal, neuro, skin, endocrine and psych systems are negative, except as otherwise noted.     PERTINENT PAST MEDICAL HISTORY:  Past Medical History:   Diagnosis Date    Allergic rhinitis, cause unspecified     Zyrtec helps    Crohn's Colitis 4/8/2019    Unspecified otitis media     Otitis Media       PREVIOUS SURGERIES:  Past Surgical History:   Procedure Laterality Date    COLONOSCOPY N/A 4/11/2019    Procedure: COMBINED COLONOSCOPY, SINGLE OR MULTIPLE BIOPSY/POLYPECTOMY BY BIOPSY;  Surgeon: Tobin Wright MD;  Location:  GI    COLONOSCOPY N/A 9/29/2022    Procedure: COLONOSCOPY with  BIOPSY;  Surgeon: Tobin Wright MD;  Location: [facility] OR    COLONOSCOPY N/A 11/18/2022    Procedure: COLONOSCOPY;  Surgeon: Tobin Wright MD;  Location: [facility]    COLONOSCOPY N/A 3/4/2024    Procedure: Colonoscopy;  Surgeon: Carissa Felipe MD;   Location: [facility] OR    COLONOSCOPY N/A 12/23/2024    Procedure: Colonoscopy;  Surgeon: Montana Crowell MD;  Location: [facility] OR    COLONOSCOPY N/A 12/23/2024    Procedure: COLONOSCOPY, WITH POLYPECTOMY AND BIOPSY;  Surgeon: Montana Crowell MD;  Location: [facility] OR    EXCISE LIP OR CHEEK FOLD  10/3/2003    Needle cautery frenulectomy.    INSERT PICC LINE Left 8/10/2020    Procedure: INSERTION, PICC @845;  Surgeon: Karan Farr MD;  Location: [facility] OR    IR PICC PLACEMENT > 5 YRS OF AGE  8/10/2020    PE TUBES  4/25/2006    RESECTION ILEOCOLIC N/A 8/21/2020    Procedure: Exploratory laparotomy with ileocolic resection with takedown of enterovesical fistula, and low anterior resection;  Surgeon: Brittni Robles MD;  Location: [facility] OR    SIGMOIDOSCOPY FLEXIBLE N/A 8/21/2020    Procedure: Sigmoidoscopy flexible;  Surgeon: Brittni Robles MD;  Location: [facility] OR    TONSILLECTOMY & ADENOIDECTOMY  4/25/2006     ALLERGIES:     Allergies   Allergen Reactions    Gadavist [Gadobutrol] Nausea     Patient could not perform timed sequences due to nausea and discomfort.    Glucagon Nausea and Vomiting     Pre medicate with lorazapam if able.      Amoxicillin     Penicillin [Penicillins]        PERTINENT MEDICATIONS:    Current Outpatient Medications:     acetaminophen (TYLENOL) 325 MG tablet, Take 2 tablets (650 mg) by mouth every 4 hours as needed for other (pain)., Disp: , Rfl:     bisacodyl (DULCOLAX) 5 MG EC tablet, Two days prior to exam take two (2) tablets at 4pm. One day prior to exam take two (2) tablets at 4pm, Disp: 4 tablet, Rfl: 0    colesevelam (WELCHOL) 625 MG tablet, Take 2 tablets (1,250 mg) by mouth 3 times daily (with meals)., Disp: , Rfl:     diphenhydrAMINE (BENADRYL) 50 MG/ML injection, Inject 1 mL (50 mg) over 3-5 minutes into the vein via push as needed for other (infusion reaction). For RN use only.  Draw up in a syringe and administer IV push.  Discard remainder of vial.,  Disp: 42820 mL, Rfl: 0    EPINEPHrine (ANY BX GENERIC EQUIV) 0.3 MG/0.3ML injection 2-pack, Inject 0.3 mLs (0.3 mg) into the muscle as needed for anaphylaxis (infusion reaction). Administer into the mid-thigh in case of severe anaphylaxis (wheezing, throat tightening, mouth swelling, difficulty breathing). May repeat dose one time in 5-15 minutes if symptoms persist., Disp: 039954 mL, Rfl: 0    inFLIXimab-dyyb (INFLECTRA) 100 MG injection, Inject 765 mg into the vein every 28 (twenty-eight) days, Disp: , Rfl:     inFLIXimab-dyyb (INFLECTRA) injection, Add to infusion 80 mLs (800 mg) over 1 hours every 4 weeks. Reconstitute inFLIXimab-dyyb vial(s). Draw up inFLIXimab-dyyb 10 mg/mL in syringe with 21 G needle and add to NaCl 0.9% bag immediately prior to infusing. MIX GENTLY BY INVERSION, DO NOT SHAKE. Discard remainder of vial(s)., Disp: 88 each, Rfl: 0    methylPREDNISolone Na Suc, PF, (SOLU-MEDROL) 125 mg/2 mL injection, Inject 2 mLs (125 mg) over 3-5 minutes into the vein via push as needed (infusion reaction). For RN use only.  Reconstitute vial. Draw up methylPREDNISolone in a syringe and administer.  Discard remainder of vial., Disp: 342406 mL, Rfl: 0    polyethylene glycol (GOLYTELY) 236 g suspension, Two days before procedure at 5PM fill first container with water. Mix and drink an 8 oz glass every 15 minutes until HALF of the container is gone. Place the remainder in the refrigerator. One day before procedure at 5PM drink second half of bowel prep. Drink an 8 oz glass every 15 minutes until it is gone. Day of procedure 6 hours before arrival time fill the 2nd container with water. Mix and drink an 8 oz glass every 15 minutes until HALF of the container is gone. Discard the remaining solution., Disp: 8000 mL, Rfl: 0    sodium chloride 0.9% bag, Infuse 250 mLs over 1 hours into the vein every 4 weeks. Add 80 mL (800 mg) of infliximab 10 mg/mL to bag immediately prior to infusing via gravity infusion. When  complete, flush bag with 20 mL NS to flush tubing., Disp: 829620 mL, Rfl: 0    sodium chloride 0.9% infusion, Infuse 500 mLs into the vein as needed for other (infusion reaction). In case of mild reaction, administer via gravity at 20 mL/hr to keep vein open. In case of severe reaction, administer via gravity wide open on prime setting., Disp: 833885 mL, Rfl: 0    sodium chloride, PF, 0.9% PF flush, Inject 10 mLs into the vein as needed for other (infusion reaction). For RN use only as needed for infusion reaction, Disp: 781117 mL, Rfl: 0    sodium chloride, PF, 0.9% PF flush, Inject 10 mLs into the vein as needed for line flush. Flush IV before and after medication administration as directed and/or at least every 12 hours., Disp: 373283 mL, Rfl: 0    sterile water, preservative free, injection, Use 80 mLs for reconstitution every 4 weeks. 1. Reconstitute each vial of inFLIXimab-dyyb (INFLECTRA) with 10 mL of sterile water for injection by slowly injecting down the inside wall of vial w/21 G needle. DO NOT SHAKE. Foaming of the solution on reconstitution is not unusual. Roll and tilt each vial gently. 2. Let drug stand for 5 minutes. 3. Inspect vials for particules and/or discoloration prior to continuing. The solution should be colorless to light yellow and opalescent, and may develop a few translucent particles. DO NOT USE IF DRUG HAS NOT FULLY DISSOLVED OR IF OPAQUE PARTICLES, DISCOLORATION OR OTHER FOREIGN PARTICULES ARE PRESENT. 4. Draw up appropriate dose w/ 21 G needle from vial., Disp: 880 mL, Rfl: 0    SOCIAL HISTORY:  Social History     Socioeconomic History    Marital status: Single     Spouse name: Not on file    Number of children: Not on file    Years of education: Not on file    Highest education level: Not on file   Occupational History    Not on file   Tobacco Use    Smoking status: Never    Smokeless tobacco: Current    Tobacco comments:     Vape   Vaping Use    Vaping status: Every Day     Substances: Nicotine, Flavoring    Devices: Pre-filled or refillable cartridge   Substance and Sexual Activity    Alcohol use: Yes    Drug use: No    Sexual activity: Yes     Partners: Female     Birth control/protection: Pill   Other Topics Concern     Service Not Asked    Blood Transfusions Not Asked    Caffeine Concern Not Asked    Occupational Exposure Not Asked    Hobby Hazards Not Asked    Sleep Concern Not Asked    Stress Concern Not Asked    Weight Concern Not Asked    Special Diet Not Asked    Back Care Not Asked    Exercise Not Asked    Bike Helmet Not Asked    Seat Belt Not Asked    Self-Exams Not Asked    Parent/sibling w/ CABG, MI or angioplasty before 65F 55M? Not Asked   Social History Narrative    Not on file     Social Drivers of Health     Financial Resource Strain: Low Risk  (11/29/2024)    Financial Resource Strain     Within the past 12 months, have you or your family members you live with been unable to get utilities (heat, electricity) when it was really needed?: No   Food Insecurity: Low Risk  (11/29/2024)    Food Insecurity     Within the past 12 months, did you worry that your food would run out before you got money to buy more?: No     Within the past 12 months, did the food you bought just not last and you didn t have money to get more?: No   Transportation Needs: Low Risk  (11/29/2024)    Transportation Needs     Within the past 12 months, has lack of transportation kept you from medical appointments, getting your medicines, non-medical meetings or appointments, work, or from getting things that you need?: No   Physical Activity: Not on file   Stress: Not on file   Social Connections: Not on file   Interpersonal Safety: Low Risk  (11/29/2024)    Interpersonal Safety     Do you feel physically and emotionally safe where you currently live?: Yes     Within the past 12 months, have you been hit, slapped, kicked or otherwise physically hurt by someone?: No     Within the past 12  months, have you been humiliated or emotionally abused in other ways by your partner or ex-partner?: No   Housing Stability: Low Risk  (11/29/2024)    Housing Stability     Do you have housing? : Yes     Are you worried about losing your housing?: No       FAMILY HISTORY:  Family History   Problem Relation Age of Onset    No Known Problems Mother     No Known Problems Father     Melanoma No family hx of     Skin Cancer No family hx of         No family history of IBD or colon or rectal cancer.     Past/family/social history reviewed and no changes    PHYSICAL EXAMINATION:  Vitals reviewed: There were no vitals taken for this visit.  Wt:   Wt Readings from Last 2 Encounters:   12/28/24 79.4 kg (175 lb)   12/09/24 77.6 kg (171 lb)      Constitutional - general appearance is well and in no acute distress. Body habitus normal  Eyes - No redness or discharge  Respiratory - No cough, unlabored breathing  Musculoskeletal - range of motion intact: Neck and arms  Skin - No discoloration or lesions  Neurological - No tremors, headaches  Psychiatric - No anxiety, alert & oriented

## 2025-01-14 ENCOUNTER — VIRTUAL VISIT (OUTPATIENT)
Dept: PHARMACY | Facility: CLINIC | Age: 25
End: 2025-01-14
Attending: PHYSICIAN ASSISTANT
Payer: COMMERCIAL

## 2025-01-14 DIAGNOSIS — K50.90 CROHN DISEASE (H): ICD-10-CM

## 2025-01-14 RX ORDER — INFLIXIMAB-DYYB 100 MG/10ML
10 INJECTION, POWDER, LYOPHILIZED, FOR SOLUTION INTRAVENOUS
Status: SHIPPED
Start: 2025-01-14

## 2025-01-14 RX ORDER — HEPATITIS B VACCINE (RECOMBINANT) 10 UG/ML
1 INJECTION, SUSPENSION INTRAMUSCULAR; SUBCUTANEOUS ONCE
Qty: 1 ML | Refills: 2 | Status: SHIPPED | OUTPATIENT
Start: 2025-01-14 | End: 2025-01-14

## 2025-01-14 NOTE — NURSING NOTE
Current patient location: Patient declined to provide     Is the patient currently in the state of [place]? YES    Visit mode: TELEPHONE    If the visit is dropped, the patient can be reconnected by:TELEPHONE VISIT: Phone number: [phone]    Will anyone else be joining the visit? NO  (If patient encounters technical issues they should call [phone]    Are changes needed to the allergy or medication list? Pt stated no changes to allergies and Pt stated no med changes    Are refills needed on medications prescribed by this physician? NO    Rooming Documentation:  Not applicable    Reason for visit: Consult    Silvana RUTH

## 2025-01-14 NOTE — PROGRESS NOTES
Medication Therapy Management (MTM) Encounter    ASSESSMENT:                            Medication Adherence/Access: No issues identified.    Crohn's Disease:  Ventura would benefit from continued treatment with Inflectra 10 mg/kg every 28 days. They are up to date on routine maintenance labs. They are up to date on annual tuberculosis screening. They are indicated for additional lab work including iron studies per Dr. Wright. No access issues for their advanced therapy are present. They are indicated for a few vaccinations which were recommended to them. He is likely getting adequate calcium from his diet, but recommend he reviews to determine if supplementation is indicated. Reminders for routine skin cancer screening were provided.     Regarding hepatitis B vaccination, his most recent Hepatitis B surface antibody returned undetectable. Although he did receive two doses counting towards his second pediatric series when he was age 19, he is now indicated for an adult Hepatitis B vaccine series, which these pediatric doses would not towards. I recommend completing a full 3-dose adult series.    PLAN:                            Ventura will consider the following vaccines:  Annual flu shot  Hepatitis B vaccine (3-dose series)- Prescription sent to [facility] in [place], [place]  Pneumococcal pneumonia (Prevnar-20)- Prescription sent to [facility] in [place], [place]  Shingles (Shingrix 2-dose series)- Prescription sent to [facility] in [place], [place]  Start Vitamin D 1000 units daily.  I recommend a calcium goal of 1000 mg/day between diet and supplementation. Please review the foods you eat and see if you are getting at least 1000 mg/day of calcium from your diet. If you are not getting adequate calcium from your diet then consider supplementation with calcium carbonate 500 mg tablet once or twice daily. https://www.dietaryguidelines.gov/food-sources-calcium  Reminder to schedule a routine skin check with dermatology, you can call  "[phone] to schedule an appointment.    Follow-up: 6/25/2025 at 3:30 PM (telephone)    SUBJECTIVE/OBJECTIVE:                          Ventura Quiroz is a 24 year old male seen for a follow-up visit.       Reason for visit: IFX follow-up visit.    Allergies/ADRs: Reviewed in chart  Past Medical History: Reviewed in chart  Tobacco: He reports that he has never smoked. He uses smokeless tobacco.  Alcohol: 1-3 beverages / week      Medication Adherence/Access: no issues reported.    Crohn's Disease:   Inflectra 10 mg/kg every 28 days     Met with Ventura for a routine follow-up visit. Denies side effects or concerns with infliximab. Regarding hepatitis B vaccination, his surface antibody returned undetectable. Although he received 2 pediatric booster doses, he did not complete the pediatric series. Combination of two doses of pediatric doses and 1 adult dose is not an approved series. He is not taking the colesevelam as it was ineffective.    At this time he is not interested in shingles vaccination or pneumococcal vaccination.    PRO-3 for Crohn's Disease    Please select the one best answer for the patient's ability at this time     Over the past week, how many liquid or soft stools have you had on average per day?   4 (When scoring, multiply number by 2=8)   Over the past week, please rate your average abdominal pain  None : 0 points  3. Over the past week, please rate your general well-being    Generally Well: 0 points    Score: 8   <13: Remission  13-21: Mild Activity   22-52: Moderate Activity  >/= 53: Severe Activity        Prior authorization status: approved ([facility])  Original start date: 2020  Last dose: 12/28/2024  Next dose: 1/25/2025     Therapeutic drug monitoring:  3/23/2024 Infliximab 34 micrograms/mL no antibodies (Infliximab 10 mg/kg every 28 days)- Pr Dr. Wright 3/6/2023, \"I do not think he should de-escalate given his prior rapid metabolism of infliximab.\"  7/16/2024 Infliximab 31 mcg/mL no anitbodies " (infliximab 10 mg/kg every 4 weeks)     Last provider visit: 2025 MD Kyle  Next provider visit: RTC 6 months (not scheduled)  Last labs completed: 12/3/2024  Last Tb screenin2024  Lab frequency: every other infusion              - standing labs hepatic panel, ESR, CRP, CBC with platelets & diff  2024  Next labs due: routine labs around 2025     IBD Health Maintenance     Vaccinations:  All patients on biologics should avoid live vaccines unless specifically indicated.    -- Influenza (every year)- last   -- TdaP (every 10 years)- last   -- Pneumococcal Pneumonia               Prevnar-13: 2019              Pneumovax-23: 2019              Prevnar-20: not on file  -- COVID-19- not on file, decline     One time confirmation of immunity or serologies:  -- Hepatitis A (serologies or immunizations)  -- Hepatitis B (serologies or immunizations)- not immune by serology  followed by boosters 2019, 2019; not immune by serology   -- Varicella/Zoster               Varicella- report history of disease 2004              Zoster- not on file  -- MMR- 2001, 2005  -- HPV (all aged 18-26)- 2019, 2019, 2019  -- Meningococcal meningitis (all patients at risk for meningitis)-- MenACWY 2019     Due to the immunosuppression in this patient, I would not advise administration of live vaccines such as varicella/VZV, intranasal influenza, MMR, or yellow fever vaccine (if traveling).       Immunosuppressive Screening:  -- Hep B Surface Antibody not immune by serology   -- Hep B Surface Antigen non-reactive 2019  -- Hep B Core Antibody non-reactive 2019  -- Hep C Antibody nonreactive 2024  -- Yearly assessment of TB negative 2024    Bone mineral density screening   -- Recommend all patients supplement with calcium and vitamin D  -- Patient reports at least 2 glasses of milk/day    Cancer Screening:  Colon cancer screening:  Copied from Kyle  "1/13/2025: \"Given ileal only disease, he does not need IBD dysplasia surveillance   -- Age appropriate colon cancer screening\"    Skin cancer screening: Annual visual exam of skin by dermatologist since patient is immunocompromised- last 2019     Depression Screening:    PHQ-2 Score:         1/3/2024     1:39 PM 3/6/2023     3:38 PM   PHQ-2 ( 1999 Pfizer)   Q1: Little interest or pleasure in doing things 0 0   Q2: Feeling down, depressed or hopeless 0 0   PHQ-2 Score 0 0       Research:  Are you interested in being contacted about enrollment in clinical research studies? Yes      Misc:  -- Avoid tobacco use  -- Avoid NSAIDs as there is potentially a 25% chance of causing an IBD flare      ----------------      I spent 15 minutes with this patient today. All changes were made via collaborative practice agreement with Tobin Wright MD.     A summary of these recommendations was sent via [employer].    Yadi CarranzaD, BCPS  MTM Pharmacist   [facility]  Phone: [phone]    Telemedicine Visit Details  The patient's medications can be safely assessed via a telemedicine encounter.  Type of service:  Telephone visit  Originating Location (pt. Location): Home    Distant Location (provider location):  Off-site  Start Time:  3:00 PM  End Time:  3:15 PM     Medication Therapy Recommendations  No medication therapy recommendations to display       "

## 2025-01-14 NOTE — PROGRESS NOTES
"Virtual Visit Details    Type of service:  Telephone Visit   Phone call duration: *** minutes   Originating Location (pt. Location): {patient location:058788::\"Home\"}  {PROVIDER LOCATION On-site should be selected for visits conducted from your clinic location or adjoining [facility] hospital, academic office, or other nearby [facility] building. Off-site should be selected for all other provider locations, including home:455011}  Distant Location (provider location):  {virtual location provider:399513}  Telephone visit completed due to {audio only reason:881714}  "

## 2025-01-14 NOTE — PATIENT INSTRUCTIONS
"Recommendations from today's MTM visit:                                                      Ventura will consider the following vaccines:  Annual flu shot  Hepatitis B vaccine (3-dose series)- Prescription sent to [facility] in [place], [place]  Pneumococcal pneumonia (Prevnar-20)- Prescription sent to [facility] in [place], [place]  Shingles (Shingrix 2-dose series)- Prescription sent to [facility] in [place], [place]  Start Vitamin D 1000 units daily.  I recommend a calcium goal of 1000 mg/day between diet and supplementation. Please review the foods you eat and see if you are getting at least 1000 mg/day of calcium from your diet. If you are not getting adequate calcium from your diet then consider supplementation with calcium carbonate 500 mg tablet once or twice daily. https://www.dietaryguidelines.gov/food-sources-calcium  Reminder to schedule a routine skin check with dermatology, you can call [phone] to schedule an appointment.    Follow-up: 6/25/2025 at 3:30 PM (telephone)    It was great speaking with you today.  I value your experience and would be very thankful for your time in providing feedback in our clinic survey. In the next few days, you may receive an email or text message from [employer] with a link to a survey related to your  clinical pharmacist.\"     To schedule another MTM appointment, please call the clinic directly or you may call the MTM scheduling line at [phone] or toll-free at [phone].     My Clinical Pharmacist's contact information:                                                      Please feel free to contact me with any questions or concerns you have.      Yunior England, PharmD, BCPS  MTM Pharmacist   [facility]  Phone: [phone]   "

## 2025-01-24 ENCOUNTER — HOME INFUSION BILLING (OUTPATIENT)
Dept: HOME HEALTH SERVICES | Facility: HOME HEALTH | Age: 25
End: 2025-01-24
Payer: COMMERCIAL

## 2025-01-24 PROCEDURE — S1015 IV TUBING EXTENSION SET: HCPCS

## 2025-01-24 PROCEDURE — A4215 STERILE NEEDLE: HCPCS

## 2025-01-24 PROCEDURE — A4930 STERILE, GLOVES PER PAIR: HCPCS

## 2025-01-24 PROCEDURE — A4213 20+ CC SYRINGE ONLY: HCPCS

## 2025-01-25 ENCOUNTER — LAB REQUISITION (OUTPATIENT)
Dept: LAB | Facility: CLINIC | Age: 25
End: 2025-01-25
Payer: COMMERCIAL

## 2025-01-25 ENCOUNTER — HOME CARE VISIT (OUTPATIENT)
Dept: HOME HEALTH SERVICES | Facility: HOME HEALTH | Age: 25
End: 2025-01-25
Payer: COMMERCIAL

## 2025-01-25 VITALS
HEART RATE: 63 BPM | TEMPERATURE: 98.2 F | RESPIRATION RATE: 18 BRPM | DIASTOLIC BLOOD PRESSURE: 68 MMHG | SYSTOLIC BLOOD PRESSURE: 104 MMHG | OXYGEN SATURATION: 99 %

## 2025-01-25 DIAGNOSIS — K50.014 CROHN'S DISEASE OF SMALL INTESTINE WITH ABSCESS (H): ICD-10-CM

## 2025-01-25 LAB
ALBUMIN SERPL BCG-MCNC: 4 G/DL (ref 3.5–5.2)
ALP SERPL-CCNC: 79 U/L (ref 40–150)
ALT SERPL W P-5'-P-CCNC: 16 U/L (ref 0–70)
AST SERPL W P-5'-P-CCNC: 17 U/L (ref 0–45)
BASOPHILS # BLD AUTO: 0 10E3/UL (ref 0–0.2)
BASOPHILS NFR BLD AUTO: 0 %
BILIRUB DIRECT SERPL-MCNC: <0.2 MG/DL (ref 0–0.3)
BILIRUB SERPL-MCNC: 0.3 MG/DL
CRP SERPL-MCNC: <3 MG/L
EOSINOPHIL # BLD AUTO: 0 10E3/UL (ref 0–0.7)
EOSINOPHIL NFR BLD AUTO: 1 %
ERYTHROCYTE [DISTWIDTH] IN BLOOD BY AUTOMATED COUNT: 15.6 % (ref 10–15)
HCT VFR BLD AUTO: 40.3 % (ref 40–53)
HGB BLD-MCNC: 13.5 G/DL (ref 13.3–17.7)
IMM GRANULOCYTES # BLD: 0 10E3/UL
IMM GRANULOCYTES NFR BLD: 0 %
LYMPHOCYTES # BLD AUTO: 1.6 10E3/UL (ref 0.8–5.3)
LYMPHOCYTES NFR BLD AUTO: 40 %
MCH RBC QN AUTO: 29 PG (ref 26.5–33)
MCHC RBC AUTO-ENTMCNC: 33.5 G/DL (ref 31.5–36.5)
MCV RBC AUTO: 87 FL (ref 78–100)
MONOCYTES # BLD AUTO: 0.6 10E3/UL (ref 0–1.3)
MONOCYTES NFR BLD AUTO: 15 %
NEUTROPHILS # BLD AUTO: 1.7 10E3/UL (ref 1.6–8.3)
NEUTROPHILS NFR BLD AUTO: 43 %
NRBC # BLD AUTO: 0 10E3/UL
NRBC BLD AUTO-RTO: 0 /100
PLATELET # BLD AUTO: 196 10E3/UL (ref 150–450)
PROT SERPL-MCNC: 6.6 G/DL (ref 6.4–8.3)
RBC # BLD AUTO: 4.65 10E6/UL (ref 4.4–5.9)
WBC # BLD AUTO: 3.9 10E3/UL (ref 4–11)

## 2025-01-25 PROCEDURE — 80076 HEPATIC FUNCTION PANEL: CPT | Performed by: PHYSICIAN ASSISTANT

## 2025-01-25 PROCEDURE — 85018 HEMOGLOBIN: CPT | Performed by: PHYSICIAN ASSISTANT

## 2025-01-25 PROCEDURE — A4217 STERILE WATER/SALINE, 500 ML: HCPCS | Mod: JZ

## 2025-01-25 PROCEDURE — 86140 C-REACTIVE PROTEIN: CPT | Performed by: PHYSICIAN ASSISTANT

## 2025-01-25 PROCEDURE — 99601 HOME NFS VISIT <2 HRS: CPT

## 2025-01-25 PROCEDURE — S9338 HIT IMMUNOTHERAPY DIEM: HCPCS

## 2025-01-25 PROCEDURE — 85041 AUTOMATED RBC COUNT: CPT | Performed by: PHYSICIAN ASSISTANT

## 2025-01-25 NOTE — PROGRESS NOTES
Nursing Visit Note:  Nurse visit today for piv, inflectra administration  for Ventura Quiroz.     present during visit today: Not Applicable.    Intravenous Access:  Peripheral IV placed.    Infusion Nursing Note:    Pre-infusion Checklist:   Have you had any delayed reaction since last infusion?   No     Have you recently had an elevated temperature, fever, chills productive cough, coughing for 3 weeks or longer or hemoptysis, abnormal vital signs, night sweats, chest pain, or have you noticed a decrease in your appetite, or noted unexplained weight loss or fatigue?   No     Do you have any open wounds or new incisions?  No     Do you have any recent or upcoming hospitalizations, surgeries, or dental procedures? Does not include esophagogastroduodenoscopy, colonoscopy, endoscopic retrograde cholangiopancreatography (ERCP), endoscopic ultrasound (EUS), dental procedures or joint aspiration/steroid injections.   No     Do you currently have or recently have had any signs of illness or infection or are you on any antibiotics?   No     Have you had any new, sudden, or worsening abdominal pain?   No     Have you or anyone in your household received a live vaccination in the past 4 weeks?   No     Have you recently been diagnosed with any new nervous system diseases or cancer diagnosis? (i.e., Multiple Sclerosis, Guillain [place], seizures, neurological changes) Are you receiving any form of radiation or chemotherapy?   No     Are you pregnant or breastfeeding, or do you have plans of pregnancy in the future?   No     Have you been having any signs of worsening depression or suicidal ideation?  No     Have you had any other new onset medical symptoms?  No    Entyvio/Ocrevus/Tyasabri only: Have you been having any new or worsening medical problems such as issues with thinking, eyesight, balance or strength that have persisted over several days?   N/A    Benlysta only: Have you been having any signs of worsening  "depression or suicidal ideations?    N/A    IVIG only: Have you had any new blood clots?  N/A    Did the patient answer \"YES\" to any of the questions above?  No     Will the patient receive a medication that has an order for infusion reaction management?  Yes, and all drugs and supplies are available and none have .     If ordered, has the patient taken pre-medications?  N/A    Plan:   Therapy is appropriate, will proceed with treatment.     Post Infusion Assessment:  Patient tolerated infusion without incident.     Note: patient is alert and oriented for today s visit. IV access established with one attempt in the left AC. Labs obtained and delivered to the [facility] post the patient s infusion. Medication infused without difficulty. Patient s next infusion is due  but he states he will be out of town. Patient states he s going to reach out to his provider and see if he wants to do the infusion early or delay the infusion. Writer informed patient that if they decide to do the infusion early that he will need to contact billing and the insurance company to make sure that this is approved. Patient informed that he will also need to contact scheduling to schedule his next infusion. Patient verbalized understanding. Patient has no further questions or concerns today.    Saline administered: 40 (ml)    Supply Check:   Does the patient have all the supplies they need for the next visit?  Yes    Next visit plan: TBD, patient will be out of town when next infusion is due on     Suzie Malcolm RN 2025  "

## 2025-02-12 ENCOUNTER — HOME INFUSION (OUTPATIENT)
Dept: HOME HEALTH SERVICES | Facility: HOME HEALTH | Age: 25
End: 2025-02-12
Payer: COMMERCIAL

## 2025-02-18 ENCOUNTER — TELEPHONE (OUTPATIENT)
Dept: HOME HEALTH SERVICES | Facility: HOME HEALTH | Age: 25
End: 2025-02-18
Payer: COMMERCIAL

## 2025-02-18 NOTE — TELEPHONE ENCOUNTER
Writer received a call from pt's mom, Indigo, to reschedule visit on Saturday 2/22 to Monday 2/24  after 3pm due to pt being out of [place], [place].     Visit rescheduled to Monday 2/24.  Email sent to [facility] pharm team and scheduling.

## 2025-02-21 ENCOUNTER — HOME INFUSION BILLING (OUTPATIENT)
Dept: HOME HEALTH SERVICES | Facility: HOME HEALTH | Age: 25
End: 2025-02-21
Payer: COMMERCIAL

## 2025-02-21 PROCEDURE — A4215 STERILE NEEDLE: HCPCS

## 2025-02-21 PROCEDURE — A4213 20+ CC SYRINGE ONLY: HCPCS

## 2025-02-21 PROCEDURE — A4930 STERILE, GLOVES PER PAIR: HCPCS

## 2025-02-21 PROCEDURE — S1015 IV TUBING EXTENSION SET: HCPCS

## 2025-02-24 ENCOUNTER — HOME CARE VISIT (OUTPATIENT)
Dept: HOME HEALTH SERVICES | Facility: HOME HEALTH | Age: 25
End: 2025-02-24
Payer: COMMERCIAL

## 2025-02-24 VITALS
BODY MASS INDEX: 22.47 KG/M2 | RESPIRATION RATE: 16 BRPM | WEIGHT: 175 LBS | SYSTOLIC BLOOD PRESSURE: 110 MMHG | TEMPERATURE: 97.8 F | DIASTOLIC BLOOD PRESSURE: 60 MMHG | HEART RATE: 68 BPM | OXYGEN SATURATION: 100 %

## 2025-02-24 PROCEDURE — 99601 HOME NFS VISIT <2 HRS: CPT

## 2025-02-24 PROCEDURE — S9338 HIT IMMUNOTHERAPY DIEM: HCPCS

## 2025-02-24 PROCEDURE — A4217 STERILE WATER/SALINE, 500 ML: HCPCS | Mod: JZ

## 2025-02-25 NOTE — PROGRESS NOTES
Nursing Visit Note:  Nurse visit today for Inflectra  for Ventura IBETH Richie.     present during visit today: Not Applicable.    Intravenous Access:  Peripheral IV placed.    Infusion Nursing Note:    Pre-infusion Checklist:   Have you had any delayed reaction since last infusion?   No     Have you recently had an elevated temperature, fever, chills productive cough, coughing for 3 weeks or longer or hemoptysis, abnormal vital signs, night sweats, chest pain, or have you noticed a decrease in your appetite, or noted unexplained weight loss or fatigue?   No     Do you have any open wounds or new incisions?  No     Do you have any recent or upcoming hospitalizations, surgeries, or dental procedures? Does not include esophagogastroduodenoscopy, colonoscopy, endoscopic retrograde cholangiopancreatography (ERCP), endoscopic ultrasound (EUS), dental procedures or joint aspiration/steroid injections.   No     Do you currently have or recently have had any signs of illness or infection or are you on any antibiotics?   No     Have you had any new, sudden, or worsening abdominal pain?   No     Have you or anyone in your household received a live vaccination in the past 4 weeks?   No     Have you recently been diagnosed with any new nervous system diseases or cancer diagnosis? (i.e., Multiple Sclerosis, Guillain [place], seizures, neurological changes) Are you receiving any form of radiation or chemotherapy?   No     Are you pregnant or breastfeeding, or do you have plans of pregnancy in the future?   No     Have you been having any signs of worsening depression or suicidal ideation?  No     Have you had any other new onset medical symptoms?  No    Entyvio/Ocrevus/Tyasabri only: Have you been having any new or worsening medical problems such as issues with thinking, eyesight, balance or strength that have persisted over several days?   N/A    Benlysta only: Have you been having any signs of worsening depression or suicidal  "ideations?    N/A    IVIG only: Have you had any new blood clots?  N/A    Did the patient answer \"YES\" to any of the questions above?  No     Will the patient receive a medication that has an order for infusion reaction management?  Yes, and all drugs and supplies are available and none have .     If ordered, has the patient taken pre-medications?  N/A    Plan:   Therapy is appropriate, will proceed with treatment.     Post Infusion Assessment:  Patient tolerated infusion without incident.  Blood return noted pre and post infusion.  Site patent and intact, free from redness, edema or discomfort.  No evidence of extravasations.  Access discontinued per protocol.  Biologic Infusion Post Education: Call the triage nurse at your clinic or seek medical attention if you have chills and/or temperature greater than or equal to 100.5, uncontrolled nausea/vomiting, diarrhea, constipation, dizziness, shortness of breath, chest pain, heart palpitations, weakness or any other new or concerning symptoms, questions or concerns.  You cannot have any live virus vaccines prior to or during treatment or up to 6 months post infusion.  If you have an upcoming surgery, medical procedure or dental procedure during treatment, this should be discussed with your ordering physician and your surgeon/dentist.  If you are having any concerning symptom, if you are unsure if you should get your next infusion or wish to speak to a provider before your next infusion, please call your care coordinator or triage nurse at your clinic to notify them so we can adequately serve you.     Note: Pt states he has been feeling well. Denies any new questions or concerns. Change in infusion date due to the pt being on vacation in [place]. PIV placed without issue. No labs today. Weight stable. Tolerated infusion well without rxn. Next visit will be in 4 weeks.     Saline administered: 50 (ml)    Supply Check:   Does the patient have all the supplies they " need for the next visit?  Yes    Next visit plan: 3/22/25    Nellie Campbell, RN 2/24/2025

## 2025-02-28 ENCOUNTER — HOME INFUSION (OUTPATIENT)
Dept: HOME HEALTH SERVICES | Facility: HOME HEALTH | Age: 25
End: 2025-02-28
Payer: COMMERCIAL

## 2025-03-20 ENCOUNTER — TELEPHONE (OUTPATIENT)
Dept: GASTROENTEROLOGY | Facility: CLINIC | Age: 25
End: 2025-03-20
Payer: COMMERCIAL

## 2025-03-20 DIAGNOSIS — K50.012 CROHN'S DISEASE OF SMALL INTESTINE WITH INTESTINAL OBSTRUCTION (H): Primary | ICD-10-CM

## 2025-03-20 RX ORDER — BISACODYL 5 MG/1
TABLET, DELAYED RELEASE ORAL
Qty: 4 TABLET | Refills: 0 | Status: SHIPPED | OUTPATIENT
Start: 2025-03-20

## 2025-03-20 NOTE — TELEPHONE ENCOUNTER
Bowel Prep Review:  Disclaimer: No call was made to the patient.     Extended Golytely bowel prep. Bowel prep sent to    [id number] - [place], [place] - [address]   Recommended due to poor prep/inadequate bowel prep in the past. See 3/4/24 colonoscopy.  Instructions were sent via [employer].       Alexandria Knutson LPN  Endoscopy Procedure Pre Assessment

## 2025-03-21 ENCOUNTER — HOME INFUSION BILLING (OUTPATIENT)
Dept: HOME HEALTH SERVICES | Facility: HOME HEALTH | Age: 25
End: 2025-03-21
Payer: COMMERCIAL

## 2025-03-21 PROCEDURE — A4213 20+ CC SYRINGE ONLY: HCPCS

## 2025-03-21 PROCEDURE — A4930 STERILE, GLOVES PER PAIR: HCPCS

## 2025-03-21 PROCEDURE — A4215 STERILE NEEDLE: HCPCS

## 2025-03-21 PROCEDURE — S1015 IV TUBING EXTENSION SET: HCPCS

## 2025-03-22 ENCOUNTER — HOME CARE VISIT (OUTPATIENT)
Dept: HOME HEALTH SERVICES | Facility: HOME HEALTH | Age: 25
End: 2025-03-22
Payer: COMMERCIAL

## 2025-03-22 ENCOUNTER — LAB REQUISITION (OUTPATIENT)
Dept: LAB | Facility: CLINIC | Age: 25
End: 2025-03-22
Payer: COMMERCIAL

## 2025-03-22 VITALS
TEMPERATURE: 97.6 F | OXYGEN SATURATION: 100 % | SYSTOLIC BLOOD PRESSURE: 108 MMHG | RESPIRATION RATE: 16 BRPM | BODY MASS INDEX: 22.45 KG/M2 | WEIGHT: 174.82 LBS | HEART RATE: 68 BPM | DIASTOLIC BLOOD PRESSURE: 60 MMHG

## 2025-03-22 DIAGNOSIS — K50.014 CROHN'S DISEASE OF SMALL INTESTINE WITH ABSCESS (H): ICD-10-CM

## 2025-03-22 LAB
ALBUMIN SERPL BCG-MCNC: 4.5 G/DL (ref 3.5–5.2)
ALP SERPL-CCNC: 82 U/L (ref 40–150)
ALT SERPL W P-5'-P-CCNC: 15 U/L (ref 0–70)
AST SERPL W P-5'-P-CCNC: 19 U/L (ref 0–45)
BASOPHILS # BLD AUTO: 0 10E3/UL (ref 0–0.2)
BASOPHILS NFR BLD AUTO: 1 %
BILIRUB DIRECT SERPL-MCNC: 0.19 MG/DL (ref 0–0.3)
BILIRUB SERPL-MCNC: 0.6 MG/DL
CRP SERPL-MCNC: <3 MG/L
EOSINOPHIL # BLD AUTO: 0.1 10E3/UL (ref 0–0.7)
EOSINOPHIL NFR BLD AUTO: 2 %
ERYTHROCYTE [DISTWIDTH] IN BLOOD BY AUTOMATED COUNT: 14.3 % (ref 10–15)
HCT VFR BLD AUTO: 42.8 % (ref 40–53)
HGB BLD-MCNC: 14.2 G/DL (ref 13.3–17.7)
IMM GRANULOCYTES # BLD: 0 10E3/UL
IMM GRANULOCYTES NFR BLD: 0 %
LYMPHOCYTES # BLD AUTO: 1.6 10E3/UL (ref 0.8–5.3)
LYMPHOCYTES NFR BLD AUTO: 27 %
MCH RBC QN AUTO: 28.9 PG (ref 26.5–33)
MCHC RBC AUTO-ENTMCNC: 33.2 G/DL (ref 31.5–36.5)
MCV RBC AUTO: 87 FL (ref 78–100)
MONOCYTES # BLD AUTO: 0.6 10E3/UL (ref 0–1.3)
MONOCYTES NFR BLD AUTO: 11 %
NEUTROPHILS # BLD AUTO: 3.6 10E3/UL (ref 1.6–8.3)
NEUTROPHILS NFR BLD AUTO: 60 %
NRBC # BLD AUTO: 0 10E3/UL
NRBC BLD AUTO-RTO: 0 /100
PLATELET # BLD AUTO: 256 10E3/UL (ref 150–450)
PROT SERPL-MCNC: 7.2 G/DL (ref 6.4–8.3)
RBC # BLD AUTO: 4.91 10E6/UL (ref 4.4–5.9)
WBC # BLD AUTO: 6 10E3/UL (ref 4–11)

## 2025-03-22 PROCEDURE — 99601 HOME NFS VISIT <2 HRS: CPT

## 2025-03-22 PROCEDURE — S9338 HIT IMMUNOTHERAPY DIEM: HCPCS

## 2025-03-22 PROCEDURE — A4217 STERILE WATER/SALINE, 500 ML: HCPCS | Mod: JZ

## 2025-03-22 PROCEDURE — 80076 HEPATIC FUNCTION PANEL: CPT | Performed by: PHYSICIAN ASSISTANT

## 2025-03-22 PROCEDURE — 86140 C-REACTIVE PROTEIN: CPT | Performed by: PHYSICIAN ASSISTANT

## 2025-03-22 PROCEDURE — 85025 COMPLETE CBC W/AUTO DIFF WBC: CPT | Performed by: PHYSICIAN ASSISTANT

## 2025-03-22 NOTE — PROGRESS NOTES
Nursing Visit Note:  Nurse visit today for inflectra administration and labs for Ventura Quiroz.     present during visit today: Not Applicable.    Intravenous Access:  Labs drawn without difficulty. and Peripheral IV placed.    Infusion Nursing Note:    Pre-infusion Checklist:   Have you had any delayed reaction since last infusion?   No     Have you recently had an elevated temperature, fever, chills productive cough, coughing for 3 weeks or longer or hemoptysis, abnormal vital signs, night sweats, chest pain, or have you noticed a decrease in your appetite, or noted unexplained weight loss or fatigue?   No     Do you have any open wounds or new incisions?  No     Do you have any recent or upcoming hospitalizations, surgeries, or dental procedures? Does not include esophagogastroduodenoscopy, colonoscopy, endoscopic retrograde cholangiopancreatography (ERCP), endoscopic ultrasound (EUS), dental procedures or joint aspiration/steroid injections.   No     Do you currently have or recently have had any signs of illness or infection or are you on any antibiotics?   No     Have you had any new, sudden, or worsening abdominal pain?   No     Have you or anyone in your household received a live vaccination in the past 4 weeks?   No     Have you recently been diagnosed with any new nervous system diseases or cancer diagnosis? (i.e., Multiple Sclerosis, Guillain [place], seizures, neurological changes) Are you receiving any form of radiation or chemotherapy?   No     Are you pregnant or breastfeeding, or do you have plans of pregnancy in the future?   No     Have you been having any signs of worsening depression or suicidal ideation?  No     Have you had any other new onset medical symptoms?  No    Entyvio/Ocrevus/Tyasabri only: Have you been having any new or worsening medical problems such as issues with thinking, eyesight, balance or strength that have persisted over several days?   N/A    Benlysta only: Have you  "been having any signs of worsening depression or suicidal ideations?    N/A    IVIG only: Have you had any new blood clots?  N/A    Did the patient answer \"YES\" to any of the questions above?  No     Will the patient receive a medication that has an order for infusion reaction management?  Yes, and all drugs and supplies are available and none have .     If ordered, has the patient taken pre-medications?  N/A    Plan:   Therapy is appropriate, will proceed with treatment.     Post Infusion Assessment:  Patient tolerated infusion without incident.  Blood return noted pre and post infusion.  Site patent and intact, free from redness, edema or discomfort.  No evidence of extravasations.  Access discontinued per protocol.  Biologic Infusion Post Education: Call the triage nurse at your clinic or seek medical attention if you have chills and/or temperature greater than or equal to 100.5, uncontrolled nausea/vomiting, diarrhea, constipation, dizziness, shortness of breath, chest pain, heart palpitations, weakness or any other new or concerning symptoms, questions or concerns.  You cannot have any live virus vaccines prior to or during treatment or up to 6 months post infusion.  If you have an upcoming surgery, medical procedure or dental procedure during treatment, this should be discussed with your ordering physician and your surgeon/dentist.  If you are having any concerning symptom, if you are unsure if you should get your next infusion or wish to speak to a provider before your next infusion, please call your care coordinator or triage nurse at your clinic to notify them so we can adequately serve you.    and Lab-Only Nursing Note    Labs obtained via VPT    Time Specimen drawn: 0920    Last dose (if applicable): No    Facility sent to: [facility] Tracking number: [id number]    Note: Pt denies any changes or issues.  He reports he s feeling well.  No GI flares.  Some difficulty with PiV placement but " infusion then went well.    Saline administered: 50ml (ml)    Supply Check:   Does the patient have all the supplies they need for the next visit?  No, Order placed for lab labels    Next visit plan: 4/19 for next bobby Peace RN 3/22/2025

## 2025-03-24 NOTE — TELEPHONE ENCOUNTER
Pre visit planning completed.      Procedure details:    Patient scheduled for Colonoscopy on 4/9/25.     Arrival time: 1230. Procedure time 1330    Facility location: [facility]; [address] [place] [address], [address] Floor, [place] [address]. Check in location: 5th Floor.    Sedation type: Conscious sedation     Pre op exam needed? No.    Indication for procedure: Crohn's       Chart review:     Electronic implanted devices? No    Recent diagnosis of diverticulitis within the last 6 weeks? No      Medication review:    Diabetic? No    Anticoagulants? No    Weight loss medication/injectable? No GLP-1 medication per patient's medication list. Nursing to verify with pre-assessment call.    Other medication HOLDING recommendations:  N/A      Prep for procedure:     Bowel prep recommendation: Extended Golytely. Bowel prep sent to      [id number] [place] [facility] [address]  Due to: poor prep/inadequate bowel prep in the past    Procedure information and instructions sent via [employer] ([employer])         Carrol Mera RN  Endoscopy Procedure Pre Assessment   [phone] option 3

## 2025-03-25 NOTE — TELEPHONE ENCOUNTER
Pre assessment completed for upcoming procedure.   (Please see previous telephone encounter notes for complete details)    Patient returned call.       Procedure details:    Arrival time and facility location reviewed.    Pre op exam needed? No.    Designated  policy reviewed. Instructed to have someone stay 6 hours post procedure.       Medication review:    Medications reviewed. Please see supporting documentation below. Holding recommendations discussed (if applicable).       Prep for procedure:    Patient stated they have already reviewed the bowel prep instructions and writer answered all patient questions (if applicable). Instructed patient to review the procedure prep instructions in detail again at least 7 days prior to procedure due to necessary dietary modifications. Patient was reminded that this is an extended/two day prep and the clear liquid diet and laxatives will need to be started TWO days prior to the procedure date. NPO instructions reviewed.    Patient was instructed to call if any questions or concerns arise.       Any additional information needed:  N/A      Patient verbalized understanding and had no questions or concerns at this time.      Jessica Monte RN  Endoscopy Procedure Pre Assessment   [phone] option 3

## 2025-04-09 ENCOUNTER — HOSPITAL ENCOUNTER (OUTPATIENT)
Facility: AMBULATORY SURGERY CENTER | Age: 25
Discharge: HOME | End: 2025-04-09
Attending: INTERNAL MEDICINE
Payer: COMMERCIAL

## 2025-04-09 VITALS
HEART RATE: 63 BPM | HEIGHT: 74 IN | SYSTOLIC BLOOD PRESSURE: 105 MMHG | TEMPERATURE: 97.2 F | RESPIRATION RATE: 12 BRPM | BODY MASS INDEX: 22.33 KG/M2 | WEIGHT: 174 LBS | DIASTOLIC BLOOD PRESSURE: 57 MMHG | OXYGEN SATURATION: 98 %

## 2025-04-09 LAB — COLONOSCOPY: NORMAL

## 2025-04-09 PROCEDURE — 88305 TISSUE EXAM BY PATHOLOGIST: CPT | Mod: TC | Performed by: INTERNAL MEDICINE

## 2025-04-09 PROCEDURE — 88305 TISSUE EXAM BY PATHOLOGIST: CPT | Mod: 26 | Performed by: PATHOLOGY

## 2025-04-09 RX ORDER — ONDANSETRON 2 MG/ML
4 INJECTION INTRAMUSCULAR; INTRAVENOUS
Status: DISCONTINUED | OUTPATIENT
Start: 2025-04-09 | End: 2025-04-10 | Stop reason: HOSPADM

## 2025-04-09 RX ORDER — FENTANYL CITRATE 50 UG/ML
INJECTION, SOLUTION INTRAMUSCULAR; INTRAVENOUS DAILY PRN
Status: DISCONTINUED | OUTPATIENT
Start: 2025-04-09 | End: 2025-04-09 | Stop reason: HOSPADM

## 2025-04-09 RX ORDER — LIDOCAINE 40 MG/G
CREAM TOPICAL
Status: DISCONTINUED | OUTPATIENT
Start: 2025-04-09 | End: 2025-04-10 | Stop reason: HOSPADM

## 2025-04-09 NOTE — H&P
Ventura Quiroz  [id number]  male  24 year old      Reason for procedure/surgery: Crohn's disease    Patient Active Problem List   Diagnosis    Crohn's Colitis    Anemia, iron deficiency    Crohn's disease of small intestine with abscess (H)    Crohn disease (H)    SBO (small bowel obstruction) (H)    Crohn's disease of small intestine with intestinal obstruction (H)    Leukocytosis, unspecified type    Insomnia       Past Surgical History:    Past Surgical History:   Procedure Laterality Date    COLONOSCOPY N/A 4/11/2019    Procedure: COMBINED COLONOSCOPY, SINGLE OR MULTIPLE BIOPSY/POLYPECTOMY BY BIOPSY;  Surgeon: Tobin Wright MD;  Location: [facility]    COLONOSCOPY N/A 9/29/2022    Procedure: COLONOSCOPY with  BIOPSY;  Surgeon: Tobin Wright MD;  Location: [facility]    COLONOSCOPY N/A 11/18/2022    Procedure: COLONOSCOPY;  Surgeon: Tobin Wright MD;  Location: [facility]    COLONOSCOPY N/A 3/4/2024    Procedure: Colonoscopy;  Surgeon: Carissa Felipe MD;  Location: [facility]    COLONOSCOPY N/A 12/23/2024    Procedure: Colonoscopy;  Surgeon: Montana Crowell MD;  Location: [facility]    COLONOSCOPY N/A 12/23/2024    Procedure: COLONOSCOPY, WITH POLYPECTOMY AND BIOPSY;  Surgeon: Montana Crowell MD;  Location: [facility]    EXCISE LIP OR CHEEK FOLD  10/3/2003    Needle cautery frenulectomy.    INSERT PICC LINE Left 8/10/2020    Procedure: INSERTION, PICC @845;  Surgeon: Karan Farr MD;  Location: [facility]    IR PICC PLACEMENT > 5 YRS OF AGE  8/10/2020    PE TUBES  4/25/2006    RESECTION ILEOCOLIC N/A 8/21/2020    Procedure: Exploratory laparotomy with ileocolic resection with takedown of enterovesical fistula, and low anterior resection;  Surgeon: Brittni Robles MD;  Location: [facility]    SIGMOIDOSCOPY FLEXIBLE N/A 8/21/2020    Procedure: Sigmoidoscopy flexible;  Surgeon: Brittni Robles MD;  Location: [facility]    TONSILLECTOMY & ADENOIDECTOMY  4/25/2006       Past  Medical History:   Past Medical History:   Diagnosis Date    Allergic rhinitis, cause unspecified     Zyrtec helps    Crohn's Colitis 4/8/2019    Unspecified otitis media     Otitis Media       Social History:   Social History     Tobacco Use    Smoking status: Never    Smokeless tobacco: Current    Tobacco comments:     Vape   Substance Use Topics    Alcohol use: Yes       Family History:   Family History   Problem Relation Age of Onset    No Known Problems Mother     No Known Problems Father     Melanoma No family hx of     Skin Cancer No family hx of        Allergies:   Allergies   Allergen Reactions    Gadavist [Gadobutrol] Nausea     Patient could not perform timed sequences due to nausea and discomfort.    Glucagon Nausea and Vomiting     Pre medicate with lorazapam if able.      Amoxicillin     Penicillin [Penicillins]        Active Medications:   Current Outpatient Medications   Medication Sig Dispense Refill    bisacodyl (DULCOLAX) 5 MG EC tablet Two days prior to exam take two (2) tablets at 4pm. One day prior to exam take two (2) tablets at 4pm 4 tablet 0    diphenhydrAMINE (BENADRYL) 50 MG/ML injection Inject 1 mL (50 mg) over 3-5 minutes into the vein via push as needed for other (infusion reaction). For RN use only.  Draw up in a syringe and administer IV push.  Discard remainder of vial. 24760 mL 0    EPINEPHrine (ANY BX GENERIC EQUIV) 0.3 MG/0.3ML injection 2-pack Inject 0.3 mLs (0.3 mg) into the muscle as needed for anaphylaxis (infusion reaction). Administer into the mid-thigh in case of severe anaphylaxis (wheezing, throat tightening, mouth swelling, difficulty breathing). May repeat dose one time in 5-15 minutes if symptoms persist. 848089 mL 0    inFLIXimab-dyyb (INFLECTRA) 100 MG injection Inject 800 mg into the vein every 28 (twenty-eight) days.      inFLIXimab-dyyb (INFLECTRA) injection Add to infusion 80 mLs (800 mg) over 1 hours every 4 weeks. Reconstitute inFLIXimab-dyyb vial(s). Draw up  inFLIXimab-dyyb 10 mg/mL in syringe with 21 G needle and add to NaCl 0.9% bag immediately prior to infusing. MIX GENTLY BY INVERSION, DO NOT SHAKE. Discard remainder of vial(s). 88 each 0    methylPREDNISolone Na Suc, PF, (SOLU-MEDROL) 125 mg/2 mL injection Inject 2 mLs (125 mg) over 3-5 minutes into the vein via push as needed (infusion reaction). For RN use only.  Reconstitute vial. Draw up methylPREDNISolone in a syringe and administer.  Discard remainder of vial. 787620 mL 0    polyethylene glycol (GOLYTELY) 236 g suspension Two days before procedure at 5PM fill first container with water. Mix and drink an 8 oz glass every 15 minutes until HALF of the container is gone. Place the remainder in the refrigerator. One day before procedure at 5PM drink second half of bowel prep. Drink an 8 oz glass every 15 minutes until it is gone. Day of procedure 6 hours before arrival time fill the 2nd container with water. Mix and drink an 8 oz glass every 15 minutes until HALF of the container is gone. Discard the remaining solution. 8000 mL 0    sodium chloride 0.9% bag Infuse 250 mLs over 1 hours into the vein every 4 weeks. Add 80 mL (800 mg) of infliximab 10 mg/mL to bag immediately prior to infusing via gravity infusion. When complete, flush bag with 20 mL NS to flush tubing. 616564 mL 0    sodium chloride 0.9% infusion Infuse 500 mLs into the vein as needed for other (infusion reaction). In case of mild reaction, administer via gravity at 20 mL/hr to keep vein open. In case of severe reaction, administer via gravity wide open on prime setting. 603103 mL 0    sodium chloride, PF, 0.9% PF flush Inject 10 mLs into the vein as needed for other (infusion reaction). For RN use only as needed for infusion reaction 500455 mL 0    sodium chloride, PF, 0.9% PF flush Inject 10 mLs into the vein as needed for line flush. Flush IV before and after medication administration as directed and/or at least every 12 hours. 018133 mL 0     sterile water, preservative free, injection Use 80 mLs for reconstitution every 4 weeks. 1. Reconstitute each vial of inFLIXimab-dyyb (INFLECTRA) with 10 mL of sterile water for injection by slowly injecting down the inside wall of vial w/21 G needle. DO NOT SHAKE. Foaming of the solution on reconstitution is not unusual. Roll and tilt each vial gently.  2. Let drug stand for 5 minutes.  3. Inspect vials for particules and/or discoloration prior to continuing. The solution should be colorless to light yellow and opalescent, and may develop a few translucent particles. DO NOT USE IF DRUG HAS NOT FULLY DISSOLVED OR IF OPAQUE PARTICLES, DISCOLORATION OR OTHER FOREIGN PARTICULES ARE PRESENT.  4. Draw up appropriate dose w/ 21 G needle from vial. 880 mL 0       Systemic Review:   CONSTITUTIONAL: NEGATIVE for fever, chills, change in weight  ENT/MOUTH: NEGATIVE for ear, mouth and throat problems  RESP: NEGATIVE for significant cough or SOB  CV: NEGATIVE for chest pain, palpitations or peripheral edema    Physical Examination:   Vital Signs: There were no vitals taken for this visit.  GENERAL: healthy, alert and no distress  NECK: no adenopathy, no asymmetry, masses, or scars  RESP: lungs clear to auscultation - no rales, rhonchi or wheezes  CV: regular rate and rhythm, normal S1 S2, no S3 or S4, no murmur, click or rub, no peripheral edema and peripheral pulses strong  ABDOMEN: soft, nontender, no hepatosplenomegaly, no masses and bowel sounds normal  MS: no gross musculoskeletal defects noted, no edema    ASA Classification: (I)  Normal healthy patient  Airway Exam: Mallampati Score: Class I (Complete visualization of soft palate)    Plan: Appropriate to proceed as scheduled.      Tobin Wright MD  4/9/2025    PCP:  Jc Tubbs

## 2025-04-14 ENCOUNTER — MYC MEDICAL ADVICE (OUTPATIENT)
Dept: GASTROENTEROLOGY | Facility: CLINIC | Age: 25
End: 2025-04-14
Payer: COMMERCIAL

## 2025-04-14 NOTE — TELEPHONE ENCOUNTER
Per Dr. Wright, plan to transition patient to alternative therapy due to moderate ileitis with a new developing stricture noted on scope.

## 2025-04-16 ENCOUNTER — HOME INFUSION (OUTPATIENT)
Dept: HOME HEALTH SERVICES | Facility: HOME HEALTH | Age: 25
End: 2025-04-16
Payer: COMMERCIAL

## 2025-04-18 ENCOUNTER — HOME INFUSION BILLING (OUTPATIENT)
Dept: HOME HEALTH SERVICES | Facility: HOME HEALTH | Age: 25
End: 2025-04-18
Payer: COMMERCIAL

## 2025-04-18 PROCEDURE — A4213 20+ CC SYRINGE ONLY: HCPCS

## 2025-04-18 PROCEDURE — S1015 IV TUBING EXTENSION SET: HCPCS

## 2025-04-18 PROCEDURE — A4215 STERILE NEEDLE: HCPCS

## 2025-04-19 ENCOUNTER — HOME CARE VISIT (OUTPATIENT)
Dept: HOME HEALTH SERVICES | Facility: HOME HEALTH | Age: 25
End: 2025-04-19
Payer: COMMERCIAL

## 2025-04-19 VITALS
TEMPERATURE: 97.9 F | OXYGEN SATURATION: 99 % | HEART RATE: 72 BPM | DIASTOLIC BLOOD PRESSURE: 70 MMHG | SYSTOLIC BLOOD PRESSURE: 110 MMHG | RESPIRATION RATE: 16 BRPM

## 2025-04-19 PROCEDURE — 99601 HOME NFS VISIT <2 HRS: CPT

## 2025-04-19 PROCEDURE — S9338 HIT IMMUNOTHERAPY DIEM: HCPCS

## 2025-04-19 PROCEDURE — A4217 STERILE WATER/SALINE, 500 ML: HCPCS | Mod: JZ

## 2025-04-19 NOTE — PROGRESS NOTES
Nursing Visit Note:  Nurse visit today for IV Inflectra and general assessment  for Ventura CRISTINA Richie.     present during visit today: Not Applicable.    Intravenous Access:  Peripheral IV placed.    Infusion Nursing Note:    Pre-infusion Checklist:   Have you had any delayed reaction since last infusion?   No     Have you recently had an elevated temperature, fever, chills productive cough, coughing for 3 weeks or longer or hemoptysis, abnormal vital signs, night sweats, chest pain, or have you noticed a decrease in your appetite, or noted unexplained weight loss or fatigue?   No     Do you have any open wounds or new incisions?  No     Do you have any recent or upcoming hospitalizations, surgeries, or dental procedures? Does not include esophagogastroduodenoscopy, colonoscopy, endoscopic retrograde cholangiopancreatography (ERCP), endoscopic ultrasound (EUS), dental procedures or joint aspiration/steroid injections.   No     Do you currently have or recently have had any signs of illness or infection or are you on any antibiotics?   No     Have you had any new, sudden, or worsening abdominal pain?   No     Have you or anyone in your household received a live vaccination in the past 4 weeks?   No     Have you recently been diagnosed with any new nervous system diseases or cancer diagnosis? (i.e., Multiple Sclerosis, Guillain [place], seizures, neurological changes) Are you receiving any form of radiation or chemotherapy?   No     Are you pregnant or breastfeeding, or do you have plans of pregnancy in the future?   No     Have you been having any signs of worsening depression or suicidal ideation?  No     Have you had any other new onset medical symptoms?  No    Entyvio/Ocrevus/Tyasabri only: Have you been having any new or worsening medical problems such as issues with thinking, eyesight, balance or strength that have persisted over several days?   N/A    Benlysta only: Have you been having any signs of  "worsening depression or suicidal ideations?    N/A    IVIG only: Have you had any new blood clots?  N/A    Did the patient answer \"YES\" to any of the questions above?  No     Will the patient receive a medication that has an order for infusion reaction management?  Yes, and all drugs and supplies are available and none have .     If ordered, has the patient taken pre-medications?  N/A    Plan:   Therapy is appropriate, will proceed with treatment.     Post Infusion Assessment:  Patient tolerated infusion without incident.  Blood return noted pre and post infusion.  Site patent and intact, free from redness, edema or discomfort.  No evidence of extravasations.  Access discontinued per protocol.  Biologic Infusion Post Education: Call the triage nurse at your clinic or seek medical attention if you have chills and/or temperature greater than or equal to 100.5, uncontrolled nausea/vomiting, diarrhea, constipation, dizziness, shortness of breath, chest pain, heart palpitations, weakness or any other new or concerning symptoms, questions or concerns.  You cannot have any live virus vaccines prior to or during treatment or up to 6 months post infusion.  If you have an upcoming surgery, medical procedure or dental procedure during treatment, this should be discussed with your ordering physician and your surgeon/dentist.  If you are having any concerning symptom, if you are unsure if you should get your next infusion or wish to speak to a provider before your next infusion, please call your care coordinator or triage nurse at your clinic to notify them so we can adequately serve you.     Note: VSS pre and post, reports GI MD is likely switching medications as last scope indicates poor response. Pt denies abd pain, cramping or discomfort, but has regular bouts of diarrhea. Reports eating and drinking well, no dizziness or feeling lightheaded/dehydrated at this time. No concerns observed or reported.     Saline " administered: 40 (ml)    Supply Check:   Does the patient have all the supplies they need for the next visit?  Yes    Next visit plan: 5/17/25 verified with pt and mom    Rosaline Chapman RN 4/19/2025

## 2025-04-29 ENCOUNTER — ENROLLMENT (OUTPATIENT)
Dept: HOME HEALTH SERVICES | Facility: HOME HEALTH | Age: 25
End: 2025-04-29
Payer: COMMERCIAL

## 2025-05-06 ENCOUNTER — HOSPITAL ENCOUNTER (EMERGENCY)
Facility: CLINIC | Age: 25
Discharge: HOME | End: 2025-05-06
Attending: STUDENT IN AN ORGANIZED HEALTH CARE EDUCATION/TRAINING PROGRAM | Admitting: STUDENT IN AN ORGANIZED HEALTH CARE EDUCATION/TRAINING PROGRAM
Payer: COMMERCIAL

## 2025-05-06 VITALS
SYSTOLIC BLOOD PRESSURE: 129 MMHG | DIASTOLIC BLOOD PRESSURE: 82 MMHG | HEIGHT: 74 IN | WEIGHT: 168.4 LBS | RESPIRATION RATE: 18 BRPM | OXYGEN SATURATION: 99 % | BODY MASS INDEX: 21.61 KG/M2 | HEART RATE: 71 BPM | TEMPERATURE: 97.4 F

## 2025-05-06 DIAGNOSIS — K64.4 EXTERNAL HEMORRHOIDS: ICD-10-CM

## 2025-05-06 DIAGNOSIS — K61.1 RECTAL ABSCESS: ICD-10-CM

## 2025-05-06 PROCEDURE — 87070 CULTURE OTHR SPECIMN AEROBIC: CPT | Performed by: STUDENT IN AN ORGANIZED HEALTH CARE EDUCATION/TRAINING PROGRAM

## 2025-05-06 PROCEDURE — 87205 SMEAR GRAM STAIN: CPT | Performed by: STUDENT IN AN ORGANIZED HEALTH CARE EDUCATION/TRAINING PROGRAM

## 2025-05-06 PROCEDURE — 250N000011 HC RX IP 250 OP 636: Mod: JZ | Performed by: STUDENT IN AN ORGANIZED HEALTH CARE EDUCATION/TRAINING PROGRAM

## 2025-05-06 PROCEDURE — 96372 THER/PROPH/DIAG INJ SC/IM: CPT | Performed by: STUDENT IN AN ORGANIZED HEALTH CARE EDUCATION/TRAINING PROGRAM

## 2025-05-06 PROCEDURE — 46050 I&D PERIANAL ABSCESS SUPFC: CPT | Performed by: STUDENT IN AN ORGANIZED HEALTH CARE EDUCATION/TRAINING PROGRAM

## 2025-05-06 PROCEDURE — 99284 EMERGENCY DEPT VISIT MOD MDM: CPT | Mod: 25 | Performed by: STUDENT IN AN ORGANIZED HEALTH CARE EDUCATION/TRAINING PROGRAM

## 2025-05-06 RX ORDER — HYDROMORPHONE HYDROCHLORIDE 1 MG/ML
0.5 INJECTION, SOLUTION INTRAMUSCULAR; INTRAVENOUS; SUBCUTANEOUS ONCE
Status: COMPLETED | OUTPATIENT
Start: 2025-05-06 | End: 2025-05-06

## 2025-05-06 RX ORDER — METRONIDAZOLE 500 MG/1
500 TABLET ORAL 3 TIMES DAILY
Qty: 21 TABLET | Refills: 0 | Status: SHIPPED | OUTPATIENT
Start: 2025-05-06 | End: 2025-05-13

## 2025-05-06 RX ORDER — CIPROFLOXACIN 500 MG/1
500 TABLET, FILM COATED ORAL 2 TIMES DAILY
Qty: 14 TABLET | Refills: 0 | Status: SHIPPED | OUTPATIENT
Start: 2025-05-06 | End: 2025-05-13

## 2025-05-06 RX ADMIN — HYDROMORPHONE HYDROCHLORIDE 0.5 MG: 1 INJECTION, SOLUTION INTRAMUSCULAR; INTRAVENOUS; SUBCUTANEOUS at 05:52

## 2025-05-06 ASSESSMENT — ACTIVITIES OF DAILY LIVING (ADL)
ADLS_ACUITY_SCORE: 50
ADLS_ACUITY_SCORE: 50

## 2025-05-06 NOTE — DISCHARGE INSTRUCTIONS
Your initial exam was most consistent with either a thrombosed hemorrhoid or perianal abscess.  After the procedure today we expressed a combination of blood and pus.    We will place you on 2 separate antibiotics to treat associated infection.  Take these as instructed until entirely gone.  You cannot drink alcohol while using the metronidazole.    Monitor symptoms closely at home.  Use Tylenol and ibuprofen for discomfort.  Perform the warm soaks/sitz baths.    Come back to the emergency department immediately for any severe internal rectal pain, fevers, consistently bloody stools, other new or worsening symptoms.

## 2025-05-06 NOTE — ED PROVIDER NOTES
History     Chief Complaint   Patient presents with    Rectal/perineal Pain     HPI  Ventura Quiroz is a 24 year old male with history of Crohn's who presents for evaluation of rectal pain.  Patient has had an area of swelling and discomfort to the right external rectum for the past 2 to 3 days.  At first he thought it was a hemorrhoid and he has been using Preparation H without significant improvement.  Patient has some pain with sitting down, but does not have any significant pain with actually passing a bowel movement.  He denies having any bloody stool or rectal bleeding.  No purulent drainage either.  Patient otherwise feels well and he denies having any fevers, abdominal pain, vomiting, other complaints.    Allergies:  Allergies   Allergen Reactions    Gadavist [Gadobutrol] Nausea     Patient could not perform timed sequences due to nausea and discomfort.    Glucagon Nausea and Vomiting     Pre medicate with lorazapam if able.      Amoxicillin     Penicillin [Penicillins]      Problem List:    Patient Active Problem List    Diagnosis Date Noted    Insomnia 12/01/2024     Priority: Medium    SBO (small bowel obstruction) (H) 11/29/2024     Priority: Medium    Crohn's disease of small intestine with intestinal obstruction (H) 11/29/2024     Priority: Medium    Leukocytosis, unspecified type 11/29/2024     Priority: Medium    Crohn disease (H) 08/21/2020     Priority: Medium    Anemia, iron deficiency 08/19/2019     Priority: Medium    Crohn's disease of small intestine with abscess (H) 08/19/2019     Priority: Medium    Crohn's Colitis 04/08/2019     Priority: Medium      Past Medical History:    Past Medical History:   Diagnosis Date    Allergic rhinitis, cause unspecified     Crohn's Colitis 4/8/2019    Unspecified otitis media      Past Surgical History:    Past Surgical History:   Procedure Laterality Date    COLONOSCOPY N/A 4/11/2019    Procedure: COMBINED COLONOSCOPY, SINGLE OR MULTIPLE BIOPSY/POLYPECTOMY  BY BIOPSY;  Surgeon: Tobin Wright MD;  Location: [facility]    COLONOSCOPY N/A 9/29/2022    Procedure: COLONOSCOPY with  BIOPSY;  Surgeon: Tobin Wright MD;  Location: [facility]    COLONOSCOPY N/A 11/18/2022    Procedure: COLONOSCOPY;  Surgeon: Tobin Wright MD;  Location: [facility]    COLONOSCOPY N/A 3/4/2024    Procedure: Colonoscopy;  Surgeon: Carissa Felipe MD;  Location: [facility]    COLONOSCOPY N/A 12/23/2024    Procedure: Colonoscopy;  Surgeon: Montana Crowell MD;  Location: [facility]    COLONOSCOPY N/A 12/23/2024    Procedure: COLONOSCOPY, WITH POLYPECTOMY AND BIOPSY;  Surgeon: Montana Crowell MD;  Location: [facility]    COLONOSCOPY N/A 4/9/2025    Procedure: Colonoscopy with balloon dilation and biopsy;  Surgeon: Tobin Wright MD;  Location: [facility]    EXCISE LIP OR CHEEK FOLD  10/3/2003    Needle cautery frenulectomy.    INSERT PICC LINE Left 8/10/2020    Procedure: INSERTION, PICC @845;  Surgeon: Karan Farr MD;  [facility]    IR PICC PLACEMENT > 5 YRS OF AGE  8/10/2020    PE TUBES  4/25/2006    RESECTION ILEOCOLIC N/A 8/21/2020    Procedure: Exploratory laparotomy with ileocolic resection with takedown of enterovesical fistula, and low anterior resection;  Surgeon: Brittni Robles MD;  Location: [facility]    SIGMOIDOSCOPY FLEXIBLE N/A 8/21/2020    Procedure: Sigmoidoscopy flexible;  Surgeon: Brittni Robles MD;  Location: [facility]    TONSILLECTOMY & ADENOIDECTOMY  4/25/2006     Family History:    Family History   Problem Relation Age of Onset    No Known Problems Mother     No Known Problems Father     Melanoma No family hx of     Skin Cancer No family hx of      Social History:  Marital Status:  Single [1]  Social History     Tobacco Use    Smoking status: Never    Smokeless tobacco: Current    Tobacco comments:     Vape   Vaping Use    Vaping status: Every Day    Substances: Nicotine, Flavoring    Devices: Pre-filled or refillable  "cartridge   Substance Use Topics    Alcohol use: Yes    Drug use: No      Medications:    ciprofloxacin (CIPRO) 500 MG tablet  metroNIDAZOLE (FLAGYL) 500 MG tablet  bisacodyl (DULCOLAX) 5 MG EC tablet  diphenhydrAMINE (BENADRYL) 50 MG/ML injection  EPINEPHrine (ANY BX GENERIC EQUIV) 0.3 MG/0.3ML injection 2-pack  inFLIXimab-dyyb (INFLECTRA) 100 MG injection  inFLIXimab-dyyb (INFLECTRA) injection  methylPREDNISolone Na Suc, PF, (SOLU-MEDROL) 125 mg/2 mL injection  polyethylene glycol (GOLYTELY) 236 g suspension  sodium chloride 0.9% bag  sodium chloride 0.9% infusion  sodium chloride, PF, 0.9% PF flush  sodium chloride, PF, 0.9% PF flush  sterile water, preservative free, injection      Review of Systems   All other systems reviewed and are negative.  See HPI.    Physical Exam   BP: 135/87  Pulse: 69  Temp: 97.4  F (36.3  C)  Resp: 20  Height: 188 cm (6' 2\")  Weight: 76.4 kg (168 lb 6.4 oz)  SpO2: 100 %    Physical Exam  Vitals and nursing note reviewed.   Constitutional:       General: He is not in acute distress.     Appearance: Normal appearance. He is not ill-appearing or toxic-appearing.   HENT:      Head: Normocephalic and atraumatic.      Nose: Nose normal.      Mouth/Throat:      Mouth: Mucous membranes are moist.   Eyes:      General: No scleral icterus.     Extraocular Movements: Extraocular movements intact.      Conjunctiva/sclera: Conjunctivae normal.      Pupils: Pupils are equal, round, and reactive to light.   Cardiovascular:      Rate and Rhythm: Normal rate and regular rhythm.      Pulses: Normal pulses.      Heart sounds: Normal heart sounds.   Pulmonary:      Effort: Pulmonary effort is normal. No respiratory distress.      Breath sounds: Normal breath sounds.   Abdominal:      Palpations: Abdomen is soft.      Tenderness: There is no abdominal tenderness. There is no guarding or rebound.      Comments: Abdomen is entirely nontender.   Genitourinary:     Comments: Patient has what appears to be a " thrombosed external hemorrhoid towards the right lateral anus.  This is tender to palpation and has a purpleish hue.  There is minimal induration and tenderness more laterally to the apparent hemorrhoid with no obvious fluctuance or erythema.  He also has a tiny superficial fissure to the posterior anal verge.  Patient declined RITESH (see MDM).  Musculoskeletal:         General: No tenderness or deformity. Normal range of motion.      Cervical back: Normal range of motion and neck supple.   Skin:     General: Skin is warm.      Capillary Refill: Capillary refill takes less than 2 seconds.      Coloration: Skin is not pale.      Findings: No erythema.   Neurological:      General: No focal deficit present.      Mental Status: He is alert and oriented to person, place, and time.   Psychiatric:         Mood and Affect: Mood normal.       ED [facility]    PROCEDURE: -Incision/Drainage    Date/Time: 5/6/2025 6:44 AM    Performed by: Sergio Guevara MD  Authorized by: Sergio Guevara MD    Risks, benefits and alternatives discussed.      LOCATION:      Type:  Abscess    Location:  Anogenital    Anogenital location:  Perianal    PRE-PROCEDURE DETAILS:     Skin preparation:  Chloraprep    PROCEDURE TYPE:     Complexity:  Simple    ANESTHESIA (see MAR for exact dosages):     Anesthesia method:  Local infiltration    Local anesthetic:  Lidocaine 1% w/o epi    PROCEDURE DETAILS:     Needle aspiration: no      Incision types:  Single straight (1 cm)    Incision depth:  Dermal    Scalpel blade:  11    Wound management:  Probed and deloculated and irrigated with saline    Drainage:  Bloody and purulent    Drainage amount:  Moderate    Wound treatment:  Wound left open    Packing materials:  None    PROCEDURE  Describe Procedure: Initial exam most consistent with thrombosed external hemorrhoid versus possible perianal abscess.  Anesthetized using 1 cc of lidocaine without  epinephrine.  Elliptical incision was performed with mostly purulent drainage.  There was still some residual induration but I could not express clot.  Swelling and tenderness improved dramatically.  The wound was probed and irrigated with saline.  Patient Tolerance:  Patient tolerated the procedure well with no immediate complications              No results found for this or any previous visit (from the past 24 hours).    Medications   HYDROmorphone (PF) (DILAUDID) injection 0.5 mg (0.5 mg Intramuscular $Given 5/6/25 2403)     Assessments & Plan (with Medical Decision Making)     I have reviewed the nursing notes.    I have reviewed the findings, diagnosis, plan and need for follow up with the patient.  Medical Decision Making  Ventura Quiroz is a 24 year old male with history of Crohn's who presents for evaluation of rectal pain.  Normal vitals.  Exam was most significant for what appears to be a thrombosed external hemorrhoid toward the right anus as described above.  There is also a superficial posterior fissure.  Abdomen is entirely nontender.  Although I think his pain, exam is most consistent with a hemorrhoid, he does have a history of Crohn's and we discussed the possibility of localized abscesses.  Even based on this possibility I think he would have a perianal abscess.  Recommended digital rectal exam given baseline Crohn's, but he ultimately declined, understanding that there is a small possibility he could have a perirectal infection.  I think this is reasonable since he does not have significant pain with defecation or any localized/systemic infectious symptoms.  The plan will be to administer a dose of IM Dilaudid for baseline pain relief, then perform local incision/drainage.    The area was anesthetized and incised as above.  This produced mostly purulent drainage which was sent for culture.  He does still have some minimal induration afterward, but I could not express a clot and swelling/tenderness  improved dramatically.  Patient tolerated the procedure well.  We will discharge him home on a combination of ciprofloxacin and Flagyl due to penicillin allergy.  Discussed supportive cares including Tylenol, ibuprofen, cleanliness, sitz baths.  Patient will follow-up with his PCP as soon as possible for reevaluation.  We also discussed strict return precautions due to history of Crohn's.  Patient will come back to the emergency department immediately for any severe rectal pain/swelling, consistent bleeding, fevers, among other new or worsening symptoms.    New Prescriptions    CIPROFLOXACIN (CIPRO) 500 MG TABLET    Take 1 tablet (500 mg) by mouth 2 times daily for 7 days.    METRONIDAZOLE (FLAGYL) 500 MG TABLET    Take 1 tablet (500 mg) by mouth 3 times daily for 7 days.     Final diagnoses:   Rectal abscess   External hemorrhoids     5/6/2025   [facility] EMERGENCY DEPT       Sergio Guevara MD  05/06/25 0702

## 2025-05-06 NOTE — Clinical Note
Ventura Quiroz was seen and treated in our emergency department on 5/6/2025.  He may return to work on 05/08/2025.       If you have any questions or concerns, please don't hesitate to call.      Sergio Guevara MD

## 2025-05-06 NOTE — ED TRIAGE NOTES
"Pt presents with increasing pain to anus for the last few days and believes he has a perianal abscess. HX of Crohn's, Pt states he thought it was hemorrhoids and has been using preparation H, however pain has gotten worse and \"lump\" has been getting larger. Tylenol taken at 0400.     Triage Assessment (Adult)       Row Name 05/06/25 0529          Triage Assessment    Airway WDL WDL        Cardiac WDL    Cardiac WDL WDL        Peripheral/Neurovascular WDL    Peripheral Neurovascular WDL WDL        Cognitive/Neuro/Behavioral WDL    Cognitive/Neuro/Behavioral WDL WDL        Frank Coma Scale    Best Eye Response 4-->(E4) spontaneous     Best Motor Response 6-->(M6) obeys commands     Best Verbal Response 5-->(V5) oriented     [place] Coma Scale Score 15                     "

## 2025-05-08 ENCOUNTER — LAB (OUTPATIENT)
Dept: INFUSION THERAPY | Facility: CLINIC | Age: 25
End: 2025-05-08
Attending: INTERNAL MEDICINE
Payer: COMMERCIAL

## 2025-05-08 VITALS
SYSTOLIC BLOOD PRESSURE: 119 MMHG | WEIGHT: 168.6 LBS | BODY MASS INDEX: 21.65 KG/M2 | RESPIRATION RATE: 14 BRPM | TEMPERATURE: 98.2 F | DIASTOLIC BLOOD PRESSURE: 79 MMHG | HEART RATE: 98 BPM | OXYGEN SATURATION: 98 %

## 2025-05-08 DIAGNOSIS — K50.014 CROHN'S DISEASE OF SMALL INTESTINE WITH ABSCESS (H): Primary | ICD-10-CM

## 2025-05-08 DIAGNOSIS — K50.014 CROHN'S DISEASE OF SMALL INTESTINE WITH ABSCESS (H): ICD-10-CM

## 2025-05-08 LAB
ALBUMIN SERPL BCG-MCNC: 4.5 G/DL (ref 3.5–5.2)
ALP SERPL-CCNC: 83 U/L (ref 40–150)
ALT SERPL W P-5'-P-CCNC: 20 U/L (ref 0–70)
AST SERPL W P-5'-P-CCNC: 22 U/L (ref 0–45)
BACTERIA ABSC ANAEROBE+AEROBE CULT: ABNORMAL
BACTERIA ABSC ANAEROBE+AEROBE CULT: ABNORMAL
BASOPHILS # BLD AUTO: 0 10E3/UL (ref 0–0.2)
BASOPHILS NFR BLD AUTO: 0 %
BILIRUB DIRECT SERPL-MCNC: 0.12 MG/DL (ref 0–0.3)
BILIRUB SERPL-MCNC: 0.3 MG/DL
CRP SERPL-MCNC: <3 MG/L
EOSINOPHIL # BLD AUTO: 0.1 10E3/UL (ref 0–0.7)
EOSINOPHIL NFR BLD AUTO: 2 %
ERYTHROCYTE [DISTWIDTH] IN BLOOD BY AUTOMATED COUNT: 14 % (ref 10–15)
GRAM STAIN RESULT: ABNORMAL
GRAM STAIN RESULT: ABNORMAL
HCT VFR BLD AUTO: 44.4 % (ref 40–53)
HGB BLD-MCNC: 14.3 G/DL (ref 13.3–17.7)
HOLD SPECIMEN: NORMAL
IMM GRANULOCYTES # BLD: 0 10E3/UL
IMM GRANULOCYTES NFR BLD: 0 %
LYMPHOCYTES # BLD AUTO: 1.7 10E3/UL (ref 0.8–5.3)
LYMPHOCYTES NFR BLD AUTO: 23 %
MCH RBC QN AUTO: 28.3 PG (ref 26.5–33)
MCHC RBC AUTO-ENTMCNC: 32.2 G/DL (ref 31.5–36.5)
MCV RBC AUTO: 88 FL (ref 78–100)
MONOCYTES # BLD AUTO: 0.6 10E3/UL (ref 0–1.3)
MONOCYTES NFR BLD AUTO: 9 %
NEUTROPHILS # BLD AUTO: 4.8 10E3/UL (ref 1.6–8.3)
NEUTROPHILS NFR BLD AUTO: 66 %
NRBC # BLD AUTO: 0 10E3/UL
NRBC BLD AUTO-RTO: 0 /100
PLATELET # BLD AUTO: 252 10E3/UL (ref 150–450)
PROT SERPL-MCNC: 7.9 G/DL (ref 6.4–8.3)
RBC # BLD AUTO: 5.06 10E6/UL (ref 4.4–5.9)
WBC # BLD AUTO: 7.3 10E3/UL (ref 4–11)

## 2025-05-08 PROCEDURE — 86140 C-REACTIVE PROTEIN: CPT

## 2025-05-08 PROCEDURE — 258N000003 HC RX IP 258 OP 636: Performed by: INTERNAL MEDICINE

## 2025-05-08 PROCEDURE — 85004 AUTOMATED DIFF WBC COUNT: CPT

## 2025-05-08 PROCEDURE — 84155 ASSAY OF PROTEIN SERUM: CPT

## 2025-05-08 RX ORDER — DIPHENHYDRAMINE HYDROCHLORIDE 50 MG/ML
50 INJECTION, SOLUTION INTRAMUSCULAR; INTRAVENOUS
Start: 2025-06-05

## 2025-05-08 RX ORDER — MEPERIDINE HYDROCHLORIDE 25 MG/ML
25 INJECTION INTRAMUSCULAR; INTRAVENOUS; SUBCUTANEOUS
OUTPATIENT
Start: 2025-06-05

## 2025-05-08 RX ORDER — DIPHENHYDRAMINE HYDROCHLORIDE 50 MG/ML
25 INJECTION, SOLUTION INTRAMUSCULAR; INTRAVENOUS
Start: 2025-06-05

## 2025-05-08 RX ORDER — METHYLPREDNISOLONE SODIUM SUCCINATE 40 MG/ML
40 INJECTION INTRAMUSCULAR; INTRAVENOUS
Start: 2025-06-05

## 2025-05-08 RX ORDER — ALBUTEROL SULFATE 0.83 MG/ML
2.5 SOLUTION RESPIRATORY (INHALATION)
OUTPATIENT
Start: 2025-06-05

## 2025-05-08 RX ORDER — HEPARIN SODIUM (PORCINE) LOCK FLUSH IV SOLN 100 UNIT/ML 100 UNIT/ML
5 SOLUTION INTRAVENOUS
OUTPATIENT
Start: 2025-06-05

## 2025-05-08 RX ORDER — HEPARIN SODIUM,PORCINE 10 UNIT/ML
5-20 VIAL (ML) INTRAVENOUS DAILY PRN
OUTPATIENT
Start: 2025-06-05

## 2025-05-08 RX ORDER — EPINEPHRINE 1 MG/ML
0.3 INJECTION, SOLUTION INTRAMUSCULAR; SUBCUTANEOUS EVERY 5 MIN PRN
OUTPATIENT
Start: 2025-06-05

## 2025-05-08 RX ORDER — ALBUTEROL SULFATE 90 UG/1
1-2 INHALANT RESPIRATORY (INHALATION)
Start: 2025-06-05

## 2025-05-08 RX ADMIN — SODIUM CHLORIDE 250 ML: 0.9 INJECTION, SOLUTION INTRAVENOUS at 09:40

## 2025-05-08 RX ADMIN — SODIUM CHLORIDE 600 MG: 0.9 INJECTION, SOLUTION INTRAVENOUS at 09:42

## 2025-05-08 NOTE — PROGRESS NOTES
Infusion Nursing Note:  Ventura Quiroz presents today for first dose of Skyrizi infusion.    Patient seen by provider today: No   present during visit today: Not Applicable.    Note:   Paged Dr Wright as patient was seen in ER on 5/6/25 and had an incision and drainage of a perianal abscess.  Has taken 1 day of antibiotics, out of the 7 day prescriptions.  Patient states pain has reduced significantly in rectal area since ER visit. Back to 60% of his baseline.  Would like to proceed with today's infusion as it is so difficult to get off work.  Paged Dr Wright whom states okay to proceed with today's treatment. Patient verbalized understanding and agreement with plan.    Intravenous Access:  Peripheral IV placed.    Treatment Conditions:  LFTs within normal limits.  Quantiferon TB gold plus drawn 7/12/24.  Biological Infusion Checklist:  ~~~ NOTE: If the patient answers yes to any of the questions below, hold the infusion and contact ordering provider or on-call provider.    Have you recently had an elevated temperature, fever, chills, productive cough, coughing for 3 weeks or longer or hemoptysis,  abnormal vital signs, night sweats,  chest pain or have you noticed a decrease in your appetite, unexplained weight loss or fatigue? No  Do you have any open wounds or new incisions? No  Do you have any upcoming hospitalizations or surgeries? Does not include esophagogastroduodenoscopy, colonoscopy, endoscopic retrograde cholangiopancreatography (ERCP), endoscopic ultrasound (EUS), dental procedures or joint aspiration/steroid injections No  Do you currently have any signs of illness or infection or are you on any antibiotics? Yes, for perianal abscess.  Have you had any new, sudden or worsening abdominal pain? No  Have you or anyone in your household received a live vaccination in the past 4 weeks? Please note: No live vaccines while on biologic/chemotherapy until 6 months after the last treatment. Patient can  receive the flu vaccine (shot only), pneumovax and the Covid vaccine. It is optimal for the patient to get these vaccines mid cycle, but they can be given at any time as long as it is not on the day of the infusion. No  Have you recently been diagnosed with any new nervous system diseases (ie. Multiple sclerosis, Guillain [place], seizures, neurological changes) or cancer diagnosis? Are you on any form of radiation or chemotherapy? No  Have there been any other new onset medical symptoms? Yes, Patient seen in ER on 5/6/25 for perianal abscess. Started Metronidazole 1 day ago. Has 6 days left of prescription.      Post Infusion Assessment:  Patient tolerated infusion without incident.  Blood return noted pre and post infusion.  Site patent and intact, free from redness, edema or discomfort.  No evidence of extravasations.  Access discontinued per protocol.       Discharge Plan:   AVS to patient via MYCHART.  Patient will return 6/9/25 for next appointment.   Patient discharged in stable condition accompanied by: self.  Departure Mode: Ambulatory.      Danae Pace RN

## 2025-05-09 ENCOUNTER — RESULTS FOLLOW-UP (OUTPATIENT)
Dept: GASTROENTEROLOGY | Facility: CLINIC | Age: 25
End: 2025-05-09

## 2025-05-17 ENCOUNTER — HOME CARE VISIT (OUTPATIENT)
Dept: HOME HEALTH SERVICES | Facility: HOME HEALTH | Age: 25
End: 2025-05-17
Payer: COMMERCIAL

## 2025-05-30 ENCOUNTER — HOME INFUSION (OUTPATIENT)
Dept: HOME HEALTH SERVICES | Facility: HOME HEALTH | Age: 25
End: 2025-05-30
Payer: COMMERCIAL

## 2025-06-04 ENCOUNTER — HOME INFUSION BILLING (OUTPATIENT)
Dept: HOME HEALTH SERVICES | Facility: HOME HEALTH | Age: 25
End: 2025-06-04
Payer: COMMERCIAL

## 2025-06-05 ENCOUNTER — HOME CARE VISIT (OUTPATIENT)
Dept: HOME HEALTH SERVICES | Facility: HOME HEALTH | Age: 25
End: 2025-06-05
Payer: COMMERCIAL

## 2025-06-05 ENCOUNTER — LAB REQUISITION (OUTPATIENT)
Dept: LAB | Facility: CLINIC | Age: 25
End: 2025-06-05
Payer: COMMERCIAL

## 2025-06-05 ENCOUNTER — HOME INFUSION BILLING (OUTPATIENT)
Dept: HOME HEALTH SERVICES | Facility: HOME HEALTH | Age: 25
End: 2025-06-05
Payer: COMMERCIAL

## 2025-06-05 ENCOUNTER — RESULTS FOLLOW-UP (OUTPATIENT)
Dept: GASTROENTEROLOGY | Facility: CLINIC | Age: 25
End: 2025-06-05

## 2025-06-05 VITALS
WEIGHT: 173 LBS | HEART RATE: 74 BPM | RESPIRATION RATE: 16 BRPM | BODY MASS INDEX: 22.21 KG/M2 | OXYGEN SATURATION: 98 % | TEMPERATURE: 96.9 F | SYSTOLIC BLOOD PRESSURE: 118 MMHG | DIASTOLIC BLOOD PRESSURE: 63 MMHG

## 2025-06-05 DIAGNOSIS — K50.014 CROHN'S DISEASE OF SMALL INTESTINE WITH ABSCESS (H): ICD-10-CM

## 2025-06-05 LAB
ALBUMIN SERPL BCG-MCNC: 4 G/DL (ref 3.5–5.2)
ALP SERPL-CCNC: 72 U/L (ref 40–150)
ALT SERPL W P-5'-P-CCNC: 28 U/L (ref 0–70)
AST SERPL W P-5'-P-CCNC: 22 U/L (ref 0–45)
BILIRUB DIRECT SERPL-MCNC: 0.08 MG/DL (ref 0–0.3)
BILIRUB SERPL-MCNC: 0.3 MG/DL
PROT SERPL-MCNC: 6.7 G/DL (ref 6.4–8.3)

## 2025-06-05 PROCEDURE — S1015 IV TUBING EXTENSION SET: HCPCS

## 2025-06-05 PROCEDURE — S9338 HIT IMMUNOTHERAPY DIEM: HCPCS

## 2025-06-05 PROCEDURE — A4215 STERILE NEEDLE: HCPCS

## 2025-06-05 PROCEDURE — 80076 HEPATIC FUNCTION PANEL: CPT | Performed by: PHYSICIAN ASSISTANT

## 2025-06-05 NOTE — PROGRESS NOTES
Nursing Visit Note:  Nurse visit today for Skylar  for Ventura Quiroz.     present during visit today: Not Applicable.    Intravenous Access:  Labs drawn without difficulty. and Peripheral IV placed.    Infusion Nursing Note:    Pre-infusion Checklist:   Have you had any delayed reaction since last infusion?   No     Have you recently had an elevated temperature, fever, chills productive cough, coughing for 3 weeks or longer or hemoptysis, abnormal vital signs, night sweats, chest pain, or have you noticed a decrease in your appetite, or noted unexplained weight loss or fatigue?   No     Do you have any open wounds or new incisions?  No     Do you have any recent or upcoming hospitalizations, surgeries, or dental procedures? Does not include esophagogastroduodenoscopy, colonoscopy, endoscopic retrograde cholangiopancreatography (ERCP), endoscopic ultrasound (EUS), dental procedures or joint aspiration/steroid injections.   No     Do you currently have or recently have had any signs of illness or infection or are you on any antibiotics?   No     Have you had any new, sudden, or worsening abdominal pain?   No     Have you or anyone in your household received a live vaccination in the past 4 weeks?   No     Have you recently been diagnosed with any new nervous system diseases or cancer diagnosis? (i.e., Multiple Sclerosis, Guillain [place], seizures, neurological changes) Are you receiving any form of radiation or chemotherapy?   No     Are you pregnant or breastfeeding, or do you have plans of pregnancy in the future?   No     Have you been having any signs of worsening depression or suicidal ideation?  No     Have you had any other new onset medical symptoms?  No    Entyvio/Ocrevus/Tyasabri only: Have you been having any new or worsening medical problems such as issues with thinking, eyesight, balance or strength that have persisted over several days?   N/A    Benlysta only: Have you been having any signs of  "worsening depression or suicidal ideations?    N/A    IVIG only: Have you had any new blood clots?  N/A    Did the patient answer \"YES\" to any of the questions above?  No     Will the patient receive a medication that has an order for infusion reaction management?  Yes, and all drugs and supplies are available and none have .     If ordered, has the patient taken pre-medications?  No    Plan:   Therapy is appropriate, will proceed with treatment.     Post Infusion Assessment:  Patient tolerated infusion without incident.  Site patent and intact, free from redness, edema or discomfort.  No evidence of extravasations.  Access discontinued per protocol.      and Lab-Only Nursing Note    Labs obtained via VPT    Time Specimen drawn: 1312    Last dose (if applicable): No    Facility sent to: [facility] Tracking number: [id number]    Note: Pt feels well today for his infusion. VSS. Pt states that he continues to have 3-4 loose stools daily but they are not as urgent or frequent as they have been in the past. Pt has been also trying to improve his diet. Pt tolerated his second dose of Skyrizi without any adverse effects.     Saline administered: 10 ml NS flush with IV insertion 1305  20 ml NS flush into bag after Skyrizi 1418 (ml)    Supply Check:   Does the patient have all the supplies they need for the next visit?  Yes    Next visit plan: 900    Josefa Pace RN 2025  "

## 2025-06-14 ENCOUNTER — HEALTH MAINTENANCE LETTER (OUTPATIENT)
Age: 25
End: 2025-06-14

## 2025-06-30 ENCOUNTER — HOME INFUSION (OUTPATIENT)
Dept: HOME HEALTH SERVICES | Facility: HOME HEALTH | Age: 25
End: 2025-06-30
Payer: COMMERCIAL

## 2025-06-30 DIAGNOSIS — K50.014 CROHN'S DISEASE OF SMALL INTESTINE WITH ABSCESS (H): Primary | ICD-10-CM

## 2025-07-02 ENCOUNTER — HOME INFUSION BILLING (OUTPATIENT)
Dept: HOME HEALTH SERVICES | Facility: HOME HEALTH | Age: 25
End: 2025-07-02
Payer: COMMERCIAL

## 2025-07-03 ENCOUNTER — LAB REQUISITION (OUTPATIENT)
Dept: LAB | Facility: CLINIC | Age: 25
End: 2025-07-03
Payer: COMMERCIAL

## 2025-07-03 ENCOUNTER — HOME CARE VISIT (OUTPATIENT)
Dept: HOME HEALTH SERVICES | Facility: HOME HEALTH | Age: 25
End: 2025-07-03
Payer: COMMERCIAL

## 2025-07-03 ENCOUNTER — HOME INFUSION BILLING (OUTPATIENT)
Dept: HOME HEALTH SERVICES | Facility: HOME HEALTH | Age: 25
End: 2025-07-03
Payer: COMMERCIAL

## 2025-07-03 VITALS
SYSTOLIC BLOOD PRESSURE: 137 MMHG | RESPIRATION RATE: 16 BRPM | HEART RATE: 79 BPM | OXYGEN SATURATION: 99 % | WEIGHT: 175 LBS | BODY MASS INDEX: 22.47 KG/M2 | TEMPERATURE: 97.1 F | DIASTOLIC BLOOD PRESSURE: 82 MMHG

## 2025-07-03 DIAGNOSIS — K50.014 CROHN'S DISEASE OF SMALL INTESTINE WITH ABSCESS (H): ICD-10-CM

## 2025-07-03 LAB
ALBUMIN SERPL BCG-MCNC: 4 G/DL (ref 3.5–5.2)
ALP SERPL-CCNC: 78 U/L (ref 40–150)
ALT SERPL W P-5'-P-CCNC: 22 U/L (ref 0–70)
AST SERPL W P-5'-P-CCNC: 17 U/L (ref 0–45)
BILIRUB DIRECT SERPL-MCNC: 0.14 MG/DL (ref 0–0.3)
BILIRUB SERPL-MCNC: 0.5 MG/DL
PROT SERPL-MCNC: 6.9 G/DL (ref 6.4–8.3)

## 2025-07-03 PROCEDURE — 82040 ASSAY OF SERUM ALBUMIN: CPT | Performed by: PHYSICIAN ASSISTANT

## 2025-07-03 PROCEDURE — S9338 HIT IMMUNOTHERAPY DIEM: HCPCS

## 2025-07-03 PROCEDURE — 84155 ASSAY OF PROTEIN SERUM: CPT | Performed by: PHYSICIAN ASSISTANT

## 2025-07-03 NOTE — PROGRESS NOTES
Nursing Visit Note:  Nurse visit today for Abbi for Ventura Quiroz.     present during visit today: Not Applicable.    Intravenous Access:  Labs drawn without difficulty. and Peripheral IV placed.    Infusion Nursing Note:    Pre-infusion Checklist:   Have you had any delayed reaction since last infusion?   No     Have you recently had an elevated temperature, fever, chills productive cough, coughing for 3 weeks or longer or hemoptysis, abnormal vital signs, night sweats, chest pain, or have you noticed a decrease in your appetite, or noted unexplained weight loss or fatigue?   No     Do you have any open wounds or new incisions?  No     Do you have any recent or upcoming hospitalizations, surgeries, or dental procedures? Does not include esophagogastroduodenoscopy, colonoscopy, endoscopic retrograde cholangiopancreatography (ERCP), endoscopic ultrasound (EUS), dental procedures or joint aspiration/steroid injections.   No     Do you currently have or recently have had any signs of illness or infection or are you on any antibiotics?   No     Have you had any new, sudden, or worsening abdominal pain?   No     Have you or anyone in your household received a live vaccination in the past 4 weeks?   No     Have you recently been diagnosed with any new nervous system diseases or cancer diagnosis? (i.e., Multiple Sclerosis, Guillain [place], seizures, neurological changes) Are you receiving any form of radiation or chemotherapy?   No     Are you pregnant or breastfeeding, or do you have plans of pregnancy in the future?   No     Have you been having any signs of worsening depression or suicidal ideation?  No     Have you had any other new onset medical symptoms?  No    Entyvio/Ocrevus/Tyasabri only: Have you been having any new or worsening medical problems such as issues with thinking, eyesight, balance or strength that have persisted over several days?   N/A    Benlysta only: Have you been having any signs of  "worsening depression or suicidal ideations?    N/A    IVIG only: Have you had any new blood clots?  N/A    Did the patient answer \"YES\" to any of the questions above?  No     Will the patient receive a medication that has an order for infusion reaction management?  Yes, and all drugs and supplies are available and none have .     If ordered, has the patient taken pre-medications?  N/A    Plan:   Therapy is appropriate, will proceed with treatment.     Post Infusion Assessment:  Patient tolerated infusion without incident.  Site patent and intact, free from redness, edema or discomfort.  Access discontinued per protocol.    and Lab-Only Nursing Note    Labs obtained via PAC    Time Specimen drawn: 0910    Last dose (if applicable): No    Facility sent to: [facility] Tracking number: [id number]    Note: VSS. Patient is doing well. Patient continues to have loose stools and states overall Skylar is managing Crohn's well.    Saline administered: 20 (ml) added to bag post infusion    Supply Check:   Does the patient have all the supplies they need for the next visit?  Yes    Next visit plan: Patients 3rd dose in IS will do Sub Q at home going forward.    Ines Helton RN 7/3/2025  "

## 2025-07-07 ENCOUNTER — TELEPHONE (OUTPATIENT)
Dept: PHARMACY | Facility: CLINIC | Age: 25
End: 2025-07-07
Payer: COMMERCIAL

## 2025-07-07 DIAGNOSIS — K50.90 CROHN DISEASE (H): Primary | ICD-10-CM

## 2025-07-07 RX ORDER — RISANKIZUMAB-RZAA 360 MG/2.4
360 WEARABLE INJECTOR SUBCUTANEOUS
Qty: 2.4 ML | Refills: 2 | Status: SHIPPED | OUTPATIENT
Start: 2025-07-07

## 2025-07-07 NOTE — TELEPHONE ENCOUNTER
Skyrizi IV 3/3 complete, RX sent for OBI using CPA with Tobin Wright MD.    Last provider visit: 2025 MD Kyle  Next provider visit: RTC 6 months (not scheduled)  Last labs completed: 2025  Last Tb screenin2024  Lab frequency: every other infusion              - standing labs hepatic panel, ESR, CRP, CBC with platelets & diff  9/3/2025  Next labs due: 2025    Skyrizi induction schedule:   Week 0: 2025 (done)  Week 4: 2025 (done)  Week 8: 7/3/2025 (done)  Week 12 (first OBI): 2025     Yassine England, PharmD, BCPS  MTM Pharmacist   [facility]  Phone: [phone]

## 2025-07-22 ENCOUNTER — VIRTUAL VISIT (OUTPATIENT)
Dept: PHARMACY | Facility: CLINIC | Age: 25
End: 2025-07-22
Attending: INTERNAL MEDICINE
Payer: COMMERCIAL

## 2025-07-22 VITALS — BODY MASS INDEX: 22.47 KG/M2 | HEIGHT: 74 IN

## 2025-07-22 DIAGNOSIS — K50.90 CROHN DISEASE (H): Primary | ICD-10-CM

## 2025-07-22 ASSESSMENT — PAIN SCALES - GENERAL: PAINLEVEL_OUTOF10: NO PAIN (0)

## 2025-07-22 NOTE — NURSING NOTE
Current patient location: [id number]  [place] [address]    Is the patient currently in the state of [place]? YES    Visit mode: CONVERT TO TELEPHONE    If the visit is dropped, the patient can be reconnected by:VIDEO VISIT: Text to cell phone:   Telephone Information:       Mobile [phone]       Will anyone else be joining the visit? NO  (If patient encounters technical issues they should call [phone])    Are changes needed to the allergy or medication list? No    Are refills needed on medications prescribed by this physician? Discuss with provider    Rooming Documentation:  Questionnaire(s) completed    Reason for visit: RECHECK (MTM Return )    Kellie RUTH

## 2025-07-22 NOTE — PROGRESS NOTES
"Virtual Visit Details    Type of service:  Telephone Visit   Phone call duration: *** minutes   Originating Location (pt. Location): {patient location:323311::\"Home\"}  {PROVIDER LOCATION On-site should be selected for visits conducted from your clinic location or adjoining [facility] hospital, academic office, or other nearby [facility] building. Off-site should be selected for all other provider locations, including home:[id number]}  Distant Location (provider location):  {virtual location provider:794363}  Telephone visit completed due to {audio only reason:788425}  "

## 2025-07-22 NOTE — PATIENT INSTRUCTIONS
"Recommendations from today's MTM visit:                                                      You can review the following video on how to administer the Skyrizi OBI: [email or web address]  You are due for follow-up in the GI clinic. Please call : [phone], option #1 to schedule an appointment.   You are due for routine maintenance labs around 7/31/2025, plus annual tuberculosis screening (Quantiferon-Tb). These labs are written under Tobin Wright MD.     Follow-up: 1/20/2026 at 3:30 PM (telephone)    It was great speaking with you today.  I value your experience and would be very thankful for your time in providing feedback in our clinic survey. In the next few days, you may receive an email or text message from [employer] with a link to a survey related to your  clinical pharmacist.\"     To schedule another MTM appointment, please call the clinic directly or you may call the MTM scheduling line at [phone] or toll-free at [phone].     My Clinical Pharmacist's contact information:                                                      Please feel free to contact me with any questions or concerns you have.      Yunior England, PharmD, BCPS  MTM Pharmacist   [facility]  Phone: [phone]   "

## 2025-07-22 NOTE — PROGRESS NOTES
Medication Therapy Management (MTM) Encounter    ASSESSMENT:                            Medication Adherence/Access: No issues identified.    Crohn's Disease:  Ventura would benefit from continued treatment with Skyrizi, his first on-body injector is due 7/31/2025. They are due for routine maintenance labs. They are due for annual tuberculosis screening. No access issues for their advanced therapy are present. Health maintenance was not comprehensively reviewed with this visit.    PLAN:                            You can review the following video on how to administer the Skyrizi OBI: [email or web address]  You are due for follow-up in the GI clinic. Please call : [phone], option #1 to schedule an appointment.   You are due for routine maintenance labs around 7/31/2025, plus annual tuberculosis screening (Quantiferon-Tb). These labs are written under Tobin Wright MD.     Follow-up: 1/20/2026 at 3:30 PM (telephone)    SUBJECTIVE/OBJECTIVE:                          Ventura Quiroz is a 24 year old male seen for a follow-up visit.       Reason for visit: Skyrizi follow-up visit.    Allergies/ADRs: Reviewed in chart  Past Medical History: Reviewed in chart  Tobacco: He reports that he has never smoked. He uses smokeless tobacco.  Alcohol: 1-3 beverages / week      Medication Adherence/Access: no issues reported.    Crohn's Disease:   Skyrizi 600 mg induction at weeks 0,4 and 8 followed by maintenance     Denies side effects or concerns with Skyrizi infusions. Is arranging virtual teaching with [facility]. He has the Skyrizi OBI in his possession.    PRO-3 for Crohn's Disease    Please select the one best answer for the patient's ability at this time     Over the past week, how many liquid or soft stools have you had on average per day?   4.5 (When scoring, multiply number by 2=9) - this is his baseline  Over the past week, please rate your average abdominal pain  None  ": 0 points  3. Over the past week, please rate your general well-being    Generally Well: 0 points    Score: 9  <13: Remission  13-21: Mild Activity   22-52: Moderate Activity  >/= 53: Severe Activity      Prior authorization status: 2.4/56 approved through 10/20/2025  Original start date: 2025  Last dose: 7/3/2025 (Skyrizi IV 3/3)  Next dose: 2025 (first OBI)       Last provider visit: 2025 MD Kyle  Next provider visit: RTC 6 months (not scheduled)  Last labs completed: 2025  Last Tb screenin2024  Lab frequency: every other infusion              - standing labs hepatic panel, ESR, CRP, CBC with platelets & diff  9/3/2025  Next labs due: 2025 (routine + Tb)    Last IBD health maintenance review: 2025     Skyrizi induction schedule:   Week 0: 2025 (done)  Week 4: 2025 (done)  Week 8: 7/3/2025 (done)  Week 12 (first OBI): 2025        Today's Vitals: Ht 6' 2\" (1.88 m)   BMI 22.47 kg/m    ----------------      I spent 11 minutes with this patient today. All changes were made via collaborative practice agreement with Tobin Wright MD.     A summary of these recommendations was sent via [employer].    Yunior England, PharmD, BCPS  MTM Pharmacist   [facility]  Phone: [phone]    Telemedicine Visit Details  The patient's medications can be safely assessed via a telemedicine encounter.  Type of service:  Telephone visit  Originating Location (pt. Location): Home    Distant Location (provider location):  Off-site  Start Time: 3:30 PM  End Time: 3:41 PM     Medication Therapy Recommendations  No medication therapy recommendations to display       "

## 2025-07-30 ENCOUNTER — LAB (OUTPATIENT)
Dept: LAB | Facility: OTHER | Age: 25
End: 2025-07-30
Payer: COMMERCIAL

## 2025-07-30 DIAGNOSIS — K50.014 CROHN'S DISEASE OF SMALL INTESTINE WITH ABSCESS (H): ICD-10-CM

## 2025-07-30 DIAGNOSIS — K50.90 CROHN DISEASE (H): ICD-10-CM

## 2025-07-30 DIAGNOSIS — D50.9 IRON DEFICIENCY ANEMIA, UNSPECIFIED IRON DEFICIENCY ANEMIA TYPE: ICD-10-CM

## 2025-07-30 LAB
ALBUMIN SERPL BCG-MCNC: 4 G/DL (ref 3.5–5.2)
ALP SERPL-CCNC: 75 U/L (ref 40–150)
ALT SERPL W P-5'-P-CCNC: 23 U/L (ref 0–70)
AST SERPL W P-5'-P-CCNC: 19 U/L (ref 0–45)
BASOPHILS # BLD AUTO: 0 10E3/UL (ref 0–0.2)
BASOPHILS NFR BLD AUTO: 0 %
BILIRUB DIRECT SERPL-MCNC: <0.08 MG/DL (ref 0–0.3)
BILIRUB SERPL-MCNC: 0.2 MG/DL
CRP SERPL-MCNC: <3 MG/L
EOSINOPHIL # BLD AUTO: 0.3 10E3/UL (ref 0–0.7)
EOSINOPHIL NFR BLD AUTO: 3 %
ERYTHROCYTE [DISTWIDTH] IN BLOOD BY AUTOMATED COUNT: 13.9 % (ref 10–15)
FERRITIN SERPL-MCNC: 13 NG/ML (ref 31–409)
HCT VFR BLD AUTO: 37.8 % (ref 40–53)
HGB BLD-MCNC: 12.3 G/DL (ref 13.3–17.7)
IMM GRANULOCYTES # BLD: 0 10E3/UL
IMM GRANULOCYTES NFR BLD: 0 %
IRON BINDING CAPACITY (ROCHE): 426 UG/DL (ref 240–430)
IRON SATN MFR SERPL: 6 % (ref 15–46)
IRON SERPL-MCNC: 26 UG/DL (ref 61–157)
LYMPHOCYTES # BLD AUTO: 2.1 10E3/UL (ref 0.8–5.3)
LYMPHOCYTES NFR BLD AUTO: 21 %
MCH RBC QN AUTO: 27.6 PG (ref 26.5–33)
MCHC RBC AUTO-ENTMCNC: 32.5 G/DL (ref 31.5–36.5)
MCV RBC AUTO: 85 FL (ref 78–100)
MONOCYTES # BLD AUTO: 0.9 10E3/UL (ref 0–1.3)
MONOCYTES NFR BLD AUTO: 9 %
NEUTROPHILS # BLD AUTO: 6.4 10E3/UL (ref 1.6–8.3)
NEUTROPHILS NFR BLD AUTO: 66 %
PLATELET # BLD AUTO: 278 10E3/UL (ref 150–450)
PROT SERPL-MCNC: 6.9 G/DL (ref 6.4–8.3)
RBC # BLD AUTO: 4.46 10E6/UL (ref 4.4–5.9)
WBC # BLD AUTO: 9.6 10E3/UL (ref 4–11)

## 2025-07-30 PROCEDURE — 85025 COMPLETE CBC W/AUTO DIFF WBC: CPT

## 2025-07-30 PROCEDURE — 83540 ASSAY OF IRON: CPT

## 2025-07-30 PROCEDURE — 86140 C-REACTIVE PROTEIN: CPT

## 2025-07-30 PROCEDURE — 36415 COLL VENOUS BLD VENIPUNCTURE: CPT

## 2025-07-30 PROCEDURE — 82728 ASSAY OF FERRITIN: CPT

## 2025-07-30 PROCEDURE — 80076 HEPATIC FUNCTION PANEL: CPT

## 2025-07-30 PROCEDURE — 83550 IRON BINDING TEST: CPT

## 2025-08-04 ENCOUNTER — HOME INFUSION (OUTPATIENT)
Dept: HOME HEALTH SERVICES | Facility: HOME HEALTH | Age: 25
End: 2025-08-04
Payer: COMMERCIAL

## 2025-08-04 ENCOUNTER — MYC MEDICAL ADVICE (OUTPATIENT)
Dept: GASTROENTEROLOGY | Facility: CLINIC | Age: 25
End: 2025-08-04
Payer: COMMERCIAL

## 2025-08-04 ENCOUNTER — ENROLLMENT (OUTPATIENT)
Dept: HOME HEALTH SERVICES | Facility: HOME HEALTH | Age: 25
End: 2025-08-04
Payer: COMMERCIAL

## 2025-08-04 DIAGNOSIS — D50.9 IRON DEFICIENCY ANEMIA, UNSPECIFIED IRON DEFICIENCY ANEMIA TYPE: Primary | ICD-10-CM

## 2025-08-04 DIAGNOSIS — K50.014 CROHN'S DISEASE OF SMALL INTESTINE WITH ABSCESS (H): ICD-10-CM

## 2025-08-04 RX ORDER — ALBUTEROL SULFATE 0.83 MG/ML
2.5 SOLUTION RESPIRATORY (INHALATION)
OUTPATIENT
Start: 2025-08-04

## 2025-08-04 RX ORDER — DIPHENHYDRAMINE HYDROCHLORIDE 50 MG/ML
50 INJECTION, SOLUTION INTRAMUSCULAR; INTRAVENOUS
Start: 2025-08-04

## 2025-08-04 RX ORDER — ALBUTEROL SULFATE 90 UG/1
1-2 INHALANT RESPIRATORY (INHALATION)
Start: 2025-08-04

## 2025-08-04 RX ORDER — HEPARIN SODIUM,PORCINE 10 UNIT/ML
5-20 VIAL (ML) INTRAVENOUS DAILY PRN
OUTPATIENT
Start: 2025-08-04

## 2025-08-04 RX ORDER — DIPHENHYDRAMINE HYDROCHLORIDE 50 MG/ML
25 INJECTION, SOLUTION INTRAMUSCULAR; INTRAVENOUS
Start: 2025-08-04

## 2025-08-04 RX ORDER — EPINEPHRINE 1 MG/ML
0.3 INJECTION, SOLUTION, CONCENTRATE INTRAVENOUS EVERY 5 MIN PRN
OUTPATIENT
Start: 2025-08-04

## 2025-08-04 RX ORDER — MEPERIDINE HYDROCHLORIDE 25 MG/ML
25 INJECTION INTRAMUSCULAR; INTRAVENOUS; SUBCUTANEOUS
OUTPATIENT
Start: 2025-08-04

## 2025-08-04 RX ORDER — METHYLPREDNISOLONE SODIUM SUCCINATE 40 MG/ML
40 INJECTION INTRAMUSCULAR; INTRAVENOUS
Start: 2025-08-04

## 2025-08-04 RX ORDER — HEPARIN SODIUM (PORCINE) LOCK FLUSH IV SOLN 100 UNIT/ML 100 UNIT/ML
5 SOLUTION INTRAVENOUS
OUTPATIENT
Start: 2025-08-04

## 2025-08-05 ENCOUNTER — HOME INFUSION (OUTPATIENT)
Dept: HOME HEALTH SERVICES | Facility: HOME HEALTH | Age: 25
End: 2025-08-05
Payer: COMMERCIAL

## 2025-08-05 ENCOUNTER — HOME INFUSION BILLING (OUTPATIENT)
Dept: HOME HEALTH SERVICES | Facility: HOME HEALTH | Age: 25
End: 2025-08-05
Payer: COMMERCIAL

## 2025-08-05 ENCOUNTER — HOME CARE VISIT (OUTPATIENT)
Dept: HOME HEALTH SERVICES | Facility: HOME HEALTH | Age: 25
End: 2025-08-05
Payer: COMMERCIAL

## 2025-08-05 VITALS
OXYGEN SATURATION: 97 % | TEMPERATURE: 97.3 F | DIASTOLIC BLOOD PRESSURE: 78 MMHG | WEIGHT: 173 LBS | SYSTOLIC BLOOD PRESSURE: 124 MMHG | BODY MASS INDEX: 22.21 KG/M2 | RESPIRATION RATE: 16 BRPM | HEART RATE: 87 BPM

## 2025-08-05 DIAGNOSIS — K50.014 CROHN'S DISEASE OF SMALL INTESTINE WITH ABSCESS (H): Primary | ICD-10-CM

## 2025-08-05 DIAGNOSIS — D50.9 IRON DEFICIENCY ANEMIA, UNSPECIFIED IRON DEFICIENCY ANEMIA TYPE: ICD-10-CM

## 2025-08-05 PROCEDURE — 99601 HOME NFS VISIT <2 HRS: CPT

## 2025-08-05 PROCEDURE — A4452 WATERPROOF TAPE: HCPCS

## 2025-08-05 PROCEDURE — A4245 ALCOHOL WIPES PER BOX: HCPCS

## 2025-08-05 PROCEDURE — S9537 HT HEM HORM INJ DIEM: HCPCS

## 2025-08-05 PROCEDURE — S1015 IV TUBING EXTENSION SET: HCPCS

## 2025-08-05 RX ORDER — DIPHENHYDRAMINE HYDROCHLORIDE 50 MG/ML
50 INJECTION, SOLUTION INTRAMUSCULAR; INTRAVENOUS PRN
Qty: 99999 ML | Refills: 0 | Status: DISPENSED | OUTPATIENT
Start: 2025-08-05 | End: 2026-08-04

## 2025-08-05 RX ORDER — EPINEPHRINE 0.3 MG/.3ML
0.3 INJECTION SUBCUTANEOUS PRN
Qty: 999999 ML | Refills: 0 | Status: DISPENSED | OUTPATIENT
Start: 2025-08-05 | End: 2026-08-04

## 2025-08-05 RX ORDER — SODIUM CHLORIDE 9 MG/ML
500 INJECTION, SOLUTION INTRAVENOUS PRN
Qty: 999999 ML | Refills: 0 | Status: DISPENSED | OUTPATIENT
Start: 2025-08-05 | End: 2026-08-04

## 2025-08-06 PROCEDURE — S9537 HT HEM HORM INJ DIEM: HCPCS

## 2025-08-07 ENCOUNTER — HOME INFUSION BILLING (OUTPATIENT)
Dept: HOME HEALTH SERVICES | Facility: HOME HEALTH | Age: 25
End: 2025-08-07
Payer: COMMERCIAL

## 2025-08-07 ENCOUNTER — HOME CARE VISIT (OUTPATIENT)
Dept: HOME HEALTH SERVICES | Facility: HOME HEALTH | Age: 25
End: 2025-08-07
Payer: COMMERCIAL

## 2025-08-07 VITALS
OXYGEN SATURATION: 98 % | HEART RATE: 85 BPM | RESPIRATION RATE: 18 BRPM | TEMPERATURE: 97.5 F | SYSTOLIC BLOOD PRESSURE: 120 MMHG | DIASTOLIC BLOOD PRESSURE: 78 MMHG

## 2025-08-07 PROCEDURE — S9537 HT HEM HORM INJ DIEM: HCPCS

## 2025-08-07 PROCEDURE — S1015 IV TUBING EXTENSION SET: HCPCS

## 2025-08-08 PROCEDURE — S9537 HT HEM HORM INJ DIEM: HCPCS

## 2025-08-09 ENCOUNTER — HOME CARE VISIT (OUTPATIENT)
Dept: HOME HEALTH SERVICES | Facility: HOME HEALTH | Age: 25
End: 2025-08-09
Payer: COMMERCIAL

## 2025-08-09 VITALS
TEMPERATURE: 97.4 F | SYSTOLIC BLOOD PRESSURE: 118 MMHG | RESPIRATION RATE: 16 BRPM | DIASTOLIC BLOOD PRESSURE: 74 MMHG | OXYGEN SATURATION: 99 % | HEART RATE: 83 BPM

## 2025-08-09 PROCEDURE — S9537 HT HEM HORM INJ DIEM: HCPCS

## 2025-08-09 PROCEDURE — 99601 HOME NFS VISIT <2 HRS: CPT

## 2025-08-10 PROCEDURE — S9537 HT HEM HORM INJ DIEM: HCPCS

## 2025-08-11 ENCOUNTER — HOME CARE VISIT (OUTPATIENT)
Dept: HOME HEALTH SERVICES | Facility: HOME HEALTH | Age: 25
End: 2025-08-11
Payer: COMMERCIAL

## 2025-08-11 VITALS
SYSTOLIC BLOOD PRESSURE: 116 MMHG | HEART RATE: 85 BPM | OXYGEN SATURATION: 98 % | RESPIRATION RATE: 18 BRPM | DIASTOLIC BLOOD PRESSURE: 78 MMHG | TEMPERATURE: 97.8 F

## 2025-08-11 PROCEDURE — 99601 HOME NFS VISIT <2 HRS: CPT

## 2025-08-11 PROCEDURE — S9537 HT HEM HORM INJ DIEM: HCPCS

## 2025-08-12 PROCEDURE — S9537 HT HEM HORM INJ DIEM: HCPCS

## 2025-08-13 ENCOUNTER — HOME CARE VISIT (OUTPATIENT)
Dept: HOME HEALTH SERVICES | Facility: HOME HEALTH | Age: 25
End: 2025-08-13
Payer: COMMERCIAL

## 2025-08-13 VITALS
OXYGEN SATURATION: 98 % | RESPIRATION RATE: 16 BRPM | SYSTOLIC BLOOD PRESSURE: 110 MMHG | HEART RATE: 88 BPM | TEMPERATURE: 97.9 F | DIASTOLIC BLOOD PRESSURE: 70 MMHG

## 2025-08-13 PROCEDURE — S9537 HT HEM HORM INJ DIEM: HCPCS

## 2025-08-13 PROCEDURE — 99601 HOME NFS VISIT <2 HRS: CPT

## 2025-08-14 PROCEDURE — S9537 HT HEM HORM INJ DIEM: HCPCS

## 2025-08-20 ENCOUNTER — DOCUMENTATION ONLY (OUTPATIENT)
Dept: HOME HEALTH SERVICES | Facility: HOME HEALTH | Age: 25
End: 2025-08-20
Payer: COMMERCIAL

## (undated) DEVICE — KIT INTRODUCER FLUENT MICRO 5FRX10CM ECHO TIP KIT-038-04

## (undated) DEVICE — KIT ENDO TURNOVER/PROCEDURE CARRY-ON 101822

## (undated) DEVICE — DRSG TEGADERM IV ADVANCED 3.5X4.5" 1685

## (undated) DEVICE — BALLOON ELATION 240CML 7-8-9-10-11MM 5.5CM

## (undated) DEVICE — SUCTION MANIFOLD DORNOCH ULTRA CART UL-CL500

## (undated) DEVICE — SUCTION SLEEVE NEPTUNE 2 165MM 0703-005-165

## (undated) DEVICE — SOL WATER IRRIG 500ML BOTTLE 2F7113

## (undated) DEVICE — LINEN TOWEL PACK X6 WHITE 5487

## (undated) DEVICE — DRAPE IOBAN INCISE 23X17" 6650EZ

## (undated) DEVICE — BALLOON ELATION 240CML 11-12-13.5-15-16MM 5.5CM EPCX12

## (undated) DEVICE — Device

## (undated) DEVICE — DRAIN JACKSON PRATT RESERVOIR 100ML SU130-1305

## (undated) DEVICE — COVER ULTRASOUND PROBE W/GEL FLEXI-FEEL 6"X58" LF  25-FF658

## (undated) DEVICE — SUCTION MANIFOLD NEPTUNE 2 SYS 1 PORT 702-025-000

## (undated) DEVICE — SU ETHILON 3-0 PS-1 18" 1663H

## (undated) DEVICE — GOWN IMPERVIOUS 2XL BLUE

## (undated) DEVICE — ESU PENCIL SMOKE EVAC W/ROCKER SWITCH 0703-047-000

## (undated) DEVICE — SPONGE LAP 18X18" X8435

## (undated) DEVICE — DRSG BIOPATCH GERMICIDAL SPLIT SPONGE 4MM MED 4150

## (undated) DEVICE — FCP BIOPSY RADIAL JAW 4 JUMBO 3.2MM CHANNEL M00513370

## (undated) DEVICE — SNARE CAPIVATOR ROUND COLD SNR BX10 M00561101

## (undated) DEVICE — CLIP HEMOSTASIS ASSURANCE W16 MM BX00711884

## (undated) DEVICE — LINEN GOWN XLG 5407

## (undated) DEVICE — INFLATION DEVICE BIG 60 ENDO-AN6012

## (undated) DEVICE — SOL WATER IRRIG 1000ML BOTTLE 2F7114

## (undated) DEVICE — DRAPE SHEET REV FOLD 3/4 9349

## (undated) DEVICE — SPECIMEN CONTAINER 3OZ W/FORMALIN 59901

## (undated) DEVICE — TUBING SUCTION MEDI-VAC 1/4"X20' N620A

## (undated) DEVICE — COVER EASY EQUIP BAG W/BAND LATEX FREE EZ-28

## (undated) DEVICE — PACK AB HYST II

## (undated) DEVICE — CAST STOCKINETTE 3"

## (undated) DEVICE — STPL LINEAR 90 X 3.5MM TA9035S

## (undated) DEVICE — BALLOON ELATION 240CML 14-15-16.5-18-19MM 5.5CM EPCX15

## (undated) DEVICE — SU PDS II 1 TP-1 54" Z879G

## (undated) DEVICE — GLOVE PROTEXIS POWDER FREE SMT 6.5  2D72PT65X

## (undated) DEVICE — STPL CONTOUR CUT CVD GREEN CS40G

## (undated) DEVICE — BLADE KNIFE SURG 15 371115

## (undated) DEVICE — SU PDS II 1 TP-1 48" Z880G

## (undated) DEVICE — DRSG PRIMAPORE 02X3" 7133

## (undated) DEVICE — TUBING SUCTION 12"X1/4" N612

## (undated) DEVICE — SU MONOCRYL 4-0 PS-2 27" UND Y426H

## (undated) DEVICE — SU VICRYL 2-0 MH 27" J323H

## (undated) DEVICE — GLOVE PROTEXIS POWDER FREE SMT 7.5  2D72PT75X

## (undated) DEVICE — ENDO FORCEP BX CAPTURA PRO SPIKE G50696

## (undated) DEVICE — KIT ENDO FIRST STEP DISINFECTANT 200ML W/POUCH EP-4

## (undated) DEVICE — CLIP HEMOSTATIC ASSURANCE W11 MM 00711882

## (undated) DEVICE — ENDO CAP AND TUBING STERILE FOR ENDOGATOR  100130

## (undated) DEVICE — STPL CIRCULAR ENDOPATH 29MM CVD ECS29A

## (undated) DEVICE — TUBING SUCTION 10'X3/16" N510

## (undated) DEVICE — DECANTER BAG 2002S

## (undated) DEVICE — LINEN TOWEL PACK X5 5464

## (undated) DEVICE — PAD CHUX UNDERPAD 23X24" 7136

## (undated) DEVICE — STPL LINEAR CUT 75MM TLC75

## (undated) DEVICE — GOWN XLG DISP 9545

## (undated) DEVICE — ADH SKIN CLOSURE PREMIERPRO EXOFIN 1.0ML 3470

## (undated) DEVICE — PREP CHLORAPREP 26ML TINTED ORANGE  260815

## (undated) DEVICE — CAP LUER LOCK MALE/FEMALE DUAL 2C6250

## (undated) DEVICE — LINEN TOWEL PACK X30 5481

## (undated) DEVICE — SU PROLENE 2-0 SHDA 36" 8523H

## (undated) DEVICE — ENDO SYSTEM WATER BOTTLE & TUBING W/CO2 FILTER 00711549

## (undated) DEVICE — STPL RELOAD LINEAR CUT 75MM TCR75

## (undated) DEVICE — SU VICRYL 3-0 SH 8X18" UND J864D

## (undated) DEVICE — DRAIN JACKSON PRATT ROUND SIL 19FR W/TROCAR LF JP-2232

## (undated) DEVICE — SU VICRYL 2-0 TIE 54" J615H

## (undated) RX ORDER — DIPHENHYDRAMINE HYDROCHLORIDE 50 MG/ML
INJECTION INTRAMUSCULAR; INTRAVENOUS
Status: DISPENSED
Start: 2022-09-29

## (undated) RX ORDER — FENTANYL CITRATE 50 UG/ML
INJECTION, SOLUTION INTRAMUSCULAR; INTRAVENOUS
Status: DISPENSED
Start: 2020-08-21

## (undated) RX ORDER — ACETAMINOPHEN 325 MG/1
TABLET ORAL
Status: DISPENSED
Start: 2020-08-21

## (undated) RX ORDER — HYDROMORPHONE HYDROCHLORIDE 1 MG/ML
INJECTION, SOLUTION INTRAMUSCULAR; INTRAVENOUS; SUBCUTANEOUS
Status: DISPENSED
Start: 2020-08-21

## (undated) RX ORDER — FENTANYL CITRATE 50 UG/ML
INJECTION, SOLUTION INTRAMUSCULAR; INTRAVENOUS
Status: DISPENSED
Start: 2025-04-09

## (undated) RX ORDER — FENTANYL CITRATE 50 UG/ML
INJECTION, SOLUTION INTRAMUSCULAR; INTRAVENOUS
Status: DISPENSED
Start: 2024-03-04

## (undated) RX ORDER — ONDANSETRON 2 MG/ML
INJECTION INTRAMUSCULAR; INTRAVENOUS
Status: DISPENSED
Start: 2020-08-21

## (undated) RX ORDER — FENTANYL CITRATE 50 UG/ML
INJECTION, SOLUTION INTRAMUSCULAR; INTRAVENOUS
Status: DISPENSED
Start: 2022-11-18

## (undated) RX ORDER — HEPARIN SODIUM,PORCINE 10 UNIT/ML
VIAL (ML) INTRAVENOUS
Status: DISPENSED
Start: 2020-08-10

## (undated) RX ORDER — PROPOFOL 10 MG/ML
INJECTION, EMULSION INTRAVENOUS
Status: DISPENSED
Start: 2019-04-11

## (undated) RX ORDER — HEPARIN SODIUM (PORCINE) LOCK FLUSH IV SOLN 100 UNIT/ML 100 UNIT/ML
SOLUTION INTRAVENOUS
Status: DISPENSED
Start: 2020-08-10

## (undated) RX ORDER — CIPROFLOXACIN 2 MG/ML
INJECTION, SOLUTION INTRAVENOUS
Status: DISPENSED
Start: 2020-08-21

## (undated) RX ORDER — FENTANYL CITRATE 50 UG/ML
INJECTION, SOLUTION INTRAMUSCULAR; INTRAVENOUS
Status: DISPENSED
Start: 2024-12-23

## (undated) RX ORDER — SODIUM CHLORIDE, SODIUM LACTATE, POTASSIUM CHLORIDE, CALCIUM CHLORIDE 600; 310; 30; 20 MG/100ML; MG/100ML; MG/100ML; MG/100ML
INJECTION, SOLUTION INTRAVENOUS
Status: DISPENSED
Start: 2020-08-21

## (undated) RX ORDER — LIDOCAINE HYDROCHLORIDE 20 MG/ML
INJECTION, SOLUTION EPIDURAL; INFILTRATION; INTRACAUDAL; PERINEURAL
Status: DISPENSED
Start: 2020-08-21

## (undated) RX ORDER — DEXAMETHASONE SODIUM PHOSPHATE 4 MG/ML
INJECTION, SOLUTION INTRA-ARTICULAR; INTRALESIONAL; INTRAMUSCULAR; INTRAVENOUS; SOFT TISSUE
Status: DISPENSED
Start: 2020-08-21

## (undated) RX ORDER — ONDANSETRON 2 MG/ML
INJECTION INTRAMUSCULAR; INTRAVENOUS
Status: DISPENSED
Start: 2022-09-29

## (undated) RX ORDER — FENTANYL CITRATE 50 UG/ML
INJECTION, SOLUTION INTRAMUSCULAR; INTRAVENOUS
Status: DISPENSED
Start: 2022-09-29

## (undated) RX ORDER — PROPOFOL 10 MG/ML
INJECTION, EMULSION INTRAVENOUS
Status: DISPENSED
Start: 2020-08-21

## (undated) RX ORDER — FENTANYL CITRATE 50 UG/ML
INJECTION, SOLUTION INTRAMUSCULAR; INTRAVENOUS
Status: DISPENSED
Start: 2019-04-11